# Patient Record
Sex: MALE | Race: WHITE | NOT HISPANIC OR LATINO | Employment: OTHER | URBAN - METROPOLITAN AREA
[De-identification: names, ages, dates, MRNs, and addresses within clinical notes are randomized per-mention and may not be internally consistent; named-entity substitution may affect disease eponyms.]

---

## 2017-01-11 ENCOUNTER — TRANSFERRED RECORDS (OUTPATIENT)
Dept: HEALTH INFORMATION MANAGEMENT | Facility: CLINIC | Age: 57
End: 2017-01-11

## 2017-01-27 ENCOUNTER — MYC MEDICAL ADVICE (OUTPATIENT)
Dept: OTHER | Facility: CLINIC | Age: 57
End: 2017-01-27

## 2017-01-27 NOTE — TELEPHONE ENCOUNTER
Please just have the patient see me next week (not on Monday) to address his concerns. This type of thing can not be optimally handled over the internet.

## 2017-03-07 ENCOUNTER — HOSPITAL ENCOUNTER (OUTPATIENT)
Facility: CLINIC | Age: 57
Setting detail: OBSERVATION
Discharge: HOME | End: 2017-03-09
Attending: EMERGENCY MEDICINE | Admitting: INTERNAL MEDICINE
Payer: COMMERCIAL

## 2017-03-07 ENCOUNTER — APPOINTMENT (OUTPATIENT)
Dept: CT IMAGING | Facility: CLINIC | Age: 57
End: 2017-03-07
Attending: EMERGENCY MEDICINE
Payer: COMMERCIAL

## 2017-03-07 DIAGNOSIS — R07.89 CHEST WALL PAIN: ICD-10-CM

## 2017-03-07 DIAGNOSIS — K59.03 DRUG-INDUCED CONSTIPATION: Primary | ICD-10-CM
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
## 2017-03-07 DIAGNOSIS — J93.9 PNEUMOTHORAX ON RIGHT: ICD-10-CM

## 2017-03-07 LAB
ALBUMIN SERPL-MCNC: 3.9 G/DL (ref 3.4–5)
ALP SERPL-CCNC: 87 U/L (ref 40–150)
ALT SERPL W P-5'-P-CCNC: 29 U/L (ref 0–70)
ANION GAP SERPL CALCULATED.3IONS-SCNC: 6 MMOL/L (ref 3–14)
AST SERPL W P-5'-P-CCNC: 27 U/L (ref 0–45)
BASOPHILS # BLD AUTO: 0.1 10E9/L (ref 0–0.2)
BASOPHILS NFR BLD AUTO: 0.7 %
BILIRUB SERPL-MCNC: 0.2 MG/DL (ref 0.2–1.3)
BUN SERPL-MCNC: 13 MG/DL (ref 7–30)
CALCIUM SERPL-MCNC: 8.9 MG/DL (ref 8.5–10.1)
CHLORIDE SERPL-SCNC: 103 MMOL/L (ref 94–109)
CO2 SERPL-SCNC: 32 MMOL/L (ref 20–32)
CREAT SERPL-MCNC: 0.9 MG/DL (ref 0.66–1.25)
DIFFERENTIAL METHOD BLD: NORMAL
EOSINOPHIL # BLD AUTO: 0.2 10E9/L (ref 0–0.7)
EOSINOPHIL NFR BLD AUTO: 2.7 %
ERYTHROCYTE [DISTWIDTH] IN BLOOD BY AUTOMATED COUNT: 13.3 % (ref 10–15)
GFR SERPL CREATININE-BSD FRML MDRD: 87 ML/MIN/1.7M2
GLUCOSE SERPL-MCNC: 106 MG/DL (ref 70–99)
HCT VFR BLD AUTO: 43.6 % (ref 40–53)
HGB BLD-MCNC: 14.6 G/DL (ref 13.3–17.7)
IMM GRANULOCYTES # BLD: 0 10E9/L (ref 0–0.4)
IMM GRANULOCYTES NFR BLD: 0.1 %
LIPASE SERPL-CCNC: 124 U/L (ref 73–393)
LYMPHOCYTES # BLD AUTO: 2.3 10E9/L (ref 0.8–5.3)
LYMPHOCYTES NFR BLD AUTO: 33.7 %
MCH RBC QN AUTO: 30.7 PG (ref 26.5–33)
MCHC RBC AUTO-ENTMCNC: 33.5 G/DL (ref 31.5–36.5)
MCV RBC AUTO: 92 FL (ref 78–100)
MONOCYTES # BLD AUTO: 0.6 10E9/L (ref 0–1.3)
MONOCYTES NFR BLD AUTO: 8.8 %
NEUTROPHILS # BLD AUTO: 3.6 10E9/L (ref 1.6–8.3)
NEUTROPHILS NFR BLD AUTO: 54 %
NRBC # BLD AUTO: 0 10*3/UL
NRBC BLD AUTO-RTO: 0 /100
PLATELET # BLD AUTO: 154 10E9/L (ref 150–450)
POTASSIUM SERPL-SCNC: 3.5 MMOL/L (ref 3.4–5.3)
PROT SERPL-MCNC: 7.9 G/DL (ref 6.8–8.8)
RBC # BLD AUTO: 4.76 10E12/L (ref 4.4–5.9)
SODIUM SERPL-SCNC: 141 MMOL/L (ref 133–144)
WBC # BLD AUTO: 6.7 10E9/L (ref 4–11)

## 2017-03-07 PROCEDURE — 99218 ZZC INITIAL OBSERVATION CARE,LEVL I: CPT | Performed by: INTERNAL MEDICINE

## 2017-03-07 PROCEDURE — 99285 EMERGENCY DEPT VISIT HI MDM: CPT | Mod: 25

## 2017-03-07 PROCEDURE — G0378 HOSPITAL OBSERVATION PER HR: HCPCS

## 2017-03-07 PROCEDURE — 85025 COMPLETE CBC W/AUTO DIFF WBC: CPT | Performed by: EMERGENCY MEDICINE

## 2017-03-07 PROCEDURE — 25000128 H RX IP 250 OP 636: Performed by: EMERGENCY MEDICINE

## 2017-03-07 PROCEDURE — 80053 COMPREHEN METABOLIC PANEL: CPT | Performed by: EMERGENCY MEDICINE

## 2017-03-07 PROCEDURE — 96375 TX/PRO/DX INJ NEW DRUG ADDON: CPT

## 2017-03-07 PROCEDURE — 25500064 ZZH RX 255 OP 636: Performed by: EMERGENCY MEDICINE

## 2017-03-07 PROCEDURE — 83690 ASSAY OF LIPASE: CPT | Performed by: EMERGENCY MEDICINE

## 2017-03-07 PROCEDURE — 96361 HYDRATE IV INFUSION ADD-ON: CPT

## 2017-03-07 PROCEDURE — 96374 THER/PROPH/DIAG INJ IV PUSH: CPT | Mod: 59

## 2017-03-07 PROCEDURE — 25000132 ZZH RX MED GY IP 250 OP 250 PS 637: Performed by: INTERNAL MEDICINE

## 2017-03-07 PROCEDURE — 71260 CT THORAX DX C+: CPT

## 2017-03-07 PROCEDURE — 99207 ZZC CDG-EXAM COMPONENT: MEETS DETAILED - DOWN CODED: CPT | Performed by: INTERNAL MEDICINE

## 2017-03-07 PROCEDURE — 74177 CT ABD & PELVIS W/CONTRAST: CPT

## 2017-03-07 PROCEDURE — 25000125 ZZHC RX 250: Performed by: EMERGENCY MEDICINE

## 2017-03-07 RX ORDER — AMOXICILLIN 250 MG
1-2 CAPSULE ORAL 2 TIMES DAILY
Status: DISCONTINUED | OUTPATIENT
Start: 2017-03-07 | End: 2017-03-09 | Stop reason: HOSPADM

## 2017-03-07 RX ORDER — IOPAMIDOL 755 MG/ML
104 INJECTION, SOLUTION INTRAVASCULAR ONCE
Status: DISCONTINUED | OUTPATIENT
Start: 2017-03-07 | End: 2017-03-07

## 2017-03-07 RX ORDER — AMOXICILLIN 250 MG
1-2 CAPSULE ORAL 2 TIMES DAILY PRN
Status: DISCONTINUED | OUTPATIENT
Start: 2017-03-07 | End: 2017-03-09 | Stop reason: HOSPADM

## 2017-03-07 RX ORDER — PROCHLORPERAZINE MALEATE 5 MG
5-10 TABLET ORAL EVERY 6 HOURS PRN
Status: DISCONTINUED | OUTPATIENT
Start: 2017-03-07 | End: 2017-03-09 | Stop reason: HOSPADM

## 2017-03-07 RX ORDER — NALOXONE HYDROCHLORIDE 0.4 MG/ML
.1-.4 INJECTION, SOLUTION INTRAMUSCULAR; INTRAVENOUS; SUBCUTANEOUS
Status: DISCONTINUED | OUTPATIENT
Start: 2017-03-07 | End: 2017-03-09 | Stop reason: HOSPADM

## 2017-03-07 RX ORDER — HYDROMORPHONE HYDROCHLORIDE 1 MG/ML
.3-.5 INJECTION, SOLUTION INTRAMUSCULAR; INTRAVENOUS; SUBCUTANEOUS
Status: DISCONTINUED | OUTPATIENT
Start: 2017-03-07 | End: 2017-03-09 | Stop reason: HOSPADM

## 2017-03-07 RX ORDER — ONDANSETRON 4 MG/1
4 TABLET, ORALLY DISINTEGRATING ORAL EVERY 6 HOURS PRN
Status: DISCONTINUED | OUTPATIENT
Start: 2017-03-07 | End: 2017-03-09 | Stop reason: HOSPADM

## 2017-03-07 RX ORDER — BISACODYL 10 MG
10 SUPPOSITORY, RECTAL RECTAL DAILY PRN
Status: DISCONTINUED | OUTPATIENT
Start: 2017-03-07 | End: 2017-03-09 | Stop reason: HOSPADM

## 2017-03-07 RX ORDER — ONDANSETRON 2 MG/ML
4 INJECTION INTRAMUSCULAR; INTRAVENOUS EVERY 6 HOURS PRN
Status: DISCONTINUED | OUTPATIENT
Start: 2017-03-07 | End: 2017-03-09 | Stop reason: HOSPADM

## 2017-03-07 RX ORDER — PRAVASTATIN SODIUM 40 MG
20 TABLET ORAL DAILY
Status: DISCONTINUED | OUTPATIENT
Start: 2017-03-08 | End: 2017-03-09 | Stop reason: HOSPADM

## 2017-03-07 RX ORDER — POLYETHYLENE GLYCOL 3350 17 G/17G
17 POWDER, FOR SOLUTION ORAL DAILY PRN
Status: DISCONTINUED | OUTPATIENT
Start: 2017-03-07 | End: 2017-03-09 | Stop reason: HOSPADM

## 2017-03-07 RX ORDER — METOCLOPRAMIDE HYDROCHLORIDE 5 MG/ML
10 INJECTION INTRAMUSCULAR; INTRAVENOUS EVERY 6 HOURS PRN
Status: DISCONTINUED | OUTPATIENT
Start: 2017-03-07 | End: 2017-03-09 | Stop reason: HOSPADM

## 2017-03-07 RX ORDER — ACETAMINOPHEN 325 MG/1
650 TABLET ORAL EVERY 4 HOURS PRN
Status: DISCONTINUED | OUTPATIENT
Start: 2017-03-07 | End: 2017-03-09 | Stop reason: HOSPADM

## 2017-03-07 RX ORDER — OXYCODONE HYDROCHLORIDE 5 MG/1
5-10 TABLET ORAL
Status: DISCONTINUED | OUTPATIENT
Start: 2017-03-07 | End: 2017-03-09 | Stop reason: HOSPADM

## 2017-03-07 RX ORDER — PROCHLORPERAZINE 25 MG
25 SUPPOSITORY, RECTAL RECTAL EVERY 12 HOURS PRN
Status: DISCONTINUED | OUTPATIENT
Start: 2017-03-07 | End: 2017-03-09 | Stop reason: HOSPADM

## 2017-03-07 RX ORDER — HYDROCHLOROTHIAZIDE 25 MG/1
25 TABLET ORAL EVERY MORNING
Status: DISCONTINUED | OUTPATIENT
Start: 2017-03-08 | End: 2017-03-09 | Stop reason: HOSPADM

## 2017-03-07 RX ORDER — MORPHINE SULFATE 4 MG/ML
4 INJECTION, SOLUTION INTRAMUSCULAR; INTRAVENOUS ONCE
Status: COMPLETED | OUTPATIENT
Start: 2017-03-07 | End: 2017-03-07

## 2017-03-07 RX ORDER — LEVOTHYROXINE SODIUM 125 UG/1
125 TABLET ORAL DAILY
Status: DISCONTINUED | OUTPATIENT
Start: 2017-03-08 | End: 2017-03-09 | Stop reason: HOSPADM

## 2017-03-07 RX ORDER — SODIUM CHLORIDE 9 MG/ML
1000 INJECTION, SOLUTION INTRAVENOUS CONTINUOUS
Status: DISCONTINUED | OUTPATIENT
Start: 2017-03-07 | End: 2017-03-07

## 2017-03-07 RX ORDER — IOPAMIDOL 755 MG/ML
104 INJECTION, SOLUTION INTRAVASCULAR ONCE
Status: COMPLETED | OUTPATIENT
Start: 2017-03-07 | End: 2017-03-07

## 2017-03-07 RX ORDER — METOCLOPRAMIDE 10 MG/1
10 TABLET ORAL EVERY 6 HOURS PRN
Status: DISCONTINUED | OUTPATIENT
Start: 2017-03-07 | End: 2017-03-09 | Stop reason: HOSPADM

## 2017-03-07 RX ADMIN — SODIUM CHLORIDE 1000 ML: 9 INJECTION, SOLUTION INTRAVENOUS at 15:42

## 2017-03-07 RX ADMIN — SENNOSIDES AND DOCUSATE SODIUM 1 TABLET: 8.6; 5 TABLET ORAL at 21:05

## 2017-03-07 RX ADMIN — OXYCODONE HYDROCHLORIDE 10 MG: 5 TABLET ORAL at 21:05

## 2017-03-07 RX ADMIN — HYDROMORPHONE HYDROCHLORIDE 1 MG: 1 INJECTION, SOLUTION INTRAMUSCULAR; INTRAVENOUS; SUBCUTANEOUS at 15:38

## 2017-03-07 RX ADMIN — SODIUM CHLORIDE 72 ML: 9 INJECTION, SOLUTION INTRAVENOUS at 16:00

## 2017-03-07 RX ADMIN — MORPHINE SULFATE 4 MG: 4 INJECTION, SOLUTION INTRAMUSCULAR; INTRAVENOUS at 15:06

## 2017-03-07 RX ADMIN — IOPAMIDOL 104 ML: 755 INJECTION, SOLUTION INTRAVENOUS at 16:00

## 2017-03-07 ASSESSMENT — ENCOUNTER SYMPTOMS
NAUSEA: 0
NECK PAIN: 0
ANAL BLEEDING: 1
SHORTNESS OF BREATH: 0
BACK PAIN: 1
WEAKNESS: 0
HEADACHES: 0
VOMITING: 0
NUMBNESS: 0

## 2017-03-07 NOTE — PHARMACY-ADMISSION MEDICATION HISTORY
Admission Medication History    Admission medication history interview status for the 3/7/2017 admission is complete. See EPIC admission navigator for prior to admission medications     Medication history source reliability:Good    Actions taken by pharmacist (provider contacted, etc):None     Additional medication history information not noted on PTA med list :None    Medication reconciliation/reorder completed by provider prior to medication history? No    Time spent in this activity: 10 minutes    Prior to Admission medications    Medication Sig Last Dose Taking? Auth Provider   hydrochlorothiazide (HYDRODIURIL) 25 MG tablet Take 1 tablet (25 mg) by mouth every morning 3/7/2017 at AM Yes Addison Davis MD   pravastatin (PRAVACHOL) 20 MG tablet Take 1 tablet (20 mg) by mouth daily 3/7/2017 at AM Yes Addison Davis MD   levothyroxine (SYNTHROID, LEVOTHROID) 125 MCG tablet Take 1 tablet (125 mcg) by mouth daily 3/7/2017 at AM Yes Addison Davis MD   Cholecalciferol (VITAMIN D) 2000 UNITS tablet Take 1 tablet by mouth daily Past Week at Unknown time Yes Addison Davis MD   cyanocobalamin 1000 MCG SUBL Place 1,000 mcg under the tongue daily Past Week at Unknown time Yes Reported, Patient   Multiple Vitamin (MULTI-VITAMIN) per tablet Take 1 tablet by mouth 2 times daily  3/7/2017 at AM Yes Reported, Patient       Camron Singh, PharmD

## 2017-03-07 NOTE — ED PROVIDER NOTES
History     Chief Complaint:  Fall    HPI   Ramiro Jeffers is a 56 year old male who presents to the emergency department today with fall. Today at 1400 the patient was in a his garage carrying an object and as he was on the third step while climbing down a flight of hardwood stairs he lost his balance and fell down the remaining stairs, which he estimates to be 8-10 steps, striking a wall at the bottom of the steps before coming to a halt on the landing. He can not recall exactly how he fell down the stairs. He denies striking his head or loss of consciousness. States that it took him 15 minutes to get up off the ground before being able to get to his feet and call his brother. States that his most sever pain is over the right anterior chest, but he also has pain in the low back. Also describes a sensations of spasms in the abdomen. Denies neck pain. States that the anterior chest pain increases with breath, but denies shortness of breath. Denies headache, visual disturbances, focal numbness or weakness. Denies nausea or vomiting.     Allergies:  No Known Drug Allergies      Medications:    Hydrodiuril   Pravastatin   Synthroid      Past Medical History:    Hypothyroidism   DVT  Cataract   Obesity   PE   Venous insufficiency   Vitamin D deficiency     Past Surgical History:    Laparoscopy, repair incisional or ventral hernia   Laparoscopic gastric bypass  Colonoscopy   Phacoemulsification clear cornea with standard intraocular lens implant   Cholecystectomy   Laparoscopic lysis adhesions     Family History:    Osteoporosis   Cancer - Maternal Grandmother - Father     Social History:  The patient was accompanied to the ED by brother.  Smoking Status: Never smoker   Smokeless Tobacco: Never used  Alcohol Use: No     Marital Status:  Single      Review of Systems   Respiratory: Negative for shortness of breath.    Gastrointestinal: Positive for anal bleeding. Negative for nausea and vomiting.   Musculoskeletal:  "Positive for back pain. Negative for neck pain.        Anterior chest wall pain: Positive    Neurological: Negative for weakness, numbness and headaches.   All other systems reviewed and are negative.    Physical Exam   First Vitals:  BP: 146/69  Pulse: 66  Temp: 97.7  F (36.5  C)  Resp: 20  Height: 175.3 cm (5' 9\")  Weight: 94.3 kg (208 lb)  SpO2: 95 %    Physical Exam   General: Middle age man appearing older than stated age sitting on bed holding right side and moaning.     Eye:  Pupils are equal, round, and reactive.  Extraocular movements intact.    ENT:  No rhinorrhea.  Moist mucus membranes.  Normal tongue and tonsil.    Cardiac:  Regular rate and rhythm.  No murmurs, gallops, or rubs.    Pulmonary:  Clear to auscultation bilaterally.  No wheezes, rales, or rhonchi. Difficulty to full assess due to shallow breaths.     Abdomen:  Positive bowel sounds.  Abdomen is soft and non-distended, without focal tenderness.    Musculoskeletal:  Normal movement of all extremities without evidence for deficit. Diffuse pain throughout the entire right anterior chest wall including the 10 th rib. No crepitous.     Skin:  Warm and dry without rashes.    Neurologic:  Non-focal exam without asymmetric weakness or numbness.  GCS 15    Psychiatric:  Normal affect with appropriate interaction with examiner.     Emergency Department Course     Imaging:  Radiology findings were communicated with the patient who voiced understanding of the findings.    CT Chest/Abdomen/Pelvis w Contrast:   IMPRESSION:  1. Mild right pneumothorax.  2. 2.5 cm left thyroid nodule.  3. Several right lung nodules. Follow-up recommended, as above.  4. 1.4 cm indeterminate sclerotic lesion of the medial left ilium.  This has slowly grown compared with June 2010. Continued periodic  surveillance recommended.  Preliminary result per radiology      Laboratory:  Laboratory findings were communicated with the patient who voiced understanding of the " findings.    CBC:WBC 6.7, HGB 14.6,    CMP: Glucose 106 (H) o/w WNL Creatinine 0.90   Lipase: 124     Interventions:  1506 Morphine 4 mg IV   1538 Dilaudid 1 mg IV   1542 NS 1000 mL IV     Emergency Department Course:  Nursing notes and vitals reviewed.  I performed an exam of the patient as documented above.   The patient was sent for a CT Chest/Abdomen/Pelvis w Contrast while in the emergency department, results above.      1748: I spoke with Dr. Avendaño of the hospitalist service regarding patient's presentation, findings, and plan of care.     1851: Discussed the patient with Dr. Santamaria, of the trauma surgery service. They agree with the plan and will evaluate the patient in the hospital.     I discussed the treatment plan with the patient. They expressed understanding of this plan and consented to admission. I discussed the patient with Dr. Avendaño, who will admit the patient to a monitored bed for further evaluation and treatment.     Impression & Plan      Medical Decision Making:  Ramiro Jeffers is a 56 year old male who presents to the emergency department today after a significant fall down some wooden stairs, primarily striking his right chest wall. He was having significant pain on my initial assessment with difficulty on deep inspiration secondary to pain. Due to the nature of the injury advance imaging was obtained, which is fortunately negative for rib fracture or intraabdominal injury. However, it does show a small pneumothorax. Considering that this is a small injury with otherwise normal oxygenation and blood pressure I do not feel that he would require a chest tube at this time. However, this certainty needs to be watched closely has his pain needs to be controled. Therefore, I spoke with Dr. Avendaño of the hospitalist who will admit the patient to observation for repeat imaging in the morning to make sure that this pneumothorax is not inappropriately expanding. I also spoke with Dr. Santamaria of the trauma  service who will consult and review the imaging to determine if further interventions need to be undertaken.     Diagnosis:    ICD-10-CM    1. Pneumothorax on right J93.9    2. Chest wall pain R07.89    3. Fall down stairs, initial encounter W10.8XXA      Scribe Disclosure:  I, Jerald Anne, am serving as a scribe at 3:11 PM on 3/7/2017 to document services personally performed by Trierweiler, Chad A, MD, based on my observations and the provider's statements to me.   3/7/2017    EMERGENCY DEPARTMENT       Trierweiler, Chad A, MD  03/07/17 1249

## 2017-03-07 NOTE — IP AVS SNAPSHOT
[facility]    [address] [place] [address]    Phone:  [phone]                                       After Visit Summary   3/7/2017    Ramiro Jeffers    MRN: [id number]           After Visit Summary Signature Page     I have received my discharge instructions, and my questions have been answered. I have discussed any challenges I see with this plan with the nurse or doctor.    ..........................................................................................................................................  Patient/Patient Representative Signature      ..........................................................................................................................................  Patient Representative Print Name and Relationship to Patient    ..................................................               ................................................  Date                                            Time    ..........................................................................................................................................  Reviewed by Signature/Title    ...................................................              ..............................................  Date                                                            Time

## 2017-03-07 NOTE — LETTER
[facility]  [address] [address]  [phone]      PATIENT DATA    Employee Name: Ramiro Jeffers      : 1960         Ramiro Jeffers was under my care from 3/7/2017-3/9/2017 at [facility]. He was UNABLE to fly at that time.     Please call with questions or concerns.         Amira Bauer PA-C

## 2017-03-07 NOTE — LETTER
[facility]  [address] [address]  [phone]      PATIENT DATA    Patient Name: Ramiro Jeffers      : 1960       Ramiro Jeffers was under my care from 3/7/2017-3/9/2017 at [facility]. He was UNABLE to fly at that time.     Please call with questions or concerns.           Amira Bauer PA-C

## 2017-03-07 NOTE — ED NOTES
[facility]  ED Nurse Handoff Report    ED Chief complaint: Fall (Fell down a flight of stairs - having lots of right torso pain, hurts to breathe.  No LOC)      ED Diagnosis:   Final diagnoses:   Pneumothorax on right   Chest wall pain   Fall down stairs, initial encounter       Code Status: Full Code    Allergies:   Allergies   Allergen Reactions     No Known Allergies        Activity level:  Stand with Assist     Needed?: No    Isolation: No  Infection: Not Applicable    Bariatric?: No      Vital Signs:   Vitals:    03/07/17 1545 03/07/17 1615 03/07/17 1645 03/07/17 1700   BP: 126/77  135/75 130/70   Pulse: 65  67 64   Resp: 19      Temp:       TempSrc:       SpO2: 92% 95%  99%   Weight:       Height:           Cardiac Rhythm: ,        Pain level: 0-10 Pain Scale: 8    Is this patient confused?: No    Patient Report: Initial Complaint: Fell down several stairs landing on his back.  Difficult to take a deep breath and has R sided arm, back and rib pain  Focused Assessment:  Difficulty breathing R rib pain  Tests Performed: labs CT  Abnormal Results:CT, Pneumothorax  Treatments provided: morphine and dilaudid, IVF    Family Comments: Brother and Niece here    OBS brochure/video discussed/provided to patient: Yes    ED Medications:   Medications   sodium chloride (PF) 0.9% PF flush 3 mL (not administered)   sodium chloride (PF) 0.9% PF flush 3 mL (not administered)   0.9% sodium chloride BOLUS (0 mLs Intravenous Stopped 3/7/17 1714)     Followed by   0.9% sodium chloride infusion (not administered)   morphine (PF) injection 4 mg (4 mg Intramuscular Given 3/7/17 1506)   HYDROmorphone (DILAUDID) injection 1 mg (1 mg Intravenous Given 3/7/17 1538)   iopamidol (ISOVUE-370) solution 104 mL (104 mLs Intravenous Given 3/7/17 1600)   sodium chloride 0.9 % for CT scan flush dose 72 mL (72 mLs Intravenous Given 3/7/17 1600)       Drips infusing?:  No      ED NURSE PHONE NUMBER: [phone]

## 2017-03-07 NOTE — IP AVS SNAPSHOT
MRN:[id number]                      After Visit Summary   3/7/2017    Ramiro WILKERSON Chose    MRN: [id number]           Thank you!     Thank you for choosing [facility] for your care. Our goal is always to provide you with excellent care. Hearing back from our patients is one way we can continue to improve our services. Please take a few minutes to complete the written survey that you may receive in the mail after you visit with us. Thank you!        Patient Information     Date Of Birth          1960        About your hospital stay     You were admitted on:  March 7, 2017 [facility]    You were discharged on:  March 9, 2017        Reason for your hospital stay       You were here for evaluation after a fall. Your chest xray showed that the fall caused a small air leak between your lung and chest wall called a pneumothorax. Repeat imaging showed that this had decreased in size.                  Who to Call     For medical emergencies, please call 911.  For non-urgent questions about your medical care, please call your primary care provider or clinic, [phone]          Attending Provider     Provider Specialty    Ramiro Kovacs MD Emergency Medicine    [facility] gard[facility] Emergency Medicine    [facility]       Primary Care Provider Office Phone # Fax #    Rufinosarah Tobi Davis [phone]       [facility] [address]  [place] [address]         When to contact your care team       Seek medical evaluation if your pain or shortness of breath worsens.                  After Care Instructions     Activity       Your activity upon discharge: activity as tolerated. Be cautious to avoid additional trauma.            Diet       Follow this diet upon discharge: Orders Placed This Encounter      Regular Diet Adult            Discharge Instructions       Continue Tylenol and Oxycodone for pain  "control, you should not drive on this medication and should be cautious as it can be sedating.                  Follow-up Appointments     Follow-up and recommended labs and tests        Follow up with primary care provider, Addison Davis, within 7 days. This appointment will be scheduled for you. Discuss ongoing symptom management as well as thyroid and lung nodules.                  Pending Results     No orders found from 3/5/2017 to 3/8/2017.            Statement of Approval     Ordered          03/09/17 1309  I have reviewed and agree with all the recommendations and orders detailed in this document.  EFFECTIVE NOW     Approved and electronically signed by:  Amira Bauer PA-C             Admission Information     Date & Time Provider Department Dept. Phone    3/7/2017 Shahram Avendaño MD [facility] [phone]      Your Vitals Were     Blood Pressure Pulse Temperature Respirations Height Weight    121/63 64 98.2  F (36.8  C) 16 1.753 m (5' 9\") 94.3 kg (208 lb)    Pulse Oximetry BMI (Body Mass Index)                95% 30.72 kg/m2          [email or web address] Information     [employer] gives you secure access to your electronic health record. If you see a primary care provider, you can also send messages to your care team and make appointments. If you have questions, please call your primary care clinic.  If you do not have a primary care provider, please call [phone] and they will assist you.        Care EveryWhere ID     This is your Care EveryWhere ID. This could be used by other organizations to access your [place] medical records  [id number]           Review of your medicines      START taking        Dose / Directions    acetaminophen 325 MG tablet   Commonly known as:  TYLENOL   Used for:  Chest wall pain        Dose:  650 mg   Take 2 tablets (650 mg) by mouth every 4 hours as needed for mild pain   Quantity:  100 tablet   Refills:  0       oxyCODONE 5 MG IR tablet   Commonly " known as:  ROXICODONE   Used for:  Chest wall pain        Dose:  5-10 mg   Take 1-2 tablets (5-10 mg) by mouth every 4 hours as needed for moderate to severe pain   Quantity:  20 tablet   Refills:  0       senna-docusate 8.6-50 MG per tablet   Commonly known as:  SENOKOT-S;PERICOLACE   Used for:  Drug-induced constipation        Dose:  1-2 tablet   Take 1-2 tablets by mouth 2 times daily as needed (constipation )   Quantity:  100 tablet   Refills:  0         CONTINUE these medicines which have NOT CHANGED        Dose / Directions    cyanocobalamin 1000 MCG Subl sublingual tablet        Dose:  1000 mcg   Place 1,000 mcg under the tongue daily   Refills:  0       hydrochlorothiazide 25 MG tablet   Commonly known as:  HYDRODIURIL   Used for:  Essential hypertension with goal blood pressure less than 130/80        Dose:  25 mg   Take 1 tablet (25 mg) by mouth every morning   Quantity:  90 tablet   Refills:  3       levothyroxine 125 MCG tablet   Commonly known as:  SYNTHROID/LEVOTHROID   Used for:  Hypothyroidism, unspecified type        Dose:  125 mcg   Take 1 tablet (125 mcg) by mouth daily   Quantity:  90 tablet   Refills:  3       Multi-vitamin Tabs tablet   Generic drug:  multivitamin, therapeutic with minerals        Dose:  1 tablet   Take 1 tablet by mouth 2 times daily   Refills:  0       pravastatin 20 MG tablet   Commonly known as:  PRAVACHOL   Used for:  Hyperlipidemia LDL goal <100        Dose:  20 mg   Take 1 tablet (20 mg) by mouth daily   Quantity:  90 tablet   Refills:  3       vitamin D 2000 UNITS tablet   Used for:  Vitamin D deficiency        Dose:  1 tablet   Take 1 tablet by mouth daily   Quantity:  100 tablet   Refills:  12            Where to get your medicines      Some of these will need a paper prescription and others can be bought over the counter. Ask your nurse if you have questions.     Bring a paper prescription for each of these medications     oxyCODONE 5 MG IR tablet       You don't  need a prescription for these medications     acetaminophen 325 MG tablet    senna-docusate 8.6-50 MG per tablet                Protect others around you: Learn how to safely use, store and throw away your medicines at www.disposemymeds.org.             Medication List: This is a list of all your medications and when to take them. Check marks below indicate your daily home schedule. Keep this list as a reference.      Medications           Morning Afternoon Evening Bedtime As Needed    acetaminophen 325 MG tablet   Commonly known as:  TYLENOL   Take 2 tablets (650 mg) by mouth every 4 hours as needed for mild pain   Last time this was given:  650 mg on 3/8/2017  7:23 AM                            Every 4 hours for pain       cyanocobalamin 1000 MCG Subl sublingual tablet   Place 1,000 mcg under the tongue daily                                hydrochlorothiazide 25 MG tablet   Commonly known as:  HYDRODIURIL   Take 1 tablet (25 mg) by mouth every morning   Last time this was given:  25 mg on 3/9/2017  7:42 AM                                   levothyroxine 125 MCG tablet   Commonly known as:  SYNTHROID/LEVOTHROID   Take 1 tablet (125 mcg) by mouth daily   Last time this was given:  125 mcg on 3/9/2017  7:42 AM                                   Multi-vitamin Tabs tablet   Take 1 tablet by mouth 2 times daily   Generic drug:  multivitamin, therapeutic with minerals                                oxyCODONE 5 MG IR tablet   Commonly known as:  ROXICODONE   Take 1-2 tablets (5-10 mg) by mouth every 4 hours as needed for moderate to severe pain   Last time this was given:  10 mg on 3/9/2017  1:18 PM                            Every 4 hours needed for pain       pravastatin 20 MG tablet   Commonly known as:  PRAVACHOL   Take 1 tablet (20 mg) by mouth daily   Last time this was given:  20 mg on 3/9/2017  7:42 AM                                   senna-docusate 8.6-50 MG per tablet   Commonly known as:  SENOKOT-S;PERICOLACE    Take 1-2 tablets by mouth 2 times daily as needed (constipation )   Last time this was given:  2 tablets on 3/9/2017  7:45 AM                                      vitamin D 2000 UNITS tablet   Take 1 tablet by mouth daily

## 2017-03-08 ENCOUNTER — APPOINTMENT (OUTPATIENT)
Dept: GENERAL RADIOLOGY | Facility: CLINIC | Age: 57
End: 2017-03-08
Attending: PHYSICIAN ASSISTANT
Payer: COMMERCIAL

## 2017-03-08 PROCEDURE — G0378 HOSPITAL OBSERVATION PER HR: HCPCS

## 2017-03-08 PROCEDURE — 25000132 ZZH RX MED GY IP 250 OP 250 PS 637: Performed by: INTERNAL MEDICINE

## 2017-03-08 PROCEDURE — 99221 1ST HOSP IP/OBS SF/LOW 40: CPT | Performed by: SURGERY

## 2017-03-08 PROCEDURE — 71020 XR CHEST 2 VW: CPT

## 2017-03-08 PROCEDURE — 99226 ZZC SUBSEQUENT OBSERVATION CARE,LEVEL III: CPT | Performed by: PHYSICIAN ASSISTANT

## 2017-03-08 RX ADMIN — OXYCODONE HYDROCHLORIDE 10 MG: 5 TABLET ORAL at 02:22

## 2017-03-08 RX ADMIN — OXYCODONE HYDROCHLORIDE 10 MG: 5 TABLET ORAL at 07:22

## 2017-03-08 RX ADMIN — LEVOTHYROXINE SODIUM 125 MCG: 125 TABLET ORAL at 07:26

## 2017-03-08 RX ADMIN — HYDROCHLOROTHIAZIDE 25 MG: 25 TABLET ORAL at 08:17

## 2017-03-08 RX ADMIN — OXYCODONE HYDROCHLORIDE 5 MG: 5 TABLET ORAL at 13:50

## 2017-03-08 RX ADMIN — Medication 1 MG: at 02:22

## 2017-03-08 RX ADMIN — ACETAMINOPHEN 650 MG: 325 TABLET, FILM COATED ORAL at 07:23

## 2017-03-08 RX ADMIN — OXYCODONE HYDROCHLORIDE 10 MG: 5 TABLET ORAL at 17:07

## 2017-03-08 RX ADMIN — SENNOSIDES AND DOCUSATE SODIUM 1 TABLET: 8.6; 5 TABLET ORAL at 08:20

## 2017-03-08 RX ADMIN — SENNOSIDES AND DOCUSATE SODIUM 2 TABLET: 8.6; 5 TABLET ORAL at 20:49

## 2017-03-08 RX ADMIN — OXYCODONE HYDROCHLORIDE 10 MG: 5 TABLET ORAL at 20:48

## 2017-03-08 ASSESSMENT — PAIN DESCRIPTION - DESCRIPTORS
DESCRIPTORS: ACHING
DESCRIPTORS: ACHING

## 2017-03-08 NOTE — PROGRESS NOTES
Full note dictated. Fall with right chest pain. Plan to stay today. CXR tomorrow morning. If OK, home, if PTX larger, will keep here. Work on ambulation today.

## 2017-03-08 NOTE — PROGRESS NOTES
Observation goals PRIOR TO DISCHARGE      -diagnostic tests and consults completed and resulted -Partially met  -vital signs normal or at patient baseline-Met   -adequate pain control on oral analgesics -Not met-  Stable to improved pneumothorax- Not met  Nurse to notify provider when observation goals have been met and patient is ready for discharge.

## 2017-03-08 NOTE — PLAN OF CARE
Problem: Goal Outcome Summary  Goal: Goal Outcome Summary  Outcome: Improving  Pt doing well. A/o. VSS. Pain controlled on oral oxy. Up SBA with cane. Ambulated in the hallway. Tolerated well. Some SOB with exertion. Oxygen sats at 89-90% with ambulation. Climbed right back to 93 % and above on rest. Tolerated reg diet. Voiding. Seen by trauma. Repeat chest Xray in am. Dc home if stable tomorrow.

## 2017-03-08 NOTE — H&P
[facility]  History and Physical   Hospitalist Service  Shahram Avendaño MD    Ramiro Jeffers MRN# [id number]   YOB: 1960 Age: 56 year old      Date of Admission:  3/7/2017    Assessment & Plan   Ramiro Jeffers is a 56 year old male with history of DVT/PE, HTN, s/p gastric bypass, hyperlipidemia, chronic venous insufficiency, hypothyroidism, who presents with a fall down the stairs and right sided rib pain.     Small right sided pneumothorax secondary to fall down stairs  Currently requiring 2L supplemental O2.  No fracture on CT, but likely has contusion of right chest wall.  - Trauma surgery consult  - Wean O2 as able  - Repeat CXR in AM; if any worsening, will defer to trauma surgery if chest tube should be considered  - Pain control with Tylenol, oxycodone and IV Dilaudid    Hypertension  - Continue PTA HCTZ    Right lung nodules  Incidental finding on chest CT showing several small right lung nodules, largest measuring 9mm.  - Radiologist recommends outpatient f/u with serial CT f/u at 3 months for 2 years vs PET    Hypothyroidism  2.5 cm left thyroid nodule  - Continue Synthroid  - F/u with PCP regarding thyroid nodule    Prophylaxis: Pneumatic Compression Devices  Code Status: Full  Disposition: Observation status, possible discharge to home tomorrow if pain control and PTX stable to improved    Primary Care Physician   Addison Davis    Chief Complaint   Fall  History is obtained from the patient.    History of Present Illness   Ramiro Jeffers is a 56 year old male with history of DVT/PE, HTN, hyperlipidemia, chronic venous insufficiency, hypothyroidism, who presents with a fall down the stairs and right sided rib pain.  The patient was in a garage when he tripped while carrying a small table, falling down 8-9 steps and landing hard on his right side.  He denies loss of consciousness or hitting his head.  The small table also landed on him.  He was unable to get up until 15  minutes later, but continued to have significant right sided chest wall pain and some shortness of breath.  He also reported some spasms involving his abdominal muscles.    Patient denies drinking alcohol.  Does not smoke.  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    In the emergency department, CT chest/abd/pelvis was positive for a small right pneumothorax but no fractures.  Trauma surgery is being contacted.    Past Medical History    I have reviewed this patient's medical history and updated it with pertinent information if needed.   Past Medical History   Diagnosis Date     ACQUIRED HYPOTHYROIDISM       Blood clot in the legs      Calculus of gallbladder without mention of cholecystitis or obstruction      Cataract      Obesity, unspecified      significant weight loss w/diet alone, on 10-15-10, BMI was 56.4     pulmonary emboli 6-2010     Unprovoked large bilateral pulmonary emboli without source identified on lower extremity dopplers, pt had a transient moderate lupus inhibitor upon initial presentation in June , 2010 which was gone in September, 2010;  all other thrombophilic workup is normal     Pulmonary embolus, bilateral      Shortness of breath      on exertion     Venous insufficiency      Viral pneumonia, unspecified      Vitamin D deficiency      Past Surgical History   I have reviewed this patient's surgical history and updated it with pertinent information if needed.  Past Surgical History   Procedure Laterality Date     Laparoscopy, surgical; repair incisional or ventral hernia       repair of ventral strangulated surgery     C nonspecific procedure  10-     Laparoscopic gastric bypass,     C nonspecific procedure  10-     1.  Attempted laparoscopic exploration with conversion to: .  Abdominal exploration. 3.  Reduction of incarcerated incisional hernia. 4.  Repair of incisional hernia.5.  Gastrostomy tube placement (#22 [place]uvian) into defunctionalized stomach.6.   Enterotomy with bowel decompression and primary repair. 7.  Abdominal lavage.      Colonoscopy  11/17/2011     Procedure:COLONOSCOPY; colonoscopy; Surgeon:ELENA OROURKE; Location: GI     Phacoemulsification clear cornea with standard intraocular lens implant  12/19/2011     Procedure:PHACOEMULSIFICATION CLEAR CORNEA WITH STANDARD INTRAOCULAR LENS IMPLANT; RIGHT PHACOEMULSIFICATION CLEAR CORNEA WITH STANDARD INTRAOCULAR LENS IMPLANT ; Surgeon:ALLISON ANTONIO; Location: [facility]     Laparoscopic cholecystectomy  9/28/2012     Procedure: LAPAROSCOPIC CHOLECYSTECTOMY;  LAPAROSCOPIC CHOLECYSTECTOMY, LYSIS OF ADHESIONS;  Surgeon: Dutch Matta MD;  Location:  OR     Laparoscopic lysis adhesions  9/28/2012     Procedure: LAPAROSCOPIC LYSIS ADHESIONS;;  Surgeon: Dutch Matta MD;  Location:  OR     Prior to Admission Medications   Prior to Admission Medications   Prescriptions Last Dose Informant Patient Reported? Taking?   Cholecalciferol (VITAMIN D) 2000 UNITS tablet Past Week at Unknown time Self No Yes   Sig: Take 1 tablet by mouth daily   Multiple Vitamin (MULTI-VITAMIN) per tablet 3/7/2017 at AM Self Yes Yes   Sig: Take 1 tablet by mouth 2 times daily    cyanocobalamin 1000 MCG SUBL Past Week at Unknown time Self Yes Yes   Sig: Place 1,000 mcg under the tongue daily   hydrochlorothiazide (HYDRODIURIL) 25 MG tablet 3/7/2017 at AM Self No Yes   Sig: Take 1 tablet (25 mg) by mouth every morning   levothyroxine (SYNTHROID, LEVOTHROID) 125 MCG tablet 3/7/2017 at AM Self No Yes   Sig: Take 1 tablet (125 mcg) by mouth daily   pravastatin (PRAVACHOL) 20 MG tablet 3/7/2017 at AM Self No Yes   Sig: Take 1 tablet (20 mg) by mouth daily      Facility-Administered Medications: None     Allergies   Allergies   Allergen Reactions     No Known Allergies      Social History   I have reviewed this patient's social history and updated it with pertinent information if needed.   Ramiro  reports that he has never smoked. He has  "never used smokeless tobacco. He reports that he does not drink alcohol or use illicit drugs.    Family History   I have reviewed this patient's family history and updated it with pertinent information if needed.    Problem (# of Occurrences) Relation (Name,Age of Onset)    CANCER (2) Maternal Grandmother: ? lung cancer, Father: melanoma    OSTEOPOROSIS (1) Mother    Thyroid Disease (1) Mother: no thyroid        Review of Systems   The 10 point Review of Systems is negative other than noted in the HPI or here.    Physical Exam   /70  Pulse 64  Temp 97.7  F (36.5  C) (Oral)  Resp 19  Ht 5' 9\" (1.753 m)  Wt 208 lb (94.3 kg)  SpO2 99%  BMI 30.72 kg/m2  Constitutional: Awake, NAD  HEENT: NC, AT, EOMI  Cardiovascular: S1, S2, regular rhythm  Respiratory: CTAB, no crackles  Gastrointestinal: Soft, NT, ND, no rebound  Neurologic: No focal deficits, oriented  Psychiatric: Appropriate affect  Lymphatic: No edema  MSK: Pain over right chest wall    Data   All pertinent laboratory and imaging results from this encounter were personally reviewed.    Recent Labs  Lab 03/07/17  1530   WBC 6.7   HGB 14.6   MCV 92         POTASSIUM 3.5   CHLORIDE 103   CO2 32   BUN 13   CR 0.90   ANIONGAP 6   GUERITA 8.9   *   ALBUMIN 3.9   PROTTOTAL 7.9   BILITOTAL 0.2   ALKPHOS 87   ALT 29   AST 27   LIPASE 124       Recent Results (from the past 24 hour(s))   CT Chest/Abdomen/Pelvis w Contrast    Narrative    CT CHEST/ABDOMEN/PELVIS WITH CONTRAST   3/7/2017 4:04 PM     HISTORY: Right-sided chest wall/abdominal pain, trauma.    TECHNIQUE: 104 mL Isovue-370. Radiation dose for this scan was reduced  using automated exposure control, adjustment of the mA and/or kV  according to patient size, or iterative reconstruction technique.    COMPARISON: CT abdomen dated 10/13/2014    FINDINGS:   Chest: Coronary artery calcifications. There is a 2.5 cm partially  calcified left thyroid nodule. Small amount of asymmetric right  pleural " thickening or fluid without significant overall effusion. Mild  right pneumothorax. No definite right rib fracture is appreciated.  There are small right lung nodules on images 32, 37, 38, 43, 23, 41.  The largest of these measures 9 mm (image 43). Depending on the  clinical scenario, serial CT followup (first at 3 months, total 2  years), versus PET scan should be performed.    Abdomen, pelvis: Changes of gastric surgery and cholecystectomy.  Lumbosacral spondylolytic spondylolisthesis. At the medial aspect of  the left ilium, there is a 1.4 cm lesion which has demonstrated slow  growth compared with June 2010. This is of unknown significance and is  too small to confidently characterize. Nothing else acute with respect  to the remaining upper abdominal organs.      Impression    IMPRESSION:  1. Mild right pneumothorax.  2. 2.5 cm left thyroid nodule.  3. Several right lung nodules. Follow-up recommended, as above.  4. 1.4 cm indeterminate sclerotic lesion of the medial left ilium.  This has slowly grown compared with June 2010. Continued periodic  surveillance recommended.    CLAUDIA BLOUNT MD

## 2017-03-08 NOTE — PROGRESS NOTES
[facility]    Hospitalist Progress Note      Assessment & Plan   Ramiro Jeffers is a 56 year old male  With a history of gastric bypass surgery, DVT/PE, HLD, chronic venous insufficiency and hypothyroidism and who was admitted on 3/7/2017 for evaluation after a fall with resulting pneumothorax.     Small right sided pneumothorax secondary to fall down stairs   No fracture on CT, but likely has contusion of right chest wall. Xray was obtained this am which shows small apical pneumothorax on the right. Patient continues to have right sided chest discomfort but has been weaned off supplemental oxygen.   - Trauma surgery consulted, discussed patient with Dr. Santamaria this morning. Patient will remain in the hospital tonight to repeat CXR in the am. If pneumothorax is stable he can likely discharge, if size increasing he will require chest tube.   - Pain control with Tylenol, oxycodone and IV Dilaudid     Hypertension. Blood pressure is well controlled.   - Continue PTA HCTZ     Right lung nodules  Incidental finding on chest CT showing several small right lung nodules, largest measuring 9mm.  - Radiologist recommends outpatient f/u with serial CT f/u at 3 months for 2 years vs PET     Hypothyroidism  2.5 cm left thyroid nodule  - Continue Synthroid  - F/u with PCP regarding thyroid nodule    DVT Prophylaxis: Pneumatic Compression Devices, ambulate every shift   Code Status: Full Code    Disposition: Expected discharge pending results of CXR tomorrow     This patient was seen and examined with Dr. Del Valle who agrees with the above plan.    Amira Bauer PA-C    Interval History   Patient continues to have right chest wall pain today, worse with inspiration. Pain is controlled with oral medication. He does have some mild shortness of breath. Ambulated with assistance of cane.     -Data reviewed today: I reviewed all new labs and imaging results over the last 24 hours. I personally reviewed the chest x-ray  image(s) showing small right pneumothorax.    Physical Exam   Temp: 99  F (37.2  C) Temp src: Oral BP: 124/75 Pulse: 64 Heart Rate: 52 Resp: 16 SpO2: 94 % O2 Device: None (Room air) Oxygen Delivery: 2 LPM  Vitals:    03/07/17 1428 03/07/17 2006   Weight: 94.3 kg (208 lb) 94.3 kg (208 lb)     Vital Signs with Ranges  Temp:  [96.7  F (35.9  C)-99  F (37.2  C)] 99  F (37.2  C)  Pulse:  [64-68] 64  Heart Rate:  [52-68] 52  Resp:  [16-22] 16  BP: (105-146)/(61-98) 124/75  SpO2:  [88 %-99 %] 94 %  I/O last 3 completed shifts:  In: -   Out: 400 [Urine:400]    Constitutional: Alert and oriented, sitting up in bed. Appears comfortable and is appropriately conversant.   ENT: normal cephalic, moist mucous membranes  Eyes:  Pupils are equal and reactive to light, EOMI  Respiratory: Lungs clear to auscultation bilaterally, no crackles or wheezing, no increased work of breathing  Cardiovascular: Regular rate and rhythm, no murmur is appreciated.   Extremities: chronic stasis changes lower extremities. Varicose veins   GI: Normal active bowel sounds, abdomen soft, non-tender  Skin/Integumen: warm, dry. No rash or ecchymosis. White plaques to elbows.   Neurologic: cranial nerves 2-12 grossly intact. No focal deficits.       Medications        sodium chloride (PF)  3 mL Intracatheter Q8H     hydrochlorothiazide  25 mg Oral QAM     levothyroxine  125 mcg Oral Daily     pravastatin  20 mg Oral Daily     senna-docusate  1-2 tablet Oral BID       Data     Recent Labs  Lab 03/07/17  1530   WBC 6.7   HGB 14.6   MCV 92         POTASSIUM 3.5   CHLORIDE 103   CO2 32   BUN 13   CR 0.90   ANIONGAP 6   GUERITA 8.9   *   ALBUMIN 3.9   PROTTOTAL 7.9   BILITOTAL 0.2   ALKPHOS 87   ALT 29   AST 27   LIPASE 124       Recent Results (from the past 24 hour(s))   CT Chest/Abdomen/Pelvis w Contrast    Narrative    CT CHEST/ABDOMEN/PELVIS WITH CONTRAST   3/7/2017 4:04 PM     HISTORY: Right-sided chest wall/abdominal pain,  trauma.    TECHNIQUE: 104 mL Isovue-370. Radiation dose for this scan was reduced  using automated exposure control, adjustment of the mA and/or kV  according to patient size, or iterative reconstruction technique.    COMPARISON: CT abdomen dated 10/13/2014    FINDINGS:   Chest: Coronary artery calcifications. There is a 2.5 cm partially  calcified left thyroid nodule. Small amount of asymmetric right  pleural thickening or fluid without significant overall effusion. Mild  right pneumothorax. No definite right rib fracture is appreciated.  There are small right lung nodules on images 32, 37, 38, 43, 23, 41.  The largest of these measures 9 mm (image 43). Depending on the  clinical scenario, serial CT followup (first at 3 months, total 2  years), versus PET scan should be performed.    Abdomen, pelvis: Changes of gastric surgery and cholecystectomy.  Lumbosacral spondylolytic spondylolisthesis. At the medial aspect of  the left ilium, there is a 1.4 cm lesion which has demonstrated slow  growth compared with June 2010. This is of unknown significance and is  too small to confidently characterize. Nothing else acute with respect  to the remaining upper abdominal organs.      Impression    IMPRESSION:  1. Mild right pneumothorax.  2. 2.5 cm left thyroid nodule.  3. Several right lung nodules. Follow-up recommended, as above.  4. 1.4 cm indeterminate sclerotic lesion of the medial left ilium.  This has slowly grown compared with June 2010. Continued periodic  surveillance recommended.    CLAUDIA BLOUNT MD   XR Chest 2 Views    Narrative    CHEST TWO VIEWS 3/8/2017 7:35 AM     HISTORY: Pneumothorax.    COMPARISON: Chest CT from 3/7/2017.    FINDINGS: Small apical pneumothorax on the right noted. No left  pneumothorax. There are no acute infiltrates. The cardiac silhouette  is not enlarged. Pulmonary vasculature is unremarkable.       Impression    IMPRESSION: Small right apical pneumothorax.    JOSE M BRENNER MD

## 2017-03-08 NOTE — PLAN OF CARE
Problem: Discharge Planning  Goal: Discharge Planning (Adult, OB, Behavioral, Peds)  Outcome: No Change  Pt A+O. VSS on RA. Lungs diminished. C/o pain with deep breathing and activity. Right arm/rib cage pain managed with oxycodone and tylenol. Up to bathroom with A1. Offered cane but pt declined. Right hand/wrist hurting so pt declined walker as well. Appears shaky on feet. Repeated X ray this morning for small pneumothorax. Nursing will continue to monitor.

## 2017-03-08 NOTE — PROGRESS NOTES
Observation goals PRIOR TO DISCHARGE        -diagnostic tests and consults completed and resulted -Partially met. Repeat X-ray this morning   -vital signs normal or at patient baseline-Met   -adequate pain control on oral analgesics -partially met. Managed with oxycodone and tylenol.   Stable to improved pneumothorax- Not met. X-ray this morning   Nurse to notify provider when observation goals have been met and patient is ready for discharge.

## 2017-03-08 NOTE — PROGRESS NOTES
Observation goals PRIOR TO DISCHARGE        -diagnostic tests and consults completed and resulted -Partially met  -vital signs normal or at patient baseline-Met   -adequate pain control on oral analgesics -Not met   Stable to improved pneumothorax- Not met  Nurse to notify provider when observation goals have been met and patient is ready for discharge.

## 2017-03-08 NOTE — PROGRESS NOTES
Observation goals PRIOR TO DISCHARGE        -diagnostic tests and consults completed and resulted -Partially met.  -vital signs normal or at patient baseline-Met   -adequate pain control on oral analgesics -partially met. Managed with oxycodone and tylenol.   Stable to improved pneumothorax- Xray result not changed from yesterday  Nurse to notify provider when observation goals have been met and patient is ready for discharge.

## 2017-03-08 NOTE — CONSULTS
REQUESTING PHYSICIAN:  None stated.      DIAGNOSIS:  Fall with right chest pain.      HISTORY OF PRESENT ILLNESS:  Ramiro Jeffers is a 56-year-old gentleman who fell down 8-10 steps that were on unpadded hitting a wall at the bottom of the steps.  He felt stuck at the bottom and does not believe he lost consciousness or hit his head.  He said because he was stuck, it took him 10-15 minutes to get up and call his brother.  His pain is mainly on the right side in the anterior and lateral chest.  He also has some low back pain, although it is much better than last night.  He denies any weakness or dizziness.  No real abdominal pain.  No leg,  arm, or head pain.  He says he has had no changes in vision or nausea or vomiting.  He does have some pain with deep inspiration today, but it is better than last night.      PAST MEDICAL HISTORY:  Morbid obesity treated with laparoscopic gastric bypass by myself.  He has also had a cholecystectomy, incisional hernia repair.  He has had cataract surgery.  Medical history includes obesity, blood clots, hypothyroidism, venous insufficiency, and cataracts.      FAMILY HISTORY:  Positive for cancers in father and grandmother.  There is a history of osteoporosis also.      SOCIAL HISTORY:  The patient is single.  He does not use tobacco or alcohol regularly.  He lives near here.  He was planning to go on a trip to visit his mother who lives out of town.      MEDICATIONS:  Pravastatin, Synthroid, HydroDIURIL, vitamins.      ALLERGIES:  No known drug allergies.      REVIEW OF SYSTEMS:  Done in 10-point completion.   RESPIRATORY:  Negative for dyspnea.   GASTROINTESTINAL:  Positive for right-sided abdominal discomfort, but negative for vomiting or diarrhea.   MUSCULOSKELETAL:  Positive for chest and back pain.   NEUROLOGIC:  No weakness, dizziness, visual changes or headaches.    ENDOCRINE:  No recent changes in his endocrine.    INTEGUMENTARY:  No cuts or bruises that he knows of.    GENITOURINARY:  He has been able to urinate.    Other systems are reviewed and are negative.      PHYSICAL EXAMINATION:   GENERAL:  The patient is alert and oriented.  Hearing and speech seem good.  Mental status seems appropriate.     VITAL SIGNS:  Afebrile with a pulse between 50 and 70.  Blood pressure is normal.   HEENT:  Head is normocephalic and atraumatic.  Nose is normal.  There are no clinical skull fractures.  There are no clinical facial bone fractures.  No abrasions or lacerations on his head.   NECK:  Nontender and supple.  The cervical spine is nontender.  There is no cervical adenopathy or thyromegaly.  There is no stridor.  There is no tracheal deviation.  Clavicles are nontender and stable.    CHEST:  Sternum is nontender to pressure.  Right-sided ribs are tender laterally.  Left-sided ribs are nontender and breathing is normal and nonlabored.  Thoracic spine is nontender.  Scapulae are nontender.   ABDOMEN:  Soft and nontender.  There is redundant skin.  There is no localized guarding, rebound or masses.  Pelvic bone is stable.   EXTREMITIES:  Redundant skin venous stasis changes on his ankles but he does have good motion and no evidence of long bone or patellar fracture.  He has good strength in all fours.  He is able to stand on his own.     NEUROLOGIC:  He seems appropriate and alert.  Seems to have good sensation in all fours.      LABORATORY DATA:  Remarkable for normal metabolic battery and CBC.  Chest CT is reviewed.  Images reviewed last night and are reviewed again today.  He does have a small pneumothorax on the right side.  There is no clear pleural effusion and no definite lung injury or rib fractures on the CT.  Solid organs appear good on the abdominal views, but no evidence of free air or free fluid.  Chest x-ray this morning also shows a small pneumothorax that is apical.  These films were reviewed and I discussed these with the radiologist and it is hard to evaluate the change in  size from a CT to a plain chest x-ray view, although it is not definitely larger.      IMPRESSION:  Fall with pulmonary injury, small pneumothorax.  Does not need a chest tube at this size.  He lives alone and is not mobilizing very well independently, so our plan is to keep him 1more night and if the chest x-ray Thursday morning shows no progression of pneumothorax, permit him to be dismissed to home.  The Trauma Service will follow with you.         ELENA KAY MD             D: 2017 13:07   T: 2017 13:25   MT: MAGDIEL      Name:     TASIA ANDREA   MRN:      [id number]        Account:       [id number]   :      1960           Consult Date:  2017      Document: [id number]

## 2017-03-08 NOTE — PROGRESS NOTES
Observation goals PRIOR TO DISCHARGE        -diagnostic tests and consults completed and resulted -Partially met. Xray done this am. Result pending  -vital signs normal or at patient baseline-Met   -adequate pain control on oral analgesics -partially met. Managed with oxycodone and tylenol.   Stable to improved pneumothorax- Xray result pending.   Nurse to notify provider when observation goals have been met and patient is ready for discharge.

## 2017-03-08 NOTE — PLAN OF CARE
Problem: Goal Outcome Summary  Goal: Goal Outcome Summary  Outcome: No Change  Care Plan Summary Note: Pt A&Ox4 , VSS on 2L/NC. C/o on Right  Upper and lower back pain managed by PO oxycodone. States pain worse with deep breathing and activity. Denies N&V, headache, SOB, dyspnea. Regular diet, IV saline locked, SBA with a walker. CXR in AM. Continue to monitor.

## 2017-03-08 NOTE — PROGRESS NOTES
CT and labs reviewed. Patient known to us from prior weight loss surgery. CT shows small pneumothorax. Would not place chest tube at this time. Recheck CXR in AM. Will follow with you.

## 2017-03-09 ENCOUNTER — APPOINTMENT (OUTPATIENT)
Dept: GENERAL RADIOLOGY | Facility: CLINIC | Age: 57
End: 2017-03-09
Attending: SURGERY
Payer: COMMERCIAL

## 2017-03-09 VITALS
RESPIRATION RATE: 16 BRPM | WEIGHT: 208 LBS | TEMPERATURE: 98.2 F | BODY MASS INDEX: 30.81 KG/M2 | HEART RATE: 64 BPM | HEIGHT: 69 IN | OXYGEN SATURATION: 95 % | SYSTOLIC BLOOD PRESSURE: 121 MMHG | DIASTOLIC BLOOD PRESSURE: 63 MMHG

## 2017-03-09 PROCEDURE — 99217 ZZC OBSERVATION CARE DISCHARGE: CPT | Performed by: PHYSICIAN ASSISTANT

## 2017-03-09 PROCEDURE — 71020 XR CHEST 2 VW: CPT

## 2017-03-09 PROCEDURE — G0378 HOSPITAL OBSERVATION PER HR: HCPCS

## 2017-03-09 PROCEDURE — 25000132 ZZH RX MED GY IP 250 OP 250 PS 637: Performed by: INTERNAL MEDICINE

## 2017-03-09 RX ORDER — AMOXICILLIN 250 MG
1-2 CAPSULE ORAL 2 TIMES DAILY PRN
Qty: 100 TABLET | Refills: 0 | COMMUNITY
Start: 2017-03-09 | End: 2018-11-13

## 2017-03-09 RX ORDER — ACETAMINOPHEN 325 MG/1
650 TABLET ORAL EVERY 4 HOURS PRN
Qty: 100 TABLET | Refills: 0 | COMMUNITY
Start: 2017-03-09 | End: 2018-11-13

## 2017-03-09 RX ORDER — OXYCODONE HYDROCHLORIDE 5 MG/1
5-10 TABLET ORAL EVERY 4 HOURS PRN
Qty: 20 TABLET | Refills: 0 | Status: ON HOLD | OUTPATIENT
Start: 2017-03-09 | End: 2018-11-15

## 2017-03-09 RX ADMIN — PRAVASTATIN SODIUM 20 MG: 40 TABLET ORAL at 07:42

## 2017-03-09 RX ADMIN — LEVOTHYROXINE SODIUM 125 MCG: 125 TABLET ORAL at 07:42

## 2017-03-09 RX ADMIN — OXYCODONE HYDROCHLORIDE 10 MG: 5 TABLET ORAL at 13:18

## 2017-03-09 RX ADMIN — SENNOSIDES AND DOCUSATE SODIUM 2 TABLET: 8.6; 5 TABLET ORAL at 07:45

## 2017-03-09 RX ADMIN — HYDROCHLOROTHIAZIDE 25 MG: 25 TABLET ORAL at 07:42

## 2017-03-09 RX ADMIN — OXYCODONE HYDROCHLORIDE 10 MG: 5 TABLET ORAL at 07:42

## 2017-03-09 NOTE — PLAN OF CARE
Problem: Goal Outcome Summary  Goal: Goal Outcome Summary  Outcome: Adequate for Discharge Date Met:  03/09/17  Pt doing well. A/o. VSS. Pain controlled. Up adlib. Tolerating diet. Xray done. Result reviewed. Ok to dc home by hospitalist and trauma. Discharge paper work given by jerel ZALDIVAR, hard copy script and work letter given to patient. Attempted to make follow up appointment with dr Davis. Left ms with his . Pt will follow up with dusty if no call received by end of this week. Dc home with brother.

## 2017-03-09 NOTE — PROGRESS NOTES
Trauma Team Note    CXR Reviewed.  Pneumothorax decreasing (down ~6mm since yesterday) in size.  Chest tube not needed.  Pt on room air and Sats maintained above 94% all of today.  Pain is adequately controlled on PO narcotics.    OK to discharge from Trauma standpoint. Home with oxycodone and stool softeners.   Should Follow up with Dr. Davis, his PCP, within 1 week.  Does not need to see Dr Santamaria.  Appreciate Hospitalist team's help with this patient!

## 2017-03-09 NOTE — PLAN OF CARE
Pt discharging to home, brother is waiting at exit 6. Pt states pain is improved, states he understands discharge instructions and when to contact for f/u emergency care. Pt will wait for call from clinic to set follow up appointment. Pt took belongings with him.

## 2017-03-09 NOTE — PROGRESS NOTES
Observation goals PRIOR TO DISCHARGE        -diagnostic tests and consults completed and resulted  Met  -vital signs normal or at patient baseline-Met   -adequate pain control on oral analgesics -Met  Stable to improved pneumothorax- Met    Awaiting Trauma consult; discharge pending.

## 2017-03-09 NOTE — PROGRESS NOTES
Observation goals PRIOR TO DISCHARGE        -diagnostic tests and consults completed and resulted -Partially met.  -vital signs normal or at patient baseline-Met   -adequate pain control on oral analgesics -partially met. Managed with oxycodone.  Stable to improved pneumothorax- not met.  Xray result not changed from yesterday  Nurse to notify provider when observation goals have been met and patient is ready for discharge.

## 2017-03-09 NOTE — PLAN OF CARE
Problem: Goal Outcome Summary  Goal: Goal Outcome Summary  Outcome: Improving  VSS and WNL for this pt. Pt transfers independently, LS diminished and is A&Ox4. Pt reports SOB with activity which quickly resolves with rest. Pt reports pain and took oxy with improvement.Plan to discharge in AM if chest xray shows improvement. Continue POC, will pass on and continue to monitor.

## 2017-03-09 NOTE — PROGRESS NOTES
Observation goals PRIOR TO DISCHARGE        -diagnostic tests and consults completed and resulted -Partially met. Xray result pending.   -vital signs normal or at patient baseline-Met   -adequate pain control on oral analgesics -partially met. Managed with oxycodone.  Stable to improved pneumothorax- Xray result pending.     Nurse to notify provider when observation goals have been met and patient is ready for discharge.

## 2017-03-09 NOTE — PLAN OF CARE
Problem: Goal Outcome Summary  Goal: Goal Outcome Summary  Outcome: No Change  VSS and WNL for this pt. Pt transfers independently, LS diminished and is A&Ox4. Pt has edema and erythema to R side from fall and complained of pain. Was given oxy with reports of improvement. Plan to discharge today if pneumothorax improves. Continue POC, will pass on and continue to monitor.

## 2017-03-16 ENCOUNTER — TELEPHONE (OUTPATIENT)
Dept: OTHER | Facility: CLINIC | Age: 57
End: 2017-03-16

## 2017-03-16 ENCOUNTER — OFFICE VISIT (OUTPATIENT)
Dept: OTHER | Facility: CLINIC | Age: 57
End: 2017-03-16
Attending: INTERNAL MEDICINE
Payer: COMMERCIAL

## 2017-03-16 VITALS
OXYGEN SATURATION: 97 % | WEIGHT: 204 LBS | BODY MASS INDEX: 30.13 KG/M2 | DIASTOLIC BLOOD PRESSURE: 67 MMHG | HEART RATE: 74 BPM | SYSTOLIC BLOOD PRESSURE: 112 MMHG

## 2017-03-16 DIAGNOSIS — E04.1 THYROID NODULE: ICD-10-CM

## 2017-03-16 DIAGNOSIS — R91.8 PULMONARY NODULES: Primary | ICD-10-CM

## 2017-03-16 PROCEDURE — 99211 OFF/OP EST MAY X REQ PHY/QHP: CPT

## 2017-03-16 PROCEDURE — 99214 OFFICE O/P EST MOD 30 MIN: CPT | Mod: ZP | Performed by: INTERNAL MEDICINE

## 2017-03-16 NOTE — PROGRESS NOTES
Ramiro Jeffers is a 56 year old male who is presenting at the current time to discuss his diagnosi(es) of multiple incidentally discovered nodules after working up a recent mechanical fall.      Review Of Systems  Skin: negative  Eyes: negative  Ears/Nose/Throat: negative  Respiratory: No shortness of breath, dyspnea on exertion, cough, or hemoptysis  Cardiovascular: negative  Gastrointestinal: negative  Genitourinary: negative  Musculoskeletal: positive for pleuritic chest pain when coughing  Neurologic: negative  Psychiatric: negative  Hematologic/Lymphatic/Immunologic: negative  Endocrine: negative    PAST MEDICAL HISTORY:                  Past Medical History   Diagnosis Date     ACQUIRED HYPOTHYROIDISM       Blood clot in the legs      Calculus of gallbladder without mention of cholecystitis or obstruction      Cataract      Obesity, unspecified      significant weight loss w/diet alone, on 10-15-10, BMI was 56.4     pulmonary emboli 6-2010     Unprovoked large bilateral pulmonary emboli without source identified on lower extremity dopplers, pt had a transient moderate lupus inhibitor upon initial presentation in June , 2010 which was gone in September, 2010;  all other thrombophilic workup is normal     Pulmonary embolus, bilateral      Shortness of breath      on exertion     Venous insufficiency      Viral pneumonia, unspecified      Vitamin D deficiency        PAST SURGICAL HISTORY:                  Past Surgical History   Procedure Laterality Date     Laparoscopy, surgical; repair incisional or ventral hernia       repair of ventral strangulated surgery     C nonspecific procedure  10-     Laparoscopic gastric bypass,     C nonspecific procedure  10-     1.  Attempted laparoscopic exploration with conversion to: .  Abdominal exploration. 3.  Reduction of incarcerated incisional hernia. 4.  Repair of incisional hernia.5.  Gastrostomy tube placement (#22 Greek) into defunctionalized stomach.6.   Enterotomy with bowel decompression and primary repair. 7.  Abdominal lavage.      Colonoscopy  11/17/2011     Procedure:COLONOSCOPY; colonoscopy; Surgeon:ELENA OROURKE; Location: GI     Phacoemulsification clear cornea with standard intraocular lens implant  12/19/2011     Procedure:PHACOEMULSIFICATION CLEAR CORNEA WITH STANDARD INTRAOCULAR LENS IMPLANT; RIGHT PHACOEMULSIFICATION CLEAR CORNEA WITH STANDARD INTRAOCULAR LENS IMPLANT ; Surgeon:ALLISON ANTONIO; Location: [facility]     Laparoscopic cholecystectomy  9/28/2012     Procedure: LAPAROSCOPIC CHOLECYSTECTOMY;  LAPAROSCOPIC CHOLECYSTECTOMY, LYSIS OF ADHESIONS;  Surgeon: Dutch Matta MD;  Location:  OR     Laparoscopic lysis adhesions  9/28/2012     Procedure: LAPAROSCOPIC LYSIS ADHESIONS;;  Surgeon: Dutch Matta MD;  Location:  OR       CURRENT MEDICATIONS:                  Current Outpatient Prescriptions   Medication Sig Dispense Refill     oxyCODONE (ROXICODONE) 5 MG IR tablet Take 1-2 tablets (5-10 mg) by mouth every 4 hours as needed for moderate to severe pain 20 tablet 0     acetaminophen (TYLENOL) 325 MG tablet Take 2 tablets (650 mg) by mouth every 4 hours as needed for mild pain 100 tablet 0     senna-docusate (SENOKOT-S;PERICOLACE) 8.6-50 MG per tablet Take 1-2 tablets by mouth 2 times daily as needed (constipation ) 100 tablet 0     hydrochlorothiazide (HYDRODIURIL) 25 MG tablet Take 1 tablet (25 mg) by mouth every morning 90 tablet 3     pravastatin (PRAVACHOL) 20 MG tablet Take 1 tablet (20 mg) by mouth daily 90 tablet 3     levothyroxine (SYNTHROID, LEVOTHROID) 125 MCG tablet Take 1 tablet (125 mcg) by mouth daily 90 tablet 3     Cholecalciferol (VITAMIN D) 2000 UNITS tablet Take 1 tablet by mouth daily 100 tablet 12     cyanocobalamin 1000 MCG SUBL Place 1,000 mcg under the tongue daily       Multiple Vitamin (MULTI-VITAMIN) per tablet Take 1 tablet by mouth 2 times daily          ALLERGIES:                  Allergies   Allergen  Reactions     No Known Allergies        SOCIAL HISTORY:                  Social History     Social History     Marital status: Single     Spouse name: N/A     Number of children: N/A     Years of education: N/A     Occupational History      Self     Social History Main Topics     Smoking status: Never Smoker     Smokeless tobacco: Never Used     Alcohol use No     Drug use: No     Sexual activity: Not on file     Other Topics Concern     Not on file     Social History Narrative       FAMILY HISTORY:                   Family History   Problem Relation Age of Onset     OSTEOPOROSIS Mother      Thyroid Disease Mother      no thyroid     CANCER Maternal Grandmother      ? lung cancer     CANCER Father      melanoma            Physical exam was not performed as it was not indicated.    A/P:    (R91.8) Pulmonary nodules  (primary encounter diagnosis)  Comment: He has pulmonary, thyroid, and ilium nodules. The ilium nodule is growing, and the thyroid nodule is large at 25 mm  Plan: PET Oncology Whole Body        Rather than getting a simple CT scan in three months, I would prefer to move to PET scan now given multiple nodules    (E04.1) Thyroid nodule  Plan: US Head Neck Soft Tissue    Greater than one half the twenty five minutes total spent on the pt's visit were spent providing education and counselling to the patient regarding the above matters.

## 2017-03-16 NOTE — MR AVS SNAPSHOT
After Visit Summary   3/16/2017    Ramiro Jeffers    MRN: [id number]           Patient Information     Date Of Birth          1960        Visit Information        Provider Department      3/16/2017 9:30 AM Addison Davis MD [facility]      Today's Diagnoses     Pulmonary nodules    -  1    Thyroid nodule           Follow-ups after your visit        Future tests that were ordered for you today     Open Future Orders        Priority Expected Expires Ordered    PET Oncology Whole Body Routine 3/23/2017 3/16/2018 3/16/2017    US Head Neck Soft Tissue Routine 6/14/2017 3/16/2018 3/16/2017            Who to contact     If you have questions or need follow up information about today's clinic visit or your schedule please contact [facility] directly at [phone].  Normal or non-critical lab and imaging results will be communicated to you by Post-ihart, letter or phone within 4 business days after the clinic has received the results. If you do not hear from us within 7 days, please contact the clinic through Post-ihart or phone. If you have a critical or abnormal lab result, we will notify you by phone as soon as possible.  Submit refill requests through [employer] or call your pharmacy and they will forward the refill request to us. Please allow 3 business days for your refill to be completed.          Additional Information About Your Visit        MyChart Information     [employer] gives you secure access to your electronic health record. If you see a primary care provider, you can also send messages to your care team and make appointments. If you have questions, please call your primary care clinic.  If you do not have a primary care provider, please call [phone] and they will assist you.        Care EveryWhere ID     This is your Care EveryWhere ID. This could be used by other organizations to access your  [facility] medical records  [id number]        Your Vitals Were     Pulse Pulse Oximetry BMI (Body Mass Index)             74 97% 30.13 kg/m2          Blood Pressure from Last 3 Encounters:   03/16/17 112/67   03/09/17 121/63   12/30/16 124/74    Weight from Last 3 Encounters:   03/16/17 204 lb (92.5 kg)   03/07/17 208 lb (94.3 kg)   12/30/16 207 lb (93.9 kg)               Primary Care Provider Office Phone # Fax #    Addison Davis [phone]       [facility] [address]  [place] [address]        Thank you!     Thank you for choosing [facility] [place]  for your care. Our goal is always to provide you with excellent care. Hearing back from our patients is one way we can continue to improve our services. Please take a few minutes to complete the written survey that you may receive in the mail after your visit with us. Thank you!             Your Updated Medication List - Protect others around you: Learn how to safely use, store and throw away your medicines at www.disposemymeds.org.          This list is accurate as of: 3/16/17 10:13 AM.  Always use your most recent med list.                   Brand Name Dispense Instructions for use    acetaminophen 325 MG tablet    TYLENOL    100 tablet    Take 2 tablets (650 mg) by mouth every 4 hours as needed for mild pain       cyanocobalamin 1000 MCG Subl sublingual tablet      Place 1,000 mcg under the tongue daily       hydrochlorothiazide 25 MG tablet    HYDRODIURIL    90 tablet    Take 1 tablet (25 mg) by mouth every morning       levothyroxine 125 MCG tablet    SYNTHROID/LEVOTHROID    90 tablet    Take 1 tablet (125 mcg) by mouth daily       Multi-vitamin Tabs tablet   Generic drug:  multivitamin, therapeutic with minerals      Take 1 tablet by mouth 2 times daily       oxyCODONE 5 MG IR tablet    ROXICODONE    20 tablet    Take 1-2 tablets (5-10 mg) by mouth every 4 hours as needed for moderate to severe pain        pravastatin 20 MG tablet    PRAVACHOL    90 tablet    Take 1 tablet (20 mg) by mouth daily       senna-docusate 8.6-50 MG per tablet    SENOKOT-S;PERICOLACE    100 tablet    Take 1-2 tablets by mouth 2 times daily as needed (constipation )       vitamin D 2000 UNITS tablet     100 tablet    Take 1 tablet by mouth daily

## 2017-03-16 NOTE — NURSING NOTE
"Chief Complaint   Patient presents with     RECHECK     hospital follow up       Initial /67 (BP Location: Left arm, Patient Position: Chair, Cuff Size: Adult Large)  Pulse 74  Wt 204 lb (92.5 kg)  SpO2 97%  BMI 30.13 kg/m2 Estimated body mass index is 30.13 kg/(m^2) as calculated from the following:    Height as of 3/7/17: 5' 9\" (1.753 m).    Weight as of this encounter: 204 lb (92.5 kg).  Medication Reconciliation: complete     Face to face nursing time: 8 minutes    Ana Gonzalez MA     "

## 2017-03-16 NOTE — TELEPHONE ENCOUNTER
Called  to start PA for PET SCAN  They will contact RN at  with response  Abdelrahman Barnes, RN, BSN

## 2017-03-20 ENCOUNTER — HOSPITAL ENCOUNTER (OUTPATIENT)
Dept: PET IMAGING | Facility: CLINIC | Age: 57
Discharge: HOME | End: 2017-03-20
Attending: INTERNAL MEDICINE | Admitting: INTERNAL MEDICINE
Payer: COMMERCIAL

## 2017-03-20 DIAGNOSIS — R91.8 PULMONARY NODULES: ICD-10-CM

## 2017-03-20 LAB — GLUCOSE BLDC GLUCOMTR-MCNC: 94 MG/DL (ref 70–99)

## 2017-03-20 PROCEDURE — 34300033 ZZH RX 343: Performed by: INTERNAL MEDICINE

## 2017-03-20 PROCEDURE — 82962 GLUCOSE BLOOD TEST: CPT

## 2017-03-20 PROCEDURE — A9552 F18 FDG: HCPCS | Performed by: INTERNAL MEDICINE

## 2017-03-20 PROCEDURE — 78816 PET IMAGE W/CT FULL BODY: CPT | Mod: PI

## 2017-03-20 RX ADMIN — FLUDEOXYGLUCOSE F-18 12.33 MCI.: 500 INJECTION, SOLUTION INTRAVENOUS at 12:07

## 2017-03-21 ENCOUNTER — OFFICE VISIT (OUTPATIENT)
Dept: OTHER | Facility: CLINIC | Age: 57
End: 2017-03-21
Attending: INTERNAL MEDICINE
Payer: COMMERCIAL

## 2017-03-21 VITALS
HEART RATE: 81 BPM | SYSTOLIC BLOOD PRESSURE: 138 MMHG | DIASTOLIC BLOOD PRESSURE: 78 MMHG | BODY MASS INDEX: 29.27 KG/M2 | OXYGEN SATURATION: 97 % | WEIGHT: 198.2 LBS

## 2017-03-21 DIAGNOSIS — R91.8 PULMONARY NODULES: ICD-10-CM

## 2017-03-21 DIAGNOSIS — R59.1 LYMPHADENOPATHY OF HEAD AND NECK: ICD-10-CM

## 2017-03-21 DIAGNOSIS — E04.1 THYROID NODULE: ICD-10-CM

## 2017-03-21 DIAGNOSIS — R42 DIZZINESS: Primary | ICD-10-CM

## 2017-03-21 PROCEDURE — 99211 OFF/OP EST MAY X REQ PHY/QHP: CPT

## 2017-03-21 PROCEDURE — 99214 OFFICE O/P EST MOD 30 MIN: CPT | Mod: ZP | Performed by: INTERNAL MEDICINE

## 2017-03-21 NOTE — NURSING NOTE
"Chief Complaint   Patient presents with     RECHECK     follow up; lab results       Initial /79 (BP Location: Right arm, Patient Position: Chair, Cuff Size: Adult Large)  Pulse 81  Wt 198 lb 3.2 oz (89.9 kg)  SpO2 97%  BMI 29.27 kg/m2 Estimated body mass index is 29.27 kg/(m^2) as calculated from the following:    Height as of 3/7/17: 5' 9\" (1.753 m).    Weight as of this encounter: 198 lb 3.2 oz (89.9 kg).  Medication Reconciliation: complete     Face to Face nursing time 7 minutes     Mackenzie Wooten CMA      "

## 2017-03-21 NOTE — MR AVS SNAPSHOT
After Visit Summary   3/21/2017    Ramiro Jeffers    MRN: [id number]           Patient Information     Date Of Birth          1960        Visit Information        Provider Department      3/21/2017 1:00 PM Addison Davis MD [facility] [place] [facility] [place]      Today's Diagnoses     Dizziness    -  1    Lymphadenopathy of head and neck        Pulmonary nodules        Thyroid nodule           Follow-ups after your visit        Your next 10 appointments already scheduled     Mar 23, 2017  7:40 AM CDT   US THYROID with US5   [facility] ([facility])    [address] [address]   [phone]           Please bring a list of your medicines (including vitamins, minerals and over-the-counter drugs). Also, tell your doctor about any allergies you may have. Wear comfortable clothes and leave your valuables at home.  You do not need to do anything special to prepare for your exam.  Please call the Imaging Department at your exam site with any questions.            Mar 23, 2017 10:30 AM CDT   US BIOPSY FINE NEEDLE ASPIRATION LYMPH NODE with US4   [facility] ([facility])    [address] [address]   [phone]           Tell us in advance if there s any chance you may be pregnant.  Bring a list of your medicines to the exam. Include vitamins, minerals and over-the-counter drugs.  If you take blood thinners, you may need to stop taking them a few days before treatment. Talk to your doctor before stopping these medicines. You will need a blood test the morning of your exam.   Stop taking Coumadin (warfarin) 3 days before your exam. Restart the day after your exam.   If you take aspirin, you may need to stop taking it 3 days before your scan.   If you take Plavix, Ticlid, Pletal or Persantine, you may need to stop taking them 5 days before  your scan. Please talk to your doctor before stopping these medicines.  If you will receive sedation for this test (medicine to help you relax):   See your family doctor for an exam within 30 days of treatment.   Plan for an adult to drive you home and stay with you for at least 6 hours.   Follow the eating and drinking guidelines checked below:   No eating or drinking for 4 hours before your test. You may take medicine with small sips of water.   If you have diabetes:If you take insulin, call your diabetes care team. Do not take diabetes pills on the morning of your test. If you take metformin (Avandamet, Glucophage, Glucovance, Metaglip) and received contrast, wait 48 hours before re-starting this medicine.  Please call the Imaging Department at your exam site with any questions.            Mar 24, 2017  9:00 AM CDT   CT LYMPH NODE BIOPSY with SHCT1, SH BODY RAD   [facility] CT ([facility])    [address] [place] [address] [phone]           Plan for an adult to drive you home and stay with you until morning.  Tell your doctor in advance:   If you have any allergies.   If you are breastfeeding or there s any chance you are pregnant.  Please bring any scans or X-rays taken at other hospitals, if they may be helpful. Also bring a list of your medicines, including vitamins, minerals and over-the-counter drugs. It is safest to leave valuables at home.  If you take blood thinners, you may need to stop taking them a few days before treatment. Talk to your doctor before stopping these medicines. You will need a blood test the morning of your exam.   Stop taking Coumadin (warfarin) 5 days before treatment. Restart the day after treatment.   If you take aspirin, you may need to stop taking it 3 days before treatment.   If you take Plavix, Ticlid, Pletal or Persantine, you may need to stop taking them 5 days before your scan.  If you have diabetes:   If you take insulin, call  your diabetes care team. Ask if you should adjust your insulin before this test.   If your kidney function is normal, continue taking your metformin (Avandamet, Glucophage, Glucovance, Metaglip) on the day of your exam.   If your kidney function is abnormal, wait 48 hours before restarting this medicine.  If you have any questions, please call the imaging department where you will have your exam.  The day before your exam: Drink extra fluids at least six 8-ounce glasses (unless your doctor tells you to restrict fluids). The day of your exam: No eating or drinking for 4 hours before your test. You may take medicine with small sips of water.              Future tests that were ordered for you today     Open Future Orders        Priority Expected Expires Ordered    US Biopsy FNA Lymph Node Routine  3/21/2018 3/21/2017    US Thyroid Routine 3/22/2017 4/21/2017 3/21/2017    CT Lymph Node Biopsy Routine 3/22/2017 4/21/2017 3/21/2017    CT Needle Biopsy Unlisted Routine 3/23/2017 3/21/2018 3/21/2017    Holter Monitor 24 hour - Adult Routine 3/30/2017 5/5/2017 3/21/2017            Who to contact     If you have questions or need follow up information about today's clinic visit or your schedule please contact [facility] directly at [phone].  Normal or non-critical lab and imaging results will be communicated to you by [employer] Labshart, letter or phone within 4 business days after the clinic has received the results. If you do not hear from us within 7 days, please contact the clinic through Sypher Labshart or phone. If you have a critical or abnormal lab result, we will notify you by phone as soon as possible.  Submit refill requests through [employer] or call your pharmacy and they will forward the refill request to us. Please allow 3 business days for your refill to be completed.          Additional Information About Your Visit        BTI Systems Information     [employer] gives you secure access to your electronic  health record. If you see a primary care provider, you can also send messages to your care team and make appointments. If you have questions, please call your primary care clinic.  If you do not have a primary care provider, please call [phone] and they will assist you.        Care EveryWhere ID     This is your Care EveryWhere ID. This could be used by other organizations to access your [facility] medical records  [id number]        Your Vitals Were     Pulse Pulse Oximetry BMI (Body Mass Index)             81 97% 29.27 kg/m2          Blood Pressure from Last 3 Encounters:   03/21/17 138/78   03/16/17 112/67   03/09/17 121/63    Weight from Last 3 Encounters:   03/21/17 198 lb 3.2 oz (89.9 kg)   03/16/17 204 lb (92.5 kg)   03/07/17 208 lb (94.3 kg)              We Performed the Following     Follow-Up with Vascular Medicine        Primary Care Provider Office Phone # Fax #    Addison Davis [phone]       [facility] [address]  [facility] [address]        Thank you!     Thank you for choosing [facility] [place]  for your care. Our goal is always to provide you with excellent care. Hearing back from our patients is one way we can continue to improve our services. Please take a few minutes to complete the written survey that you may receive in the mail after your visit with us. Thank you!             Your Updated Medication List - Protect others around you: Learn how to safely use, store and throw away your medicines at www.disposemymeds.org.          This list is accurate as of: 3/21/17  2:33 PM.  Always use your most recent med list.                   Brand Name Dispense Instructions for use    acetaminophen 325 MG tablet    TYLENOL    100 tablet    Take 2 tablets (650 mg) by mouth every 4 hours as needed for mild pain       cyanocobalamin 1000 MCG Subl sublingual tablet      Place 1,000 mcg under the tongue daily       hydrochlorothiazide 25 MG  tablet    HYDRODIURIL    90 tablet    Take 1 tablet (25 mg) by mouth every morning       levothyroxine 125 MCG tablet    SYNTHROID/LEVOTHROID    90 tablet    Take 1 tablet (125 mcg) by mouth daily       Multi-vitamin Tabs tablet   Generic drug:  multivitamin, therapeutic with minerals      Take 1 tablet by mouth 2 times daily       oxyCODONE 5 MG IR tablet    ROXICODONE    20 tablet    Take 1-2 tablets (5-10 mg) by mouth every 4 hours as needed for moderate to severe pain       pravastatin 20 MG tablet    PRAVACHOL    90 tablet    Take 1 tablet (20 mg) by mouth daily       senna-docusate 8.6-50 MG per tablet    SENOKOT-S;PERICOLACE    100 tablet    Take 1-2 tablets by mouth 2 times daily as needed (constipation )       vitamin D 2000 UNITS tablet     100 tablet    Take 1 tablet by mouth daily

## 2017-03-21 NOTE — PROGRESS NOTES
SUBJECTIVE:    CC: Ramiro Jeffers is a 56 year old male who presents for:       Dizziness  Lymphadenopathy of head and neck  Pulmonary nodules  Thyroid nodule          HISTORIES:    PROBLEM LIST:                   Patient Active Problem List   Diagnosis     Acquired hypothyroidism     pulmonary emboli     Venous insufficiency     Vitamin D deficiency     Bariatric surgery status     Postsurgical nonabsorption     Bilateral knee pain     Anemia     Pneumothorax       PAST MEDICAL HISTORY:                  Past Medical History   Diagnosis Date     ACQUIRED HYPOTHYROIDISM       Blood clot in the legs      Calculus of gallbladder without mention of cholecystitis or obstruction      Cataract      Obesity, unspecified      significant weight loss w/diet alone, on 10-15-10, BMI was 56.4     pulmonary emboli 6-2010     Unprovoked large bilateral pulmonary emboli without source identified on lower extremity dopplers, pt had a transient moderate lupus inhibitor upon initial presentation in June , 2010 which was gone in September, 2010;  all other thrombophilic workup is normal     Pulmonary embolus, bilateral      Shortness of breath      on exertion     Venous insufficiency      Viral pneumonia, unspecified      Vitamin D deficiency        PAST SURGICAL HISTORY:                  Past Surgical History   Procedure Laterality Date     Laparoscopy, surgical; repair incisional or ventral hernia       repair of ventral strangulated surgery     C nonspecific procedure  10-     Laparoscopic gastric bypass,     C nonspecific procedure  10-     1.  Attempted laparoscopic exploration with conversion to: .  Abdominal exploration. 3.  Reduction of incarcerated incisional hernia. 4.  Repair of incisional hernia.5.  Gastrostomy tube placement (#22 Welsh) into defunctionalized stomach.6.  Enterotomy with bowel decompression and primary repair. 7.  Abdominal lavage.      Colonoscopy  11/17/2011     Procedure:COLONOSCOPY;  colonoscopy; Surgeon:ELENA OROURKE; Location: GI     Phacoemulsification clear cornea with standard intraocular lens implant  12/19/2011     Procedure:PHACOEMULSIFICATION CLEAR CORNEA WITH STANDARD INTRAOCULAR LENS IMPLANT; RIGHT PHACOEMULSIFICATION CLEAR CORNEA WITH STANDARD INTRAOCULAR LENS IMPLANT ; Surgeon:ALLISON ANTONIO; Location: [facility]     Laparoscopic cholecystectomy  9/28/2012     Procedure: LAPAROSCOPIC CHOLECYSTECTOMY;  LAPAROSCOPIC CHOLECYSTECTOMY, LYSIS OF ADHESIONS;  Surgeon: Dutch Matta MD;  Location:  OR     Laparoscopic lysis adhesions  9/28/2012     Procedure: LAPAROSCOPIC LYSIS ADHESIONS;;  Surgeon: Dutch Matta MD;  Location:  OR       CURRENT MEDICATIONS:                  Current Outpatient Prescriptions   Medication Sig Dispense Refill     oxyCODONE (ROXICODONE) 5 MG IR tablet Take 1-2 tablets (5-10 mg) by mouth every 4 hours as needed for moderate to severe pain 20 tablet 0     acetaminophen (TYLENOL) 325 MG tablet Take 2 tablets (650 mg) by mouth every 4 hours as needed for mild pain 100 tablet 0     senna-docusate (SENOKOT-S;PERICOLACE) 8.6-50 MG per tablet Take 1-2 tablets by mouth 2 times daily as needed (constipation ) 100 tablet 0     hydrochlorothiazide (HYDRODIURIL) 25 MG tablet Take 1 tablet (25 mg) by mouth every morning 90 tablet 3     pravastatin (PRAVACHOL) 20 MG tablet Take 1 tablet (20 mg) by mouth daily 90 tablet 3     levothyroxine (SYNTHROID, LEVOTHROID) 125 MCG tablet Take 1 tablet (125 mcg) by mouth daily 90 tablet 3     Cholecalciferol (VITAMIN D) 2000 UNITS tablet Take 1 tablet by mouth daily 100 tablet 12     cyanocobalamin 1000 MCG SUBL Place 1,000 mcg under the tongue daily       Multiple Vitamin (MULTI-VITAMIN) per tablet Take 1 tablet by mouth 2 times daily          ALLERGIES:                  Allergies   Allergen Reactions     No Known Allergies        SOCIAL HISTORY:                  Social History     Social History     Marital status: Single      Spouse name: N/A     Number of children: N/A     Years of education: N/A     Occupational History      Self     Social History Main Topics     Smoking status: Never Smoker     Smokeless tobacco: Never Used     Alcohol use No     Drug use: No     Sexual activity: Not on file     Other Topics Concern     Not on file     Social History Narrative       FAMILY HISTORY:                   Family History   Problem Relation Age of Onset     OSTEOPOROSIS Mother      Thyroid Disease Mother      no thyroid     CANCER Maternal Grandmother      ? lung cancer     CANCER Father      melanoma                             REVIEW OF SYSTEMS:  CONSTITUTIONAL:fatigue, dizziness  EYES: no subjective changes in visual acuity, no photophobia  ENT/MOUTH: no complaints of rhinorrhea, nasal congestion, sore throat, hearing changes  RESP:no SOB  CV: no c/o exertional chest pressure or ALAS  GI: No abdominal pain, constipation, change in bowel movements, nausea, pyrosis, BRBPR  :no polyuria or polydipsia, no dysuria, no gross hematuria  MUSCULOSKELATAL:no arthalgias or myalgias  INTEGUMENTARY/SKIN: no pruritis, rashes, or moles with recent change in size, shape, or pigmentation  BREAST: no lumps, masses, or discharges  NEURO: no gross sensory or motor symptoms, no dizziness, no confusion  ENDOCRINE: no polyuria or polydipsia, no heat or cold intolerance  HEME/ALLERGY/IMMUNE: no fevers, chills, night sweats, or unwanted weight loss  PSYCHIATRIC: no depression, anxiety, or internal stimuli    EXAM:    /78 (BP Location: Right arm, Patient Position: Chair, Cuff Size: Adult Large)  Pulse 81  Wt 198 lb 3.2 oz (89.9 kg)  SpO2 97%  BMI 29.27 kg/m2  BMI: Body mass index is 29.27 kg/(m^2).  GENERAL APPEARANCE: healthy, alert and no distress   EXAM:  EYES: clear conjunctiva, no cataracts, no obvious fundoscopic abnormalities  HENT: oropharynx, nares, and TMs are WNL  NECK: no JVD, thyromegaly or lymphadenopathy, no cervical  bruits  RESP: clear to auscultation without rales, wheezes, or rhonchi  CV: RRR, no murmurs, gallops, or rubs  LYMPH: no cervical , axillary, or inguinal lymphadenopathy appreciated  GI: NABS, ND/NT, no masses or organomegally appreciated  MS: no obvoius clinicallly relevant arthropathy, no evidence vasculitis  SKIN: no nevi clinically suspicious for malignancy are noted  NEURO: CN II-XII intact, no localizing sensory or motor abnoramlities noted, DTRs symmetrical bilaterally  PSYCH: Mental status exam reveals the pt to have normal mood and affect. There is no disorder of thought form or content. There is no response to internal stimuli. There is no suicidal or homicidal ideation.           (R42) Dizziness  (primary encounter diagnosis)  Comment: he has dizziness every day and states his HR is in the 30s when he has the dizziness  Plan: Holter Monitor 24 hour - Adult, CT Needle         Biopsy Unlisted, US Thyroid, CT Lymph Node         Biopsy            (R59.1) Lymphadenopathy of head and neck  Comment: this si suspicious for malignancy. I will proceed to Bx.  Plan: Holter Monitor 24 hour - Adult, CT Needle         Biopsy Unlisted, US Thyroid, CT Lymph Node         Biopsy           (R91.8) Pulmonary nodules  Comment: non lit up on PET scan  Plan: Holter Monitor 24 hour - Adult, CT Needle         Biopsy Unlisted, US Thyroid, CT Lymph Node         Biopsy           (E04.1) Thyroid nodule  Comment: check thyroid U/S  Plan: Holter Monitor 24 hour - Adult, CT Needle         Biopsy Unlisted, US Thyroid, CT Lymph Node         Biopsy                                   I have discussed with patient the risks, benefits, medications, treatment options and modalities.   I have instructed the patient to call or schedule a follow-up appointment if any problems or failure to improve.

## 2017-03-22 RX ORDER — LIDOCAINE 40 MG/G
CREAM TOPICAL
Status: CANCELLED | OUTPATIENT
Start: 2017-03-22

## 2017-03-23 ENCOUNTER — HOSPITAL ENCOUNTER (OUTPATIENT)
Dept: ULTRASOUND IMAGING | Facility: CLINIC | Age: 57
End: 2017-03-23
Attending: INTERNAL MEDICINE | Admitting: INTERNAL MEDICINE
Payer: COMMERCIAL

## 2017-03-23 ENCOUNTER — TELEPHONE (OUTPATIENT)
Dept: OTHER | Facility: CLINIC | Age: 57
End: 2017-03-23

## 2017-03-23 ENCOUNTER — HOSPITAL ENCOUNTER (OUTPATIENT)
Facility: CLINIC | Age: 57
Discharge: HOME | End: 2017-03-23
Attending: INTERNAL MEDICINE | Admitting: INTERNAL MEDICINE
Payer: COMMERCIAL

## 2017-03-23 VITALS — SYSTOLIC BLOOD PRESSURE: 121 MMHG | HEART RATE: 47 BPM | DIASTOLIC BLOOD PRESSURE: 55 MMHG | RESPIRATION RATE: 16 BRPM

## 2017-03-23 DIAGNOSIS — E04.1 THYROID NODULE: ICD-10-CM

## 2017-03-23 DIAGNOSIS — R42 DIZZINESS: ICD-10-CM

## 2017-03-23 DIAGNOSIS — R59.1 LYMPHADENOPATHY OF HEAD AND NECK: ICD-10-CM

## 2017-03-23 DIAGNOSIS — R91.8 PULMONARY NODULES: ICD-10-CM

## 2017-03-23 PROCEDURE — 76942 ECHO GUIDE FOR BIOPSY: CPT

## 2017-03-23 PROCEDURE — 88185 FLOWCYTOMETRY/TC ADD-ON: CPT | Performed by: INTERNAL MEDICINE

## 2017-03-23 PROCEDURE — 76536 US EXAM OF HEAD AND NECK: CPT

## 2017-03-23 PROCEDURE — 40001004 ZZHCL STATISTIC FLOW INT 9-15 ABY TC 88188: Performed by: INTERNAL MEDICINE

## 2017-03-23 PROCEDURE — 25000125 ZZHC RX 250: Performed by: RADIOLOGY

## 2017-03-23 PROCEDURE — 40000863 ZZH STATISTIC RADIOLOGY XRAY, US, CT, MAR, NM

## 2017-03-23 PROCEDURE — 88305 TISSUE EXAM BY PATHOLOGIST: CPT | Mod: 26 | Performed by: RADIOLOGY

## 2017-03-23 PROCEDURE — 00000159 ZZHCL STATISTIC H-SEND OUTS PREP: Performed by: RADIOLOGY

## 2017-03-23 PROCEDURE — 88184 FLOWCYTOMETRY/ TC 1 MARKER: CPT | Performed by: INTERNAL MEDICINE

## 2017-03-23 PROCEDURE — 88305 TISSUE EXAM BY PATHOLOGIST: CPT | Performed by: RADIOLOGY

## 2017-03-23 RX ORDER — LIDOCAINE HYDROCHLORIDE 10 MG/ML
6 INJECTION, SOLUTION EPIDURAL; INFILTRATION; INTRACAUDAL; PERINEURAL ONCE
Status: COMPLETED | OUTPATIENT
Start: 2017-03-23 | End: 2017-03-23

## 2017-03-23 RX ADMIN — LIDOCAINE HYDROCHLORIDE 60 MG: 10 INJECTION, SOLUTION EPIDURAL; INFILTRATION; INTRACAUDAL; PERINEURAL at 11:40

## 2017-03-23 NOTE — IP AVS SNAPSHOT
MRN:[id number]                      After Visit Summary   3/23/2017    Ramiro Jeffers    MRN: [id number]           Visit Information        Department      3/23/2017  9:44 AM [facility]          Review of your medicines      UNREVIEWED medicines. Ask your doctor about these medicines        Dose / Directions    acetaminophen 325 MG tablet   Commonly known as:  TYLENOL   Used for:  Chest wall pain        Dose:  650 mg   Take 2 tablets (650 mg) by mouth every 4 hours as needed for mild pain   Quantity:  100 tablet   Refills:  0       cyanocobalamin 1000 MCG Subl sublingual tablet        Dose:  1000 mcg   Place 1,000 mcg under the tongue daily   Refills:  0       hydrochlorothiazide 25 MG tablet   Commonly known as:  HYDRODIURIL   Used for:  Essential hypertension with goal blood pressure less than 130/80        Dose:  25 mg   Take 1 tablet (25 mg) by mouth every morning   Quantity:  90 tablet   Refills:  3       levothyroxine 125 MCG tablet   Commonly known as:  SYNTHROID/LEVOTHROID   Used for:  Hypothyroidism, unspecified type        Dose:  125 mcg   Take 1 tablet (125 mcg) by mouth daily   Quantity:  90 tablet   Refills:  3       Multi-vitamin Tabs tablet   Generic drug:  multivitamin, therapeutic with minerals        Dose:  1 tablet   Take 1 tablet by mouth 2 times daily   Refills:  0       oxyCODONE 5 MG IR tablet   Commonly known as:  ROXICODONE   Used for:  Chest wall pain        Dose:  5-10 mg   Take 1-2 tablets (5-10 mg) by mouth every 4 hours as needed for moderate to severe pain   Quantity:  20 tablet   Refills:  0       pravastatin 20 MG tablet   Commonly known as:  PRAVACHOL   Used for:  Hyperlipidemia LDL goal <100        Dose:  20 mg   Take 1 tablet (20 mg) by mouth daily   Quantity:  90 tablet   Refills:  3       senna-docusate 8.6-50 MG per tablet   Commonly known as:  SENOKOT-S;PERICOLACE   Used for:  Drug-induced constipation        Dose:  1-2 tablet   Take 1-2  tablets by mouth 2 times daily as needed (constipation )   Quantity:  100 tablet   Refills:  0       vitamin D 2000 UNITS tablet   Used for:  Vitamin D deficiency        Dose:  1 tablet   Take 1 tablet by mouth daily   Quantity:  100 tablet   Refills:  12                Protect others around you: Learn how to safely use, store and throw away your medicines at www.disposemymeds.org.         Follow-ups after your visit         Care Instructions        Further instructions from your care team       FNA Thyroid Biopsy Discharge Instructions     After you go home:      You may resume your normal diet    Care of Puncture Site:      You may have mild bruising, soreness & swelling at the puncture site. This will go away in a few days    For swelling & bruising, you may use an ice pack on the site.     Activity:      You may go back to your normal activity    Avoid strenuous activity for 24 hours    Medicines:      You may resume all medications    For minor pain, you may take Acetaminophen (Tylenol) or Ibuprofen (Advil)            Call the provider who ordered this test if:      Increased redness or swelling at the site    Fluid or blood oozing from the site    Severe pain at the site    Chills or a fever greater than 101 F (38 C).    Any questions or concerns    Call  911 or go to the Emergency Room if:      Trouble breathing    Your neck swells    Bleeding that you cannot control    Severe difficulty swallowing    If you have questions call:      [facility] Radiology Dept @ [phone]    The provider who performed your procedure was _________Dr Carreon________.     Additional Information About Your Visit        [employer] Information     [employer] gives you secure access to your electronic health record. If you see a primary care provider, you can also send messages to your care team and make appointments. If you have questions, please call your primary care clinic.  If you do not have a primary care provider, please  call [phone] and they will assist you.        Care EveryWhere ID     This is your Care EveryWhere ID. This could be used by other organizations to access your [facility] medical records  [id number]         Primary Care Provider Office Phone # Fax #    Addison Tobi Davis [phone]      Thank you!     Thank you for choosing [facility] for your care. Our goal is always to provide you with excellent care. Hearing back from our patients is one way we can continue to improve our services. Please take a few minutes to complete the written survey that you may receive in the mail after you visit with us. Thank you!             Medication List: This is a list of all your medications and when to take them. Check marks below indicate your daily home schedule. Keep this list as a reference.      Medications           Morning Afternoon Evening Bedtime As Needed    acetaminophen 325 MG tablet   Commonly known as:  TYLENOL   Take 2 tablets (650 mg) by mouth every 4 hours as needed for mild pain                                cyanocobalamin 1000 MCG Subl sublingual tablet   Place 1,000 mcg under the tongue daily                                hydrochlorothiazide 25 MG tablet   Commonly known as:  HYDRODIURIL   Take 1 tablet (25 mg) by mouth every morning                                levothyroxine 125 MCG tablet   Commonly known as:  SYNTHROID/LEVOTHROID   Take 1 tablet (125 mcg) by mouth daily                                Multi-vitamin Tabs tablet   Take 1 tablet by mouth 2 times daily   Generic drug:  multivitamin, therapeutic with minerals                                oxyCODONE 5 MG IR tablet   Commonly known as:  ROXICODONE   Take 1-2 tablets (5-10 mg) by mouth every 4 hours as needed for moderate to severe pain                                pravastatin 20 MG tablet   Commonly known as:  PRAVACHOL   Take 1 tablet (20 mg) by mouth daily                                senna-docusate 8.6-50 MG per  tablet   Commonly known as:  SENOKOT-S;PERICOLACE   Take 1-2 tablets by mouth 2 times daily as needed (constipation )                                vitamin D 2000 UNITS tablet   Take 1 tablet by mouth daily

## 2017-03-23 NOTE — TELEPHONE ENCOUNTER
Patient called and states that he wants to cancel Holter monitor.  He thinks that the antibiotic that he was taking was causing the palpitations. (He googled).  Appointment canceled per pt request as he states that he has not had the sxs since stopping the  Antibiotic  Abdelrahman Barnes, RN, BSN

## 2017-03-23 NOTE — IP AVS SNAPSHOT
[address] [place] [address]    Phone:  [phone]                                       After Visit Summary   3/23/2017    Ramiro Jeffers    MRN: [id number]           After Visit Summary Signature Page     I have received my discharge instructions, and my questions have been answered. I have discussed any challenges I see with this plan with the nurse or doctor.    ..........................................................................................................................................  Patient/Patient Representative Signature      ..........................................................................................................................................  Patient Representative Print Name and Relationship to Patient    ..................................................               ................................................  Date                                            Time    ..........................................................................................................................................  Reviewed by Signature/Title    ...................................................              ..............................................  Date                                                            Time

## 2017-03-23 NOTE — DISCHARGE INSTRUCTIONS
FNA Thyroid Biopsy Discharge Instructions     After you go home:      You may resume your normal diet    Care of Puncture Site:      You may have mild bruising, soreness & swelling at the puncture site. This will go away in a few days    For swelling & bruising, you may use an ice pack on the site.     Activity:      You may go back to your normal activity    Avoid strenuous activity for 24 hours    Medicines:      You may resume all medications    For minor pain, you may take Acetaminophen (Tylenol) or Ibuprofen (Advil)            Call the provider who ordered this test if:      Increased redness or swelling at the site    Fluid or blood oozing from the site    Severe pain at the site    Chills or a fever greater than 101 F (38 C).    Any questions or concerns    Call  911 or go to the Emergency Room if:      Trouble breathing    Your neck swells    Bleeding that you cannot control    Severe difficulty swallowing    If you have questions call:      [facility] Radiology Dept @ [phone]    The provider who performed your procedure was _________Dr Carreon________.

## 2017-03-23 NOTE — PROGRESS NOTES
No dressing on neck, no hematoma or bleeding.  Care Suites Discharge Summary    Discharge Criteria:   Discharge Criteria met per MD orders: Yes.   Vital signs stable.     Pt demonstrates ability to ambulate safely: Yes.  (See discharge questionnaire for additional information)    Discharge instructions & education:   Discharge instructions reviewed with patient . Patient verbalizes  understanding.    Medications:   Patient will be discharging on new medications- No. Patient verbalizes reason for use, start date, and side effects NA.    Items returned to patient:   Home and hospital acquired medications returned to patient NA   Listed belongings gathered and returned to patient: Yes    Patient discharged to home per self  .    Svetlana Medley

## 2017-03-24 LAB — COPATH REPORT: NORMAL

## 2017-03-27 LAB — COPATH REPORT: NORMAL

## 2017-03-28 ENCOUNTER — TELEPHONE (OUTPATIENT)
Dept: OTHER | Facility: CLINIC | Age: 57
End: 2017-03-28

## 2017-03-28 NOTE — TELEPHONE ENCOUNTER
Patient called and wanted to know results of biopsy.  Routed to covering PCP to advise    Abdelrahman Barnes RN, BSN

## 2017-10-12 DIAGNOSIS — Z00.00 ROUTINE GENERAL MEDICAL EXAMINATION AT A HEALTH CARE FACILITY: ICD-10-CM

## 2017-10-12 DIAGNOSIS — I10 ESSENTIAL HYPERTENSION WITH GOAL BLOOD PRESSURE LESS THAN 130/80: ICD-10-CM

## 2017-10-12 DIAGNOSIS — E78.5 HYPERLIPIDEMIA LDL GOAL <100: ICD-10-CM

## 2017-10-12 DIAGNOSIS — Z98.84 BARIATRIC SURGERY STATUS: ICD-10-CM

## 2017-10-12 DIAGNOSIS — K91.2 POSTSURGICAL NONABSORPTION: ICD-10-CM

## 2017-10-12 DIAGNOSIS — E55.9 VITAMIN D DEFICIENCY: ICD-10-CM

## 2017-10-12 LAB
ALBUMIN SERPL-MCNC: 3.6 G/DL (ref 3.4–5)
ALP SERPL-CCNC: 76 U/L (ref 40–150)
ALT SERPL W P-5'-P-CCNC: 22 U/L (ref 0–70)
ANION GAP SERPL CALCULATED.3IONS-SCNC: 9 MMOL/L (ref 3–14)
AST SERPL W P-5'-P-CCNC: 22 U/L (ref 0–45)
BASOPHILS # BLD AUTO: 0 10E9/L (ref 0–0.2)
BASOPHILS NFR BLD AUTO: 0.7 %
BILIRUB DIRECT SERPL-MCNC: <0.1 MG/DL (ref 0–0.2)
BILIRUB SERPL-MCNC: 0.5 MG/DL (ref 0.2–1.3)
BUN SERPL-MCNC: 16 MG/DL (ref 7–30)
CALCIUM SERPL-MCNC: 9.2 MG/DL (ref 8.5–10.1)
CHLORIDE SERPL-SCNC: 103 MMOL/L (ref 94–109)
CHOLEST SERPL-MCNC: 158 MG/DL
CO2 SERPL-SCNC: 28 MMOL/L (ref 20–32)
CREAT SERPL-MCNC: 0.75 MG/DL (ref 0.66–1.25)
DEPRECATED CALCIDIOL+CALCIFEROL SERPL-MC: 34 UG/L (ref 20–75)
DIFFERENTIAL METHOD BLD: ABNORMAL
EOSINOPHIL # BLD AUTO: 0.2 10E9/L (ref 0–0.7)
EOSINOPHIL NFR BLD AUTO: 3.5 %
ERYTHROCYTE [DISTWIDTH] IN BLOOD BY AUTOMATED COUNT: 14.3 % (ref 10–15)
FOLATE SERPL-MCNC: 26.3 NG/ML
GFR SERPL CREATININE-BSD FRML MDRD: >90 ML/MIN/1.7M2
GLUCOSE SERPL-MCNC: 88 MG/DL (ref 70–99)
HBA1C MFR BLD: 5.7 % (ref 4.3–6)
HCT VFR BLD AUTO: 41.5 % (ref 40–53)
HDLC SERPL-MCNC: 61 MG/DL
HGB BLD-MCNC: 13.8 G/DL (ref 13.3–17.7)
LDLC SERPL CALC-MCNC: 82 MG/DL
LYMPHOCYTES # BLD AUTO: 1.6 10E9/L (ref 0.8–5.3)
LYMPHOCYTES NFR BLD AUTO: 35.8 %
MCH RBC QN AUTO: 30.5 PG (ref 26.5–33)
MCHC RBC AUTO-ENTMCNC: 33.3 G/DL (ref 31.5–36.5)
MCV RBC AUTO: 92 FL (ref 78–100)
MONOCYTES # BLD AUTO: 0.4 10E9/L (ref 0–1.3)
MONOCYTES NFR BLD AUTO: 8.1 %
NEUTROPHILS # BLD AUTO: 2.4 10E9/L (ref 1.6–8.3)
NEUTROPHILS NFR BLD AUTO: 51.9 %
NONHDLC SERPL-MCNC: 97 MG/DL
PLATELET # BLD AUTO: 148 10E9/L (ref 150–450)
POTASSIUM SERPL-SCNC: 3.9 MMOL/L (ref 3.4–5.3)
PROT SERPL-MCNC: 7.3 G/DL (ref 6.8–8.8)
PSA SERPL-ACNC: 0.04 UG/L (ref 0–4)
RBC # BLD AUTO: 4.52 10E12/L (ref 4.4–5.9)
SODIUM SERPL-SCNC: 140 MMOL/L (ref 133–144)
T3FREE SERPL-MCNC: 2.4 PG/ML (ref 2.3–4.2)
T4 FREE SERPL-MCNC: 1.21 NG/DL (ref 0.76–1.46)
TRIGL SERPL-MCNC: 77 MG/DL
TSH SERPL DL<=0.005 MIU/L-ACNC: 3.49 MU/L (ref 0.4–4)
VIT B12 SERPL-MCNC: 412 PG/ML (ref 193–986)
WBC # BLD AUTO: 4.6 10E9/L (ref 4–11)

## 2017-10-12 PROCEDURE — 84439 ASSAY OF FREE THYROXINE: CPT | Performed by: INTERNAL MEDICINE

## 2017-10-12 PROCEDURE — 80076 HEPATIC FUNCTION PANEL: CPT | Performed by: INTERNAL MEDICINE

## 2017-10-12 PROCEDURE — 36415 COLL VENOUS BLD VENIPUNCTURE: CPT | Performed by: INTERNAL MEDICINE

## 2017-10-12 PROCEDURE — 84481 FREE ASSAY (FT-3): CPT | Performed by: INTERNAL MEDICINE

## 2017-10-12 PROCEDURE — 82306 VITAMIN D 25 HYDROXY: CPT | Performed by: INTERNAL MEDICINE

## 2017-10-12 PROCEDURE — 84443 ASSAY THYROID STIM HORMONE: CPT | Performed by: INTERNAL MEDICINE

## 2017-10-12 PROCEDURE — 80061 LIPID PANEL: CPT | Performed by: INTERNAL MEDICINE

## 2017-10-12 PROCEDURE — G0103 PSA SCREENING: HCPCS | Performed by: INTERNAL MEDICINE

## 2017-10-12 PROCEDURE — 85025 COMPLETE CBC W/AUTO DIFF WBC: CPT | Performed by: INTERNAL MEDICINE

## 2017-10-12 PROCEDURE — 82607 VITAMIN B-12: CPT | Performed by: INTERNAL MEDICINE

## 2017-10-12 PROCEDURE — 80048 BASIC METABOLIC PNL TOTAL CA: CPT | Performed by: INTERNAL MEDICINE

## 2017-10-12 PROCEDURE — 82746 ASSAY OF FOLIC ACID SERUM: CPT | Performed by: INTERNAL MEDICINE

## 2017-10-12 PROCEDURE — 83036 HEMOGLOBIN GLYCOSYLATED A1C: CPT | Performed by: INTERNAL MEDICINE

## 2017-10-19 ENCOUNTER — OFFICE VISIT (OUTPATIENT)
Dept: OTHER | Facility: CLINIC | Age: 57
End: 2017-10-19
Attending: INTERNAL MEDICINE
Payer: COMMERCIAL

## 2017-10-19 VITALS
HEART RATE: 60 BPM | SYSTOLIC BLOOD PRESSURE: 108 MMHG | OXYGEN SATURATION: 97 % | WEIGHT: 206.8 LBS | BODY MASS INDEX: 30.54 KG/M2 | DIASTOLIC BLOOD PRESSURE: 60 MMHG

## 2017-10-19 DIAGNOSIS — E03.9 HYPOTHYROIDISM, UNSPECIFIED TYPE: ICD-10-CM

## 2017-10-19 DIAGNOSIS — I10 ESSENTIAL HYPERTENSION WITH GOAL BLOOD PRESSURE LESS THAN 130/80: ICD-10-CM

## 2017-10-19 DIAGNOSIS — E78.5 HYPERLIPIDEMIA LDL GOAL <100: ICD-10-CM

## 2017-10-19 DIAGNOSIS — Z00.00 ROUTINE GENERAL MEDICAL EXAMINATION AT A HEALTH CARE FACILITY: Primary | ICD-10-CM

## 2017-10-19 PROCEDURE — 99211 OFF/OP EST MAY X REQ PHY/QHP: CPT

## 2017-10-19 PROCEDURE — 99396 PREV VISIT EST AGE 40-64: CPT | Mod: ZP | Performed by: INTERNAL MEDICINE

## 2017-10-19 RX ORDER — PRAVASTATIN SODIUM 20 MG
20 TABLET ORAL DAILY
Qty: 90 TABLET | Refills: 3 | Status: SHIPPED | OUTPATIENT
Start: 2017-10-19 | End: 2018-10-26

## 2017-10-19 RX ORDER — HYDROCHLOROTHIAZIDE 25 MG/1
25 TABLET ORAL EVERY MORNING
Qty: 90 TABLET | Refills: 3 | Status: SHIPPED | OUTPATIENT
Start: 2017-10-19 | End: 2018-10-26

## 2017-10-19 RX ORDER — LEVOTHYROXINE SODIUM 125 UG/1
125 TABLET ORAL DAILY
Qty: 90 TABLET | Refills: 3 | Status: SHIPPED | OUTPATIENT
Start: 2017-10-19 | End: 2018-11-13

## 2017-10-19 NOTE — NURSING NOTE
"Chief Complaint   Patient presents with     RECHECK     1 year F/U. Labs on 10/12       Initial /60 (BP Location: Right arm, Patient Position: Chair, Cuff Size: Adult Regular)  Pulse 60  Wt 206 lb 12.8 oz (93.8 kg)  SpO2 97%  BMI 30.54 kg/m2 Estimated body mass index is 30.54 kg/(m^2) as calculated from the following:    Height as of 3/7/17: 5' 9\" (1.753 m).    Weight as of this encounter: 206 lb 12.8 oz (93.8 kg).  Medication Reconciliation: complete    Face to Face time 5 minutes  Jennifer Man CMA      "

## 2017-10-19 NOTE — PROGRESS NOTES
SUBJECTIVE:    CC: Ramiro Jeffers is a 57 year old male who presents for:       Routine general medical examination at a health care facility  Essential hypertension with goal blood pressure less than 130/80  Hyperlipidemia LDL goal <100  Hypothyroidism, unspecified type          HISTORIES:    PROBLEM LIST:                   Patient Active Problem List   Diagnosis     Acquired hypothyroidism     pulmonary emboli     Venous insufficiency     Vitamin D deficiency     Bariatric surgery status     Postsurgical nonabsorption     Bilateral knee pain     Anemia     Pneumothorax       PAST MEDICAL HISTORY:                  Past Medical History:   Diagnosis Date     ACQUIRED HYPOTHYROIDISM       Blood clot in the legs      Calculus of gallbladder without mention of cholecystitis or obstruction      Cataract      Obesity, unspecified     significant weight loss w/diet alone, on 10-15-10, BMI was 56.4     pulmonary emboli 6-2010    Unprovoked large bilateral pulmonary emboli without source identified on lower extremity dopplers, pt had a transient moderate lupus inhibitor upon initial presentation in June , 2010 which was gone in September, 2010;  all other thrombophilic workup is normal     Pulmonary embolus, bilateral      Shortness of breath     on exertion     Venous insufficiency      Viral pneumonia, unspecified      Vitamin D deficiency        PAST SURGICAL HISTORY:                  Past Surgical History:   Procedure Laterality Date     C NONSPECIFIC PROCEDURE  10-    Laparoscopic gastric bypass,     C NONSPECIFIC PROCEDURE  10-    1.  Attempted laparoscopic exploration with conversion to: .  Abdominal exploration. 3.  Reduction of incarcerated incisional hernia. 4.  Repair of incisional hernia.5.  Gastrostomy tube placement (#22 Somali) into defunctionalized stomach.6.  Enterotomy with bowel decompression and primary repair. 7.  Abdominal lavage.      COLONOSCOPY  11/17/2011    Procedure:COLONOSCOPY;  colonoscopy; Surgeon:ELENA OROURKE; Location: GI     LAPAROSCOPIC CHOLECYSTECTOMY  9/28/2012    Procedure: LAPAROSCOPIC CHOLECYSTECTOMY;  LAPAROSCOPIC CHOLECYSTECTOMY, LYSIS OF ADHESIONS;  Surgeon: Dutch Matta MD;  Location:  OR     LAPAROSCOPIC LYSIS ADHESIONS  9/28/2012    Procedure: LAPAROSCOPIC LYSIS ADHESIONS;;  Surgeon: Dutch Matta MD;  Location:  OR     LAPAROSCOPY, SURGICAL; REPAIR INCISIONAL OR VENTRAL HERNIA      repair of ventral strangulated surgery     PHACOEMULSIFICATION CLEAR CORNEA WITH STANDARD INTRAOCULAR LENS IMPLANT  12/19/2011    Procedure:PHACOEMULSIFICATION CLEAR CORNEA WITH STANDARD INTRAOCULAR LENS IMPLANT; RIGHT PHACOEMULSIFICATION CLEAR CORNEA WITH STANDARD INTRAOCULAR LENS IMPLANT ; Surgeon:ALLISON ANTONIO; Location[facility]       CURRENT MEDICATIONS:                  Current Outpatient Prescriptions   Medication Sig Dispense Refill     hydrochlorothiazide (HYDRODIURIL) 25 MG tablet Take 1 tablet (25 mg) by mouth every morning 90 tablet 3     pravastatin (PRAVACHOL) 20 MG tablet Take 1 tablet (20 mg) by mouth daily 90 tablet 3     levothyroxine (SYNTHROID/LEVOTHROID) 125 MCG tablet Take 1 tablet (125 mcg) by mouth daily 90 tablet 3     oxyCODONE (ROXICODONE) 5 MG IR tablet Take 1-2 tablets (5-10 mg) by mouth every 4 hours as needed for moderate to severe pain 20 tablet 0     acetaminophen (TYLENOL) 325 MG tablet Take 2 tablets (650 mg) by mouth every 4 hours as needed for mild pain 100 tablet 0     senna-docusate (SENOKOT-S;PERICOLACE) 8.6-50 MG per tablet Take 1-2 tablets by mouth 2 times daily as needed (constipation ) 100 tablet 0     Cholecalciferol (VITAMIN D) 2000 UNITS tablet Take 1 tablet by mouth daily 100 tablet 12     cyanocobalamin 1000 MCG SUBL Place 1,000 mcg under the tongue daily       Multiple Vitamin (MULTI-VITAMIN) per tablet Take 1 tablet by mouth 2 times daily        [DISCONTINUED] hydrochlorothiazide (HYDRODIURIL) 25 MG tablet Take 1 tablet (25 mg) by  mouth every morning 90 tablet 3     [DISCONTINUED] pravastatin (PRAVACHOL) 20 MG tablet Take 1 tablet (20 mg) by mouth daily 90 tablet 3     [DISCONTINUED] levothyroxine (SYNTHROID, LEVOTHROID) 125 MCG tablet Take 1 tablet (125 mcg) by mouth daily 90 tablet 3       ALLERGIES:                  Allergies   Allergen Reactions     No Known Allergies        SOCIAL HISTORY:                  Social History     Social History     Marital status: Single     Spouse name: N/A     Number of children: N/A     Years of education: N/A     Occupational History      Self     Social History Main Topics     Smoking status: Never Smoker     Smokeless tobacco: Never Used     Alcohol use No     Drug use: No     Sexual activity: Not on file     Other Topics Concern     Not on file     Social History Narrative       FAMILY HISTORY:                   Family History   Problem Relation Age of Onset     OSTEOPOROSIS Mother      Thyroid Disease Mother      no thyroid     CANCER Maternal Grandmother      ? lung cancer     CANCER Father      melanoma                             REVIEW OF SYSTEMS:  CONSTITUTIONAL:POSITIVE  For generalized fatigue without B sxs  EYES: no subjective changes in visual acuity, no photophobia  ENT/MOUTH: no complaints of rhinorrhea, nasal congestion, sore throat, hearing changes  RESP:no SOB  CV: no c/o exertional chest pressure or ALAS  GI: No abdominal pain, constipation, change in bowel movements, nausea, pyrosis, BRBPR  :no polyuria or polydipsia, no dysuria, no gross hematuria  MUSCULOSKELATAL:no arthalgias or myalgias  INTEGUMENTARY/SKIN: no pruritis, rashes, or moles with recent change in size, shape, or pigmentation  BREAST: no lumps, masses, or discharges  NEURO: no gross sensory or motor symptoms, no dizziness, no confusion  ENDOCRINE: no polyuria or polydipsia, no heat or cold intolerance  HEME/ALLERGY/IMMUNE: no fevers, chills, night sweats, or unwanted weight loss  PSYCHIATRIC: no  depression, anxiety, or internal stimuli    EXAM:    /60 (BP Location: Right arm, Patient Position: Chair, Cuff Size: Adult Regular)  Pulse 60  Wt 206 lb 12.8 oz (93.8 kg)  SpO2 97%  BMI 30.54 kg/m2  BMI: Body mass index is 30.54 kg/(m^2).  GENERAL APPEARANCE: healthy, alert and no distress   EXAM:  EYES: clear conjunctiva, no cataracts, no obvious fundoscopic abnormalities  HENT: oropharynx, nares, and TMs are WNL  NECK: no JVD, thyromegaly or lymphadenopathy, no cervical bruits  RESP: clear to auscultation without rales, wheezes, or rhonchi  CV: RRR, no murmurs, gallops, or rubs  LYMPH: no cervical , axillary, or inguinal lymphadenopathy appreciated  GI: NABS, ND/NT, no masses or organomegally appreciated  : normal external genitalia and anus, no lumps, masses, or tenderness noted on palpation of the normally sized prostate  MS: no obvoius clinicallly relevant arthropathy, no evidence vasculitis  SKIN: no nevi clinically suspicious for malignancy are noted  NEURO: CN II-XII intact, no localizing sensory or motor abnoramlities noted, DTRs symmetrical bilaterally  PSYCH: Mental status exam reveals the pt to have normal mood and affect. There is no disorder of thought form or content. There is no response to internal stimuli. There is no suicidal or homicidal ideation.         (Z00.00) Routine general medical examination at a health care facility  (primary encounter diagnosis)  Comment: colonosocpy due 2021  Plan: Follow-Up with Vascular Medicine, CBC with         platelets differential, T3 Free, T4 free, TSH,         Basic metabolic panel, Hepatic panel, Lipid         Profile, Hemoglobin A1c, Prostate spec antigen         screen           (I10) Essential hypertension with goal blood pressure less than 130/80  Comment: at goal  Plan: hydrochlorothiazide (HYDRODIURIL) 25 MG tablet,        Follow-Up with Vascular Medicine            (E78.5) Hyperlipidemia LDL goal <100  Comment: at goal  Plan: pravastatin  (PRAVACHOL) 20 MG tablet, Follow-Up        with Vascular Medicine, T3 Free, T4 free, TSH,         Lipid Profile            (E03.9) Hypothyroidism, unspecified type  Comment: TFTs WNL  Plan: levothyroxine (SYNTHROID/LEVOTHROID) 125 MCG         tablet, Follow-Up with Vascular Medicine, T3         Free, T4 free, TSH            Greater than one half the 15 minutes not spent on preventive care during this visit was spent providing aducation and counselling regarding item(s) # 2 onward as delineated above.                            I have discussed with patient the risks, benefits, medications, treatment options and modalities.   I have instructed the patient to call or schedule a follow-up appointment if any problems or failure to improve.

## 2017-10-19 NOTE — MR AVS SNAPSHOT
After Visit Summary   10/19/2017    Ramiro Jeffers    MRN: [id number]           Patient Information     Date Of Birth          1960        Visit Information        Provider Department      10/19/2017 8:30 AM Addison Davis MD [facility] [place]      Today's Diagnoses     Routine general medical examination at a health care facility    -  1    Essential hypertension with goal blood pressure less than 130/80        Hyperlipidemia LDL goal <100        Hypothyroidism, unspecified type           Follow-ups after your visit        Additional Services     Follow-Up with Vascular Medicine                 Future tests that were ordered for you today     Open Future Orders        Priority Expected Expires Ordered    Follow-Up with Vascular Medicine Routine 10/19/2018 10/19/2018 10/19/2017    CBC with platelets differential Routine 10/19/2018 10/19/2018 10/19/2017    T3 Free Routine 10/19/2018 10/19/2018 10/19/2017    T4 free Routine 10/19/2018 10/19/2018 10/19/2017    TSH Routine 10/19/2018 10/19/2018 10/19/2017    Basic metabolic panel Routine 10/19/2018 10/19/2018 10/19/2017    Hepatic panel Routine 10/19/2018 10/19/2018 10/19/2017    Lipid Profile Routine 10/19/2018 10/19/2018 10/19/2017    Hemoglobin A1c Routine 10/19/2018 10/19/2018 10/19/2017    Prostate spec antigen screen Routine 10/19/2018 10/19/2018 10/19/2017            Who to contact     If you have questions or need follow up information about today's clinic visit or your schedule please contact [facility] directly at [phone].  Normal or non-critical lab and imaging results will be communicated to you by MyChart, letter or phone within 4 business days after the clinic has received the results. If you do not hear from us within 7 days, please contact the clinic through MyChart or phone. If you have a critical or abnormal lab result, we will notify  you by phone as soon as possible.  Submit refill requests through [employer] or call your pharmacy and they will forward the refill request to us. Please allow 3 business days for your refill to be completed.          Additional Information About Your Visit        [email or web address] Information     [employer] gives you secure access to your electronic health record. If you see a primary care provider, you can also send messages to your care team and make appointments. If you have questions, please call your primary care clinic.  If you do not have a primary care provider, please call [phone] and they will assist you.        Care EveryWhere ID     This is your Care EveryWhere ID. This could be used by other organizations to access your [facility] medical records  [id number]        Your Vitals Were     Pulse Pulse Oximetry BMI (Body Mass Index)             60 97% 30.54 kg/m2          Blood Pressure from Last 3 Encounters:   10/19/17 108/60   03/23/17 121/55   03/21/17 138/78    Weight from Last 3 Encounters:   10/19/17 206 lb 12.8 oz (93.8 kg)   03/21/17 198 lb 3.2 oz (89.9 kg)   03/16/17 204 lb (92.5 kg)                 Where to get your medicines      [facility] [place], [place] - [address]  [address] [place] [address] [facility] [address]    Hours:  24-hours Phone:  [phone]     hydrochlorothiazide 25 MG tablet    levothyroxine 125 MCG tablet    pravastatin 20 MG tablet          Primary Care Provider Office Phone # Fax #    Addison Davis [phone]       [place] [facility] [address] [facility] [address]  [facility] [address]        Equal Access to Services     [facility] AH: Hadii kinza Cheng, waaxda luqadaha, qaybta kaalmada ed, israel kumari. So [facility] [phone].    ATENCIÓN: Si habla español, tiene a reynoso disposición servicios gratuitos de asistencia lingüística. Llame al [phone].    We  comply with applicable federal civil rights laws and [place] laws. We do not discriminate on the basis of race, color, national origin, age, disability, sex, sexual orientation, or gender identity.            Thank you!     Thank you for choosing [facility] [place]  for your care. Our goal is always to provide you with excellent care. Hearing back from our patients is one way we can continue to improve our services. Please take a few minutes to complete the written survey that you may receive in the mail after your visit with us. Thank you!             Your Updated Medication List - Protect others around you: Learn how to safely use, store and throw away your medicines at www.disposemymeds.org.          This list is accurate as of: 10/19/17  9:35 AM.  Always use your most recent med list.                   Brand Name Dispense Instructions for use Diagnosis    acetaminophen 325 MG tablet    TYLENOL    100 tablet    Take 2 tablets (650 mg) by mouth every 4 hours as needed for mild pain    Chest wall pain       cyanocobalamin 1000 MCG Subl sublingual tablet      Place 1,000 mcg under the tongue daily        hydrochlorothiazide 25 MG tablet    HYDRODIURIL    90 tablet    Take 1 tablet (25 mg) by mouth every morning    Essential hypertension with goal blood pressure less than 130/80       levothyroxine 125 MCG tablet    SYNTHROID/LEVOTHROID    90 tablet    Take 1 tablet (125 mcg) by mouth daily    Hypothyroidism, unspecified type       Multi-vitamin Tabs tablet   Generic drug:  multivitamin, therapeutic with minerals      Take 1 tablet by mouth 2 times daily        oxyCODONE 5 MG IR tablet    ROXICODONE    20 tablet    Take 1-2 tablets (5-10 mg) by mouth every 4 hours as needed for moderate to severe pain    Chest wall pain       pravastatin 20 MG tablet    PRAVACHOL    90 tablet    Take 1 tablet (20 mg) by mouth daily    Hyperlipidemia LDL goal <100       senna-docusate 8.6-50 MG per tablet     SENOKOT-S;PERICOLACE    100 tablet    Take 1-2 tablets by mouth 2 times daily as needed (constipation )    Drug-induced constipation       vitamin D 2000 UNITS tablet     100 tablet    Take 1 tablet by mouth daily    Vitamin D deficiency

## 2017-12-25 DIAGNOSIS — I10 ESSENTIAL HYPERTENSION WITH GOAL BLOOD PRESSURE LESS THAN 130/80: ICD-10-CM

## 2017-12-26 RX ORDER — HYDROCHLOROTHIAZIDE 25 MG/1
TABLET ORAL
Qty: 90 TABLET | Refills: 0 | Status: SHIPPED | OUTPATIENT
Start: 2017-12-26 | End: 2018-04-11

## 2017-12-26 NOTE — TELEPHONE ENCOUNTER
"Refill request received for Hydrochlorothiazide  Last Fill Quantity: 90; # refills: 3  Last Written Prescription Date: 10-19-1  Last Office Visit 10-19-17 with  who noted   \"I10) Essential hypertension with goal blood pressure less than 130/80  Comment: at goal  Plan: hydrochlorothiazide (HYDRODIURIL) 25 MG tablet,        Follow-Up with Vascular Medicine\"    Will Refill per RN protocol.            Last Basic Metabolic Panel:  Lab Results   Component Value Date     10/12/2017      Lab Results   Component Value Date    POTASSIUM 3.9 10/12/2017     Lab Results   Component Value Date    CHLORIDE 103 10/12/2017     Lab Results   Component Value Date    GUERITA 9.2 10/12/2017     Lab Results   Component Value Date    CO2 28 10/12/2017     Lab Results   Component Value Date    BUN 16 10/12/2017     Lab Results   Component Value Date    CR 0.75 10/12/2017     Lab Results   Component Value Date    GLC 88 10/12/2017       Emma Tolentino, RN, BSN  "

## 2017-12-27 ENCOUNTER — TELEPHONE (OUTPATIENT)
Dept: OTHER | Facility: CLINIC | Age: 57
End: 2017-12-27

## 2017-12-27 NOTE — TELEPHONE ENCOUNTER
In March patient had a pneumothorax.  He had to cancel a flight at that time.  He is wondering if PCP would write a note stating that the patient could not fly during that month due to illness/ surgery.  He can then use a ticket for now without additional fees.  He would like the letter sent to his e mail address  Please see drafted letter under letters tab.  Abdelrahman Barnes RN, BSN

## 2017-12-27 NOTE — LETTER
[facility] [address]  [place] [address]  Phone: [phone]  Fax: [phone]      December 27, 2017    Ramiro Jeffers  [address] [place] [id number]    TO WHOM IT MAY CONCERN,    Ramiro Jeffers was seen in March 2017.  He was not medically appropriate to travel by plane due to injury that resulted in a surgical procedure.  This resulted him in having to cancel his flight/ travel plans    Sincerely,          Addison Davis MD    [facility]

## 2018-04-11 DIAGNOSIS — I10 ESSENTIAL HYPERTENSION WITH GOAL BLOOD PRESSURE LESS THAN 130/80: ICD-10-CM

## 2018-04-11 RX ORDER — HYDROCHLOROTHIAZIDE 25 MG/1
TABLET ORAL
Qty: 90 TABLET | Refills: 0 | Status: SHIPPED | OUTPATIENT
Start: 2018-04-11 | End: 2018-10-26

## 2018-04-11 NOTE — TELEPHONE ENCOUNTER
Patient has refills available from 10/19/17 Rx w Q90 and 3 refills  Sent to [facility] as patient needs refill  Abdelrahman Barnes, RN, BSN

## 2018-10-17 ENCOUNTER — TELEPHONE (OUTPATIENT)
Dept: OTHER | Facility: CLINIC | Age: 58
End: 2018-10-17

## 2018-10-17 DIAGNOSIS — R30.0 DYSURIA: Primary | ICD-10-CM

## 2018-10-17 NOTE — TELEPHONE ENCOUNTER
Patient states that over the past several months that he has difficulty starting and has poor flow when urinating.  He has PSA ordered for lab draw on Friday, does PCP want any other labs?  Please advise  Abdelrahman Barnes RN, BSN

## 2018-10-19 DIAGNOSIS — Z00.00 ROUTINE GENERAL MEDICAL EXAMINATION AT A HEALTH CARE FACILITY: ICD-10-CM

## 2018-10-19 DIAGNOSIS — R30.0 DYSURIA: ICD-10-CM

## 2018-10-19 DIAGNOSIS — E78.5 HYPERLIPIDEMIA LDL GOAL <100: ICD-10-CM

## 2018-10-19 DIAGNOSIS — E03.9 HYPOTHYROIDISM, UNSPECIFIED TYPE: ICD-10-CM

## 2018-10-19 LAB
ALBUMIN SERPL-MCNC: 3.5 G/DL (ref 3.4–5)
ALBUMIN UR-MCNC: NEGATIVE MG/DL
ALP SERPL-CCNC: 76 U/L (ref 40–150)
ALT SERPL W P-5'-P-CCNC: 21 U/L (ref 0–70)
ANION GAP SERPL CALCULATED.3IONS-SCNC: 7 MMOL/L (ref 3–14)
APPEARANCE UR: CLEAR
AST SERPL W P-5'-P-CCNC: 21 U/L (ref 0–45)
BASOPHILS # BLD AUTO: 0 10E9/L (ref 0–0.2)
BASOPHILS NFR BLD AUTO: 0.4 %
BILIRUB DIRECT SERPL-MCNC: 0.1 MG/DL (ref 0–0.2)
BILIRUB SERPL-MCNC: 0.4 MG/DL (ref 0.2–1.3)
BILIRUB UR QL STRIP: NEGATIVE
BUN SERPL-MCNC: 21 MG/DL (ref 7–30)
CALCIUM SERPL-MCNC: 9.1 MG/DL (ref 8.5–10.1)
CHLORIDE SERPL-SCNC: 102 MMOL/L (ref 94–109)
CHOLEST SERPL-MCNC: 163 MG/DL
CO2 SERPL-SCNC: 31 MMOL/L (ref 20–32)
COLOR UR AUTO: YELLOW
CREAT SERPL-MCNC: 0.83 MG/DL (ref 0.66–1.25)
DIFFERENTIAL METHOD BLD: ABNORMAL
EOSINOPHIL # BLD AUTO: 0.2 10E9/L (ref 0–0.7)
EOSINOPHIL NFR BLD AUTO: 3.8 %
ERYTHROCYTE [DISTWIDTH] IN BLOOD BY AUTOMATED COUNT: 14 % (ref 10–15)
GFR SERPL CREATININE-BSD FRML MDRD: >90 ML/MIN/1.7M2
GLUCOSE SERPL-MCNC: 86 MG/DL (ref 70–99)
GLUCOSE UR STRIP-MCNC: NEGATIVE MG/DL
HBA1C MFR BLD: 5.4 % (ref 0–5.6)
HCT VFR BLD AUTO: 42.6 % (ref 40–53)
HDLC SERPL-MCNC: 52 MG/DL
HGB BLD-MCNC: 13.6 G/DL (ref 13.3–17.7)
HGB UR QL STRIP: NEGATIVE
KETONES UR STRIP-MCNC: NEGATIVE MG/DL
LDLC SERPL CALC-MCNC: 98 MG/DL
LEUKOCYTE ESTERASE UR QL STRIP: NEGATIVE
LYMPHOCYTES # BLD AUTO: 1.6 10E9/L (ref 0.8–5.3)
LYMPHOCYTES NFR BLD AUTO: 32.8 %
MCH RBC QN AUTO: 29 PG (ref 26.5–33)
MCHC RBC AUTO-ENTMCNC: 31.9 G/DL (ref 31.5–36.5)
MCV RBC AUTO: 91 FL (ref 78–100)
MONOCYTES # BLD AUTO: 0.4 10E9/L (ref 0–1.3)
MONOCYTES NFR BLD AUTO: 8.8 %
NEUTROPHILS # BLD AUTO: 2.6 10E9/L (ref 1.6–8.3)
NEUTROPHILS NFR BLD AUTO: 54.2 %
NITRATE UR QL: NEGATIVE
NONHDLC SERPL-MCNC: 111 MG/DL
PH UR STRIP: 6 PH (ref 5–7)
PLATELET # BLD AUTO: 146 10E9/L (ref 150–450)
POTASSIUM SERPL-SCNC: 3.7 MMOL/L (ref 3.4–5.3)
PROT SERPL-MCNC: 7.3 G/DL (ref 6.8–8.8)
PSA SERPL-ACNC: 0.02 UG/L (ref 0–4)
RBC # BLD AUTO: 4.69 10E12/L (ref 4.4–5.9)
SODIUM SERPL-SCNC: 140 MMOL/L (ref 133–144)
SOURCE: NORMAL
SP GR UR STRIP: 1.01 (ref 1–1.03)
T3FREE SERPL-MCNC: 1.9 PG/ML (ref 2.3–4.2)
T4 FREE SERPL-MCNC: 1.01 NG/DL (ref 0.76–1.46)
TRIGL SERPL-MCNC: 66 MG/DL
TSH SERPL DL<=0.005 MIU/L-ACNC: 4.2 MU/L (ref 0.4–4)
UROBILINOGEN UR STRIP-ACNC: 0.2 EU/DL (ref 0.2–1)
WBC # BLD AUTO: 4.8 10E9/L (ref 4–11)

## 2018-10-19 PROCEDURE — 36415 COLL VENOUS BLD VENIPUNCTURE: CPT | Performed by: INTERNAL MEDICINE

## 2018-10-19 PROCEDURE — 84481 FREE ASSAY (FT-3): CPT | Performed by: INTERNAL MEDICINE

## 2018-10-19 PROCEDURE — 80048 BASIC METABOLIC PNL TOTAL CA: CPT | Performed by: INTERNAL MEDICINE

## 2018-10-19 PROCEDURE — G0103 PSA SCREENING: HCPCS | Performed by: INTERNAL MEDICINE

## 2018-10-19 PROCEDURE — 83036 HEMOGLOBIN GLYCOSYLATED A1C: CPT | Performed by: INTERNAL MEDICINE

## 2018-10-19 PROCEDURE — 85025 COMPLETE CBC W/AUTO DIFF WBC: CPT | Performed by: INTERNAL MEDICINE

## 2018-10-19 PROCEDURE — 80061 LIPID PANEL: CPT | Performed by: INTERNAL MEDICINE

## 2018-10-19 PROCEDURE — 84439 ASSAY OF FREE THYROXINE: CPT | Performed by: INTERNAL MEDICINE

## 2018-10-19 PROCEDURE — 80076 HEPATIC FUNCTION PANEL: CPT | Performed by: INTERNAL MEDICINE

## 2018-10-19 PROCEDURE — 84443 ASSAY THYROID STIM HORMONE: CPT | Performed by: INTERNAL MEDICINE

## 2018-10-19 PROCEDURE — 81003 URINALYSIS AUTO W/O SCOPE: CPT | Performed by: INTERNAL MEDICINE

## 2018-10-26 ENCOUNTER — HOSPITAL ENCOUNTER (OUTPATIENT)
Dept: CT IMAGING | Facility: CLINIC | Age: 58
Discharge: HOME | End: 2018-10-26
Attending: INTERNAL MEDICINE | Admitting: INTERNAL MEDICINE
Payer: COMMERCIAL

## 2018-10-26 ENCOUNTER — OFFICE VISIT (OUTPATIENT)
Dept: OTHER | Facility: CLINIC | Age: 58
End: 2018-10-26
Attending: INTERNAL MEDICINE
Payer: COMMERCIAL

## 2018-10-26 VITALS
DIASTOLIC BLOOD PRESSURE: 69 MMHG | OXYGEN SATURATION: 98 % | WEIGHT: 200.8 LBS | HEIGHT: 68 IN | BODY MASS INDEX: 30.43 KG/M2 | HEART RATE: 55 BPM | SYSTOLIC BLOOD PRESSURE: 109 MMHG

## 2018-10-26 DIAGNOSIS — E03.9 HYPOTHYROIDISM, UNSPECIFIED TYPE: ICD-10-CM

## 2018-10-26 DIAGNOSIS — E78.5 HYPERLIPIDEMIA LDL GOAL <100: ICD-10-CM

## 2018-10-26 DIAGNOSIS — Z00.00 ROUTINE GENERAL MEDICAL EXAMINATION AT A HEALTH CARE FACILITY: Primary | ICD-10-CM

## 2018-10-26 DIAGNOSIS — I10 ESSENTIAL HYPERTENSION WITH GOAL BLOOD PRESSURE LESS THAN 130/80: ICD-10-CM

## 2018-10-26 DIAGNOSIS — R10.31 RLQ ABDOMINAL PAIN: ICD-10-CM

## 2018-10-26 DIAGNOSIS — R07.89 CHEST PRESSURE: ICD-10-CM

## 2018-10-26 PROCEDURE — 25000128 H RX IP 250 OP 636: Performed by: INTERNAL MEDICINE

## 2018-10-26 PROCEDURE — 99213 OFFICE O/P EST LOW 20 MIN: CPT | Mod: 25 | Performed by: INTERNAL MEDICINE

## 2018-10-26 PROCEDURE — G0463 HOSPITAL OUTPT CLINIC VISIT: HCPCS

## 2018-10-26 PROCEDURE — 74177 CT ABD & PELVIS W/CONTRAST: CPT

## 2018-10-26 PROCEDURE — 99396 PREV VISIT EST AGE 40-64: CPT | Mod: ZP | Performed by: INTERNAL MEDICINE

## 2018-10-26 PROCEDURE — 93010 ELECTROCARDIOGRAM REPORT: CPT | Mod: ZP | Performed by: INTERNAL MEDICINE

## 2018-10-26 RX ORDER — PRAVASTATIN SODIUM 20 MG
20 TABLET ORAL DAILY
Qty: 90 TABLET | Refills: 3 | Status: ON HOLD | OUTPATIENT
Start: 2018-10-26 | End: 2018-11-15

## 2018-10-26 RX ORDER — HYDROCHLOROTHIAZIDE 25 MG/1
25 TABLET ORAL EVERY MORNING
Qty: 90 TABLET | Refills: 3 | Status: SHIPPED | OUTPATIENT
Start: 2018-10-26 | End: 2019-10-04

## 2018-10-26 RX ORDER — LEVOTHYROXINE SODIUM 137 UG/1
137 TABLET ORAL DAILY
Qty: 90 TABLET | Refills: 3 | Status: SHIPPED | OUTPATIENT
Start: 2018-10-26 | End: 2019-06-27 | Stop reason: DRUGHIGH

## 2018-10-26 RX ORDER — IOPAMIDOL 755 MG/ML
100 INJECTION, SOLUTION INTRAVASCULAR ONCE
Status: COMPLETED | OUTPATIENT
Start: 2018-10-26 | End: 2018-10-26

## 2018-10-26 RX ADMIN — IOPAMIDOL 98 ML: 755 INJECTION, SOLUTION INTRAVENOUS at 11:51

## 2018-10-26 NOTE — MR AVS SNAPSHOT
After Visit Summary   10/26/2018    Ramiro Jeffers    MRN: [id number]           Patient Information     Date Of Birth          1960        Visit Information        Provider Department      10/26/2018 9:10 AM Addison Davis MD [facility]  [facility]      Today's Diagnoses     Routine general medical examination at a health care facility    -  1    Essential hypertension with goal blood pressure less than 130/80        Hyperlipidemia LDL goal <100        Hypothyroidism, unspecified type        Exertional chest pressure        RLQ abdominal pain           Follow-ups after your visit        Additional Services     CARDIOLOGY PROCEDURE ADULT REFERRAL       Your provider has referred you to:   [facility]: [facility] - [place] [phone]   [email or web address]    Cardiology procedures to be completed: Lexiscan nuc med stress test, reason for procedure exertional chest pain    Please be aware that coverage of these services is subject to the terms and limitations of your health insurance plan.  Call member services at your health plan with any benefit or coverage questions.     Please bring the following to your appointment:  >>   Any x-rays, CTs or MRIs which have been performed.  Contact the facility where they were done to arrange for  prior to your scheduled appointment.   >>   List of current medications  >>   This referral request   >>   Any documents/labs given to you for this referral    [facility]   For scheduling questions call [phone]    [facility]  For scheduling questions call [phone]                  Your next 10 appointments already scheduled     Oct 30, 2018  8:00 AM CDT   NM  CV MPI WITH ISABELA 1 DAY with SCINM1   [facility] (Cardiovascular Imaging at [facility])    [address]  [facility] [facility]  [address]  [facility] [address] [phone]           How do I prepare for my exam? (Food and drink instructions) Day 1 & Day 2: Stop all caffeine 12 hours before the test. This includes coffee, tea, soda pop, chocolate and certain medicines (such as Anacin, Excedrin and NoDoz). Also avoid decaf coffee and tea, as these contain small amounts of caffeine. Stop eating 4 hours before the test. You may drink water.  How do I prepare for my exam? (Other instructions) You may need to stop some medicines before the test. Follow your doctor s orders. Day 1 & Day 2: *If you take a beta blocker: Do not take your beta-blocker on the day before your test, unless specifically told to by your doctor. And do not take it on the day of your test. Bring it with you to take after the test.  *If you take Aggrenox or dipyridamole (Persantine, Permole), stop taking it 48 hours before your test. *If you take Viagra, Cialis or Levitra, stop taking it 48 hours before your test. *If you take theophylline or aminophylline, stop taking it 12 hours before your test.  For patients with diabetes: *If you take insulin, call your diabetes care team. Ask if you should take a 1/2 dose the morning of your test. *If you take diabetes medicine by mouth, don t take it on the morning of your test. Bring it with you to take after the test. (If you have questions, call your diabetes care team.)  *Do not take nitrates on the day of your test. Do not wear your Nitro-Patch. *No alcohol, smoking or other tobacco for 12 hours before the test.  What should I wear: Please wear a loose two-piece outfit. If you will have an exercise test, bring rubber-soled walking shoes.  How long does the exam take: *This test can take 1-2 days.* ONE day exam: Allow 3-4 hours for test. IF TWO day exam: Allow  minutes PER day for test.  What should I bring: Please bring a list of your medicines (including vitamins, minerals and over-the-counter drugs).  Leave your valuables at home.  Do I need a :  No  is needed.  What do I need to tell my doctor? When you arrive, please tell us if you: * Have diabetes * Are breastfeeding * May be pregnant * Have a pacemaker of ICD (implantable defibrillator).  What should I do after the exam: No restrictions, You may resume normal activities.  What is this test: Your doctor has ordered a nuclear stress test to check how well blood is flowing through your heart. You will either exercise or take a medicine that mimics exercise; we will watch your heart.  Who should I call with questions: If you have any questions, please call the Imaging Department where you will have your exam. Directions, parking instructions, and other information is available on our website, [email or web address].            Oct 30, 2018 12:10 PM CDT   Return Visit with Addison Davis MD   [facility] ([facility])    [address]  [facility] [id number] [phone]              Who to contact     If you have questions or need follow up information about today's clinic visit or your schedule please contact [facility] [place] directly at [phone].  Normal or non-critical lab and imaging results will be communicated to you by Gentishart, letter or phone within 4 business days after the clinic has received the results. If you do not hear from us within 7 days, please contact the clinic through Gentishart or phone. If you have a critical or abnormal lab result, we will notify you by phone as soon as possible.  Submit refill requests through Cotendo or call your pharmacy and they will forward the refill request to us. Please allow 3 business days for your refill to be completed.          Additional Information About Your Visit        Cotendo Information     Cotendo gives you secure access to your electronic health record. If you see a primary care  "provider, you can also send messages to your care team and make appointments. If you have questions, please call your primary care clinic.  If you do not have a primary care provider, please call [phone] and they will assist you.        Care EveryWhere ID     This is your Care EveryWhere ID. This could be used by other organizations to access your [place] medical records  [id number]        Your Vitals Were     Pulse Height Pulse Oximetry BMI (Body Mass Index)          55 1.727 m (5' 8\") 98% 30.53 kg/m2         Blood Pressure from Last 3 Encounters:   10/26/18 109/69   10/19/17 108/60   03/23/17 121/55    Weight from Last 3 Encounters:   10/26/18 91.1 kg (200 lb 12.8 oz)   10/19/17 93.8 kg (206 lb 12.8 oz)   03/21/17 89.9 kg (198 lb 3.2 oz)              We Performed the Following     CARDIOLOGY PROCEDURE ADULT REFERRAL     EKG 12-lead complete w/read - Clinics     EKG 12-lead complete w/read - Clinics     Follow-Up with Vascular Medicine     OFFICE/OUTPT VISIT,EST,LEVL III          Today's Medication Changes          These changes are accurate as of 10/26/18 11:59 PM.  If you have any questions, ask your nurse or doctor.               These medicines have changed or have updated prescriptions.        Dose/Directions    hydrochlorothiazide 25 MG tablet   Commonly known as:  HYDRODIURIL   This may have changed:  See the new instructions.   Used for:  Essential hypertension with goal blood pressure less than 130/80   Changed by:  Addison Davis MD        Dose:  25 mg   Take 1 tablet (25 mg) by mouth every morning   Quantity:  90 tablet   Refills:  3       * levothyroxine 125 MCG tablet   Commonly known as:  SYNTHROID/LEVOTHROID   This may have changed:  Another medication with the same name was added. Make sure you understand how and when to take each.   Used for:  Hypothyroidism, unspecified type   Changed by:  Addison Davis MD        Dose:  125 mcg   Take 1 tablet (125 mcg) by mouth " daily   Quantity:  90 tablet   Refills:  3       * levothyroxine 137 MCG tablet   Commonly known as:  SYNTHROID/LEVOTHROID   This may have changed:  You were already taking a medication with the same name, and this prescription was added. Make sure you understand how and when to take each.   Used for:  Hypothyroidism, unspecified type   Changed by:  Addison Davis MD        Dose:  137 mcg   Take 1 tablet (137 mcg) by mouth daily   Quantity:  90 tablet   Refills:  3       * Notice:  This list has 2 medication(s) that are the same as other medications prescribed for you. Read the directions carefully, and ask your doctor or other care provider to review them with you.         Where to get your medicines      These medications were sent to [facility] [address] [place], [place] - [address]  [address] [facility] [address]    Hours:  24-hours Phone:  [phone]     hydrochlorothiazide 25 MG tablet    levothyroxine 137 MCG tablet    pravastatin 20 MG tablet                Primary Care Provider Office Phone # Fax #    Addison Davis [phone] [address]  [facility] [address]        Equal Access to Services     LARY REYES AH: Hadii kinza arias hadasho Soomaali, waaxda luqadaha, qaybta kaalmada adeegyada, israel kumari. So [facility] [phone].    ATENCIÓN: Si habla español, tiene a reynoso disposición servicios gratuitos de asistencia lingüística. Llame al [phone].    We comply with applicable federal civil rights laws and [place] laws. We do not discriminate on the basis of race, color, national origin, age, disability, sex, sexual orientation, or gender identity.            Thank you!     Thank you for choosing [facility] [place]  for your care. Our goal is always to provide you with excellent care. Hearing back from our patients is one way we can continue to improve our services. Please  take a few minutes to complete the written survey that you may receive in the mail after your visit with us. Thank you!             Your Updated Medication List - Protect others around you: Learn how to safely use, store and throw away your medicines at www.disposemymeds.org.          This list is accurate as of 10/26/18 11:59 PM.  Always use your most recent med list.                   Brand Name Dispense Instructions for use Diagnosis    acetaminophen 325 MG tablet    TYLENOL    100 tablet    Take 2 tablets (650 mg) by mouth every 4 hours as needed for mild pain    Chest wall pain       cyanocobalamin 1000 MCG Subl sublingual tablet      Place 1,000 mcg under the tongue daily        hydrochlorothiazide 25 MG tablet    HYDRODIURIL    90 tablet    Take 1 tablet (25 mg) by mouth every morning    Essential hypertension with goal blood pressure less than 130/80       * levothyroxine 125 MCG tablet    SYNTHROID/LEVOTHROID    90 tablet    Take 1 tablet (125 mcg) by mouth daily    Hypothyroidism, unspecified type       * levothyroxine 137 MCG tablet    SYNTHROID/LEVOTHROID    90 tablet    Take 1 tablet (137 mcg) by mouth daily    Hypothyroidism, unspecified type       Multi-vitamin Tabs tablet   Generic drug:  multivitamin, therapeutic with minerals      Take 1 tablet by mouth 2 times daily        oxyCODONE IR 5 MG tablet    ROXICODONE    20 tablet    Take 1-2 tablets (5-10 mg) by mouth every 4 hours as needed for moderate to severe pain    Chest wall pain       pravastatin 20 MG tablet    PRAVACHOL    90 tablet    Take 1 tablet (20 mg) by mouth daily    Hyperlipidemia LDL goal <100       senna-docusate 8.6-50 MG per tablet    SENOKOT-S;PERICOLACE    100 tablet    Take 1-2 tablets by mouth 2 times daily as needed (constipation )    Drug-induced constipation       vitamin D 2000 units tablet     100 tablet    Take 1 tablet by mouth daily    Vitamin D deficiency       * Notice:  This list has 2 medication(s) that are the  same as other medications prescribed for you. Read the directions carefully, and ask your doctor or other care provider to review them with you.

## 2018-10-26 NOTE — NURSING NOTE
"Ramiro Jeffers is a 58 year old male who presents for:  Chief Complaint   Patient presents with     RECHECK     Follow up on labs        Vitals:    Vitals:    10/26/18 0850   BP: 109/69   BP Location: Right arm   Patient Position: Sitting   Cuff Size: Adult Regular   Pulse: 55   SpO2: 98%   Weight: 200 lb 12.8 oz (91.1 kg)   Height: 5' 8\" (1.727 m)       BMI:  Estimated body mass index is 30.53 kg/(m^2) as calculated from the following:    Height as of this encounter: 5' 8\" (1.727 m).    Weight as of this encounter: 200 lb 12.8 oz (91.1 kg).    Pain Score:  Data Unavailable        Kelsea Chance      "

## 2018-10-29 ENCOUNTER — TELEPHONE (OUTPATIENT)
Dept: OTHER | Facility: CLINIC | Age: 58
End: 2018-10-29

## 2018-10-29 NOTE — TELEPHONE ENCOUNTER
Prior Authorization Not Needed per Insurance    Medication: pravastatin  Insurance Company: [employer] - Phone [phone] Fax [phone]  Expected CoPay:      Pharmacy Filling the Rx: [email or web address] [address] [place] [address] [address]  Pharmacy Notified:  yes  Patient Notified:  YES    PA not needed.  Pharmacy was able to get a paid claim.  Request was sent in error.

## 2018-10-29 NOTE — TELEPHONE ENCOUNTER
Prior Authorization Retail Medication Request    Medication/Dose: pravastatin  ICD code (if different than what is on RX):    Previously Tried and Failed:  Pravastatin 10 mg  Rationale:  Meets Hyperlipidemia LDL goal <100 on medication    Insurance Name:  [employer] [place]   Insurance ID:  [id number]       Pharmacy Information (if different than what is on RX)  Name:    Phone:

## 2018-10-29 NOTE — PROGRESS NOTES
SUBJECTIVE:    CC: Ramiro Jeffers is a 58 year old male who presents for:       Routine general medical examination at a health care facility  Essential hypertension with goal blood pressure less than 130/80  Hyperlipidemia LDL goal <100  Hypothyroidism, unspecified type  Chest pressure  RLQ abdominal pain          HISTORIES:    PROBLEM LIST:                   Patient Active Problem List   Diagnosis     Acquired hypothyroidism     pulmonary emboli     Venous insufficiency     Vitamin D deficiency     Bariatric surgery status     Postsurgical nonabsorption     Bilateral knee pain     Anemia     Pneumothorax       PAST MEDICAL HISTORY:                  Past Medical History:   Diagnosis Date     ACQUIRED HYPOTHYROIDISM       Blood clot in the legs      Calculus of gallbladder without mention of cholecystitis or obstruction      Cataract      Obesity, unspecified     significant weight loss w/diet alone, on 10-15-10, BMI was 56.4     pulmonary emboli 6-2010    Unprovoked large bilateral pulmonary emboli without source identified on lower extremity dopplers, pt had a transient moderate lupus inhibitor upon initial presentation in June , 2010 which was gone in September, 2010;  all other thrombophilic workup is normal     Pulmonary embolus, bilateral      Shortness of breath     on exertion     Venous insufficiency      Viral pneumonia, unspecified      Vitamin D deficiency        PAST SURGICAL HISTORY:                  Past Surgical History:   Procedure Laterality Date     C NONSPECIFIC PROCEDURE  10-    Laparoscopic gastric bypass,     C NONSPECIFIC PROCEDURE  10-    1.  Attempted laparoscopic exploration with conversion to: .  Abdominal exploration. 3.  Reduction of incarcerated incisional hernia. 4.  Repair of incisional hernia.5.  Gastrostomy tube placement (#22 Luxembourgish) into defunctionalized stomach.6.  Enterotomy with bowel decompression and primary repair. 7.  Abdominal lavage.      COLONOSCOPY   11/17/2011    Procedure:COLONOSCOPY; colonoscopy; Surgeon:ELENA OROURKE; Location: GI     LAPAROSCOPIC CHOLECYSTECTOMY  9/28/2012    Procedure: LAPAROSCOPIC CHOLECYSTECTOMY;  LAPAROSCOPIC CHOLECYSTECTOMY, LYSIS OF ADHESIONS;  Surgeon: Dutch Matta MD;  Location:  OR     LAPAROSCOPIC LYSIS ADHESIONS  9/28/2012    Procedure: LAPAROSCOPIC LYSIS ADHESIONS;;  Surgeon: Dutch Matta MD;  Location:  OR     LAPAROSCOPY, SURGICAL; REPAIR INCISIONAL OR VENTRAL HERNIA      repair of ventral strangulated surgery     PHACOEMULSIFICATION CLEAR CORNEA WITH STANDARD INTRAOCULAR LENS IMPLANT  12/19/2011    Procedure:PHACOEMULSIFICATION CLEAR CORNEA WITH STANDARD INTRAOCULAR LENS IMPLANT; RIGHT PHACOEMULSIFICATION CLEAR CORNEA WITH STANDARD INTRAOCULAR LENS IMPLANT ; Surgeon:ALLISON ANTONIO; Location[facility]       CURRENT MEDICATIONS:                  Current Outpatient Prescriptions   Medication Sig Dispense Refill     acetaminophen (TYLENOL) 325 MG tablet Take 2 tablets (650 mg) by mouth every 4 hours as needed for mild pain 100 tablet 0     Cholecalciferol (VITAMIN D) 2000 UNITS tablet Take 1 tablet by mouth daily 100 tablet 12     cyanocobalamin 1000 MCG SUBL Place 1,000 mcg under the tongue daily       hydrochlorothiazide (HYDRODIURIL) 25 MG tablet Take 1 tablet (25 mg) by mouth every morning 90 tablet 3     levothyroxine (SYNTHROID/LEVOTHROID) 125 MCG tablet Take 1 tablet (125 mcg) by mouth daily 90 tablet 3     levothyroxine (SYNTHROID/LEVOTHROID) 137 MCG tablet Take 1 tablet (137 mcg) by mouth daily 90 tablet 3     Multiple Vitamin (MULTI-VITAMIN) per tablet Take 1 tablet by mouth 2 times daily        oxyCODONE (ROXICODONE) 5 MG IR tablet Take 1-2 tablets (5-10 mg) by mouth every 4 hours as needed for moderate to severe pain 20 tablet 0     pravastatin (PRAVACHOL) 20 MG tablet Take 1 tablet (20 mg) by mouth daily 90 tablet 3     senna-docusate (SENOKOT-S;PERICOLACE) 8.6-50 MG per tablet Take 1-2 tablets by mouth  "2 times daily as needed (constipation ) 100 tablet 0       ALLERGIES:                  Allergies   Allergen Reactions     No Known Allergies        SOCIAL HISTORY:                  Social History     Social History     Marital status: Single     Spouse name: N/A     Number of children: N/A     Years of education: N/A     Occupational History      Self     Social History Main Topics     Smoking status: Never Smoker     Smokeless tobacco: Never Used     Alcohol use No     Drug use: No     Sexual activity: Not on file     Other Topics Concern     Not on file     Social History Narrative       FAMILY HISTORY:                   Family History   Problem Relation Age of Onset     Osteoporosis Mother      Thyroid Disease Mother      no thyroid     Cancer Maternal Grandmother      ? lung cancer     Cancer Father      melanoma                             REVIEW OF SYSTEMS:  CONSTITUTIONAL:no malaise, fatigue, or other general symptoms  EYES: no subjective changes in visual acuity, no photophobia  ENT/MOUTH: no complaints of rhinorrhea, nasal congestion, sore throat, hearing changes  RESP:no SOB  CV: POSITIVE for left sided exertional CP while mowing grass  GI: RLQ abdominal pain  :no polyuria or polydipsia, no dysuria, no gross hematuria  MUSCULOSKELATAL:no arthalgias or myalgias  INTEGUMENTARY/SKIN: no pruritis, rashes, or moles with recent change in size, shape, or pigmentation  BREAST: no lumps, masses, or discharges  NEURO: no gross sensory or motor symptoms, no dizziness, no confusion  ENDOCRINE: no polyuria or polydipsia, no heat or cold intolerance  HEME/ALLERGY/IMMUNE: no fevers, chills, night sweats, or unwanted weight loss  PSYCHIATRIC: no depression, anxiety, or internal stimuli    EXAM:    /69 (BP Location: Right arm, Patient Position: Sitting, Cuff Size: Adult Regular)  Pulse 55  Ht 1.727 m (5' 8\")  Wt 91.1 kg (200 lb 12.8 oz)  SpO2 98%  BMI 30.53 kg/m2  BMI: Body mass index is 30.53 " kg/(m^2).  GENERAL APPEARANCE: healthy, alert and no distress   EXAM:  EYES: clear conjunctiva, no cataracts, no obvious fundoscopic abnormalities  HENT: oropharynx, nares, and TMs are WNL  NECK: no JVD, thyromegaly or lymphadenopathy, no cervical bruits  RESP: clear to auscultation without rales, wheezes, or rhonchi  CV: RRR, no murmurs, gallops, or rubs  LYMPH: no cervical , axillary, or inguinal lymphadenopathy appreciated  GI: POSITIVE for RLQ painonpalpation  : normal external genitalia and anus, no lumps, masses, or tenderness noted on palpation of the normally sized prostate  MS: no obvoius clinicallly relevant arthropathy, no evidence vasculitis  SKIN: no nevi clinically suspicious for malignancy are noted  NEURO: CN II-XII intact, no localizing sensory or motor abnoramlities noted, DTRs symmetrical bilaterally  PSYCH: Mental status exam reveals the pt to have normal mood and affect. There is no disorder of thought form or content. There is no response to internal stimuli. There is no suicidal or homicidal ideation.           (Z00.00) Routine general medical examination at a health care facility  (primary encounter diagnosis)  Comment: RTC one year for annual physical  Plan:    (I10) Essential hypertension with goal blood pressure less than 130/80  Comment: at goal  Plan: OFFICE/OUTPT VISIT,EST,LEVL III,         hydrochlorothiazide (HYDRODIURIL) 25 MG tablet            (E78.5) Hyperlipidemia LDL goal <100  Comment: at goal  Plan: OFFICE/OUTPT VISIT,EST,LEVL III, pravastatin         (PRAVACHOL) 20 MG tablet            (E03.9) Hypothyroidism, unspecified type  Comment: TFTs WNL  Plan: OFFICE/OUTPT VISIT,EST,LEVL III, levothyroxine         (SYNTHROID/LEVOTHROID) 137 MCG tablet, TSH, T4         free, T3 Free           (R07.89) Exertional chest pressure  Comment: EKG without acute changes, no pain presently, get Lexiscan , RTC next week, go to ER if recurrent exertional CP  Plan: EKG 12-lead complete w/read -  Clinics,         OFFICE/OUTPT VISIT,EST,LEVL III, EKG 12-lead         complete w/read - Clinics, CARDIOLOGY PROCEDURE        ADULT REFERRAL, NM Lexiscan stress test (nuc         card)           (R10.31) RLQ abdominal pain  Comment: r/o   appendicitis  Plan: CT Abdomen Pelvis w Contrast            Greater than one half the 15 minutes not spent on preventive care during this visit was spent providing aducation and counselling regarding item(s) # 2 onward as delineated above.                            I have discussed with patient the risks, benefits, medications, treatment options and modalities.   I have instructed the patient to call or schedule a follow-up appointment if any problems or failure to improve.

## 2018-10-30 ENCOUNTER — HOSPITAL ENCOUNTER (OUTPATIENT)
Dept: CARDIOLOGY | Facility: CLINIC | Age: 58
Discharge: HOME | End: 2018-10-30
Attending: INTERNAL MEDICINE | Admitting: INTERNAL MEDICINE
Payer: COMMERCIAL

## 2018-10-30 ENCOUNTER — DOCUMENTATION ONLY (OUTPATIENT)
Dept: ADMINISTRATIVE | Facility: CLINIC | Age: 58
End: 2018-10-30

## 2018-10-30 VITALS — HEART RATE: 50 BPM

## 2018-10-30 DIAGNOSIS — R07.89 ATYPICAL CHEST PAIN: Primary | ICD-10-CM

## 2018-10-30 DIAGNOSIS — R07.89 CHEST PRESSURE: ICD-10-CM

## 2018-10-30 DIAGNOSIS — R07.89 ATYPICAL CHEST PAIN: ICD-10-CM

## 2018-10-30 PROCEDURE — A9502 TC99M TETROFOSMIN: HCPCS | Performed by: INTERNAL MEDICINE

## 2018-10-30 PROCEDURE — 34300033 ZZH RX 343: Performed by: INTERNAL MEDICINE

## 2018-10-30 PROCEDURE — 75571 CT HRT W/O DYE W/CA TEST: CPT | Mod: 26 | Performed by: INTERNAL MEDICINE

## 2018-10-30 PROCEDURE — 75571 CT HRT W/O DYE W/CA TEST: CPT

## 2018-10-30 RX ORDER — AMINOPHYLLINE 25 MG/ML
50-100 INJECTION, SOLUTION INTRAVENOUS
Status: DISCONTINUED | OUTPATIENT
Start: 2018-10-30 | End: 2018-10-31 | Stop reason: HOSPADM

## 2018-10-30 RX ORDER — METOPROLOL TARTRATE 1 MG/ML
5-15 INJECTION, SOLUTION INTRAVENOUS
Status: DISCONTINUED | OUTPATIENT
Start: 2018-10-30 | End: 2018-10-31 | Stop reason: HOSPADM

## 2018-10-30 RX ORDER — ONDANSETRON 2 MG/ML
4 INJECTION INTRAMUSCULAR; INTRAVENOUS
Status: DISCONTINUED | OUTPATIENT
Start: 2018-10-30 | End: 2018-10-31 | Stop reason: HOSPADM

## 2018-10-30 RX ORDER — REGADENOSON 0.08 MG/ML
0.4 INJECTION, SOLUTION INTRAVENOUS ONCE
Status: DISCONTINUED | OUTPATIENT
Start: 2018-10-30 | End: 2018-10-31 | Stop reason: HOSPADM

## 2018-10-30 RX ORDER — ALBUTEROL SULFATE 90 UG/1
2 AEROSOL, METERED RESPIRATORY (INHALATION) EVERY 5 MIN PRN
Status: DISCONTINUED | OUTPATIENT
Start: 2018-10-30 | End: 2018-10-31 | Stop reason: HOSPADM

## 2018-10-30 RX ORDER — DIPHENHYDRAMINE HYDROCHLORIDE 50 MG/ML
25-50 INJECTION INTRAMUSCULAR; INTRAVENOUS
Status: DISCONTINUED | OUTPATIENT
Start: 2018-10-30 | End: 2018-10-31 | Stop reason: HOSPADM

## 2018-10-30 RX ORDER — NITROGLYCERIN 0.4 MG/1
0.4 TABLET SUBLINGUAL
Status: DISCONTINUED | OUTPATIENT
Start: 2018-10-30 | End: 2018-10-31 | Stop reason: HOSPADM

## 2018-10-30 RX ORDER — METHYLPREDNISOLONE SODIUM SUCCINATE 125 MG/2ML
125 INJECTION, POWDER, LYOPHILIZED, FOR SOLUTION INTRAMUSCULAR; INTRAVENOUS
Status: DISCONTINUED | OUTPATIENT
Start: 2018-10-30 | End: 2018-10-31 | Stop reason: HOSPADM

## 2018-10-30 RX ORDER — METOPROLOL TARTRATE 50 MG
50-100 TABLET ORAL
Status: DISCONTINUED | OUTPATIENT
Start: 2018-10-30 | End: 2018-10-31 | Stop reason: HOSPADM

## 2018-10-30 RX ORDER — NITROGLYCERIN 0.4 MG/1
TABLET SUBLINGUAL
Qty: 25 TABLET | Refills: 1 | Status: SHIPPED | OUTPATIENT
Start: 2018-10-30 | End: 2018-11-26

## 2018-10-30 RX ORDER — DIPHENHYDRAMINE HCL 25 MG
25 CAPSULE ORAL
Status: DISCONTINUED | OUTPATIENT
Start: 2018-10-30 | End: 2018-10-31 | Stop reason: HOSPADM

## 2018-10-30 RX ORDER — ACYCLOVIR 200 MG/1
0-1 CAPSULE ORAL
Status: DISCONTINUED | OUTPATIENT
Start: 2018-10-30 | End: 2018-10-31 | Stop reason: HOSPADM

## 2018-10-30 RX ADMIN — Medication 4 MCI.: at 08:23

## 2018-10-30 NOTE — PROGRESS NOTES
Cardiology Note    Patient presented today for a Lexiscan nuclear stress test.  Resting images had significant inferior attenuation due to GI uptake, likely from previous gastric bypass.  Despite drinking soda and reimaging, there was still significant inferior attenuation.  Test was canceled as it would have been uninterpretable.    Patient was set up for coronary CTA the same day.  Due to frequent PACs, only the calcium score portion could be done.  Calcium score was 700 with significant calcification of the LAD.    Results were communicated to Dr. Davis's office.  Patient will be seen tomorrow by Dr. Davis.  Referral to cardiology and angiogram may be needed.  As far as other noninvasive testing options, a dobutamine stress echo or cardiac MRI could be considered (unable to do a treadmill).  If patient has symptoms more suggestive of angina, moving forward with angiogram may be a better option.    Saúl Culver MD  Cardiology - [facility]  Pager: [phone]  Text Page  October 30, 2018

## 2018-11-05 PROBLEM — R07.89 CHEST PRESSURE: Status: ACTIVE | Noted: 2018-11-05

## 2018-11-06 ENCOUNTER — OFFICE VISIT (OUTPATIENT)
Dept: CARDIOLOGY | Facility: CLINIC | Age: 58
End: 2018-11-06
Payer: COMMERCIAL

## 2018-11-06 VITALS
HEIGHT: 68 IN | DIASTOLIC BLOOD PRESSURE: 72 MMHG | WEIGHT: 198.6 LBS | BODY MASS INDEX: 30.1 KG/M2 | HEART RATE: 64 BPM | SYSTOLIC BLOOD PRESSURE: 115 MMHG

## 2018-11-06 DIAGNOSIS — I25.118 CORONARY ARTERY DISEASE OF NATIVE ARTERY OF NATIVE HEART WITH STABLE ANGINA PECTORIS (H): Primary | ICD-10-CM

## 2018-11-06 PROCEDURE — 99205 OFFICE O/P NEW HI 60 MIN: CPT | Performed by: INTERNAL MEDICINE

## 2018-11-06 RX ORDER — METOPROLOL TARTRATE 25 MG/1
12.5 TABLET, FILM COATED ORAL 2 TIMES DAILY
Qty: 60 TABLET | Refills: 3 | Status: SHIPPED | OUTPATIENT
Start: 2018-11-06 | End: 2018-11-21 | Stop reason: ALTCHOICE

## 2018-11-06 NOTE — LETTER
11/6/2018    Addison Davis MD  [address]  [facility] [address]    RE: Ramiro Jeffers       Dear Colleague,    I had the pleasure of seeing Ramiro Jeffers in the [facility].    CARDIOLOGY CLINIC CONSULTATION    REASON FOR CONSULT: Angina      PRIMARY CARE PHYSICIAN:  Addison Davis    HISTORY OF PRESENT ILLNESS:  This is a pleasant 58-year-old male patient who denies any prior cardiac history.  The patient has a history of DVT and a pulmonary embolus in the past and was on anticoagulation.  He is off anticoagulation now.  He has a history of mild hypertension and mild hyperlipidemia on medical management.  Over the last few months particularly in the last 1-3 months the patient has been complaining of exertional chest heaviness and discomfort in the center of his chest.  Symptoms resolve when he is at rest.  Last Sunday symptoms lasted for about an hour when he was mowing the lawn.  After they subsided he was fine throughout the entire day.  His PCP ordered a nuclear stress test appropriately however there was significant gut uptake and attenuation because of which the pictures were not reliable.  He was referred for a coronary CT angiography however he had PVCs which made that unreliable as well and eventually the test was converted into a CT coronary calcium score.  This showed significant calcification in the LAD with a total score of 563.  He has been referred to cardiology to discuss this.  Denies heart failure symptoms or palpitations or syncope or presyncope.  Functionally otherwise pretty active.  He says the first time he noticed this chest pain was about a year ago but symptoms have been particularly predominant in the last 3 months.  He never has any symptoms at rest. No smoking, no DM. No bleeding problems.    PAST MEDICAL HISTORY:  Past Medical History:   Diagnosis Date     ACQUIRED HYPOTHYROIDISM       Blood clot in the legs      Calculus of  gallbladder without mention of cholecystitis or obstruction      Cataract      Chest pressure 11/5/2018     Hyperlipidemia      Hypertension      Obesity, unspecified     significant weight loss w/diet alone, on 10-15-10, BMI was 56.4     pulmonary emboli 6-2010    Unprovoked large bilateral pulmonary emboli without source identified on lower extremity dopplers, pt had a transient moderate lupus inhibitor upon initial presentation in June , 2010 which was gone in September, 2010;  all other thrombophilic workup is normal     Pulmonary embolus, bilateral      Shortness of breath     on exertion     Venous insufficiency      Viral pneumonia, unspecified      Vitamin D deficiency        MEDICATIONS:  Current Outpatient Prescriptions   Medication     acetaminophen (TYLENOL) 325 MG tablet     Cholecalciferol (VITAMIN D) 2000 UNITS tablet     cyanocobalamin 1000 MCG SUBL     hydrochlorothiazide (HYDRODIURIL) 25 MG tablet     levothyroxine (SYNTHROID/LEVOTHROID) 137 MCG tablet     metoprolol tartrate (LOPRESSOR) 25 MG tablet     Multiple Vitamin (MULTI-VITAMIN) per tablet     nitroGLYcerin (NITROSTAT) 0.4 MG sublingual tablet     pravastatin (PRAVACHOL) 20 MG tablet     levothyroxine (SYNTHROID/LEVOTHROID) 125 MCG tablet     oxyCODONE (ROXICODONE) 5 MG IR tablet     senna-docusate (SENOKOT-S;PERICOLACE) 8.6-50 MG per tablet     No current facility-administered medications for this visit.        ALLERGIES:  Allergies   Allergen Reactions     No Known Allergies        SOCIAL HISTORY:  I have reviewed this patient's social history and updated it with pertinent information if needed. Ramiro Jeffers  reports that he has never smoked. He has never used smokeless tobacco. He reports that he does not drink alcohol or use illicit drugs.    FAMILY HISTORY:  I have reviewed this patient's family history and updated it with pertinent information if needed.   Family History   Problem Relation Age of Onset     Osteoporosis Mother       Thyroid Disease Mother      no thyroid     Cancer Maternal Grandmother      ? lung cancer     Cancer Father      melanoma     Coronary Artery Disease Brother      Coronary Artery Disease Brother        REVIEW OF SYSTEMS:  Constitutional:  No weight loss, fever, chills, weakness or fatigue.  HEENT:  Eyes:  No visual loss, blurred vision, double vision or yellow sclerae. No hearing loss, sneezing, congestion, runny nose or sore throat.  Skin:  No rash or itching.  Cardiovascular: per HPI  Respiratory: per HPI  GI:  No anorexia, nausea, vomiting or diarrhea. No abdominal pain or blood.  :  No dysurea, hematuria  Neurologic:  No headache, dizziness, syncope, paralysis, ataxia, numbness or tingling in the extremities. No change in bowel or bladder control.  Musculoskeletal:  No muscle, back pain, joint pain or stiffness.  Hematologic:  No anemia, bleeding or bruising.  Lymphatics:  No enlarged nodes. No history of splenectomy.  Psychiatric:  No history of depression or anxiety.  Endocrine:  No reports of sweating, cold or heat intolerance. No polyuria or polydipsia.  Allergies:  No history of asthma, hives, eczema or rhinitis.    PHYSICAL EXAM:      BP: 115/72 Pulse: 64            Vital Signs with Ranges  Pulse:  [64] 64  BP: (115)/(72) 115/72  198 lbs 9.6 oz    Constitutional: awake, alert, no distress  Eyes: PERRL, sclera nonicteric  ENT: trachea midline  Respiratory: CTAB  Cardiovascular: Normal heart sounds without MRG  GI: nondistended, nontender, bowel sounds present, hernia  Lymph/Hematologic: no lymphadenopathy  Skin: dry, no rash  Musculoskeletal: good muscle tone, strength 5/5 in upper and lower extremities  Neurologic: no focal deficits  Neuropsychiatric: appropriate affact    DATA:  Labs: Pertinent cardiac labs reviewed    Echocardiogram 2016  The left ventricle is borderline dilated. Left ventricular systolic function is mildly reduced. The visual ejection fraction is estimated at 50-55%. There is mild  global hypokinesia of the left ventricle. No regional wall motion abnormalities noted. The transmitral spectral Doppler flow pattern is suggestive of pseudonormalization. There is mild biatrial enlargement. The rhythm was normal sinus. The patient exhibited frequent PVCs. Compared to the prior study dated 10/25/2010, there are changes as noted. The mild global left ventricular dysfunction is new and may be partially due to the frequent PVCs. Clinical correlation suggested.    ASSESSMENT:  This is a 58-year-old male patient who is been complaining of crescendo angina over the last 1-3 months.  He does not have any resting symptoms.  ECG does not show any active signs of acute ischemia.  Nuclear stress test was unreliable as mentioned above.  CT angiography could not be gated well because of PVCs.  Subsequently a CT coronary calcium score showed a total score of 563 with significant calcium in the LAD.    RECOMMENDATIONS:  1.  Crescendo angina: No unstable findings at this point.  No resting chest discomfort.  Discussed extensively about the stress test findings and CT findings.  Given his symptoms and LAD calcifications noted on the CT we will proceed with coronary angiography to define coronary anatomy with intent to revascularize. Risks and benefits of the procedure were discussed with the patient in detail that includes the risk of stroke, heart attack, death, emergent stenting or bypass, contrast induced allergic reaction, renal dysfunction (including risks of temporary or permanent dialysis), and pulmonary, sedation, and vascular complications (including bleeding and transfusion) were discussed. Patient understands the overall risks of the procedure and wishes to proceed.  He is agreeable to taking dual antiplatelet therapy and the risks and benefits associated with that.  Start aspirin 81 mg daily.  Continue statin.  Will start metoprolol 12.5 mg twice daily.  I have instructed him to take nitroglycerin when he  gets chest discomfort.  If pain does not subside and or changes in intensity or quality he needs to come into the emergency department and/or call 911.  We will repeat an echocardiogram to evaluate valves and LV function. If no obstructive CAD found, he can stop Metoprolol, but still needs to continue statin, and aspirin should be considered too. If he does end up getting PCI, I would recommend pushing his statin to high intensity.     2.  PVCs: These were noted even in 2016 on his echocardiogram.  I will get a Holter monitor to quantify this burden.  May need referral to cardiac EP.     Return to see me in clinic in 1 month with results of echo and Holter, and after his cardiac catheterization.    Tereza Key MD, [facility]  Cardiology - [facility]  November 6, 2018          Thank you for allowing me to participate in the care of your patient.      Sincerely,     Tereza Key MD     [facility]    cc:   Addison Davis MD  [address]  [place] [address]

## 2018-11-06 NOTE — PROGRESS NOTES
CARDIOLOGY CLINIC CONSULTATION    REASON FOR CONSULT: Angina      PRIMARY CARE PHYSICIAN:  Addison Davis    HISTORY OF PRESENT ILLNESS:  This is a pleasant 58-year-old male patient who denies any prior cardiac history.  The patient has a history of DVT and a pulmonary embolus in the past and was on anticoagulation.  He is off anticoagulation now.  He has a history of mild hypertension and mild hyperlipidemia on medical management.  Over the last few months particularly in the last 1-3 months the patient has been complaining of exertional chest heaviness and discomfort in the center of his chest.  Symptoms resolve when he is at rest.  Last Sunday symptoms lasted for about an hour when he was mowing the lawn.  After they subsided he was fine throughout the entire day.  His PCP ordered a nuclear stress test appropriately however there was significant gut uptake and attenuation because of which the pictures were not reliable.  He was referred for a coronary CT angiography however he had PVCs which made that unreliable as well and eventually the test was converted into a CT coronary calcium score.  This showed significant calcification in the LAD with a total score of 563.  He has been referred to cardiology to discuss this.  Denies heart failure symptoms or palpitations or syncope or presyncope.  Functionally otherwise pretty active.  He says the first time he noticed this chest pain was about a year ago but symptoms have been particularly predominant in the last 3 months.  He never has any symptoms at rest. No smoking, no DM. No bleeding problems.    PAST MEDICAL HISTORY:  Past Medical History:   Diagnosis Date     ACQUIRED HYPOTHYROIDISM       Blood clot in the legs      Calculus of gallbladder without mention of cholecystitis or obstruction      Cataract      Chest pressure 11/5/2018     Hyperlipidemia      Hypertension      Obesity, unspecified     significant weight loss w/diet alone, on 10-15-10, BMI  was 56.4     pulmonary emboli 6-2010    Unprovoked large bilateral pulmonary emboli without source identified on lower extremity dopplers, pt had a transient moderate lupus inhibitor upon initial presentation in June , 2010 which was gone in September, 2010;  all other thrombophilic workup is normal     Pulmonary embolus, bilateral      Shortness of breath     on exertion     Venous insufficiency      Viral pneumonia, unspecified      Vitamin D deficiency        MEDICATIONS:  Current Outpatient Prescriptions   Medication     acetaminophen (TYLENOL) 325 MG tablet     Cholecalciferol (VITAMIN D) 2000 UNITS tablet     cyanocobalamin 1000 MCG SUBL     hydrochlorothiazide (HYDRODIURIL) 25 MG tablet     levothyroxine (SYNTHROID/LEVOTHROID) 137 MCG tablet     metoprolol tartrate (LOPRESSOR) 25 MG tablet     Multiple Vitamin (MULTI-VITAMIN) per tablet     nitroGLYcerin (NITROSTAT) 0.4 MG sublingual tablet     pravastatin (PRAVACHOL) 20 MG tablet     levothyroxine (SYNTHROID/LEVOTHROID) 125 MCG tablet     oxyCODONE (ROXICODONE) 5 MG IR tablet     senna-docusate (SENOKOT-S;PERICOLACE) 8.6-50 MG per tablet     No current facility-administered medications for this visit.        ALLERGIES:  Allergies   Allergen Reactions     No Known Allergies        SOCIAL HISTORY:  I have reviewed this patient's social history and updated it with pertinent information if needed. Ramiro WILKERSON Zeyad  reports that he has never smoked. He has never used smokeless tobacco. He reports that he does not drink alcohol or use illicit drugs.    FAMILY HISTORY:  I have reviewed this patient's family history and updated it with pertinent information if needed.   Family History   Problem Relation Age of Onset     Osteoporosis Mother      Thyroid Disease Mother      no thyroid     Cancer Maternal Grandmother      ? lung cancer     Cancer Father      melanoma     Coronary Artery Disease Brother      Coronary Artery Disease Brother        REVIEW OF  SYSTEMS:  Constitutional:  No weight loss, fever, chills, weakness or fatigue.  HEENT:  Eyes:  No visual loss, blurred vision, double vision or yellow sclerae. No hearing loss, sneezing, congestion, runny nose or sore throat.  Skin:  No rash or itching.  Cardiovascular: per HPI  Respiratory: per HPI  GI:  No anorexia, nausea, vomiting or diarrhea. No abdominal pain or blood.  :  No dysurea, hematuria  Neurologic:  No headache, dizziness, syncope, paralysis, ataxia, numbness or tingling in the extremities. No change in bowel or bladder control.  Musculoskeletal:  No muscle, back pain, joint pain or stiffness.  Hematologic:  No anemia, bleeding or bruising.  Lymphatics:  No enlarged nodes. No history of splenectomy.  Psychiatric:  No history of depression or anxiety.  Endocrine:  No reports of sweating, cold or heat intolerance. No polyuria or polydipsia.  Allergies:  No history of asthma, hives, eczema or rhinitis.    PHYSICAL EXAM:      BP: 115/72 Pulse: 64            Vital Signs with Ranges  Pulse:  [64] 64  BP: (115)/(72) 115/72  198 lbs 9.6 oz    Constitutional: awake, alert, no distress  Eyes: PERRL, sclera nonicteric  ENT: trachea midline  Respiratory: CTAB  Cardiovascular: Normal heart sounds without MRG  GI: nondistended, nontender, bowel sounds present, hernia  Lymph/Hematologic: no lymphadenopathy  Skin: dry, no rash  Musculoskeletal: good muscle tone, strength 5/5 in upper and lower extremities  Neurologic: no focal deficits  Neuropsychiatric: appropriate affact    DATA:  Labs: Pertinent cardiac labs reviewed    Echocardiogram 2016  The left ventricle is borderline dilated. Left ventricular systolic function is mildly reduced. The visual ejection fraction is estimated at 50-55%. There is mild global hypokinesia of the left ventricle. No regional wall motion abnormalities noted. The transmitral spectral Doppler flow pattern is suggestive of pseudonormalization. There is mild biatrial enlargement. The  rhythm was normal sinus. The patient exhibited frequent PVCs. Compared to the prior study dated 10/25/2010, there are changes as noted. The mild global left ventricular dysfunction is new and may be partially due to the frequent PVCs. Clinical correlation suggested.    ASSESSMENT:  This is a 58-year-old male patient who is been complaining of crescendo angina over the last 1-3 months.  He does not have any resting symptoms.  ECG does not show any active signs of acute ischemia.  Nuclear stress test was unreliable as mentioned above.  CT angiography could not be gated well because of PVCs.  Subsequently a CT coronary calcium score showed a total score of 563 with significant calcium in the LAD.    RECOMMENDATIONS:  1.  Crescendo angina: No unstable findings at this point.  No resting chest discomfort.  Discussed extensively about the stress test findings and CT findings.  Given his symptoms and LAD calcifications noted on the CT we will proceed with coronary angiography to define coronary anatomy with intent to revascularize. Risks and benefits of the procedure were discussed with the patient in detail that includes the risk of stroke, heart attack, death, emergent stenting or bypass, contrast induced allergic reaction, renal dysfunction (including risks of temporary or permanent dialysis), and pulmonary, sedation, and vascular complications (including bleeding and transfusion) were discussed. Patient understands the overall risks of the procedure and wishes to proceed.  He is agreeable to taking dual antiplatelet therapy and the risks and benefits associated with that.  Start aspirin 81 mg daily.  Continue statin.  Will start metoprolol 12.5 mg twice daily.  I have instructed him to take nitroglycerin when he gets chest discomfort.  If pain does not subside and or changes in intensity or quality he needs to come into the emergency department and/or call 911.  We will repeat an echocardiogram to evaluate valves and  LV function. If no obstructive CAD found, he can stop Metoprolol, but still needs to continue statin, and aspirin should be considered too. If he does end up getting PCI, I would recommend pushing his statin to high intensity.     2.  PVCs: These were noted even in 2016 on his echocardiogram.  I will get a Holter monitor to quantify this burden.  May need referral to cardiac EP.     Return to see me in clinic in 1 month with results of echo and Holter, and after his cardiac catheterization.    Teerza Key MD, [facility]  Cardiology - [facility]  November 6, 2018

## 2018-11-06 NOTE — MR AVS SNAPSHOT
After Visit Summary   11/6/2018    Ramiro Jeffers    MRN: [id number]           Patient Information     Date Of Birth          1960        Visit Information        Provider Department      11/6/2018 9:45 AM Tereza Key MD [facility]        Today's Diagnoses     Coronary artery disease of native artery of native heart with stable angina pectoris (H)    -  1       Follow-ups after your visit        Additional Services     Follow-Up with Cardiologist                 Your next 10 appointments already scheduled     Nov 07, 2018  8:30 AM CST   Holter Monitor with [facility] Radiology - P Heart Imaging ([facility])    [address]  [facility] [address] [phone]            Nov 08, 2018  7:45 AM CST   Ech Complete with [id number]   [facility] CV Echocardiography (Cardiovascular Imaging at [facility])    [address]  [facility] [address] [phone]           1.  Please bring or wear a comfortable two-piece outfit. 2.  You may eat, drink and take your normal medicines. 3.  For any questions that cannot be answered, please contact the ordering physician 4.  Please do not wear perfumes or scented lotions on the day of your exam.            Nov 13, 2018  9:40 AM CST   Return Visit with Addison Davis MD   [facility] Vascular Center (Vascular Health Center at [facility])    [address]  [facility] [address]   [phone]              Future tests that were ordered for you today     Open Future Orders        Priority Expected Expires Ordered    Holter Monitor 48 hour - Adult Routine 11/6/2018 11/13/2018 11/6/2018    Echocardiogram Routine 11/6/2018 11/19/2018 11/6/2018    Follow-Up with Cardiologist Routine 12/6/2018 12/29/2018 11/6/2018    Cardiac Cath: Coronary Angiography Adult Order Routine 11/6/2018 11/22/2018  "11/6/2018            Who to contact     If you have questions or need follow up information about today's clinic visit or your schedule please contact [facility] directly at [phone].  Normal or non-critical lab and imaging results will be communicated to you by MyChart, letter or phone within 4 business days after the clinic has received the results. If you do not hear from us within 7 days, please contact the clinic through Ricohart or phone. If you have a critical or abnormal lab result, we will notify you by phone as soon as possible.  Submit refill requests through Quosis or call your pharmacy and they will forward the refill request to us. Please allow 3 business days for your refill to be completed.          Additional Information About Your Visit        RicoharRedux Information     Quosis gives you secure access to your electronic health record. If you see a primary care provider, you can also send messages to your care team and make appointments. If you have questions, please call your primary care clinic.  If you do not have a primary care provider, please call [phone] and they will assist you.        Care EveryWhere ID     This is your Care EveryWhere ID. This could be used by other organizations to access your [facility] medical records  [id number]        Your Vitals Were     Pulse Height BMI (Body Mass Index)             64 1.727 m (5' 8\") 30.2 kg/m2          Blood Pressure from Last 3 Encounters:   11/06/18 115/72   10/26/18 109/69   10/19/17 108/60    Weight from Last 3 Encounters:   11/06/18 90.1 kg (198 lb 9.6 oz)   10/26/18 91.1 kg (200 lb 12.8 oz)   10/19/17 93.8 kg (206 lb 12.8 oz)                 Today's Medication Changes          These changes are accurate as of 11/6/18 10:30 AM.  If you have any questions, ask your nurse or doctor.               Start taking these medicines.        Dose/Directions    metoprolol tartrate 25 MG tablet   Commonly known " as:  LOPRESSOR   Used for:  Coronary artery disease of native artery of native heart with stable angina pectoris (H)   Started by:  Tereza Key MD        Dose:  12.5 mg   Take 0.5 tablets (12.5 mg) by mouth 2 times daily   Quantity:  60 tablet   Refills:  3            Where to get your medicines      These medications [facility] sent to [facility] [address] [place] [address] - [address] AT [address]  [address] [address]    Hours:  24-hours Phone:  [phone]     metoprolol tartrate 25 MG tablet                Primary Care Provider Office Phone # Fax #    Addison Davis [phone] [address]  [place] [address]        Equal Access to Services     [facility]: Hadii kinza arias hadasho Sopaul, waaxda luqadaha, qaybta kaalmada adetressayada, israel mcmahan . So [facility] [phone].    ATENCIÓN: Si habla español, tiene a reynoso disposición servicios gratuitos de asistencia lingüística. [facility] [phone].    We comply with applicable federal civil rights laws and [place] laws. We do not discriminate on the basis of race, color, national origin, age, disability, sex, sexual orientation, or gender identity.            Thank you!     Thank you for choosing [facility]  for your care. Our goal is always to provide you with excellent care. Hearing back from our patients is one way we can continue to improve our services. Please take a few minutes to complete the written survey that you may receive in the mail after your visit with us. Thank you!             Your Updated Medication List - Protect others around you: Learn how to safely use, store and throw away your medicines at www.disposemymeds.org.          This list is accurate as of 11/6/18 10:30 AM.  Always use your most recent med list.                   Brand Name Dispense Instructions for use Diagnosis    acetaminophen 325 MG  tablet    TYLENOL    100 tablet    Take 2 tablets (650 mg) by mouth every 4 hours as needed for mild pain    Chest wall pain       cyanocobalamin 1000 MCG Subl sublingual tablet      Place 1,000 mcg under the tongue daily        hydrochlorothiazide 25 MG tablet    HYDRODIURIL    90 tablet    Take 1 tablet (25 mg) by mouth every morning    Essential hypertension with goal blood pressure less than 130/80       * levothyroxine 125 MCG tablet    SYNTHROID/LEVOTHROID    90 tablet    Take 1 tablet (125 mcg) by mouth daily    Hypothyroidism, unspecified type       * levothyroxine 137 MCG tablet    SYNTHROID/LEVOTHROID    90 tablet    Take 1 tablet (137 mcg) by mouth daily    Hypothyroidism, unspecified type       metoprolol tartrate 25 MG tablet    LOPRESSOR    60 tablet    Take 0.5 tablets (12.5 mg) by mouth 2 times daily    Coronary artery disease of native artery of native heart with stable angina pectoris (H)       Multi-vitamin Tabs tablet   Generic drug:  multivitamin, therapeutic with minerals      Take 1 tablet by mouth 2 times daily        nitroGLYcerin 0.4 MG sublingual tablet    NITROSTAT    25 tablet    For chest pain place 1 tablet under the tongue every 5 minutes for 3 doses. If symptoms persist 5 minutes after 1st dose call 911.    Atypical chest pain       oxyCODONE IR 5 MG tablet    ROXICODONE    20 tablet    Take 1-2 tablets (5-10 mg) by mouth every 4 hours as needed for moderate to severe pain    Chest wall pain       pravastatin 20 MG tablet    PRAVACHOL    90 tablet    Take 1 tablet (20 mg) by mouth daily    Hyperlipidemia LDL goal <100       senna-docusate 8.6-50 MG per tablet    SENOKOT-S;PERICOLACE    100 tablet    Take 1-2 tablets by mouth 2 times daily as needed (constipation )    Drug-induced constipation       vitamin D 2000 units tablet     100 tablet    Take 1 tablet by mouth daily    Vitamin D deficiency       * Notice:  This list has 2 medication(s) that are the same as other medications  prescribed for you. Read the directions carefully, and ask your doctor or other care provider to review them with you.

## 2018-11-06 NOTE — LETTER
11/6/2018    Addison Davis MD  [address]  [facility] [address]    RE: Ramiro Jeffers       Dear Colleague,    I had the pleasure of seeing Ramiro Jeffers in the [facility].    CARDIOLOGY CLINIC CONSULTATION    REASON FOR CONSULT: Angina      PRIMARY CARE PHYSICIAN:  Addison Davis    HISTORY OF PRESENT ILLNESS:  This is a pleasant 58-year-old male patient who denies any prior cardiac history.  The patient has a history of DVT and a pulmonary embolus in the past and was on anticoagulation.  He is off anticoagulation now.  He has a history of mild hypertension and mild hyperlipidemia on medical management.  Over the last few months particularly in the last 1-3 months the patient has been complaining of exertional chest heaviness and discomfort in the center of his chest.  Symptoms resolve when he is at rest.  Last Sunday symptoms lasted for about an hour when he was mowing the lawn.  After they subsided he was fine throughout the entire day.  His PCP ordered a nuclear stress test appropriately however there was significant gut uptake and attenuation because of which the pictures were not reliable.  He was referred for a coronary CT angiography however he had PVCs which made that unreliable as well and eventually the test was converted into a CT coronary calcium score.  This showed significant calcification in the LAD with a total score of 563.  He has been referred to cardiology to discuss this.  Denies heart failure symptoms or palpitations or syncope or presyncope.  Functionally otherwise pretty active.  He says the first time he noticed this chest pain was about a year ago but symptoms have been particularly predominant in the last 3 months.  He never has any symptoms at rest. No smoking, no DM. No bleeding problems.    PAST MEDICAL HISTORY:  Past Medical History:   Diagnosis Date     ACQUIRED HYPOTHYROIDISM       Blood clot in the legs      Calculus of  gallbladder without mention of cholecystitis or obstruction      Cataract      Chest pressure 11/5/2018     Hyperlipidemia      Hypertension      Obesity, unspecified     significant weight loss w/diet alone, on 10-15-10, BMI was 56.4     pulmonary emboli 6-2010    Unprovoked large bilateral pulmonary emboli without source identified on lower extremity dopplers, pt had a transient moderate lupus inhibitor upon initial presentation in June , 2010 which was gone in September, 2010;  all other thrombophilic workup is normal     Pulmonary embolus, bilateral      Shortness of breath     on exertion     Venous insufficiency      Viral pneumonia, unspecified      Vitamin D deficiency        MEDICATIONS:  Current Outpatient Prescriptions   Medication     acetaminophen (TYLENOL) 325 MG tablet     Cholecalciferol (VITAMIN D) 2000 UNITS tablet     cyanocobalamin 1000 MCG SUBL     hydrochlorothiazide (HYDRODIURIL) 25 MG tablet     levothyroxine (SYNTHROID/LEVOTHROID) 137 MCG tablet     metoprolol tartrate (LOPRESSOR) 25 MG tablet     Multiple Vitamin (MULTI-VITAMIN) per tablet     nitroGLYcerin (NITROSTAT) 0.4 MG sublingual tablet     pravastatin (PRAVACHOL) 20 MG tablet     levothyroxine (SYNTHROID/LEVOTHROID) 125 MCG tablet     oxyCODONE (ROXICODONE) 5 MG IR tablet     senna-docusate (SENOKOT-S;PERICOLACE) 8.6-50 MG per tablet     No current facility-administered medications for this visit.        ALLERGIES:  Allergies   Allergen Reactions     No Known Allergies        SOCIAL HISTORY:  I have reviewed this patient's social history and updated it with pertinent information if needed. Ramiro Jeffers  reports that he has never smoked. He has never used smokeless tobacco. He reports that he does not drink alcohol or use illicit drugs.    FAMILY HISTORY:  I have reviewed this patient's family history and updated it with pertinent information if needed.   Family History   Problem Relation Age of Onset     Osteoporosis Mother       Thyroid Disease Mother      no thyroid     Cancer Maternal Grandmother      ? lung cancer     Cancer Father      melanoma     Coronary Artery Disease Brother      Coronary Artery Disease Brother        REVIEW OF SYSTEMS:  Constitutional:  No weight loss, fever, chills, weakness or fatigue.  HEENT:  Eyes:  No visual loss, blurred vision, double vision or yellow sclerae. No hearing loss, sneezing, congestion, runny nose or sore throat.  Skin:  No rash or itching.  Cardiovascular: per HPI  Respiratory: per HPI  GI:  No anorexia, nausea, vomiting or diarrhea. No abdominal pain or blood.  :  No dysurea, hematuria  Neurologic:  No headache, dizziness, syncope, paralysis, ataxia, numbness or tingling in the extremities. No change in bowel or bladder control.  Musculoskeletal:  No muscle, back pain, joint pain or stiffness.  Hematologic:  No anemia, bleeding or bruising.  Lymphatics:  No enlarged nodes. No history of splenectomy.  Psychiatric:  No history of depression or anxiety.  Endocrine:  No reports of sweating, cold or heat intolerance. No polyuria or polydipsia.  Allergies:  No history of asthma, hives, eczema or rhinitis.    PHYSICAL EXAM:      BP: 115/72 Pulse: 64            Vital Signs with Ranges  Pulse:  [64] 64  BP: (115)/(72) 115/72  198 lbs 9.6 oz    Constitutional: awake, alert, no distress  Eyes: PERRL, sclera nonicteric  ENT: trachea midline  Respiratory: CTAB  Cardiovascular: Normal heart sounds without MRG  GI: nondistended, nontender, bowel sounds present, hernia  Lymph/Hematologic: no lymphadenopathy  Skin: dry, no rash  Musculoskeletal: good muscle tone, strength 5/5 in upper and lower extremities  Neurologic: no focal deficits  Neuropsychiatric: appropriate affact    DATA:  Labs: Pertinent cardiac labs reviewed    Echocardiogram 2016  The left ventricle is borderline dilated. Left ventricular systolic function is mildly reduced. The visual ejection fraction is estimated at 50-55%. There is mild  global hypokinesia of the left ventricle. No regional wall motion abnormalities noted. The transmitral spectral Doppler flow pattern is suggestive of pseudonormalization. There is mild biatrial enlargement. The rhythm was normal sinus. The patient exhibited frequent PVCs. Compared to the prior study dated 10/25/2010, there are changes as noted. The mild global left ventricular dysfunction is new and may be partially due to the frequent PVCs. Clinical correlation suggested.    ASSESSMENT:  This is a 58-year-old male patient who is been complaining of crescendo angina over the last 1-3 months.  He does not have any resting symptoms.  ECG does not show any active signs of acute ischemia.  Nuclear stress test was unreliable as mentioned above.  CT angiography could not be gated well because of PVCs.  Subsequently a CT coronary calcium score showed a total score of 563 with significant calcium in the LAD.    RECOMMENDATIONS:  1.  Crescendo angina: No unstable findings at this point.  No resting chest discomfort.  Discussed extensively about the stress test findings and CT findings.  Given his symptoms and LAD calcifications noted on the CT we will proceed with coronary angiography to define coronary anatomy with intent to revascularize. Risks and benefits of the procedure were discussed with the patient in detail that includes the risk of stroke, heart attack, death, emergent stenting or bypass, contrast induced allergic reaction, renal dysfunction (including risks of temporary or permanent dialysis), and pulmonary, sedation, and vascular complications (including bleeding and transfusion) were discussed. Patient understands the overall risks of the procedure and wishes to proceed.  He is agreeable to taking dual antiplatelet therapy and the risks and benefits associated with that.  Start aspirin 81 mg daily.  Continue statin.  Will start metoprolol 12.5 mg twice daily.  I have instructed him to take nitroglycerin when he  gets chest discomfort.  If pain does not subside and or changes in intensity or quality he needs to come into the emergency department and/or call 911.  We will repeat an echocardiogram to evaluate valves and LV function. If no obstructive CAD found, he can stop Metoprolol, but still needs to continue statin, and aspirin should be considered too. If he does end up getting PCI, I would recommend pushing his statin to high intensity.     2.  PVCs: These were noted even in 2016 on his echocardiogram.  I will get a Holter monitor to quantify this burden.  May need referral to cardiac EP.     Return to see me in clinic in 1 month with results of echo and Holter, and after his cardiac catheterization.    Tereza Key MD, [facility]  Cardiology - [facility] Heart  November 6, 2018    Thank you for allowing me to participate in the care of your patient.    Sincerely,     Tereza Key MD     [facility]

## 2018-11-08 ENCOUNTER — CARE COORDINATION (OUTPATIENT)
Dept: CARDIOLOGY | Facility: CLINIC | Age: 58
End: 2018-11-08

## 2018-11-08 ENCOUNTER — HOSPITAL ENCOUNTER (OUTPATIENT)
Dept: CARDIOLOGY | Facility: CLINIC | Age: 58
Discharge: HOME | End: 2018-11-08
Attending: INTERNAL MEDICINE | Admitting: INTERNAL MEDICINE
Payer: COMMERCIAL

## 2018-11-08 DIAGNOSIS — I25.118 CORONARY ARTERY DISEASE OF NATIVE ARTERY OF NATIVE HEART WITH STABLE ANGINA PECTORIS (H): ICD-10-CM

## 2018-11-08 PROCEDURE — 93306 TTE W/DOPPLER COMPLETE: CPT

## 2018-11-08 PROCEDURE — 93306 TTE W/DOPPLER COMPLETE: CPT | Mod: 26 | Performed by: INTERNAL MEDICINE

## 2018-11-08 NOTE — PROGRESS NOTES
Echo results from 11/8/18 noted. Pt had OV with  on 11/6 for crescendo angina and calcification found on CT calcium scan. Pt set up for coronary angiogram on 11/15. Echo ordered to assess pumping function and valves. Pt having holter placed on 11/12 to further assess PVC burden. Will message  with echo results and call pt with any further recommendations. Enrique ZALDIVAR November 8, 2018, 11:24 AM        Echo summary report from 11/8/18:  Interpretation Summary     Mildly decreased left ventricular systolic function. The visual ejection  fraction is estimated at 40-45%. Traced at 45%.  There is mild-moderate global hypokinesia of the left ventricle.  The right ventricle is normal in structure, function and size.  There is no pericardial effusion.  Dilation of the inferior vena cava is present with normal respiratory  variation in diameter.  Compared to prior study, LV fxn appears unchanged despite reported difference  in LVEF, owing to frequent PVCs.

## 2018-11-12 ENCOUNTER — HOSPITAL ENCOUNTER (OUTPATIENT)
Dept: CARDIOLOGY | Facility: CLINIC | Age: 58
Discharge: HOME | End: 2018-11-12
Attending: INTERNAL MEDICINE | Admitting: INTERNAL MEDICINE
Payer: COMMERCIAL

## 2018-11-12 DIAGNOSIS — I25.118 CORONARY ARTERY DISEASE OF NATIVE ARTERY OF NATIVE HEART WITH STABLE ANGINA PECTORIS (H): ICD-10-CM

## 2018-11-12 PROCEDURE — 93226 XTRNL ECG REC<48 HR SCAN A/R: CPT

## 2018-11-13 ENCOUNTER — OFFICE VISIT (OUTPATIENT)
Dept: OTHER | Facility: CLINIC | Age: 58
End: 2018-11-13
Attending: INTERNAL MEDICINE
Payer: COMMERCIAL

## 2018-11-13 VITALS
BODY MASS INDEX: 30.2 KG/M2 | SYSTOLIC BLOOD PRESSURE: 107 MMHG | HEART RATE: 56 BPM | DIASTOLIC BLOOD PRESSURE: 67 MMHG | WEIGHT: 198.6 LBS | OXYGEN SATURATION: 98 %

## 2018-11-13 DIAGNOSIS — E03.9 HYPOTHYROIDISM, UNSPECIFIED TYPE: ICD-10-CM

## 2018-11-13 DIAGNOSIS — R07.89 CHEST WALL PAIN: Primary | ICD-10-CM

## 2018-11-13 DIAGNOSIS — E78.5 HYPERLIPIDEMIA LDL GOAL <70: ICD-10-CM

## 2018-11-13 DIAGNOSIS — K59.03 DRUG-INDUCED CONSTIPATION: ICD-10-CM

## 2018-11-13 DIAGNOSIS — E55.9 VITAMIN D DEFICIENCY: ICD-10-CM

## 2018-11-13 PROCEDURE — 99214 OFFICE O/P EST MOD 30 MIN: CPT | Mod: ZP | Performed by: INTERNAL MEDICINE

## 2018-11-13 PROCEDURE — G0463 HOSPITAL OUTPT CLINIC VISIT: HCPCS

## 2018-11-13 RX ORDER — PRAVASTATIN SODIUM 20 MG
20 TABLET ORAL DAILY
Qty: 90 TABLET | Refills: 3 | Status: CANCELLED | OUTPATIENT
Start: 2018-11-13

## 2018-11-13 RX ORDER — LEVOTHYROXINE SODIUM 125 UG/1
125 TABLET ORAL DAILY
Qty: 90 TABLET | Refills: 3 | Status: ON HOLD | OUTPATIENT
Start: 2018-11-13 | End: 2018-11-15

## 2018-11-13 RX ORDER — CHOLECALCIFEROL (VITAMIN D3) 50 MCG
1 TABLET ORAL DAILY
Qty: 100 TABLET | Refills: 12 | Status: ON HOLD | OUTPATIENT
Start: 2018-11-13 | End: 2023-07-17

## 2018-11-13 RX ORDER — AMOXICILLIN 250 MG
1-2 CAPSULE ORAL 2 TIMES DAILY PRN
Qty: 100 TABLET | Refills: 0 | Status: SHIPPED | OUTPATIENT
Start: 2018-11-13 | End: 2019-02-11

## 2018-11-13 RX ORDER — ATORVASTATIN CALCIUM 40 MG/1
40 TABLET, FILM COATED ORAL DAILY
Qty: 90 TABLET | Refills: 3 | Status: SHIPPED | OUTPATIENT
Start: 2018-11-13 | End: 2019-11-25

## 2018-11-13 RX ORDER — ACETAMINOPHEN 325 MG/1
650 TABLET ORAL EVERY 4 HOURS PRN
Qty: 100 TABLET | Refills: 0 | Status: SHIPPED | OUTPATIENT
Start: 2018-11-13 | End: 2020-11-16

## 2018-11-13 NOTE — NURSING NOTE
"Ramiro Jeffers is a 58 year old male who presents for:  Chief Complaint   Patient presents with     RECHECK     f/u cardiology appt / stress tests-        Vitals:    Vitals:    11/13/18 0932   BP: 107/67   BP Location: Right arm   Patient Position: Chair   Cuff Size: Adult Large   Pulse: 56   SpO2: 98%   Weight: 198 lb 9.6 oz (90.1 kg)       BMI:  Estimated body mass index is 30.2 kg/(m^2) as calculated from the following:    Height as of 11/6/18: 5' 8\" (1.727 m).    Weight as of this encounter: 198 lb 9.6 oz (90.1 kg).    Pain Score:  Data Unavailable        Eri Bonilla MA      "

## 2018-11-13 NOTE — PROGRESS NOTES
Ramiro Jeffers is a 58 year old male who is presenting at the current time to discuss his diagnosi(es) of:       Chest wall pain  Hyperlipidemia LDL goal <70  Hypothyroidism, unspecified type  Vitamin D deficiency  Drug-induced constipation .      Review Of Systems  Skin: negative  Eyes: negative  Ears/Nose/Throat: negative  Respiratory: No shortness of breath, dyspnea on exertion, cough, or hemoptysis  Cardiovascular: positive for exertional CP  Gastrointestinal: negative  Genitourinary: negative  Musculoskeletal: negative  Neurologic: negative  Psychiatric: negative  Hematologic/Lymphatic/Immunologic: negative  Endocrine: negative      PAST MEDICAL HISTORY:                  Past Medical History:   Diagnosis Date     ACQUIRED HYPOTHYROIDISM       Blood clot in the legs      Calculus of gallbladder without mention of cholecystitis or obstruction      Cataract      Chest pressure 11/5/2018     Hyperlipidemia      Hypertension      Obesity, unspecified     significant weight loss w/diet alone, on 10-15-10, BMI was 56.4     pulmonary emboli 6-2010    Unprovoked large bilateral pulmonary emboli without source identified on lower extremity dopplers, pt had a transient moderate lupus inhibitor upon initial presentation in June , 2010 which was gone in September, 2010;  all other thrombophilic workup is normal     Pulmonary embolus, bilateral      Shortness of breath     on exertion     Venous insufficiency      Viral pneumonia, unspecified      Vitamin D deficiency        PAST SURGICAL HISTORY:                  Past Surgical History:   Procedure Laterality Date     C NONSPECIFIC PROCEDURE  10-    Laparoscopic gastric bypass,     C NONSPECIFIC PROCEDURE  10-    1.  Attempted laparoscopic exploration with conversion to: .  Abdominal exploration. 3.  Reduction of incarcerated incisional hernia. 4.  Repair of incisional hernia.5.  Gastrostomy tube placement (#22 Romanian) into defunctionalized stomach.6.  Enterotomy  with bowel decompression and primary repair. 7.  Abdominal lavage.      COLONOSCOPY  11/17/2011    Procedure:COLONOSCOPY; colonoscopy; Surgeon:ELENA OROURKE; Location: GI     LAPAROSCOPIC CHOLECYSTECTOMY  9/28/2012    Procedure: LAPAROSCOPIC CHOLECYSTECTOMY;  LAPAROSCOPIC CHOLECYSTECTOMY, LYSIS OF ADHESIONS;  Surgeon: Dutch Matta MD;  Location:  OR     LAPAROSCOPIC LYSIS ADHESIONS  9/28/2012    Procedure: LAPAROSCOPIC LYSIS ADHESIONS;;  Surgeon: Dutch Matta MD;  Location:  OR     LAPAROSCOPY, SURGICAL; REPAIR INCISIONAL OR VENTRAL HERNIA      repair of ventral strangulated surgery     PHACOEMULSIFICATION CLEAR CORNEA WITH STANDARD INTRAOCULAR LENS IMPLANT  12/19/2011    Procedure:PHACOEMULSIFICATION CLEAR CORNEA WITH STANDARD INTRAOCULAR LENS IMPLANT; RIGHT PHACOEMULSIFICATION CLEAR CORNEA WITH STANDARD INTRAOCULAR LENS IMPLANT ; Surgeon:ALLISON ANTONIO; Location[facility]       CURRENT MEDICATIONS:                  Current Outpatient Prescriptions   Medication Sig Dispense Refill     acetaminophen (TYLENOL) 325 MG tablet Take 2 tablets (650 mg) by mouth every 4 hours as needed for mild pain 100 tablet 0     Cholecalciferol (VITAMIN D) 2000 UNITS tablet Take 1 tablet by mouth daily 100 tablet 12     cyanocobalamin 1000 MCG SUBL Place 1,000 mcg under the tongue daily       hydrochlorothiazide (HYDRODIURIL) 25 MG tablet Take 1 tablet (25 mg) by mouth every morning 90 tablet 3     levothyroxine (SYNTHROID/LEVOTHROID) 125 MCG tablet Take 1 tablet (125 mcg) by mouth daily 90 tablet 3     levothyroxine (SYNTHROID/LEVOTHROID) 137 MCG tablet Take 1 tablet (137 mcg) by mouth daily 90 tablet 3     metoprolol tartrate (LOPRESSOR) 25 MG tablet Take 0.5 tablets (12.5 mg) by mouth 2 times daily 60 tablet 3     Multiple Vitamin (MULTI-VITAMIN) per tablet Take 1 tablet by mouth 2 times daily        nitroGLYcerin (NITROSTAT) 0.4 MG sublingual tablet For chest pain place 1 tablet under the tongue every 5 minutes for 3  doses. If symptoms persist 5 minutes after 1st dose call 911. 25 tablet 1     oxyCODONE (ROXICODONE) 5 MG IR tablet Take 1-2 tablets (5-10 mg) by mouth every 4 hours as needed for moderate to severe pain 20 tablet 0     pravastatin (PRAVACHOL) 20 MG tablet Take 1 tablet (20 mg) by mouth daily 90 tablet 3     senna-docusate (SENOKOT-S;PERICOLACE) 8.6-50 MG per tablet Take 1-2 tablets by mouth 2 times daily as needed (constipation ) 100 tablet 0       ALLERGIES:                  Allergies   Allergen Reactions     No Known Allergies        SOCIAL HISTORY:                  Social History     Social History     Marital status: Single     Spouse name: N/A     Number of children: N/A     Years of education: N/A     Occupational History      Self     Social History Main Topics     Smoking status: Never Smoker     Smokeless tobacco: Never Used     Alcohol use No     Drug use: No     Sexual activity: Not on file     Other Topics Concern     Not on file     Social History Narrative       FAMILY HISTORY:                   Family History   Problem Relation Age of Onset     Osteoporosis Mother      Thyroid Disease Mother      no thyroid     Cancer Maternal Grandmother      ? lung cancer     Cancer Father      melanoma     Coronary Artery Disease Brother      Coronary Artery Disease Brother          Physical exam Reveals:    O/P: WNL  HEENT: WNL  NECK: No JVD, thyromegaly, or lymphadenopathy  HEART: RRR, no murmurs, gallops, or rubs  LUNGS: CTA bilaterally without rales, wheezes, or rhonchi  GI: NABS, nondistended, nontender, soft  EXT:without cyanosis, clubbing, or edema  NEURO: nonfocal  : no flank tenderness      A/P:    (R07.89) Crescendo angina  (primary encounter diagnosis)  Comment: having continuing angina, but not taking SL NTG. Told to do so, and go to ER if not miguel angel  Plan: acetaminophen (TYLENOL) 325 MG tablet, LipoFit         by NMR, CRP cardiac risk, Lipoprotein A        RTC after cath, and f/u appt  with cards    (E78.5) Hyperlipidemia LDL goal <70  Comment: needs high intensity statin given CAD, not at goal, stop prava , start lipitor  Plan: atorvastatin (LIPITOR) 40 MG tablet, LipoFit by        NMR, CRP cardiac risk, Lipoprotein A            (E03.9) Hypothyroidism, unspecified type  Comment: TFTs WNL  Plan: levothyroxine (SYNTHROID/LEVOTHROID) 125 MCG         tablet           (E55.9) Vitamin D deficiency  Comment: needs the below  Plan: Cholecalciferol (VITAMIN D) 2000 units tablet            (K59.03) Drug-induced constipation  Comment: needs the below  Plan: senna-docusate (SENOKOT-S;PERICOLACE) 8.6-50 MG        per tablet

## 2018-11-14 RX ORDER — POTASSIUM CHLORIDE 1500 MG/1
20 TABLET, EXTENDED RELEASE ORAL
Status: CANCELLED | OUTPATIENT
Start: 2018-11-14

## 2018-11-14 RX ORDER — SODIUM CHLORIDE 9 MG/ML
INJECTION, SOLUTION INTRAVENOUS CONTINUOUS
Status: CANCELLED | OUTPATIENT
Start: 2018-11-14

## 2018-11-14 RX ORDER — LIDOCAINE 40 MG/G
CREAM TOPICAL
Status: CANCELLED | OUTPATIENT
Start: 2018-11-14

## 2018-11-14 NOTE — PROGRESS NOTES
Pre cath orders entered for left heart cath on 11/15. Pt called and I reviewed prep instructions. Pt instructed not to eat or drink anything after midnight. He will take his AM medications. Pt just started ASA 81 mg daily, so he will instead take  mg the night before and morning of cath. Pt will hold his hydrochlorothiazide. Pt has no known allergy to dye. He has someone to drive him and stay with him 24 hours post cath. I reviewed echo results with pt. Pt returned holter today so results will be reviewed with him at post cath f/u next week. Enrique ZALDIVAR November 14, 2018, 5:14 PM

## 2018-11-15 ENCOUNTER — HOSPITAL ENCOUNTER (OUTPATIENT)
Facility: CLINIC | Age: 58
Discharge: HOME | End: 2018-11-15
Attending: INTERNAL MEDICINE | Admitting: INTERNAL MEDICINE
Payer: COMMERCIAL

## 2018-11-15 ENCOUNTER — APPOINTMENT (OUTPATIENT)
Dept: CARDIOLOGY | Facility: CLINIC | Age: 58
End: 2018-11-15
Attending: INTERNAL MEDICINE
Payer: COMMERCIAL

## 2018-11-15 VITALS
HEIGHT: 68 IN | WEIGHT: 198.6 LBS | TEMPERATURE: 98 F | SYSTOLIC BLOOD PRESSURE: 110 MMHG | HEART RATE: 52 BPM | BODY MASS INDEX: 30.1 KG/M2 | RESPIRATION RATE: 16 BRPM | OXYGEN SATURATION: 100 % | DIASTOLIC BLOOD PRESSURE: 75 MMHG

## 2018-11-15 DIAGNOSIS — Z98.61 POSTSURGICAL PERCUTANEOUS TRANSLUMINAL CORONARY ANGIOPLASTY STATUS: ICD-10-CM

## 2018-11-15 DIAGNOSIS — I25.110 CORONARY ARTERY DISEASE INVOLVING NATIVE CORONARY ARTERY OF NATIVE HEART WITH UNSTABLE ANGINA PECTORIS (H): ICD-10-CM

## 2018-11-15 DIAGNOSIS — I25.118 CORONARY ARTERY DISEASE OF NATIVE ARTERY OF NATIVE HEART WITH STABLE ANGINA PECTORIS (H): ICD-10-CM

## 2018-11-15 LAB
ANION GAP SERPL CALCULATED.3IONS-SCNC: 5 MMOL/L (ref 3–14)
APTT PPP: 27 SEC (ref 22–37)
BUN SERPL-MCNC: 27 MG/DL (ref 7–30)
CALCIUM SERPL-MCNC: 8.9 MG/DL (ref 8.5–10.1)
CHLORIDE SERPL-SCNC: 103 MMOL/L (ref 94–109)
CO2 SERPL-SCNC: 33 MMOL/L (ref 20–32)
CREAT SERPL-MCNC: 0.8 MG/DL (ref 0.66–1.25)
ERYTHROCYTE [DISTWIDTH] IN BLOOD BY AUTOMATED COUNT: 13.5 % (ref 10–15)
GFR SERPL CREATININE-BSD FRML MDRD: >90 ML/MIN/1.7M2
GLUCOSE SERPL-MCNC: 92 MG/DL (ref 70–99)
HCT VFR BLD AUTO: 40.6 % (ref 40–53)
HGB BLD-MCNC: 13.6 G/DL (ref 13.3–17.7)
INR PPP: 1.06 (ref 0.86–1.14)
MCH RBC QN AUTO: 30.1 PG (ref 26.5–33)
MCHC RBC AUTO-ENTMCNC: 33.5 G/DL (ref 31.5–36.5)
MCV RBC AUTO: 90 FL (ref 78–100)
PLATELET # BLD AUTO: 129 10E9/L (ref 150–450)
POTASSIUM SERPL-SCNC: 3.5 MMOL/L (ref 3.4–5.3)
RBC # BLD AUTO: 4.52 10E12/L (ref 4.4–5.9)
SODIUM SERPL-SCNC: 141 MMOL/L (ref 133–144)
WBC # BLD AUTO: 4.6 10E9/L (ref 4–11)

## 2018-11-15 PROCEDURE — 85027 COMPLETE CBC AUTOMATED: CPT | Performed by: INTERNAL MEDICINE

## 2018-11-15 PROCEDURE — C1887 CATHETER, GUIDING: HCPCS

## 2018-11-15 PROCEDURE — C1725 CATH, TRANSLUMIN NON-LASER: HCPCS

## 2018-11-15 PROCEDURE — 80048 BASIC METABOLIC PNL TOTAL CA: CPT | Performed by: INTERNAL MEDICINE

## 2018-11-15 PROCEDURE — 99153 MOD SED SAME PHYS/QHP EA: CPT

## 2018-11-15 PROCEDURE — 25000132 ZZH RX MED GY IP 250 OP 250 PS 637: Performed by: INTERNAL MEDICINE

## 2018-11-15 PROCEDURE — 27210759 ZZH DEVICE INFLATION CR6

## 2018-11-15 PROCEDURE — 85610 PROTHROMBIN TIME: CPT | Performed by: INTERNAL MEDICINE

## 2018-11-15 PROCEDURE — 40000235 ZZH STATISTIC TELEMETRY

## 2018-11-15 PROCEDURE — 27210914 ZZH SHEATH CR8

## 2018-11-15 PROCEDURE — 93454 CORONARY ARTERY ANGIO S&I: CPT | Mod: 26 | Performed by: INTERNAL MEDICINE

## 2018-11-15 PROCEDURE — 27210856 ZZH ACCESS HEART CATH CR2

## 2018-11-15 PROCEDURE — 93010 ELECTROCARDIOGRAM REPORT: CPT | Performed by: INTERNAL MEDICINE

## 2018-11-15 PROCEDURE — C9600 PERC DRUG-EL COR STENT SING: HCPCS | Mod: LD

## 2018-11-15 PROCEDURE — 40000852 ZZH STATISTIC HEART CATH LAB OR EP LAB

## 2018-11-15 PROCEDURE — C1769 GUIDE WIRE: HCPCS

## 2018-11-15 PROCEDURE — 27210892 ZZH CATH CR4

## 2018-11-15 PROCEDURE — 27210787 ZZH MANIFOLD CR2

## 2018-11-15 PROCEDURE — C1874 STENT, COATED/COV W/DEL SYS: HCPCS

## 2018-11-15 PROCEDURE — 40000065 ZZH STATISTIC EKG NON-CHARGEABLE

## 2018-11-15 PROCEDURE — 99152 MOD SED SAME PHYS/QHP 5/>YRS: CPT

## 2018-11-15 PROCEDURE — 92928 PRQ TCAT PLMT NTRAC ST 1 LES: CPT | Mod: LD | Performed by: INTERNAL MEDICINE

## 2018-11-15 PROCEDURE — 93454 CORONARY ARTERY ANGIO S&I: CPT

## 2018-11-15 PROCEDURE — 27210946 ZZH KIT HC TOTES DISP CR8

## 2018-11-15 PROCEDURE — 25000125 ZZHC RX 250: Performed by: INTERNAL MEDICINE

## 2018-11-15 PROCEDURE — 93005 ELECTROCARDIOGRAM TRACING: CPT

## 2018-11-15 PROCEDURE — 85730 THROMBOPLASTIN TIME PARTIAL: CPT | Performed by: INTERNAL MEDICINE

## 2018-11-15 PROCEDURE — 27210795 ZZH PAD DEFIB QUICK CR4

## 2018-11-15 PROCEDURE — 99152 MOD SED SAME PHYS/QHP 5/>YRS: CPT | Performed by: INTERNAL MEDICINE

## 2018-11-15 PROCEDURE — 27211089 ZZH KIT ACIST INJECTOR CR3

## 2018-11-15 PROCEDURE — 25000128 H RX IP 250 OP 636: Performed by: INTERNAL MEDICINE

## 2018-11-15 RX ORDER — PROPOFOL 10 MG/ML
5-75 INJECTION, EMULSION INTRAVENOUS CONTINUOUS
Status: DISCONTINUED | OUTPATIENT
Start: 2018-11-15 | End: 2018-11-15 | Stop reason: HOSPADM

## 2018-11-15 RX ORDER — NALOXONE HYDROCHLORIDE 0.4 MG/ML
.1-.4 INJECTION, SOLUTION INTRAMUSCULAR; INTRAVENOUS; SUBCUTANEOUS
Status: DISCONTINUED | OUTPATIENT
Start: 2018-11-15 | End: 2018-11-15 | Stop reason: HOSPADM

## 2018-11-15 RX ORDER — ACETAMINOPHEN 325 MG/1
325-650 TABLET ORAL EVERY 4 HOURS PRN
Status: DISCONTINUED | OUTPATIENT
Start: 2018-11-15 | End: 2018-11-15 | Stop reason: HOSPADM

## 2018-11-15 RX ORDER — LIDOCAINE 40 MG/G
CREAM TOPICAL
Status: DISCONTINUED | OUTPATIENT
Start: 2018-11-15 | End: 2018-11-15 | Stop reason: HOSPADM

## 2018-11-15 RX ORDER — NITROGLYCERIN 5 MG/ML
100-500 VIAL (ML) INTRAVENOUS
Status: COMPLETED | OUTPATIENT
Start: 2018-11-15 | End: 2018-11-15

## 2018-11-15 RX ORDER — NITROGLYCERIN 0.4 MG/1
0.4 TABLET SUBLINGUAL EVERY 5 MIN PRN
Status: DISCONTINUED | OUTPATIENT
Start: 2018-11-15 | End: 2018-11-15 | Stop reason: HOSPADM

## 2018-11-15 RX ORDER — SODIUM CHLORIDE 9 MG/ML
INJECTION, SOLUTION INTRAVENOUS CONTINUOUS
Status: DISCONTINUED | OUTPATIENT
Start: 2018-11-15 | End: 2018-11-15 | Stop reason: HOSPADM

## 2018-11-15 RX ORDER — CLOPIDOGREL 300 MG/1
300-600 TABLET, FILM COATED ORAL
Status: COMPLETED | OUTPATIENT
Start: 2018-11-15 | End: 2018-11-15

## 2018-11-15 RX ORDER — DIPHENHYDRAMINE HYDROCHLORIDE 50 MG/ML
25-50 INJECTION INTRAMUSCULAR; INTRAVENOUS
Status: DISCONTINUED | OUTPATIENT
Start: 2018-11-15 | End: 2018-11-15 | Stop reason: HOSPADM

## 2018-11-15 RX ORDER — ENALAPRILAT 1.25 MG/ML
1.25-2.5 INJECTION INTRAVENOUS
Status: DISCONTINUED | OUTPATIENT
Start: 2018-11-15 | End: 2018-11-15 | Stop reason: HOSPADM

## 2018-11-15 RX ORDER — CLOPIDOGREL BISULFATE 75 MG/1
75 TABLET ORAL ONCE
Status: DISCONTINUED | OUTPATIENT
Start: 2018-11-16 | End: 2018-11-15 | Stop reason: HOSPADM

## 2018-11-15 RX ORDER — METHYLPREDNISOLONE SODIUM SUCCINATE 125 MG/2ML
125 INJECTION, POWDER, LYOPHILIZED, FOR SOLUTION INTRAMUSCULAR; INTRAVENOUS
Status: DISCONTINUED | OUTPATIENT
Start: 2018-11-15 | End: 2018-11-15 | Stop reason: HOSPADM

## 2018-11-15 RX ORDER — ASPIRIN 81 MG/1
81 TABLET ORAL DAILY
Status: DISCONTINUED | OUTPATIENT
Start: 2018-11-15 | End: 2018-11-15 | Stop reason: HOSPADM

## 2018-11-15 RX ORDER — NITROGLYCERIN 20 MG/100ML
.07-2 INJECTION INTRAVENOUS CONTINUOUS PRN
Status: DISCONTINUED | OUTPATIENT
Start: 2018-11-15 | End: 2018-11-15 | Stop reason: HOSPADM

## 2018-11-15 RX ORDER — BUPIVACAINE HYDROCHLORIDE 2.5 MG/ML
1-10 INJECTION, SOLUTION EPIDURAL; INFILTRATION; INTRACAUDAL
Status: DISCONTINUED | OUTPATIENT
Start: 2018-11-15 | End: 2018-11-15 | Stop reason: HOSPADM

## 2018-11-15 RX ORDER — NALOXONE HYDROCHLORIDE 0.4 MG/ML
.2-.4 INJECTION, SOLUTION INTRAMUSCULAR; INTRAVENOUS; SUBCUTANEOUS
Status: DISCONTINUED | OUTPATIENT
Start: 2018-11-15 | End: 2018-11-15 | Stop reason: HOSPADM

## 2018-11-15 RX ORDER — ATROPINE SULFATE 0.1 MG/ML
.5-1 INJECTION INTRAVENOUS
Status: DISCONTINUED | OUTPATIENT
Start: 2018-11-15 | End: 2018-11-15 | Stop reason: HOSPADM

## 2018-11-15 RX ORDER — ONDANSETRON 2 MG/ML
4 INJECTION INTRAMUSCULAR; INTRAVENOUS EVERY 4 HOURS PRN
Status: DISCONTINUED | OUTPATIENT
Start: 2018-11-15 | End: 2018-11-15 | Stop reason: HOSPADM

## 2018-11-15 RX ORDER — POTASSIUM CHLORIDE 7.45 MG/ML
10 INJECTION INTRAVENOUS
Status: DISCONTINUED | OUTPATIENT
Start: 2018-11-15 | End: 2018-11-15 | Stop reason: HOSPADM

## 2018-11-15 RX ORDER — LIDOCAINE HYDROCHLORIDE 10 MG/ML
1-10 INJECTION, SOLUTION EPIDURAL; INFILTRATION; INTRACAUDAL; PERINEURAL
Status: COMPLETED | OUTPATIENT
Start: 2018-11-15 | End: 2018-11-15

## 2018-11-15 RX ORDER — LIDOCAINE HYDROCHLORIDE 10 MG/ML
30 INJECTION, SOLUTION EPIDURAL; INFILTRATION; INTRACAUDAL; PERINEURAL
Status: DISCONTINUED | OUTPATIENT
Start: 2018-11-15 | End: 2018-11-15 | Stop reason: HOSPADM

## 2018-11-15 RX ORDER — SODIUM NITROPRUSSIDE 25 MG/ML
100-200 INJECTION INTRAVENOUS
Status: DISCONTINUED | OUTPATIENT
Start: 2018-11-15 | End: 2018-11-15 | Stop reason: HOSPADM

## 2018-11-15 RX ORDER — PROPOFOL 10 MG/ML
10-20 INJECTION, EMULSION INTRAVENOUS EVERY 30 MIN PRN
Status: DISCONTINUED | OUTPATIENT
Start: 2018-11-15 | End: 2018-11-15 | Stop reason: HOSPADM

## 2018-11-15 RX ORDER — METOPROLOL TARTRATE 1 MG/ML
5 INJECTION, SOLUTION INTRAVENOUS EVERY 5 MIN PRN
Status: DISCONTINUED | OUTPATIENT
Start: 2018-11-15 | End: 2018-11-15 | Stop reason: HOSPADM

## 2018-11-15 RX ORDER — FUROSEMIDE 10 MG/ML
20-100 INJECTION INTRAMUSCULAR; INTRAVENOUS
Status: DISCONTINUED | OUTPATIENT
Start: 2018-11-15 | End: 2018-11-15 | Stop reason: HOSPADM

## 2018-11-15 RX ORDER — VERAPAMIL HYDROCHLORIDE 2.5 MG/ML
1-2.5 INJECTION, SOLUTION INTRAVENOUS
Status: COMPLETED | OUTPATIENT
Start: 2018-11-15 | End: 2018-11-15

## 2018-11-15 RX ORDER — NITROGLYCERIN 5 MG/ML
100-200 VIAL (ML) INTRAVENOUS
Status: DISCONTINUED | OUTPATIENT
Start: 2018-11-15 | End: 2018-11-15 | Stop reason: HOSPADM

## 2018-11-15 RX ORDER — NICARDIPINE HYDROCHLORIDE 2.5 MG/ML
100 INJECTION INTRAVENOUS
Status: DISCONTINUED | OUTPATIENT
Start: 2018-11-15 | End: 2018-11-15 | Stop reason: HOSPADM

## 2018-11-15 RX ORDER — PROTAMINE SULFATE 10 MG/ML
25-100 INJECTION, SOLUTION INTRAVENOUS EVERY 5 MIN PRN
Status: DISCONTINUED | OUTPATIENT
Start: 2018-11-15 | End: 2018-11-15 | Stop reason: HOSPADM

## 2018-11-15 RX ORDER — NALOXONE HYDROCHLORIDE 0.4 MG/ML
0.4 INJECTION, SOLUTION INTRAMUSCULAR; INTRAVENOUS; SUBCUTANEOUS EVERY 5 MIN PRN
Status: DISCONTINUED | OUTPATIENT
Start: 2018-11-15 | End: 2018-11-15 | Stop reason: HOSPADM

## 2018-11-15 RX ORDER — POTASSIUM CHLORIDE 1500 MG/1
20 TABLET, EXTENDED RELEASE ORAL
Status: COMPLETED | OUTPATIENT
Start: 2018-11-15 | End: 2018-11-15

## 2018-11-15 RX ORDER — FENTANYL CITRATE 50 UG/ML
25-50 INJECTION, SOLUTION INTRAMUSCULAR; INTRAVENOUS
Status: DISCONTINUED | OUTPATIENT
Start: 2018-11-15 | End: 2018-11-15 | Stop reason: HOSPADM

## 2018-11-15 RX ORDER — MORPHINE SULFATE 2 MG/ML
1-2 INJECTION, SOLUTION INTRAMUSCULAR; INTRAVENOUS EVERY 5 MIN PRN
Status: DISCONTINUED | OUTPATIENT
Start: 2018-11-15 | End: 2018-11-15 | Stop reason: HOSPADM

## 2018-11-15 RX ORDER — ASPIRIN 325 MG
325 TABLET ORAL
Status: DISCONTINUED | OUTPATIENT
Start: 2018-11-15 | End: 2018-11-15 | Stop reason: HOSPADM

## 2018-11-15 RX ORDER — FLUMAZENIL 0.1 MG/ML
0.2 INJECTION, SOLUTION INTRAVENOUS
Status: DISCONTINUED | OUTPATIENT
Start: 2018-11-15 | End: 2018-11-15 | Stop reason: HOSPADM

## 2018-11-15 RX ORDER — DOBUTAMINE HYDROCHLORIDE 200 MG/100ML
2-20 INJECTION INTRAVENOUS CONTINUOUS PRN
Status: DISCONTINUED | OUTPATIENT
Start: 2018-11-15 | End: 2018-11-15 | Stop reason: HOSPADM

## 2018-11-15 RX ORDER — ASPIRIN 81 MG/1
81-324 TABLET, CHEWABLE ORAL
Status: DISCONTINUED | OUTPATIENT
Start: 2018-11-15 | End: 2018-11-15 | Stop reason: HOSPADM

## 2018-11-15 RX ORDER — PHENYLEPHRINE HCL IN 0.9% NACL 1 MG/10 ML
20-100 SYRINGE (ML) INTRAVENOUS
Status: DISCONTINUED | OUTPATIENT
Start: 2018-11-15 | End: 2018-11-15 | Stop reason: HOSPADM

## 2018-11-15 RX ORDER — EPINEPHRINE 1 MG/ML
0.3 INJECTION, SOLUTION, CONCENTRATE INTRAVENOUS
Status: DISCONTINUED | OUTPATIENT
Start: 2018-11-15 | End: 2018-11-15 | Stop reason: HOSPADM

## 2018-11-15 RX ORDER — PROTAMINE SULFATE 10 MG/ML
1-5 INJECTION, SOLUTION INTRAVENOUS
Status: DISCONTINUED | OUTPATIENT
Start: 2018-11-15 | End: 2018-11-15 | Stop reason: HOSPADM

## 2018-11-15 RX ORDER — PRASUGREL 10 MG/1
10-60 TABLET, FILM COATED ORAL
Status: DISCONTINUED | OUTPATIENT
Start: 2018-11-15 | End: 2018-11-15 | Stop reason: HOSPADM

## 2018-11-15 RX ORDER — HEPARIN SODIUM 1000 [USP'U]/ML
1000-10000 INJECTION, SOLUTION INTRAVENOUS; SUBCUTANEOUS EVERY 5 MIN PRN
Status: DISCONTINUED | OUTPATIENT
Start: 2018-11-15 | End: 2018-11-15 | Stop reason: HOSPADM

## 2018-11-15 RX ORDER — ADENOSINE 3 MG/ML
12-12000 INJECTION, SOLUTION INTRAVENOUS
Status: DISCONTINUED | OUTPATIENT
Start: 2018-11-15 | End: 2018-11-15 | Stop reason: HOSPADM

## 2018-11-15 RX ORDER — HYDRALAZINE HYDROCHLORIDE 20 MG/ML
10-20 INJECTION INTRAMUSCULAR; INTRAVENOUS
Status: DISCONTINUED | OUTPATIENT
Start: 2018-11-15 | End: 2018-11-15 | Stop reason: HOSPADM

## 2018-11-15 RX ORDER — CLOPIDOGREL BISULFATE 75 MG/1
75 TABLET ORAL DAILY
Qty: 90 TABLET | Refills: 3 | Status: SHIPPED | OUTPATIENT
Start: 2018-11-16 | End: 2018-11-21

## 2018-11-15 RX ORDER — CLOPIDOGREL BISULFATE 75 MG/1
75 TABLET ORAL
Status: DISCONTINUED | OUTPATIENT
Start: 2018-11-15 | End: 2018-11-15 | Stop reason: HOSPADM

## 2018-11-15 RX ORDER — HYDROCODONE BITARTRATE AND ACETAMINOPHEN 5; 325 MG/1; MG/1
1-2 TABLET ORAL EVERY 4 HOURS PRN
Status: DISCONTINUED | OUTPATIENT
Start: 2018-11-15 | End: 2018-11-15 | Stop reason: HOSPADM

## 2018-11-15 RX ORDER — DEXTROSE MONOHYDRATE 25 G/50ML
12.5-5 INJECTION, SOLUTION INTRAVENOUS EVERY 30 MIN PRN
Status: DISCONTINUED | OUTPATIENT
Start: 2018-11-15 | End: 2018-11-15 | Stop reason: HOSPADM

## 2018-11-15 RX ORDER — DOPAMINE HYDROCHLORIDE 160 MG/100ML
2-20 INJECTION, SOLUTION INTRAVENOUS CONTINUOUS PRN
Status: DISCONTINUED | OUTPATIENT
Start: 2018-11-15 | End: 2018-11-15 | Stop reason: HOSPADM

## 2018-11-15 RX ORDER — POTASSIUM CHLORIDE 29.8 MG/ML
20 INJECTION INTRAVENOUS
Status: DISCONTINUED | OUTPATIENT
Start: 2018-11-15 | End: 2018-11-15 | Stop reason: HOSPADM

## 2018-11-15 RX ORDER — LORAZEPAM 2 MG/ML
.5-2 INJECTION INTRAMUSCULAR EVERY 4 HOURS PRN
Status: DISCONTINUED | OUTPATIENT
Start: 2018-11-15 | End: 2018-11-15 | Stop reason: HOSPADM

## 2018-11-15 RX ORDER — ATROPINE SULFATE 0.1 MG/ML
0.5 INJECTION INTRAVENOUS EVERY 5 MIN PRN
Status: DISCONTINUED | OUTPATIENT
Start: 2018-11-15 | End: 2018-11-15 | Stop reason: HOSPADM

## 2018-11-15 RX ORDER — NIFEDIPINE 10 MG/1
10 CAPSULE ORAL
Status: DISCONTINUED | OUTPATIENT
Start: 2018-11-15 | End: 2018-11-15 | Stop reason: HOSPADM

## 2018-11-15 RX ORDER — IOPAMIDOL 755 MG/ML
130 INJECTION, SOLUTION INTRAVASCULAR ONCE
Status: COMPLETED | OUTPATIENT
Start: 2018-11-15 | End: 2018-11-15

## 2018-11-15 RX ADMIN — POTASSIUM CHLORIDE 20 MEQ: 1500 TABLET, EXTENDED RELEASE ORAL at 07:53

## 2018-11-15 RX ADMIN — CLOPIDOGREL BISULFATE 600 MG: 300 TABLET, FILM COATED ORAL at 08:54

## 2018-11-15 RX ADMIN — MIDAZOLAM 2 MG: 1 INJECTION INTRAMUSCULAR; INTRAVENOUS at 09:16

## 2018-11-15 RX ADMIN — VERAPAMIL HYDROCHLORIDE 2.5 MG: 2.5 INJECTION, SOLUTION INTRAVENOUS at 08:48

## 2018-11-15 RX ADMIN — FENTANYL CITRATE 100 MCG: 50 INJECTION, SOLUTION INTRAMUSCULAR; INTRAVENOUS at 09:16

## 2018-11-15 RX ADMIN — NITROGLYCERIN 400 MCG: 5 INJECTION, SOLUTION INTRAVENOUS at 08:48

## 2018-11-15 RX ADMIN — NITROGLYCERIN 800 MCG: 5 INJECTION, SOLUTION INTRAVENOUS at 09:13

## 2018-11-15 RX ADMIN — IOPAMIDOL 130 ML: 755 INJECTION, SOLUTION INTRAVASCULAR at 09:30

## 2018-11-15 RX ADMIN — HEPARIN SODIUM 5000 UNITS: 1000 INJECTION, SOLUTION INTRAVENOUS; SUBCUTANEOUS at 08:48

## 2018-11-15 RX ADMIN — SODIUM CHLORIDE: 9 INJECTION, SOLUTION INTRAVENOUS at 07:32

## 2018-11-15 RX ADMIN — ACETAMINOPHEN 650 MG: 325 TABLET, FILM COATED ORAL at 11:49

## 2018-11-15 RX ADMIN — LIDOCAINE HYDROCHLORIDE 1 ML: 10 INJECTION, SOLUTION EPIDURAL; INFILTRATION; INTRACAUDAL; PERINEURAL at 08:46

## 2018-11-15 RX ADMIN — HEPARIN SODIUM 5000 UNITS: 1000 INJECTION, SOLUTION INTRAVENOUS; SUBCUTANEOUS at 08:54

## 2018-11-15 NOTE — DISCHARGE INSTRUCTIONS
Cardiac Angioplasty/Stent Discharge Instructions - Radial    After you go home:      Have an adult stay with you until tomorrow.    Drink extra fluids for 2 days.    You may resume your normal diet.    No smoking       For 24 hours - due to the sedation you received:    Relax and take it easy.    Do NOT make any important or legal decisions.    Do NOT drive or operate machines at home or at work.    Do NOT drink alcohol.    Care of Wrist Puncture Site:      For the first 24 hrs - check the puncture site every 1-2 hours while awake.    It is normal to have soreness at the puncture site and mild tingling in your hand for up to 3 days.    Remove the bandaid after 24 hours. If there is minor oozing, apply another bandaid and remove it after 12 hours.    You may shower tomorrow.  Do NOT take a bath, or use a hot tub or pool for at least 3 days. Do NOT scrub the site. Do not use lotion or powder near the puncture site.           Activity:          For 2 days:     do not use your hand or arm to support your weight (such as rising from a chair)     do not bend your wrist (such as lifting a garage door).    do not lift more than 5 pounds or exercise your arm (such as tennis, golf or bowling).    Do NOT do any heavy activity such as exercise, lifting, or straining.     Bleeding:      If you start bleeding from the site in your wrist, sit down and press firmly on/above the site for 10 minutes.     Once bleeding stops, keep arm still for 2 hours.     Call [facility] as soon as you can.       Call 911 right away if you have heavy bleeding or bleeding that does not stop.      Medicines:      do not stop taking Plavix  until you talk to your cardiologist.         Take your medications,  unless your provider tells you not to.      If you have stopped any medicines, check with your provider about when to restart them.    Follow Up Appointments:      Follow up with [facility] Nurse Practitioner at [facility] of patient  preference in 7-10 days.    Cardiac Rehab will contact you for follow up care.    Call the clinic if:      You have a large or growing hard lump around the site.    The site is red, swollen, hot or tender.    Blood or fluid is draining from the site.    You have chills or a fever greater than 101 F (38 C).    Your arm feels numb, cool or changes color.    You have hives, a rash or unusual itching.    Any questions or concerns.    Other Instructions:     carry your stent card with you at all times.      [facility] [place]:    [phone] [facility] (7 days a week)

## 2018-11-15 NOTE — CONSULTS
I have examined the patient, reviewed the history, medications and pre procedural tests. Progressive angina with CAD documented with CTA. I have explained to the patient the risks of death, MI, stroke, hematoma, possible urgent bypass surgery for failed PCI, use of stents, thienopyridine agents, possible peripheral vascular complications, arrhythmia, the use of FFR in clinical decision-making and alternative of medical therapy alone in regards to left heart catheterization, left ventriculography, coronary angiography, and possible percutaneous coronary intervention. The patient voiced understanding and wishes to proceed. The patient has a good right radial pulse, normal ulnar pulse and a normal Vikas's sign.

## 2018-11-15 NOTE — PROGRESS NOTES
AVS reviewed and given to pt, along with new filled Plavix and stent booklet with temporary ID card. Right radial site remains soft, clean, and dry with no signs of bleeding. Denies any c/o. Discharged to home per w/c with family driving, in stable condition.

## 2018-11-15 NOTE — PROGRESS NOTES
Pt up and ambulated to bathroom in the mott with stand by assist and tolerated activity well. Pt tolerating po food and fluids.

## 2018-11-15 NOTE — PROGRESS NOTES
Pt returns to [facility] via bed.  Right radial TR band in place --Denies pain, nausea or vomiting. Tolerating po fluids and family at bedside.

## 2018-11-15 NOTE — IP AVS SNAPSHOT
[address] [place] [address]    Phone:  [phone]                                       After Visit Summary   11/15/2018    Ramiro Jeffers    MRN: [id number]           After Visit Summary Signature Page     I have received my discharge instructions, and my questions have been answered. I have discussed any challenges I see with this plan with the nurse or doctor.    ..........................................................................................................................................  Patient/Patient Representative Signature      ..........................................................................................................................................  Patient Representative Print Name and Relationship to Patient    ..................................................               ................................................  Date                                   Time    ..........................................................................................................................................  Reviewed by Signature/Title    ...................................................              ..............................................  Date                                               Time          22EPIC Rev 08/18

## 2018-11-15 NOTE — IP AVS SNAPSHOT
MRN:[id number]                      After Visit Summary   11/15/2018    Ramiro Jeffers    MRN: [id number]           Visit Information        Department      11/15/2018  6:27 AM [facility]          Review of your medicines      UNREVIEWED medicines. Ask your doctor about these medicines        Dose / Directions    acetaminophen 325 MG tablet   Commonly known as:  TYLENOL   Used for:  Chest wall pain        Dose:  650 mg   Take 2 tablets (650 mg) by mouth every 4 hours as needed for mild pain   Quantity:  100 tablet   Refills:  0       ASPIRIN PO        Dose:  325 mg   Take 325 mg by mouth once   Refills:  0       atorvastatin 40 MG tablet   Commonly known as:  LIPITOR   Used for:  Hyperlipidemia LDL goal <70        Dose:  40 mg   Take 1 tablet (40 mg) by mouth daily   Quantity:  90 tablet   Refills:  3       cyanocobalamin 1000 MCG Subl sublingual tablet        Dose:  1000 mcg   Place 1,000 mcg under the tongue daily   Refills:  0       hydrochlorothiazide 25 MG tablet   Commonly known as:  HYDRODIURIL   Used for:  Essential hypertension with goal blood pressure less than 130/80        Dose:  25 mg   Take 1 tablet (25 mg) by mouth every morning   Quantity:  90 tablet   Refills:  3       levothyroxine 137 MCG tablet   Commonly known as:  SYNTHROID/LEVOTHROID   Used for:  Hypothyroidism, unspecified type        Dose:  137 mcg   Take 1 tablet (137 mcg) by mouth daily   Quantity:  90 tablet   Refills:  3       metoprolol tartrate 25 MG tablet   Commonly known as:  LOPRESSOR   Used for:  Coronary artery disease of native artery of native heart with stable angina pectoris (H)        Dose:  12.5 mg   Take 0.5 tablets (12.5 mg) by mouth 2 times daily   Quantity:  60 tablet   Refills:  3       Multi-vitamin Tabs tablet   Generic drug:  multivitamin, therapeutic with minerals        Dose:  1 tablet   Take 1 tablet by mouth 2 times daily   Refills:  0       nitroGLYcerin 0.4 MG sublingual  tablet   Commonly known as:  NITROSTAT   Used for:  Atypical chest pain        For chest pain place 1 tablet under the tongue every 5 minutes for 3 doses. If symptoms persist 5 minutes after 1st dose call 911.   Quantity:  25 tablet   Refills:  1       senna-docusate 8.6-50 MG per tablet   Commonly known as:  SENOKOT-S;PERICOLACE   Used for:  Drug-induced constipation        Dose:  1-2 tablet   Take 1-2 tablets by mouth 2 times daily as needed (constipation )   Quantity:  100 tablet   Refills:  0       vitamin D 2000 units tablet   Used for:  Vitamin D deficiency        Dose:  1 tablet   Take 1 tablet by mouth daily   Quantity:  100 tablet   Refills:  12         START taking        Dose / Directions    aspirin 81 MG tablet   Used for:  Coronary artery disease involving native coronary artery of native heart with unstable angina pectoris (H)        Dose:  81 mg   Start taking on:  11/16/2018   Take 1 tablet (81 mg) by mouth daily Start tomorrow morning.   Refills:  0       clopidogrel 75 MG tablet   Commonly known as:  PLAVIX   Used for:  Coronary artery disease involving native coronary artery of native heart with unstable angina pectoris (H)        Dose:  75 mg   Start taking on:  11/16/2018   Take 1 tablet (75 mg) by mouth daily   Quantity:  90 tablet   Refills:  3            Where to get your medicines      Some of these will need a paper prescription and others can be bought over the counter. Ask your nurse if you have questions.     Bring a paper prescription for each of these medications     clopidogrel 75 MG tablet       You don't need a prescription for these medications     aspirin 81 MG tablet               Prescriptions were sent or printed at these locations (2 Prescriptions)                   [facility] [address] [place] [address] [place], [place] - [address]   [address] [place] [address]    Telephone:  [phone]   Fax:  [phone]   Hours:                  Not Printed or  Sent (1 of 2)         aspirin 81 MG tablet                 Printed at Department/Unit printer (1 of 2)         clopidogrel (PLAVIX) 75 MG tablet                 Protect others around you: Learn how to safely use, store and throw away your medicines at www.disposemymeds.org.         Follow-ups after your visit        Additional Services     CARDIAC REHAB REFERRAL       Please be aware that coverage of these services is subject to the terms and limitations of your health insurance plan.  Call member services at your health plan with any benefit or coverage questions.     Order is sent electronically to central rehab scheduling. Call [phone] if you haven't been contacted regarding these appointments within 2 business days of discharge.                  Your next 10 appointments already scheduled     Nov 21, 2018 10:10 AM CST   Return Visit with CHARO Oliver CNP   [facility] ([facility])    [address] [place] [address] [address]  [facility] [address]   [phone] OPT 2            Dec 06, 2018  7:45 AM CST   Return Visit with Tereza Key MD   [facility] ([facility])    [address] [place] [address] [address]  [facility] [address] [phone] OPT 2            Dec 07, 2018  8:40 AM CST   Return Visit with Addison Davis MD   [facility] ([facility])    [address] [place] [address]. . [address]  [facility] [address]   [phone]            Feb 04, 2019  7:30 AM CST   LAB with [facility]   [facility] ([facility])    [address] [id number] [phone]           Please do not eat 10-12 hours before your appointment if you are coming in fasting for labs on lipids, cholesterol, or glucose (sugar). This does not apply to pregnant women. Water, hot tea and black coffee (with nothing added)  are okay. Do not drink other fluids, diet soda or chew gum.            Feb 19, 2019  9:10 AM CST   Return Visit with Addison Davis MD   [facility] ([facility])    [address]  [place] [address] [phone]               Care Instructions        After Care Instructions     Discharge Instructions - Follow up with [facility] Cardiologist       Follow -up with the [facility] Clinic of patient preference in 2-4 weeks            Discharge Instructions - Follow up with [facility] Nurse Practitioner        Follow up with [facility] Nurse Practitioner at [facility] Clinic of patient preference in 7-10 days.            Discharge Instructions - IF on Metformin (Glucophage or Glucovance) or Metformin containing medications       IF on Metformin (Glucophage or Glucovance) or Metformin containing medications , schedule a Basic Metabolic Panel at [facility] or Primary Clinic in 48 - 72 hours post procedure and PRIOR TO resuming the Metformin or Metformin containing medications.  Hold Metformin (Glucophage or Glucovance) or Metformin containing medications until after the Basic Metabolic Panel on the 2nd or 3rd day following the procedure.  May resume after blood draw is complete.                  Further instructions from your care team       Cardiac Angioplasty/Stent Discharge Instructions - Radial    After you go home:      Have an adult stay with you until tomorrow.    Drink extra fluids for 2 days.    You may resume your normal diet.    No smoking       For 24 hours - due to the sedation you received:    Relax and take it easy.    Do NOT make any important or legal decisions.    Do NOT drive or operate machines at home or at work.    Do NOT drink alcohol.    Care of Wrist Puncture Site:      For the first 24 hrs - check the puncture site every 1-2 hours while awake.    It is normal to have soreness at the puncture site and mild  tingling in your hand for up to 3 days.    Remove the bandaid after 24 hours. If there is minor oozing, apply another bandaid and remove it after 12 hours.    You may shower tomorrow.  Do NOT take a bath, or use a hot tub or pool for at least 3 days. Do NOT scrub the site. Do not use lotion or powder near the puncture site.           Activity:          For 2 days:     do not use your hand or arm to support your weight (such as rising from a chair)     do not bend your wrist (such as lifting a garage door).    do not lift more than 5 pounds or exercise your arm (such as tennis, golf or bowling).    Do NOT do any heavy activity such as exercise, lifting, or straining.     Bleeding:      If you start bleeding from the site in your wrist, sit down and press firmly on/above the site for 10 minutes.     Once bleeding stops, keep arm still for 2 hours.     Call [facility] as soon as you can.       Call 911 right away if you have heavy bleeding or bleeding that does not stop.      Medicines:      do not stop taking Plavix  until you talk to your cardiologist.         Take your medications,  unless your provider tells you not to.      If you have stopped any medicines, check with your provider about when to restart them.    Follow Up Appointments:      Follow up with [facility] Nurse Practitioner at [facility] Clinic of patient preference in 7-10 days.    Cardiac Rehab will contact you for follow up care.    Call the clinic if:      You have a large or growing hard lump around the site.    The site is red, swollen, hot or tender.    Blood or fluid is draining from the site.    You have chills or a fever greater than 101 F (38 C).    Your arm feels numb, cool or changes color.    You have hives, a rash or unusual itching.    Any questions or concerns.    Other Instructions:     carry your stent card with you at all times.      [facility] [place]:    [phone] [facility] (7 days a week)            "Additional Information About Your Visit        MyChart Information     [email or web address] gives you secure access to your electronic health record. If you see a primary care provider, you can also send messages to your care team and make appointments. If you have questions, please call your primary care clinic.  If you do not have a primary care provider, please call [phone] and they will assist you.        Care EveryWhere ID     This is your Care EveryWhere ID. This could be used by other organizations to access your [facility] medical records  [id number]        Your Vitals Were     Blood Pressure Pulse Temperature Respirations Height Weight    111/65 52 98  F (36.7  C) (Oral) 20 1.727 m (5' 8\") 90.1 kg (198 lb 9.6 oz)    Pulse Oximetry BMI (Body Mass Index)                99% 30.2 kg/m2           Primary Care Provider Office Phone # Fax #    Rufinosarah Tobi Davis [phone]      Equal Access to Services     LARY REYES : Hadii kinza ku hadasho Soomaali, waaxda luqadaha, qaybta kaalmada adeegyada, waxay mariano mcmahan . So [facility] [phone].    ATENCIÓN: Si habla espvianney, tiene a reynoso disposición servicios gratuitos de asistencia lingüística. Llame al [phone].    We comply with applicable federal civil rights laws and [place] laws. We do not discriminate on the basis of race, color, national origin, age, disability, sex, sexual orientation, or gender identity.            Thank you!     Thank you for choosing [facility] for your care. Our goal is always to provide you with excellent care. Hearing back from our patients is one way we can continue to improve our services. Please take a few minutes to complete the written survey that you may receive in the mail after you visit with us. Thank you!             Medication List: This is a list of all your medications and when to take them. Check marks below indicate your daily home schedule. Keep this list as a reference.    "   Medications           Morning Afternoon Evening Bedtime As Needed    acetaminophen 325 MG tablet   Commonly known as:  TYLENOL   Take 2 tablets (650 mg) by mouth every 4 hours as needed for mild pain                                aspirin 81 MG tablet   Take 1 tablet (81 mg) by mouth daily Start tomorrow morning.   Start taking on:  11/16/2018                                ASPIRIN PO   Take 325 mg by mouth once                                atorvastatin 40 MG tablet   Commonly known as:  LIPITOR   Take 1 tablet (40 mg) by mouth daily                                clopidogrel 75 MG tablet   Commonly known as:  PLAVIX   Take 1 tablet (75 mg) by mouth daily   Start taking on:  11/16/2018   Last time this was given:  600 mg on 11/15/2018  8:54 AM                                cyanocobalamin 1000 MCG Subl sublingual tablet   Place 1,000 mcg under the tongue daily                                hydrochlorothiazide 25 MG tablet   Commonly known as:  HYDRODIURIL   Take 1 tablet (25 mg) by mouth every morning                                levothyroxine 137 MCG tablet   Commonly known as:  SYNTHROID/LEVOTHROID   Take 1 tablet (137 mcg) by mouth daily                                metoprolol tartrate 25 MG tablet   Commonly known as:  LOPRESSOR   Take 0.5 tablets (12.5 mg) by mouth 2 times daily                                Multi-vitamin Tabs tablet   Take 1 tablet by mouth 2 times daily   Generic drug:  multivitamin, therapeutic with minerals                                nitroGLYcerin 0.4 MG sublingual tablet   Commonly known as:  NITROSTAT   For chest pain place 1 tablet under the tongue every 5 minutes for 3 doses. If symptoms persist 5 minutes after 1st dose call 911.                                senna-docusate 8.6-50 MG per tablet   Commonly known as:  SENOKOT-S;PERICOLACE   Take 1-2 tablets by mouth 2 times daily as needed (constipation )                                vitamin D 2000 units tablet   Take 1  tablet by mouth daily

## 2018-11-16 ENCOUNTER — TELEPHONE (OUTPATIENT)
Dept: CARDIOLOGY | Facility: CLINIC | Age: 58
End: 2018-11-16

## 2018-11-16 LAB
INTERPRETATION ECG - MUSE: NORMAL
INTERPRETATION MONITOR -MUSE: NORMAL

## 2018-11-16 NOTE — TELEPHONE ENCOUNTER
Spoke with patient post discharge from care suites after coronary angiography.     Intervention: proximal LAD    Access Site: Right radial    Instructions:   Patient may remove the Band-Aid after 24 hours, but if there is minor oozing apply another and may remove second Band-Aid after 12 hours. Also, no heavy exercise for 2 days, do not take a bath, or use a hot tub or pool for at least 3 days but may shower.     Instructed patient to call 911 right away if the bleeding is heavy or does not stop. Call the clinic if there is a large or growing lump around the site, the site is red, swollen, hot, or tender, have fever >101 degrees F or chills, your arm or leg feels numb or cool, or hives, a rash, or unusual itching, shortness of breath, or chest discomfort.    Patient states he is having intermittent palpitations. Holter monitor results are not available at this time. He agrees to participate in cardiac rehab.     Heart Follow Up: CHARO Oliver, CNP at 10:10 am on 11/21    After hours contact number [phone] option 2.     CHARO Almaraz, CNP  11/16/2018

## 2018-11-17 LAB — INTERPRETATION ECG - MUSE: NORMAL

## 2018-11-21 ENCOUNTER — OFFICE VISIT (OUTPATIENT)
Dept: CARDIOLOGY | Facility: CLINIC | Age: 58
End: 2018-11-21
Payer: COMMERCIAL

## 2018-11-21 VITALS
HEIGHT: 68 IN | HEART RATE: 59 BPM | DIASTOLIC BLOOD PRESSURE: 70 MMHG | SYSTOLIC BLOOD PRESSURE: 109 MMHG | BODY MASS INDEX: 31.49 KG/M2 | WEIGHT: 207.8 LBS

## 2018-11-21 DIAGNOSIS — I25.110 CORONARY ARTERY DISEASE INVOLVING NATIVE CORONARY ARTERY OF NATIVE HEART WITH UNSTABLE ANGINA PECTORIS (H): Primary | ICD-10-CM

## 2018-11-21 DIAGNOSIS — R00.2 PALPITATIONS: ICD-10-CM

## 2018-11-21 DIAGNOSIS — E78.2 MIXED HYPERLIPIDEMIA: ICD-10-CM

## 2018-11-21 DIAGNOSIS — I10 ESSENTIAL HYPERTENSION: ICD-10-CM

## 2018-11-21 DIAGNOSIS — I25.5 ISCHEMIC CARDIOMYOPATHY: ICD-10-CM

## 2018-11-21 PROCEDURE — 99214 OFFICE O/P EST MOD 30 MIN: CPT | Performed by: NURSE PRACTITIONER

## 2018-11-21 RX ORDER — CLOPIDOGREL BISULFATE 75 MG/1
75 TABLET ORAL DAILY
Qty: 30 TABLET | Refills: 11 | Status: SHIPPED | OUTPATIENT
Start: 2018-11-21 | End: 2019-12-02

## 2018-11-21 RX ORDER — METOPROLOL SUCCINATE 50 MG/1
50 TABLET, EXTENDED RELEASE ORAL DAILY
Qty: 30 TABLET | Refills: 6 | Status: SHIPPED | OUTPATIENT
Start: 2018-11-21 | End: 2018-12-07

## 2018-11-21 NOTE — PROGRESS NOTES
Cardiology Clinic Progress Note  Ramiro Jeffers MRN# [id number]   YOB: 1960 Age: 58 year old     Reason For Visit: Follow up post angiogram   Primary Cardiologist:   Dr. Key          History of Presenting Illness:    Ramiro Jeffers is a pleasant 58 year old patient who carries a PMH significant for DVT and PE, hypertension, gastric bypass surgery in 2010 and hyperlipidemia. He was last seen on 11/6/18 as a new consult for cresendo angina progressing over the last 1-3 months. He recently underwent a nuclear stress test that showed unreliable images due to gut uptake and attenuation followed by a CTA that was unreliable due to frequent PVC's. A CE coronary calcium score showed a significant calcification in the LAD with a total score of 563. Due to his reported symptoms and calcium score finding, a coronary angiogram was recommended. On 11/15/18, he underwent coronary angiogram and stenting of the proximal LAD with a 3.5 x 16 mm DORY. No other significant residual disease. Echocardiogram showed a mildly reduced LV function of 40-45% with mild-moderate global hypokinesia. No significant valvular disease. He comes to the office today for follow up.     He is feeling well on a cardiac standpoint, denies chest pain, shortness of breath, PND, orthopnea, presyncope, or syncope. His primary complaint is palpitations. Recent 48 hr Holter monitor showed a 4% PVC burden. He admits to a mild improvement in symptoms after starting on metoprolol.    Blood pressure today is well controlled at 109/70, HR 59. He monitor blood pressure at home with comparable readings.   Last BMP showed a sodium of 141, potassium 3.5, BUN 27, Creatinine 0.8, and GFR >90  BMI is 31. He monitors his weights daily and concerned over his ~ 9lb weight gain. Upon exam he is Euvolemic.     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 He is scheduled to begin cardiac rehab next week.   He follows a strict heart healthy, low  sodium, no sugar diet.   Last lipid panel showed a total cholesterol of 163, HDL 52, LDL 98, and triglycerides 66. He was recently switched from pravastatin to Lipitor.     He  remains compliant with all medications. Denies any evidence of bleeding on DAPT.                    Assessment and Plan:     1. Coronary artery disease - s/p 3.5 x 16 mm DORY to proximal LAD. No residual disease. Continue on Plavix x 12 month uninterrupted and lifelong aspirin, BB, hydrochlorothiazide, and statin. In review of his medications, he is unsure of the names of medications and clopidogrel does not sound familiar. He will call and clarify once he gets home.  Cardiac rehab, beginning next week. Encourage exercise and weight loss.    2. Ischemic cardiomyopathy - Echo cardiogram demonstrated a Ef of 40-45% with mold- moderate global hypokinesis. Continue on diuretics, and upitrated dose of metoprolol.   3. Palpitations - frequent. 48 hr holter showed a 4% PVC burden. Will switch metoprolol to Toprol and increase dose to 50mg daily. Monitor for bradycardia.   4. Hypertension - well controlled  5. Hyperlipidemia - LDL not at goal, recently switched to lipitor from pravastatin. Due for follow up lipids in 6 weeks, labs were added to his already scheduled lab visit in February.                 Thank you for allowing me to participate in this delightful patient's care. He is scheduled to follow up in 2 weeks with Dr. Key.        CHARO Oliver, CNP          Review of Systems:   Review of Systems:  Skin:  Positive for rash;bruising   Eyes:  Positive for glasses;cataracts  ENT:  Negative    Respiratory:  Positive for shortness of breath;sleep apnea  Cardiovascular:    Positive for;chest pain;palpitations;lightheadedness;dizziness;edema  Gastroenterology: Negative    Genitourinary:  Positive for urinary frequency;hesitancy  Musculoskeletal:  Positive for joint stiffness;back pain;joint pain  Neurologic:  Positive for headaches;numbness  or tingling of hands;numbness or tingling of feet  Psychiatric:  Positive for sleep disturbances  Heme/Lymph/Imm:  Negative    Endocrine:  Positive for cold intolerance;thyroid disorder;hot flashes              Physical Exam:     GEN:  In general, this is a well nourished male  in no acute distress   HEENT:  Pupils equal, round. Sclerae nonicteric. Clear oropharynx. Mucous membranes moist.  NECK: Supple, no masses appreciated. Trachea midline. No JVD  C/V:  Regular rate and rhythm, no murmur, rub or gallop.  RESP: Respirations are unlabored. No use of accessory muscles. Clear to auscultation bilaterally without wheezing, rales, or rhonchi.  GI: Abdomen soft, nontender, nondistended. No HSM appreciated.   EXTREM: Trace ankle LE edema. No cyanosis or clubbing.  NEURO: Alert and oriented, cooperative. Gait stable No obvious focal deficits.   PSYCH: Normal affect.  SKIN: Warm and dry. No rashes or petechiae appreciated.          Past Medical History:     Past Medical History:   Diagnosis Date     ACQUIRED HYPOTHYROIDISM       Blood clot in the legs      Calculus of gallbladder without mention of cholecystitis or obstruction      Cataract      Chest pressure 11/5/2018     Hyperlipidemia      Hypertension      Obesity, unspecified     significant weight loss w/diet alone, on 10-15-10, BMI was 56.4     pulmonary emboli 6-2010    Unprovoked large bilateral pulmonary emboli without source identified on lower extremity dopplers, pt had a transient moderate lupus inhibitor upon initial presentation in June , 2010 which was gone in September, 2010;  all other thrombophilic workup is normal     Pulmonary embolus, bilateral      Shortness of breath     on exertion     Venous insufficiency      Viral pneumonia, unspecified      Vitamin D deficiency               Past Surgical History:     Past Surgical History:   Procedure Laterality Date     C NONSPECIFIC PROCEDURE  10-    Laparoscopic gastric bypass,     C NONSPECIFIC  PROCEDURE  10-    1.  Attempted laparoscopic exploration with conversion to: .  Abdominal exploration. 3.  Reduction of incarcerated incisional hernia. 4.  Repair of incisional hernia.5.  Gastrostomy tube placement (#22 Qat[place]) into defunctionalized stomach.6.  Enterotomy with bowel decompression and primary repair. 7.  Abdominal lavage.      COLONOSCOPY  11/17/2011    Procedure:COLONOSCOPY; colonoscopy; Surgeon:ELENA OROURKE; Location: GI     LAPAROSCOPIC CHOLECYSTECTOMY  9/28/2012    Procedure: LAPAROSCOPIC CHOLECYSTECTOMY;  LAPAROSCOPIC CHOLECYSTECTOMY, LYSIS OF ADHESIONS;  Surgeon: Dutch Matta MD;  Location:  OR     LAPAROSCOPIC LYSIS ADHESIONS  9/28/2012    Procedure: LAPAROSCOPIC LYSIS ADHESIONS;;  Surgeon: Dutch Matta MD;  Location:  OR     LAPAROSCOPY, SURGICAL; REPAIR INCISIONAL OR VENTRAL HERNIA      repair of ventral strangulated surgery     PHACOEMULSIFICATION CLEAR CORNEA WITH STANDARD INTRAOCULAR LENS IMPLANT  12/19/2011    Procedure:PHACOEMULSIFICATION CLEAR CORNEA WITH STANDARD INTRAOCULAR LENS IMPLANT; RIGHT PHACOEMULSIFICATION CLEAR CORNEA WITH STANDARD INTRAOCULAR LENS IMPLANT ; Surgeon:ALLISON ANTONIO; Location:[facility]              Allergies:   No known allergies       Data:   All laboratory data reviewed:    LAST CHOLESTEROL:  Lab Results   Component Value Date    CHOL 163 10/19/2018     Lab Results   Component Value Date    HDL 52 10/19/2018     Lab Results   Component Value Date    LDL 98 10/19/2018     Lab Results   Component Value Date    TRIG 66 10/19/2018     Lab Results   Component Value Date    CHOLHDLRATIO 3.0 10/13/2014       LAST BMP:  Lab Results   Component Value Date     11/15/2018      Lab Results   Component Value Date    POTASSIUM 3.5 11/15/2018     Lab Results   Component Value Date    CHLORIDE 103 11/15/2018     Lab Results   Component Value Date    GUERITA 8.9 11/15/2018     Lab Results   Component Value Date    CO2 33 11/15/2018     Lab Results    Component Value Date    BUN 27 11/15/2018     Lab Results   Component Value Date    CR 0.80 11/15/2018     Lab Results   Component Value Date    GLC 92 11/15/2018       LAST CBC:  Lab Results   Component Value Date    WBC 4.6 11/15/2018     Lab Results   Component Value Date    RBC 4.52 11/15/2018     Lab Results   Component Value Date    HGB 13.6 11/15/2018     Lab Results   Component Value Date    HCT 40.6 11/15/2018     Lab Results   Component Value Date    MCV 90 11/15/2018     Lab Results   Component Value Date    MCH 30.1 11/15/2018     Lab Results   Component Value Date    MCHC 33.5 11/15/2018     Lab Results   Component Value Date    RDW 13.5 11/15/2018     Lab Results   Component Value Date     11/15/2018

## 2018-11-21 NOTE — MR AVS SNAPSHOT
After Visit Summary   11/21/2018    Ramiro Jeffers    MRN: [id number]           Patient Information     Date Of Birth          1960        Visit Information        Provider Department      11/21/2018 10:10 AM Ritu Altamirano APRN CNP [facility]        Today's Diagnoses     Essential hypertension    -  1    Mixed hyperlipidemia        Coronary artery disease involving native coronary artery of native heart with unstable angina pectoris (H)        Ischemic cardiomyopathy          Care Instructions    Thanks for coming into [facility] Heart clinic today.    Doing well on a cardiac standpoint   Complaints of persistent palpitations  48 hr holter showed 4% PVC burden  Will switch metoprolol to long acting Toprol and increase to 50mg daily  Monitor daily weights, call if > 3lbs overnight or a 5lb increase in a week.   Blood pressure and HR stable, monitor daily  Follow up cholesterol panel in February  Reviewed angiogram results  Due to begin cardiac rehab next week.         Please call my nurse at [phone] with any questions or concerns.    Scheduling phone number: [phone]  Reminder: Please bring in all current medications, over the counter supplements and vitamin bottles to your next appointment.                        Follow-ups after your visit        Your next 10 appointments already scheduled     Nov 27, 2018  9:30 AM CST   Cardiac Evaluation with  CARDIAC REHAB 4   [facility] ([facility])    [address], [address]  [facility] [address]   [phone]            Dec 06, 2018  7:45 AM CST   Return Visit with Tereza Key MD   [facility] ([facility])    [address]  [facility] [address]   [phone] OPT 2            Dec 07, 2018  8:40 AM CST   Return Visit with Addison Davis MD   [facility]  [place] ([facility])    [address]  [place] [address]   [phone]            Feb 04, 2019  7:30 AM CST   LAB with [facility]   [facility] ([facility])    [address] [address]   [phone]           Please do not eat 10-12 hours before your appointment if you are coming in fasting for labs on lipids, cholesterol, or glucose (sugar). This does not apply to pregnant women. Water, hot tea and black coffee (with nothing added) are okay. Do not drink other fluids, diet soda or chew gum.            Feb 19, 2019  9:10 AM CST   Return Visit with Addison Davis MD   [facility] ([facility])    [address]  [place] [address]   [phone]              Future tests that were ordered for you today     Open Future Orders        Priority Expected Expires Ordered    Lipid Profile Routine 2/4/2019 11/21/2019 11/21/2018            Who to contact     If you have questions or need follow up information about today's clinic visit or your schedule please contact [facility] directly at [phone].  Normal or non-critical lab and imaging results will be communicated to you by [employer], letter or phone within 4 business days after the clinic has received the results. If you do not hear from us within 7 days, please contact the clinic through [employer] or phone. If you have a critical or abnormal lab result, we will notify you by phone as soon as possible.  Submit refill requests through [employer] or call your pharmacy and they will forward the refill request to us. Please allow 3 business days for your refill to be completed.          Additional Information About Your Visit        [employer] Information     [employer] gives you secure access to your electronic health  "record. If you see a primary care provider, you can also send messages to your care team and make appointments. If you have questions, please call your primary care clinic.  If you do not have a primary care provider, please call [phone] and they will assist you.        Care EveryWhere ID     This is your Care EveryWhere ID. This could be used by other organizations to access your [place] medical records  [id number]        Your Vitals Were     Pulse Height BMI (Body Mass Index)             59 1.727 m (5' 8\") 31.6 kg/m2          Blood Pressure from Last 3 Encounters:   11/21/18 109/70   11/15/18 110/75   11/13/18 107/67    Weight from Last 3 Encounters:   11/21/18 94.3 kg (207 lb 12.8 oz)   11/15/18 90.1 kg (198 lb 9.6 oz)   11/13/18 90.1 kg (198 lb 9.6 oz)                 Today's Medication Changes          These changes are accurate as of 11/21/18 10:45 AM.  If you have any questions, ask your nurse or doctor.               Start taking these medicines.        Dose/Directions    metoprolol succinate 50 MG 24 hr tablet   Commonly known as:  TOPROL-XL   Used for:  Coronary artery disease involving native coronary artery of native heart with unstable angina pectoris (H), Ischemic cardiomyopathy   Started by:  Ritu Altamirano APRN CNP        Dose:  50 mg   Take 1 tablet (50 mg) by mouth daily   Quantity:  30 tablet   Refills:  6         Stop taking these medicines if you haven't already. Please contact your care team if you have questions.     metoprolol tartrate 25 MG tablet   Commonly known as:  LOPRESSOR   Stopped by:  Ritu Altamirano APRN CNP                Where to get your medicines      These medications were sent to [id number]  [place], [place] - [address] AT [address] [place] [address] & [facility]  [address] [place] [address]    Hours:  24-hours Phone:  [phone]     clopidogrel 75 MG tablet    metoprolol succinate 50 MG 24 hr tablet                Primary Care Provider Office Phone # " Fax #    Addison Davis [phone] [address] [place] [address]        Equal Access to Services     LARY REYES : Hadii aad ku hadeileengato Prosper, waaxda luqadaha, qaybta kaalmada ed, israel barriso alexandriatressa moss martir kumari. So [facility] [phone].    ATENCIÓN: Si habla español, tiene a reynoso disposición servicios gratuitos de asistencia lingüística. Llame al [phone].    We comply with applicable federal civil rights laws and [place] laws. We do not discriminate on the basis of race, color, national origin, age, disability, sex, sexual orientation, or gender identity.            Thank you!     Thank you for choosing [facility]  for your care. Our goal is always to provide you with excellent care. Hearing back from our patients is one way we can continue to improve our services. Please take a few minutes to complete the written survey that you may receive in the mail after your visit with us. Thank you!             Your Updated Medication List - Protect others around you: Learn how to safely use, store and throw away your medicines at www.disposemymeds.org.          This list is accurate as of 11/21/18 10:45 AM.  Always use your most recent med list.                   Brand Name Dispense Instructions for use Diagnosis    acetaminophen 325 MG tablet    TYLENOL    100 tablet    Take 2 tablets (650 mg) by mouth every 4 hours as needed for mild pain    Chest wall pain       aspirin 81 MG tablet      Take 1 tablet (81 mg) by mouth daily Start tomorrow morning.    Coronary artery disease involving native coronary artery of native heart with unstable angina pectoris (H)       atorvastatin 40 MG tablet    LIPITOR    90 tablet    Take 1 tablet (40 mg) by mouth daily    Hyperlipidemia LDL goal <70       clopidogrel 75 MG tablet    PLAVIX    30 tablet    Take 1 tablet (75 mg) by mouth daily    Coronary artery disease involving native  coronary artery of native heart with unstable angina pectoris (H)       cyanocobalamin 1000 MCG Subl sublingual tablet      Place 1,000 mcg under the tongue daily        hydrochlorothiazide 25 MG tablet    HYDRODIURIL    90 tablet    Take 1 tablet (25 mg) by mouth every morning    Essential hypertension with goal blood pressure less than 130/80       levothyroxine 137 MCG tablet    SYNTHROID/LEVOTHROID    90 tablet    Take 1 tablet (137 mcg) by mouth daily    Hypothyroidism, unspecified type       metoprolol succinate 50 MG 24 hr tablet    TOPROL-XL    30 tablet    Take 1 tablet (50 mg) by mouth daily    Coronary artery disease involving native coronary artery of native heart with unstable angina pectoris (H), Ischemic cardiomyopathy       Multi-vitamin Tabs tablet   Generic drug:  multivitamin, therapeutic with minerals      Take 1 tablet by mouth 2 times daily        nitroGLYcerin 0.4 MG sublingual tablet    NITROSTAT    25 tablet    For chest pain place 1 tablet under the tongue every 5 minutes for 3 doses. If symptoms persist 5 minutes after 1st dose call 911.    Atypical chest pain       senna-docusate 8.6-50 MG per tablet    SENOKOT-S;PERICOLACE    100 tablet    Take 1-2 tablets by mouth 2 times daily as needed (constipation )    Drug-induced constipation       vitamin D3 2000 units tablet    CHOLECALCIFEROL    100 tablet    Take 1 tablet by mouth daily    Vitamin D deficiency

## 2018-11-21 NOTE — PATIENT INSTRUCTIONS
Thanks for coming into [facility] Heart clinic today.    Doing well on a cardiac standpoint   Complaints of persistent palpitations  48 hr holter showed 4% PVC burden  Will switch metoprolol to long acting Toprol and increase to 50mg daily  Monitor daily weights, call if > 3lbs overnight or a 5lb increase in a week.   Blood pressure and HR stable, monitor daily  Follow up cholesterol panel in February  Reviewed angiogram results  Due to begin cardiac rehab next week.   Continue on current medications, will confirm when he gets home he is taking Plavix.        Please call my nurse at [phone] with any questions or concerns.    Scheduling phone number: [phone]  Reminder: Please bring in all current medications, over the counter supplements and vitamin bottles to your next appointment.

## 2018-11-21 NOTE — LETTER
11/21/2018    Addison Davis MD  [address]  [facility] [address]    RE: Ramiro Jeffers       Dear Colleague,    I had the pleasure of seeing Ramiro Jeffers in the [facility].    Cardiology Clinic Progress Note  Ramiro Jeffers MRN# [id number]   YOB: 1960 Age: 58 year old     Reason For Visit: Follow up post angiogram   Primary Cardiologist:   Dr. Key          History of Presenting Illness:    Ramiro Jeffers is a pleasant 58 year old patient who carries a PMH significant for DVT and PE, hypertension, gastric bypass surgery in 2010 and hyperlipidemia. He was last seen on 11/6/18 as a new consult for cresendo angina progressing over the last 1-3 months. He recently underwent a nuclear stress test that showed unreliable images due to gut uptake and attenuation followed by a CTA that was unreliable due to frequent PVC's. A CE coronary calcium score showed a significant calcification in the LAD with a total score of 563. Due to his reported symptoms and calcium score finding, a coronary angiogram was recommended. On 11/15/18, he underwent coronary angiogram and stenting of the proximal LAD with a 3.5 x 16 mm DORY. No other significant residual disease. Echocardiogram showed a mildly reduced LV function of 40-45% with mild-moderate global hypokinesia. No significant valvular disease. He comes to the office today for follow up.     He is feeling well on a cardiac standpoint, denies chest pain, shortness of breath, PND, orthopnea, presyncope, or syncope. His primary complaint is palpitations. Recent 48 hr Holter monitor showed a 4% PVC burden. He admits to a mild improvement in symptoms after starting on metoprolol.    Blood pressure today is well controlled at 109/70, HR 59. He monitor blood pressure at home with comparable readings.   Last BMP showed a sodium of 141, potassium 3.5, BUN 27, Creatinine 0.8, and GFR >90  BMI is 31. He monitors his weights daily and  concerned over his ~ 9lb weight gain. Upon exam he is Euvolemic.     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 He is scheduled to begin cardiac rehab next week.   He follows a strict heart healthy, low sodium, no sugar diet.   Last lipid panel showed a total cholesterol of 163, HDL 52, LDL 98, and triglycerides 66. He was recently switched from pravastatin to Lipitor.     He  remains compliant with all medications. Denies any evidence of bleeding on DAPT.                    Assessment and Plan:     1. Coronary artery disease - s/p 3.5 x 16 mm DORY to proximal LAD. No residual disease. Continue on Plavix x 12 month uninterrupted and lifelong aspirin, BB, hydrochlorothiazide, and statin. In review of his medications, he is unsure of the names of medications and clopidogrel does not sound familiar. He will call and clarify once he gets home.  Cardiac rehab, beginning next week. Encourage exercise and weight loss.    2. Ischemic cardiomyopathy - Echo cardiogram demonstrated a Ef of 40-45% with mold- moderate global hypokinesis. Continue on diuretics, and upitrated dose of metoprolol.   3. Palpitations - frequent. 48 hr holter showed a 4% PVC burden. Will switch metoprolol to Toprol and increase dose to 50mg daily. Monitor for bradycardia.   4. Hypertension - well controlled  5. Hyperlipidemia - LDL not at goal, recently switched to lipitor from pravastatin. Due for follow up lipids in 6 weeks, labs were added to his already scheduled lab visit in February.                 Thank you for allowing me to participate in this delightful patient's care. He is scheduled to follow up in 2 weeks with Dr. Key.        Ritu Altamirano, CHARO, CNP          Review of Systems:   Review of Systems:  Skin:  Positive for rash;bruising   Eyes:  Positive for glasses;cataracts  ENT:  Negative    Respiratory:  Positive for shortness of breath;sleep apnea  Cardiovascular:    Positive for;chest  pain;palpitations;lightheadedness;dizziness;edema  Gastroenterology: Negative    Genitourinary:  Positive for urinary frequency;hesitancy  Musculoskeletal:  Positive for joint stiffness;back pain;joint pain  Neurologic:  Positive for headaches;numbness or tingling of hands;numbness or tingling of feet  Psychiatric:  Positive for sleep disturbances  Heme/Lymph/Imm:  Negative    Endocrine:  Positive for cold intolerance;thyroid disorder;hot flashes              Physical Exam:     GEN:  In general, this is a well nourished male  in no acute distress   HEENT:  Pupils equal, round. Sclerae nonicteric. Clear oropharynx. Mucous membranes moist.  NECK: Supple, no masses appreciated. Trachea midline. No JVD  C/V:  Regular rate and rhythm, no murmur, rub or gallop.  RESP: Respirations are unlabored. No use of accessory muscles. Clear to auscultation bilaterally without wheezing, rales, or rhonchi.  GI: Abdomen soft, nontender, nondistended. No HSM appreciated.   EXTREM: Trace ankle LE edema. No cyanosis or clubbing.  NEURO: Alert and oriented, cooperative. Gait stable No obvious focal deficits.   PSYCH: Normal affect.  SKIN: Warm and dry. No rashes or petechiae appreciated.          Past Medical History:     Past Medical History:   Diagnosis Date     ACQUIRED HYPOTHYROIDISM       Blood clot in the legs      Calculus of gallbladder without mention of cholecystitis or obstruction      Cataract      Chest pressure 11/5/2018     Hyperlipidemia      Hypertension      Obesity, unspecified     significant weight loss w/diet alone, on 10-15-10, BMI was 56.4     pulmonary emboli 6-2010    Unprovoked large bilateral pulmonary emboli without source identified on lower extremity dopplers, pt had a transient moderate lupus inhibitor upon initial presentation in June , 2010 which was gone in September, 2010;  all other thrombophilic workup is normal     Pulmonary embolus, bilateral      Shortness of breath     on exertion     Venous  insufficiency      Viral pneumonia, unspecified      Vitamin D deficiency               Past Surgical History:     Past Surgical History:   Procedure Laterality Date     C NONSPECIFIC PROCEDURE  10-    Laparoscopic gastric bypass,     C NONSPECIFIC PROCEDURE  10-    1.  Attempted laparoscopic exploration with conversion to: .  Abdominal exploration. 3.  Reduction of incarcerated incisional hernia. 4.  Repair of incisional hernia.5.  Gastrostomy tube placement (#22 [id number]) into defunctionalized stomach.6.  Enterotomy with bowel decompression and primary repair. 7.  Abdominal lavage.      COLONOSCOPY  11/17/2011    Procedure:COLONOSCOPY; colonoscopy; Surgeon:ELENA OROURKE; Location: GI     LAPAROSCOPIC CHOLECYSTECTOMY  9/28/2012    Procedure: LAPAROSCOPIC CHOLECYSTECTOMY;  LAPAROSCOPIC CHOLECYSTECTOMY, LYSIS OF ADHESIONS;  Surgeon: Dutch Matta MD;  Location:  OR     LAPAROSCOPIC LYSIS ADHESIONS  9/28/2012    Procedure: LAPAROSCOPIC LYSIS ADHESIONS;;  Surgeon: Dutch Matta MD;  Location:  OR     LAPAROSCOPY, SURGICAL; REPAIR INCISIONAL OR VENTRAL HERNIA      repair of ventral strangulated surgery     PHACOEMULSIFICATION CLEAR CORNEA WITH STANDARD INTRAOCULAR LENS IMPLANT  12/19/2011    Procedure:PHACOEMULSIFICATION CLEAR CORNEA WITH STANDARD INTRAOCULAR LENS IMPLANT; RIGHT PHACOEMULSIFICATION CLEAR CORNEA WITH STANDARD INTRAOCULAR LENS IMPLANT ; Surgeon:ALLISON ANTONIO; Location:[facility]              Allergies:   No known allergies       Data:   All laboratory data reviewed:    LAST CHOLESTEROL:  Lab Results   Component Value Date    CHOL 163 10/19/2018     Lab Results   Component Value Date    HDL 52 10/19/2018     Lab Results   Component Value Date    LDL 98 10/19/2018     Lab Results   Component Value Date    TRIG 66 10/19/2018     Lab Results   Component Value Date    CHOLHDLRATIO 3.0 10/13/2014     LAST BMP:  Lab Results   Component Value Date     11/15/2018      Lab Results    Component Value Date    POTASSIUM 3.5 11/15/2018     Lab Results   Component Value Date    CHLORIDE 103 11/15/2018     Lab Results   Component Value Date    GUERITA 8.9 11/15/2018     Lab Results   Component Value Date    CO2 33 11/15/2018     Lab Results   Component Value Date    BUN 27 11/15/2018     Lab Results   Component Value Date    CR 0.80 11/15/2018     Lab Results   Component Value Date    GLC 92 11/15/2018     LAST CBC:  Lab Results   Component Value Date    WBC 4.6 11/15/2018     Lab Results   Component Value Date    RBC 4.52 11/15/2018     Lab Results   Component Value Date    HGB 13.6 11/15/2018     Lab Results   Component Value Date    HCT 40.6 11/15/2018     Lab Results   Component Value Date    MCV 90 11/15/2018     Lab Results   Component Value Date    MCH 30.1 11/15/2018     Lab Results   Component Value Date    MCHC 33.5 11/15/2018     Lab Results   Component Value Date    RDW 13.5 11/15/2018     Lab Results   Component Value Date     11/15/2018     Thank you for allowing me to participate in the care of your patient.    Sincerely,     CHARO Oliver [facility]

## 2018-11-26 DIAGNOSIS — R07.89 ATYPICAL CHEST PAIN: ICD-10-CM

## 2018-11-26 RX ORDER — NITROGLYCERIN 0.4 MG/1
TABLET SUBLINGUAL
Qty: 25 TABLET | Refills: 1 | Status: SHIPPED | OUTPATIENT
Start: 2018-11-26 | End: 2019-02-11

## 2018-11-27 ENCOUNTER — HOSPITAL ENCOUNTER (OUTPATIENT)
Dept: CARDIAC REHAB | Facility: CLINIC | Age: 58
End: 2018-11-27
Attending: INTERNAL MEDICINE
Payer: COMMERCIAL

## 2018-11-27 DIAGNOSIS — I25.110 CORONARY ARTERY DISEASE INVOLVING NATIVE CORONARY ARTERY OF NATIVE HEART WITH UNSTABLE ANGINA PECTORIS (H): ICD-10-CM

## 2018-11-27 PROCEDURE — 93798 PHYS/QHP OP CAR RHAB W/ECG: CPT | Performed by: OCCUPATIONAL THERAPIST

## 2018-11-27 PROCEDURE — 93797 PHYS/QHP OP CAR RHAB WO ECG: CPT | Performed by: OCCUPATIONAL THERAPIST

## 2018-11-27 PROCEDURE — 40000116 ZZH STATISTIC OP CR VISIT: Performed by: OCCUPATIONAL THERAPIST

## 2018-11-27 PROCEDURE — 40000575 ZZH STATISTIC OP CARDIAC VISIT #2: Performed by: OCCUPATIONAL THERAPIST

## 2018-11-28 ENCOUNTER — HOSPITAL ENCOUNTER (OUTPATIENT)
Dept: CARDIAC REHAB | Facility: CLINIC | Age: 58
End: 2018-11-28
Attending: INTERNAL MEDICINE
Payer: COMMERCIAL

## 2018-11-28 PROCEDURE — 93798 PHYS/QHP OP CAR RHAB W/ECG: CPT | Performed by: REHABILITATION PRACTITIONER

## 2018-11-28 PROCEDURE — 40000116 ZZH STATISTIC OP CR VISIT: Performed by: REHABILITATION PRACTITIONER

## 2018-11-30 ENCOUNTER — HOSPITAL ENCOUNTER (OUTPATIENT)
Dept: CARDIAC REHAB | Facility: CLINIC | Age: 58
End: 2018-11-30
Attending: INTERNAL MEDICINE
Payer: COMMERCIAL

## 2018-11-30 PROCEDURE — 93798 PHYS/QHP OP CAR RHAB W/ECG: CPT

## 2018-11-30 PROCEDURE — 40000116 ZZH STATISTIC OP CR VISIT

## 2018-12-03 ENCOUNTER — HOSPITAL ENCOUNTER (OUTPATIENT)
Dept: CARDIAC REHAB | Facility: CLINIC | Age: 58
End: 2018-12-03
Attending: INTERNAL MEDICINE
Payer: COMMERCIAL

## 2018-12-03 PROCEDURE — 40000116 ZZH STATISTIC OP CR VISIT

## 2018-12-03 PROCEDURE — 93798 PHYS/QHP OP CAR RHAB W/ECG: CPT

## 2018-12-04 ENCOUNTER — HOSPITAL ENCOUNTER (OUTPATIENT)
Dept: CARDIAC REHAB | Facility: CLINIC | Age: 58
End: 2018-12-04
Attending: INTERNAL MEDICINE
Payer: COMMERCIAL

## 2018-12-04 PROCEDURE — 93798 PHYS/QHP OP CAR RHAB W/ECG: CPT

## 2018-12-04 PROCEDURE — 40000116 ZZH STATISTIC OP CR VISIT

## 2018-12-06 ENCOUNTER — HOSPITAL ENCOUNTER (OUTPATIENT)
Dept: CARDIAC REHAB | Facility: CLINIC | Age: 58
End: 2018-12-06
Attending: INTERNAL MEDICINE
Payer: COMMERCIAL

## 2018-12-06 ENCOUNTER — OFFICE VISIT (OUTPATIENT)
Dept: CARDIOLOGY | Facility: CLINIC | Age: 58
End: 2018-12-06
Attending: INTERNAL MEDICINE
Payer: COMMERCIAL

## 2018-12-06 VITALS
HEART RATE: 72 BPM | HEIGHT: 68 IN | BODY MASS INDEX: 30.52 KG/M2 | WEIGHT: 201.4 LBS | DIASTOLIC BLOOD PRESSURE: 54 MMHG | SYSTOLIC BLOOD PRESSURE: 106 MMHG

## 2018-12-06 DIAGNOSIS — I25.118 CORONARY ARTERY DISEASE OF NATIVE ARTERY OF NATIVE HEART WITH STABLE ANGINA PECTORIS (H): Primary | ICD-10-CM

## 2018-12-06 PROCEDURE — 93798 PHYS/QHP OP CAR RHAB W/ECG: CPT | Performed by: CLINICAL EXERCISE PHYSIOLOGIST

## 2018-12-06 PROCEDURE — 99214 OFFICE O/P EST MOD 30 MIN: CPT | Performed by: INTERNAL MEDICINE

## 2018-12-06 PROCEDURE — 40000116 ZZH STATISTIC OP CR VISIT: Performed by: CLINICAL EXERCISE PHYSIOLOGIST

## 2018-12-06 NOTE — MR AVS SNAPSHOT
After Visit Summary   12/6/2018    Ramiro Jeffers    MRN: [id number]           Patient Information     Date Of Birth          1960        Visit Information        Provider Department      12/6/2018 7:45 AM Tereza Key MD [facility]   [place]        Today's Diagnoses     Coronary artery disease of native artery of native heart with stable angina pectoris (H)    -  1       Follow-ups after your visit        Additional Services     Follow-Up with Cardiac Advanced Practice Provider           Follow-Up with Cardiologist                 Your next 10 appointments already scheduled     Dec 07, 2018  8:40 AM CST   Return Visit with Addison Davis MD   [facility] ([facility])    [address]  [place] [address]   [phone]            Dec 10, 2018  6:30 AM CST   Cardiac Treatment with [facility] Card[facility] 1   [facility] ([facility])    [address]  [place] [address]   [phone]            Dec 11, 2018  6:30 AM CST   Cardiac Treatment with Sh Cardiac Rehab 1   [facility] ([facility])    [address]  [place] [address]   [phone]            Dec 13, 2018  6:30 AM CST   Cardiac Treatment with Sh Cardiac Rehab 1   [facility] ([facility])    [address]  [place] [address]   [phone]            Dec 17, 2018  6:30 AM CST   Cardiac Treatment with Sh Cardiac Rehab 1   [facility] ([facility])    [address]  [place] [address]   [phone]            Dec 18, 2018  6:30 AM CST   Cardiac Treatment with Sh Cardiac Rehab 1   [facility] ([facility])    [address]  [facility] [address]    [phone]            Feb 04, 2019  7:30 AM CST   LAB with [facility]   [facility] ([facility])    [address] [id number] [phone]           Please do not eat 10-12 hours before your appointment if you are coming in fasting for labs on lipids, cholesterol, or glucose (sugar). This does not apply to pregnant women. Water, hot tea and black coffee (with nothing added) are okay. Do not drink other fluids, diet soda or chew gum.            Feb 19, 2019  9:10 AM CST   Return Visit with Addison Davis MD   [facility] ([facility])    [address]  [place] [id number] [phone]              Future tests that were ordered for you today     Open Future Orders        Priority Expected Expires Ordered    Follow-Up with Cardiac Advanced Practice Provider Routine 1/5/2019 12/6/2019 12/6/2018    Lipid Profile Routine 6/4/2019 12/6/2019 12/6/2018    ALT Routine 6/4/2019 12/6/2019 12/6/2018    Follow-Up with Cardiologist Routine 6/4/2019 12/6/2019 12/6/2018            Who to contact     If you have questions or need follow up information about today's clinic visit or your schedule please contact [facility] directly at [phone].  Normal or non-critical lab and imaging results will be communicated to you by MyChart, letter or phone within 4 business days after the clinic has received the results. If you do not hear from us within 7 days, please contact the clinic through MyChart or phone. If you have a critical or abnormal lab result, we will notify you by phone as soon as possible.  Submit refill requests through [employer] or call your pharmacy and they will forward the refill request to us. Please allow 3 business days for your refill to be completed.          Additional Information About Your Visit       "  Care EveryWhere ID     This is your Care EveryWhere ID. This could be used by other organizations to access your [place] medical records  [id number]        Your Vitals Were     Pulse Height BMI (Body Mass Index)             72 1.727 m (5' 8\") 30.62 kg/m2          Blood Pressure from Last 3 Encounters:   12/06/18 106/54   11/21/18 109/70   11/15/18 110/75    Weight from Last 3 Encounters:   12/06/18 91.4 kg (201 lb 6.4 oz)   11/21/18 94.3 kg (207 lb 12.8 oz)   11/15/18 90.1 kg (198 lb 9.6 oz)              We Performed the Following     Follow-Up with Cardiologist        Primary Care Provider Office Phone # Fax #    Addison Davis [phone] [address]  [facility] [address]        Equal Access to Services     [facility] HospitalHELEN : Hadii aad ku hadasho Sopaul, waaxda luqadaha, qaybta kaalmada adeegyada, israel mcmahan . So [facility] [phone].    ATENCIÓN: Si habla español, tiene a reynoso disposición servicios gratuitos de asistencia lingüística. Llame al [phone].    We comply with applicable federal civil rights laws and [place] laws. We do not discriminate on the basis of race, color, national origin, age, disability, sex, sexual orientation, or gender identity.            Thank you!     Thank you for choosing [facility]  for your care. Our goal is always to provide you with excellent care. Hearing back from our patients is one way we can continue to improve our services. Please take a few minutes to complete the written survey that you may receive in the mail after your visit with us. Thank you!             Your Updated Medication List - Protect others around you: Learn how to safely use, store and throw away your medicines at www.disposemymeds.org.          This list is accurate as of 12/6/18  8:07 AM.  Always use your most recent med list.                   Brand Name Dispense Instructions for use Diagnosis "    acetaminophen 325 MG tablet    TYLENOL    100 tablet    Take 2 tablets (650 mg) by mouth every 4 hours as needed for mild pain    Chest wall pain       aspirin 81 MG tablet    ASA     Take 1 tablet (81 mg) by mouth daily Start tomorrow morning.    Coronary artery disease involving native coronary artery of native heart with unstable angina pectoris (H)       atorvastatin 40 MG tablet    LIPITOR    90 tablet    Take 1 tablet (40 mg) by mouth daily    Hyperlipidemia LDL goal <70       clopidogrel 75 MG tablet    PLAVIX    30 tablet    Take 1 tablet (75 mg) by mouth daily    Coronary artery disease involving native coronary artery of native heart with unstable angina pectoris (H)       cyanocobalamin 1000 MCG Subl sublingual tablet      Place 1,000 mcg under the tongue daily        hydrochlorothiazide 25 MG tablet    HYDRODIURIL    90 tablet    Take 1 tablet (25 mg) by mouth every morning    Essential hypertension with goal blood pressure less than 130/80       levothyroxine 137 MCG tablet    SYNTHROID/LEVOTHROID    90 tablet    Take 1 tablet (137 mcg) by mouth daily    Hypothyroidism, unspecified type       metoprolol succinate ER 50 MG 24 hr tablet    TOPROL-XL    30 tablet    Take 1 tablet (50 mg) by mouth daily    Coronary artery disease involving native coronary artery of native heart with unstable angina pectoris (H), Ischemic cardiomyopathy       Multi-vitamin tablet   Generic drug:  multivitamin w/minerals      Take 1 tablet by mouth 2 times daily        nitroGLYcerin 0.4 MG sublingual tablet    NITROSTAT    25 tablet    For chest pain place 1 tablet under the tongue every 5 minutes for 3 doses. If symptoms persist 5 minutes after 1st dose call 911.    Atypical chest pain       senna-docusate 8.6-50 MG tablet    SENOKOT-S/PERICOLACE    100 tablet    Take 1-2 tablets by mouth 2 times daily as needed (constipation )    Drug-induced constipation       vitamin D3 2000 units tablet    CHOLECALCIFEROL    100  tablet    Take 1 tablet by mouth daily    Vitamin D deficiency

## 2018-12-06 NOTE — LETTER
12/6/2018    Addison Davis MD  [address]  [facility] [address]    RE: Ramiro Jeffers       Dear Colleague,    I had the pleasure of seeing Ramiro Jeffers in the [facility].    CARDIOLOGY CLINIC CONSULTATION    REASON FOR CONSULT: Proximal LAD PCI    PRIMARY CARE PHYSICIAN:  Addison Davis    HISTORY OF PRESENT ILLNESS:  Ramiro Jeffers is a pleasant 58 year old patient who carries a PMH significant for DVT and PE, hypertension, gastric bypass surgery in 2010 and hyperlipidemia. He was first seen on 11/6/18 as a new consult by me for cresendo angina progressing for 1-3 months. He underwent a nuclear stress test that showed unreliable images due to gut uptake and attenuation followed by a CTA that was unreliable due to frequent PVC's. A CT coronary calcium score showed a significant calcification in the LAD with a total score of 563. Due to his reported symptoms and calcium score finding, a coronary angiogram was recommended. On 11/15/18, he underwent coronary angiogram and stenting of the proximal LAD with a 3.5 x 16 mm DORY. Echocardiogram showed a mildly reduced LV function of 40-45% with mild-moderate global hypokinesia. No significant valvular disease. Enrolled in cardiac rehab. Pravastatin changed to atorvastatin during initial visit.     He comes to the office today for follow up.  Overall he says he is feeling much better after his PCI.  However he did have one episode of chest discomfort during cardiac rehabilitation with subsided with rest.  H clear to auscultation bilaterally with good air entry e also had one episode of chest discomfort while shoveling snow when it was very cold and he was at peak of his heavy exertion.  He took a couple of nitros for that.  Denies shortness of breath palpitations presyncope or syncope.  He is compliant with his medications.    PAST MEDICAL HISTORY:  Past Medical History:   Diagnosis Date     ACQUIRED HYPOTHYROIDISM        Blood clot in the legs      CAD (coronary artery disease)     11/15/18 Cath: PCI with 3.5-16mm DORY to proximal LAD     Calculus of gallbladder without mention of cholecystitis or obstruction      Cataract      Chest pressure 11/5/2018     Hyperlipidemia      Hypertension      Ischemic cardiomyopathy     EF 40-45% on 11/2018 Echo     Obesity, unspecified     significant weight loss w/diet alone, on 10-15-10, BMI was 56.4     pulmonary emboli 6-2010    Unprovoked large bilateral pulmonary emboli without source identified on lower extremity dopplers, pt had a transient moderate lupus inhibitor upon initial presentation in June , 2010 which was gone in September, 2010;  all other thrombophilic workup is normal     Pulmonary embolus, bilateral      PVC's (premature ventricular contractions)     4% burden on 11/2018 Holter     Shortness of breath     on exertion     Venous insufficiency      Viral pneumonia, unspecified      Vitamin D deficiency        MEDICATIONS:  Current Outpatient Prescriptions   Medication     acetaminophen (TYLENOL) 325 MG tablet     aspirin 81 MG tablet     atorvastatin (LIPITOR) 40 MG tablet     Cholecalciferol (VITAMIN D) 2000 units tablet     clopidogrel (PLAVIX) 75 MG tablet     hydrochlorothiazide (HYDRODIURIL) 25 MG tablet     levothyroxine (SYNTHROID/LEVOTHROID) 137 MCG tablet     metoprolol succinate (TOPROL-XL) 50 MG 24 hr tablet     Multiple Vitamin (MULTI-VITAMIN) per tablet     nitroGLYcerin (NITROSTAT) 0.4 MG sublingual tablet     senna-docusate (SENOKOT-S;PERICOLACE) 8.6-50 MG per tablet     cyanocobalamin 1000 MCG SUBL     No current facility-administered medications for this visit.        ALLERGIES:  Allergies   Allergen Reactions     No Known Allergies        SOCIAL HISTORY:  I have reviewed this patient's social history and updated it with pertinent information if needed. Ramiro Jeffers  reports that he has never smoked. He has never used smokeless tobacco. He reports that he does  not drink alcohol or use illicit drugs.    FAMILY HISTORY:  I have reviewed this patient's family history and updated it with pertinent information if needed.   Family History   Problem Relation Age of Onset     Osteoporosis Mother      Thyroid Disease Mother      no thyroid     Cancer Maternal Grandmother      ? lung cancer     Cancer Father      melanoma     Coronary Artery Disease Brother      Coronary Artery Disease Brother        REVIEW OF SYSTEMS:  Skin:  Positive for rash;bruising;lumps or bumps   Eyes:  Positive for glasses;cataracts  ENT:  Negative    Respiratory:  Positive for shortness of breath;sleep apnea (SOB less)  Cardiovascular:    Positive for;heaviness;lightheadedness;edema (Episode of Chest heaviness with shoveling, requiring two ntg., palpitations  greatly improved.)  Gastroenterology: Negative    Genitourinary:  Positive for urinary frequency;hesitancy  Musculoskeletal:  Positive for joint stiffness;back pain;joint pain  Neurologic:  Positive for headaches;numbness or tingling of hands;numbness or tingling of feet  Psychiatric:  Positive for sleep disturbances  Heme/Lymph/Imm:  Negative    Endocrine:  Positive for cold intolerance;thyroid disorder      PHYSICAL EXAM:      BP: 106/54 Pulse: 72            Vital Signs with Ranges  Pulse:  [72] 72  BP: (106)/(54) 106/54  201 lbs 6.4 oz    Constitutional: awake, alert, no distress  ENT: trachea midline  Respiratory: Clear to auscultation bilaterally with good air entry  Cardiovascular: Normal heart sounds without S3 murmurs or gallops.  JVP is normal.  There is no edema.  GI: nondistended, nontender, bowel sounds present  Neuropsychiatric: appropriate affact    DATA:  Labs: Pertinent cardiac labs reviewed    ASSESSMENT:  This is a 58-year-old male patient who presented in November 2018 with crescendo angina.  Coronary angiography revealed proximal LAD disease that was subsequently stented by Dr. Muro.  His ejection fraction is 40-45% without any  clinical evidence of heart failure.  He is here for routine follow-up.    RECOMMENDATIONS:  1. The patient had a couple of episodes of chest discomfort that resolved with nitro, one during cardiac rehabilitation and the other one while shoveling snow when he was at peak exertion.  Otherwise he is functionally pretty active without any any evidence of chest discomfort with routine activity he says.  It is unlikely that his stent is jeopardized at this time and his symptoms could potentially be explained by other obstructive microvascular disease.    2. We discussed about repeating a stress test versus increasing his antianginal therapy.  Patient prefers to increase his beta-blocker therapy at this time.  We will increase metoprolol succinate to 75 mg daily.  If his symptoms do not improve in the next 1 month when he sees an advanced practice provider, then I recommend a stress test.    3. I have advised him that should his symptoms continue to worsen in the interim that he should call our clinic and he will need a repeat ischemic evaluation much sooner.    4. Otherwise I will plan to see him back in 6 months with a lipid panel and echocardiogram to reassess EF.  His LDL goal should be less than 70.  He has recently been transitioned from pravastatin to atorvastatin.    5. He needs to continue dual antiplatelet therapy till November 2019.  6. Given his BP being borderline, and mid-range LV dysfunction and him needing further increase in his anti-anginal meds, we will hold off on ACEi at this time, but can be considered in the future depending on his clinical course.  7. He seems to be doing fine from a palpitations standpoint. No symptoms after BB therapy. Mild PVC burden on Holter. Monitor.    Tereza Key MD, [facility]  Cardiology - [facility]  December 6, 2018          Thank you for allowing me to participate in the care of your patient.    Sincerely,     Treeza Key MD     [facility]  [facility]

## 2018-12-06 NOTE — LETTER
12/6/2018    Addison Davis MD  [address]  [facility] [address]    RE: Ramiro Jeffers       Dear Colleague,    I had the pleasure of seeing Ramiro Jeffers in the [facility].    CARDIOLOGY CLINIC CONSULTATION    REASON FOR CONSULT: Proximal LAD PCI    PRIMARY CARE PHYSICIAN:  Addison Davis    HISTORY OF PRESENT ILLNESS:  Ramiro Jeffers is a pleasant 58 year old patient who carries a PMH significant for DVT and PE, hypertension, gastric bypass surgery in 2010 and hyperlipidemia. He was first seen on 11/6/18 as a new consult by me for cresendo angina progressing for 1-3 months. He underwent a nuclear stress test that showed unreliable images due to gut uptake and attenuation followed by a CTA that was unreliable due to frequent PVC's. A CT coronary calcium score showed a significant calcification in the LAD with a total score of 563. Due to his reported symptoms and calcium score finding, a coronary angiogram was recommended. On 11/15/18, he underwent coronary angiogram and stenting of the proximal LAD with a 3.5 x 16 mm DORY. Echocardiogram showed a mildly reduced LV function of 40-45% with mild-moderate global hypokinesia. No significant valvular disease. Enrolled in cardiac rehab. Pravastatin changed to atorvastatin during initial visit.     He comes to the office today for follow up.  Overall he says he is feeling much better after his PCI.  However he did have one episode of chest discomfort during cardiac rehabilitation with subsided with rest.  H clear to auscultation bilaterally with good air entry e also had one episode of chest discomfort while shoveling snow when it was very cold and he was at peak of his heavy exertion.  He took a couple of nitros for that.  Denies shortness of breath palpitations presyncope or syncope.  He is compliant with his medications.    PAST MEDICAL HISTORY:  Past Medical History:   Diagnosis Date     ACQUIRED HYPOTHYROIDISM        Blood clot in the legs      CAD (coronary artery disease)     11/15/18 Cath: PCI with 3.5-16mm DORY to proximal LAD     Calculus of gallbladder without mention of cholecystitis or obstruction      Cataract      Chest pressure 11/5/2018     Hyperlipidemia      Hypertension      Ischemic cardiomyopathy     EF 40-45% on 11/2018 Echo     Obesity, unspecified     significant weight loss w/diet alone, on 10-15-10, BMI was 56.4     pulmonary emboli 6-2010    Unprovoked large bilateral pulmonary emboli without source identified on lower extremity dopplers, pt had a transient moderate lupus inhibitor upon initial presentation in June , 2010 which was gone in September, 2010;  all other thrombophilic workup is normal     Pulmonary embolus, bilateral      PVC's (premature ventricular contractions)     4% burden on 11/2018 Holter     Shortness of breath     on exertion     Venous insufficiency      Viral pneumonia, unspecified      Vitamin D deficiency        MEDICATIONS:  Current Outpatient Prescriptions   Medication     acetaminophen (TYLENOL) 325 MG tablet     aspirin 81 MG tablet     atorvastatin (LIPITOR) 40 MG tablet     Cholecalciferol (VITAMIN D) 2000 units tablet     clopidogrel (PLAVIX) 75 MG tablet     hydrochlorothiazide (HYDRODIURIL) 25 MG tablet     levothyroxine (SYNTHROID/LEVOTHROID) 137 MCG tablet     metoprolol succinate (TOPROL-XL) 50 MG 24 hr tablet     Multiple Vitamin (MULTI-VITAMIN) per tablet     nitroGLYcerin (NITROSTAT) 0.4 MG sublingual tablet     senna-docusate (SENOKOT-S;PERICOLACE) 8.6-50 MG per tablet     cyanocobalamin 1000 MCG SUBL     No current facility-administered medications for this visit.        ALLERGIES:  Allergies   Allergen Reactions     No Known Allergies        SOCIAL HISTORY:  I have reviewed this patient's social history and updated it with pertinent information if needed. Ramiro Jeffers  reports that he has never smoked. He has never used smokeless tobacco. He reports that he does  not drink alcohol or use illicit drugs.    FAMILY HISTORY:  I have reviewed this patient's family history and updated it with pertinent information if needed.   Family History   Problem Relation Age of Onset     Osteoporosis Mother      Thyroid Disease Mother      no thyroid     Cancer Maternal Grandmother      ? lung cancer     Cancer Father      melanoma     Coronary Artery Disease Brother      Coronary Artery Disease Brother        REVIEW OF SYSTEMS:  Skin:  Positive for rash;bruising;lumps or bumps   Eyes:  Positive for glasses;cataracts  ENT:  Negative    Respiratory:  Positive for shortness of breath;sleep apnea (SOB less)  Cardiovascular:    Positive for;heaviness;lightheadedness;edema (Episode of Chest heaviness with shoveling, requiring two ntg., palpitations  greatly improved.)  Gastroenterology: Negative    Genitourinary:  Positive for urinary frequency;hesitancy  Musculoskeletal:  Positive for joint stiffness;back pain;joint pain  Neurologic:  Positive for headaches;numbness or tingling of hands;numbness or tingling of feet  Psychiatric:  Positive for sleep disturbances  Heme/Lymph/Imm:  Negative    Endocrine:  Positive for cold intolerance;thyroid disorder      PHYSICAL EXAM:      BP: 106/54 Pulse: 72            Vital Signs with Ranges  Pulse:  [72] 72  BP: (106)/(54) 106/54  201 lbs 6.4 oz    Constitutional: awake, alert, no distress  ENT: trachea midline  Respiratory: Clear to auscultation bilaterally with good air entry  Cardiovascular: Normal heart sounds without S3 murmurs or gallops.  JVP is normal.  There is no edema.  GI: nondistended, nontender, bowel sounds present  Neuropsychiatric: appropriate affact    DATA:  Labs: Pertinent cardiac labs reviewed    ASSESSMENT:  This is a 58-year-old male patient who presented in November 2018 with crescendo angina.  Coronary angiography revealed proximal LAD disease that was subsequently stented by Dr. Muro.  His ejection fraction is 40-45% without any  clinical evidence of heart failure.  He is here for routine follow-up.    RECOMMENDATIONS:  1. The patient had a couple of episodes of chest discomfort that resolved with nitro, one during cardiac rehabilitation and the other one while shoveling snow when he was at peak exertion.  Otherwise he is functionally pretty active without any any evidence of chest discomfort with routine activity he says.  It is unlikely that his stent is jeopardized at this time and his symptoms could potentially be explained by other obstructive microvascular disease.    2. We discussed about repeating a stress test versus increasing his antianginal therapy.  Patient prefers to increase his beta-blocker therapy at this time.  We will increase metoprolol succinate to 75 mg daily.  If his symptoms do not improve in the next 1 month when he sees an advanced practice provider, then I recommend a stress test.    3. I have advised him that should his symptoms continue to worsen in the interim that he should call our clinic and he will need a repeat ischemic evaluation much sooner.    4. Otherwise I will plan to see him back in 6 months with a lipid panel and echocardiogram to reassess EF.  His LDL goal should be less than 70.  He has recently been transitioned from pravastatin to atorvastatin.    5. He needs to continue dual antiplatelet therapy till November 2019.  6. Given his BP being borderline, and mid-range LV dysfunction and him needing further increase in his anti-anginal meds, we will hold off on ACEi at this time, but can be considered in the future depending on his clinical course.  7. He seems to be doing fine from a palpitations standpoint. No symptoms after BB therapy. Mild PVC burden on Holter. Monitor.    Tereza Key MD, [facility]  Cardiology - [facility]  December 6, 2018          Thank you for allowing me to participate in the care of your patient.      Sincerely,     Tereza Key MD     [facility]  Care    cc:   Tereza Key MD  [address]  [place] [address]

## 2018-12-06 NOTE — PROGRESS NOTES
CARDIOLOGY CLINIC CONSULTATION    REASON FOR CONSULT: Proximal LAD PCI    PRIMARY CARE PHYSICIAN:  Addison Davis    HISTORY OF PRESENT ILLNESS:  Ramiro Jeffers is a pleasant 58 year old patient who carries a PMH significant for DVT and PE, hypertension, gastric bypass surgery in 2010 and hyperlipidemia. He was first seen on 11/6/18 as a new consult by me for cresendo angina progressing for 1-3 months. He underwent a nuclear stress test that showed unreliable images due to gut uptake and attenuation followed by a CTA that was unreliable due to frequent PVC's. A CT coronary calcium score showed a significant calcification in the LAD with a total score of 563. Due to his reported symptoms and calcium score finding, a coronary angiogram was recommended. On 11/15/18, he underwent coronary angiogram and stenting of the proximal LAD with a 3.5 x 16 mm DORY. Echocardiogram showed a mildly reduced LV function of 40-45% with mild-moderate global hypokinesia. No significant valvular disease. Enrolled in cardiac rehab. Pravastatin changed to atorvastatin during initial visit.     He comes to the office today for follow up.  Overall he says he is feeling much better after his PCI.  However he did have one episode of chest discomfort during cardiac rehabilitation with subsided with rest.  H clear to auscultation bilaterally with good air entry e also had one episode of chest discomfort while shoveling snow when it was very cold and he was at peak of his heavy exertion.  He took a couple of nitros for that.  Denies shortness of breath palpitations presyncope or syncope.  He is compliant with his medications.    PAST MEDICAL HISTORY:  Past Medical History:   Diagnosis Date     ACQUIRED HYPOTHYROIDISM       Blood clot in the legs      CAD (coronary artery disease)     11/15/18 Cath: PCI with 3.5-16mm DORY to proximal LAD     Calculus of gallbladder without mention of cholecystitis or obstruction      Cataract      Chest  pressure 11/5/2018     Hyperlipidemia      Hypertension      Ischemic cardiomyopathy     EF 40-45% on 11/2018 Echo     Obesity, unspecified     significant weight loss w/diet alone, on 10-15-10, BMI was 56.4     pulmonary emboli 6-2010    Unprovoked large bilateral pulmonary emboli without source identified on lower extremity dopplers, pt had a transient moderate lupus inhibitor upon initial presentation in June , 2010 which was gone in September, 2010;  all other thrombophilic workup is normal     Pulmonary embolus, bilateral      PVC's (premature ventricular contractions)     4% burden on 11/2018 Holter     Shortness of breath     on exertion     Venous insufficiency      Viral pneumonia, unspecified      Vitamin D deficiency        MEDICATIONS:  Current Outpatient Prescriptions   Medication     acetaminophen (TYLENOL) 325 MG tablet     aspirin 81 MG tablet     atorvastatin (LIPITOR) 40 MG tablet     Cholecalciferol (VITAMIN D) 2000 units tablet     clopidogrel (PLAVIX) 75 MG tablet     hydrochlorothiazide (HYDRODIURIL) 25 MG tablet     levothyroxine (SYNTHROID/LEVOTHROID) 137 MCG tablet     metoprolol succinate (TOPROL-XL) 50 MG 24 hr tablet     Multiple Vitamin (MULTI-VITAMIN) per tablet     nitroGLYcerin (NITROSTAT) 0.4 MG sublingual tablet     senna-docusate (SENOKOT-S;PERICOLACE) 8.6-50 MG per tablet     cyanocobalamin 1000 MCG SUBL     No current facility-administered medications for this visit.        ALLERGIES:  Allergies   Allergen Reactions     No Known Allergies        SOCIAL HISTORY:  I have reviewed this patient's social history and updated it with pertinent information if needed. Ramiro WILKERSON Zeyad  reports that he has never smoked. He has never used smokeless tobacco. He reports that he does not drink alcohol or use illicit drugs.    FAMILY HISTORY:  I have reviewed this patient's family history and updated it with pertinent information if needed.   Family History   Problem Relation Age of Onset      Osteoporosis Mother      Thyroid Disease Mother      no thyroid     Cancer Maternal Grandmother      ? lung cancer     Cancer Father      melanoma     Coronary Artery Disease Brother      Coronary Artery Disease Brother        REVIEW OF SYSTEMS:  Skin:  Positive for rash;bruising;lumps or bumps   Eyes:  Positive for glasses;cataracts  ENT:  Negative    Respiratory:  Positive for shortness of breath;sleep apnea (SOB less)  Cardiovascular:    Positive for;heaviness;lightheadedness;edema (Episode of Chest heaviness with shoveling, requiring two ntg., palpitations  greatly improved.)  Gastroenterology: Negative    Genitourinary:  Positive for urinary frequency;hesitancy  Musculoskeletal:  Positive for joint stiffness;back pain;joint pain  Neurologic:  Positive for headaches;numbness or tingling of hands;numbness or tingling of feet  Psychiatric:  Positive for sleep disturbances  Heme/Lymph/Imm:  Negative    Endocrine:  Positive for cold intolerance;thyroid disorder      PHYSICAL EXAM:      BP: 106/54 Pulse: 72            Vital Signs with Ranges  Pulse:  [72] 72  BP: (106)/(54) 106/54  201 lbs 6.4 oz    Constitutional: awake, alert, no distress  ENT: trachea midline  Respiratory: Clear to auscultation bilaterally with good air entry  Cardiovascular: Normal heart sounds without S3 murmurs or gallops.  JVP is normal.  There is no edema.  GI: nondistended, nontender, bowel sounds present  Neuropsychiatric: appropriate affact    DATA:  Labs: Pertinent cardiac labs reviewed    ASSESSMENT:  This is a 58-year-old male patient who presented in November 2018 with crescendo angina.  Coronary angiography revealed proximal LAD disease that was subsequently stented by Dr. Muro.  His ejection fraction is 40-45% without any clinical evidence of heart failure.  He is here for routine follow-up.    RECOMMENDATIONS:  1. The patient had a couple of episodes of chest discomfort that resolved with nitro, one during cardiac rehabilitation  and the other one while shoveling snow when he was at peak exertion.  Otherwise he is functionally pretty active without any any evidence of chest discomfort with routine activity he says.  It is unlikely that his stent is jeopardized at this time and his symptoms could potentially be explained by other obstructive microvascular disease.    2. We discussed about repeating a stress test versus increasing his antianginal therapy.  Patient prefers to increase his beta-blocker therapy at this time.  We will increase metoprolol succinate to 75 mg daily.  If his symptoms do not improve in the next 1 month when he sees an advanced practice provider, then I recommend a stress test.    3. I have advised him that should his symptoms continue to worsen in the interim that he should call our clinic and he will need a repeat ischemic evaluation much sooner.    4. Otherwise I will plan to see him back in 6 months with a lipid panel and echocardiogram to reassess EF.  His LDL goal should be less than 70.  He has recently been transitioned from pravastatin to atorvastatin.    5. He needs to continue dual antiplatelet therapy till November 2019.  6. Given his BP being borderline, and mid-range LV dysfunction and him needing further increase in his anti-anginal meds, we will hold off on ACEi at this time, but can be considered in the future depending on his clinical course.  7. He seems to be doing fine from a palpitations standpoint. No symptoms after BB therapy. Mild PVC burden on Holter. Monitor.    Tereza Key MD, [facility]  Cardiology - [facility]  December 6, 2018

## 2018-12-07 ENCOUNTER — OFFICE VISIT (OUTPATIENT)
Dept: OTHER | Facility: CLINIC | Age: 58
End: 2018-12-07
Attending: INTERNAL MEDICINE
Payer: COMMERCIAL

## 2018-12-07 VITALS
BODY MASS INDEX: 30.41 KG/M2 | HEART RATE: 59 BPM | WEIGHT: 200 LBS | OXYGEN SATURATION: 98 % | DIASTOLIC BLOOD PRESSURE: 66 MMHG | SYSTOLIC BLOOD PRESSURE: 104 MMHG

## 2018-12-07 DIAGNOSIS — R10.31 ABDOMINAL PAIN, RLQ: Primary | ICD-10-CM

## 2018-12-07 DIAGNOSIS — I25.5 ISCHEMIC CARDIOMYOPATHY: ICD-10-CM

## 2018-12-07 DIAGNOSIS — I25.110 CORONARY ARTERY DISEASE INVOLVING NATIVE CORONARY ARTERY OF NATIVE HEART WITH UNSTABLE ANGINA PECTORIS (H): ICD-10-CM

## 2018-12-07 PROCEDURE — 99214 OFFICE O/P EST MOD 30 MIN: CPT | Mod: ZP | Performed by: INTERNAL MEDICINE

## 2018-12-07 PROCEDURE — G0463 HOSPITAL OUTPT CLINIC VISIT: HCPCS

## 2018-12-07 RX ORDER — METOPROLOL SUCCINATE 50 MG/1
75 TABLET, EXTENDED RELEASE ORAL DAILY
Qty: 135 TABLET | Refills: 3 | Status: SHIPPED | OUTPATIENT
Start: 2018-12-07 | End: 2019-11-29

## 2018-12-07 NOTE — PROGRESS NOTES
Ramiro Jeffers is a 58 year old male who is presenting at the current time to discuss his diagnosi(es) of :       Abdominal pain, RLQ  Coronary artery disease involving native coronary artery of native heart with unstable angina pectoris (H)  Ischemic cardiomyopathy .    Review Of Systems  Skin: negative  Eyes: negative  Ears/Nose/Throat: negative  Respiratory: No shortness of breath, dyspnea on exertion, cough, or hemoptysis  Cardiovascular: negative  Gastrointestinal: positive for intermittent RLQ pain not effected by eating, urinating , defecating, physical activity, lasts 20 minutes when occurs then subsides. nO testicular radiation or flank pain, no fevers, chills, n,v,d  Genitourinary: negative  Musculoskeletal: negative  Neurologic: negative  Psychiatric: negative  Hematologic/Lymphatic/Immunologic: negative  Endocrine: negative    PAST MEDICAL HISTORY:                  Past Medical History:   Diagnosis Date     ACQUIRED HYPOTHYROIDISM       Blood clot in the legs      CAD (coronary artery disease)     11/15/18 Cath: PCI with 3.5-16mm DORY to proximal LAD     Calculus of gallbladder without mention of cholecystitis or obstruction      Cataract      Chest pressure 11/5/2018     Hyperlipidemia      Hypertension      Ischemic cardiomyopathy     EF 40-45% on 11/2018 Echo     Obesity, unspecified     significant weight loss w/diet alone, on 10-15-10, BMI was 56.4     pulmonary emboli 6-2010    Unprovoked large bilateral pulmonary emboli without source identified on lower extremity dopplers, pt had a transient moderate lupus inhibitor upon initial presentation in June , 2010 which was gone in September, 2010;  all other thrombophilic workup is normal     Pulmonary embolus, bilateral      PVC's (premature ventricular contractions)     4% burden on 11/2018 Holter     Shortness of breath     on exertion     Venous insufficiency      Viral pneumonia, unspecified      Vitamin D deficiency        PAST SURGICAL HISTORY:                   Past Surgical History:   Procedure Laterality Date     C NONSPECIFIC PROCEDURE  10-    Laparoscopic gastric bypass,     C NONSPECIFIC PROCEDURE  10-    1.  Attempted laparoscopic exploration with conversion to: .  Abdominal exploration. 3.  Reduction of incarcerated incisional hernia. 4.  Repair of incisional hernia.5.  Gastrostomy tube placement (#22 French) into defunctionalized stomach.6.  Enterotomy with bowel decompression and primary repair. 7.  Abdominal lavage.      COLONOSCOPY  11/17/2011    Procedure:COLONOSCOPY; colonoscopy; Surgeon:ELENA OROURKE; Location:[facility]     LAPAROSCOPIC CHOLECYSTECTOMY  9/28/2012    Procedure: LAPAROSCOPIC CHOLECYSTECTOMY;  LAPAROSCOPIC CHOLECYSTECTOMY, LYSIS OF ADHESIONS;  Surgeon: Dutch Matta MD;  Location:  OR     LAPAROSCOPIC LYSIS ADHESIONS  9/28/2012    Procedure: LAPAROSCOPIC LYSIS ADHESIONS;;  Surgeon: Dutch Matta MD;  Location:  OR     LAPAROSCOPY, SURGICAL; REPAIR INCISIONAL OR VENTRAL HERNIA      repair of ventral strangulated surgery     PHACOEMULSIFICATION CLEAR CORNEA WITH STANDARD INTRAOCULAR LENS IMPLANT  12/19/2011    Procedure:PHACOEMULSIFICATION CLEAR CORNEA WITH STANDARD INTRAOCULAR LENS IMPLANT; RIGHT PHACOEMULSIFICATION CLEAR CORNEA WITH STANDARD INTRAOCULAR LENS IMPLANT ; Surgeon:ALLISON ANTONIO; Location:[facility]       CURRENT MEDICATIONS:                  Current Outpatient Prescriptions   Medication Sig Dispense Refill     acetaminophen (TYLENOL) 325 MG tablet Take 2 tablets (650 mg) by mouth every 4 hours as needed for mild pain 100 tablet 0     aspirin 81 MG tablet Take 1 tablet (81 mg) by mouth daily Start tomorrow morning.       atorvastatin (LIPITOR) 40 MG tablet Take 1 tablet (40 mg) by mouth daily 90 tablet 3     Cholecalciferol (VITAMIN D) 2000 units tablet Take 1 tablet by mouth daily 100 tablet 12     clopidogrel (PLAVIX) 75 MG tablet Take 1 tablet (75 mg) by mouth daily 30 tablet 11     cyanocobalamin  1000 MCG SUBL Place 1,000 mcg under the tongue daily       hydrochlorothiazide (HYDRODIURIL) 25 MG tablet Take 1 tablet (25 mg) by mouth every morning 90 tablet 3     levothyroxine (SYNTHROID/LEVOTHROID) 137 MCG tablet Take 1 tablet (137 mcg) by mouth daily 90 tablet 3     metoprolol succinate ER (TOPROL-XL) 50 MG 24 hr tablet Take 1.5 tablets (75 mg) by mouth daily 135 tablet 3     Multiple Vitamin (MULTI-VITAMIN) per tablet Take 1 tablet by mouth 2 times daily        nitroGLYcerin (NITROSTAT) 0.4 MG sublingual tablet For chest pain place 1 tablet under the tongue every 5 minutes for 3 doses. If symptoms persist 5 minutes after 1st dose call 911. 25 tablet 1     senna-docusate (SENOKOT-S;PERICOLACE) 8.6-50 MG per tablet Take 1-2 tablets by mouth 2 times daily as needed (constipation ) 100 tablet 0     [DISCONTINUED] metoprolol succinate (TOPROL-XL) 50 MG 24 hr tablet Take 1 tablet (50 mg) by mouth daily 30 tablet 6       ALLERGIES:                  Allergies   Allergen Reactions     No Known Allergies        SOCIAL HISTORY:                  Social History     Social History     Marital status: Single     Spouse name: N/A     Number of children: N/A     Years of education: N/A     Occupational History      Self     Social History Main Topics     Smoking status: Never Smoker     Smokeless tobacco: Never Used     Alcohol use No     Drug use: No     Sexual activity: Not on file     Other Topics Concern     Not on file     Social History Narrative       FAMILY HISTORY:                   Family History   Problem Relation Age of Onset     Osteoporosis Mother      Thyroid Disease Mother      no thyroid     Cancer Maternal Grandmother      ? lung cancer     Cancer Father      melanoma     Coronary Artery Disease Brother      Coronary Artery Disease Brother          Physical exam Reveals:    O/P: WNL  HEENT: WNL  NECK: No JVD, thyromegaly, or lymphadenopathy  HEART: RRR, no murmurs, gallops, or rubs  LUNGS:  CTA bilaterally without rales, wheezes, or rhonchi  GI: NABS, nondistended, nontender, soft  EXT:without cyanosis, clubbing, or edema  NEURO: nonfocal  : no flank tenderness    Component      Latest Ref Rng & Units 10/19/2018   WBC      4.0 - 11.0 10e9/L 4.8   RBC Count      4.4 - 5.9 10e12/L 4.69   Hemoglobin      13.3 - 17.7 g/dL 13.6   Hematocrit      40.0 - 53.0 % 42.6   MCV      78 - 100 fl 91   MCH      26.5 - 33.0 pg 29.0   MCHC      31.5 - 36.5 g/dL 31.9   RDW      10.0 - 15.0 % 14.0   Platelet Count      150 - 450 10e9/L 146 (L)   % Neutrophils      % 54.2   % Lymphocytes      % 32.8   % Monocytes      % 8.8   % Eosinophils      % 3.8   % Basophils      % 0.4   Absolute Neutrophil      1.6 - 8.3 10e9/L 2.6   Absolute Lymphocytes      0.8 - 5.3 10e9/L 1.6   Absolute Monocytes      0.0 - 1.3 10e9/L 0.4   Absolute Eosinophils      0.0 - 0.7 10e9/L 0.2   Absolute Basophils      0.0 - 0.2 10e9/L 0.0   Diff Method       Automated Method   Color Urine       Yellow   Appearance Urine       Clear   Glucose Urine      NEG:Negative mg/dL Negative   Bilirubin Urine      NEG:Negative Negative   Ketones Urine      NEG:Negative mg/dL Negative   Specific Gravity Urine      1.003 - 1.035 1.010   Blood Urine      NEG:Negative Negative   pH Urine      5.0 - 7.0 pH 6.0   Protein Albumin Urine      NEG:Negative mg/dL Negative   Urobilinogen Urine      0.2 - 1.0 EU/dL 0.2   Nitrite Urine      NEG:Negative Negative   Leukocyte Esterase Urine      NEG:Negative Negative   Source       Midstream Urine   Sodium      133 - 144 mmol/L 140   Potassium      3.4 - 5.3 mmol/L 3.7   Chloride      94 - 109 mmol/L 102   Carbon Dioxide      20 - 32 mmol/L 31   Anion Gap      3 - 14 mmol/L 7   Glucose      70 - 99 mg/dL 86   Urea Nitrogen      7 - 30 mg/dL 21   Creatinine      0.66 - 1.25 mg/dL 0.83   GFR Estimate      >60 mL/min/1.7m2 >90   GFR Estimate If Black      >60 mL/min/1.7m2 >90   Calcium      8.5 - 10.1 mg/dL 9.1   Bilirubin  Direct      0.0 - 0.2 mg/dL 0.1   Bilirubin Total      0.2 - 1.3 mg/dL 0.4   Albumin      3.4 - 5.0 g/dL 3.5   Protein Total      6.8 - 8.8 g/dL 7.3   Alkaline Phosphatase      40 - 150 U/L 76   ALT      0 - 70 U/L 21   AST      0 - 45 U/L 21   Cholesterol      <200 mg/dL 163   Triglycerides      <150 mg/dL 66   HDL Cholesterol      >39 mg/dL 52   LDL Cholesterol Calculated      <100 mg/dL 98   Non HDL Cholesterol      <130 mg/dL 111   Free T3      2.3 - 4.2 pg/mL 1.9 (L)   T4 Free      0.76 - 1.46 ng/dL 1.01   TSH      0.40 - 4.00 mU/L 4.20 (H)   Hemoglobin A1C      0 - 5.6 % 5.4   PSA      0 - 4 ug/L 0.02       CT ABDOMEN AND PELVIS WITH CONTRAST 10/26/2018 11:51 AM      HISTORY:  Right lower quadrant abdominal pain.     COMPARISON: None.     TECHNIQUE: Volumetric helical acquisition of CT images from the lung  bases through the symphysis pubis after the administration of 98 mL  Isovue-370 intravenous contrast. Radiation dose for this scan was  reduced using automated exposure control, adjustment of the mA and/or  kV according to patient size, or iterative reconstruction technique.     FINDINGS: The liver, bilateral kidneys and adrenal glands, pancreas,  and spleen demonstrate no worrisome focal lesion. Visualized appendix  unremarkable. Gastric bypass changes. There are mild atherosclerotic  changes of the visualized aorta and its branches. There is no evidence  of aortic dissection or aneurysm. No hydronephrosis. Unremarkable  gallbladder. There is no free fluid in the abdomen or pelvis. No free  air in the abdomen. Bone windows reveal no destructive lesions. L5  spondylolysis with spondylolisthesis. There are no abdominal or pelvic  lymph nodes that are abnormal by size criteria. The visualized lung  bases are unremarkable. There are no dilated loops of small bowel or  colon.         IMPRESSION: No acute process demonstrated within the abdomen and  pelvis.     JOSE M BRENNER MD    A/P:    (R10.31) Abdominal  pain, RLQ, intermittent, with normal CT scan abdomen and WBC   (primary encounter diagnosis)  Comment: Normal findings as above. Sxs persist intermittently, will check a SBFT to image peristaltic progress. Act upon results accordingly. Check labs as below as well.  Plan: CBC with platelets differential, Erythrocyte         sedimentation rate auto, Basic metabolic panel,        Lipase, CRP inflammation, XR Upper GI w Small         Bowel Follow Through            (I25.110) Coronary artery disease involving native coronary artery of native heart with unstable angina pectoris (H)  Comment: On 11/15/18, he underwent coronary angiogram and stenting of the proximal LAD with a 3.5 x 16 mm DORY. Echocardiogram showed a mildly reduced LV function of 40-45% with mild-moderate global hypokinesia. No significant valvular disease. Some anginal episodes since then, but none since seeing cards yesterday.   Plan: metoprolol succinate ER (TOPROL-XL) 50 MG 24 hr        tablet        Go to ER for recurrent CP not relieved with NTG.    (I25.5) Ischemic cardiomyopathy  Comment: increase beta blockade, RTC one week for BP check, consider adding ace inhibition or ARB at that time  Plan: metoprolol succinate ER (TOPROL-XL) 50 MG 24 hr        tablet

## 2018-12-07 NOTE — NURSING NOTE
"Ramiro Jeffers is a 58 year old male who presents for:  Chief Complaint   Patient presents with     RECHECK     f/u medication and ECHO        Vitals:    Vitals:    12/07/18 0844   BP: 104/66   BP Location: Right arm   Patient Position: Chair   Cuff Size: Adult Regular   Pulse: 59   SpO2: 98%   Weight: 200 lb (90.7 kg)       BMI:  Estimated body mass index is 30.41 kg/(m^2) as calculated from the following:    Height as of 12/6/18: 5' 8\" (1.727 m).    Weight as of this encounter: 200 lb (90.7 kg).    Pain Score:  Data Unavailable        Eri Bonilla MA      "

## 2018-12-07 NOTE — MR AVS SNAPSHOT
After Visit Summary   12/7/2018    Ramiro Jeffers    MRN: [id number]           Patient Information     Date Of Birth          1960        Visit Information        Provider Department      12/7/2018 8:40 AM Addison Davis MD [facility]      Today's Diagnoses     Abdominal pain, RLQ, intermittent, with normal CT scan abdomen and WBC     -  1    Coronary artery disease involving native coronary artery of native heart with unstable angina pectoris (H)        Ischemic cardiomyopathy           Follow-ups after your visit        Your next 10 appointments already scheduled     Dec 10, 2018  6:30 AM CST   Cardiac Treatment with  Cardiac Rehab 1   [facility] ([facility])    [address], [address]  [facility] [address] [phone]            Dec 10, 2018  9:30 AM CST   XR UPPER GI W SMALL BOWEL FOLLOW THROUGH with XR5,  BODY RAD   [facility] ([facility])    [address] [facility] [address] [phone]           How do I prepare for my exam? (Food and drink instructions) Do not eat, chew gum, or smoke for 8 hours before your exam. Keep drinking clear liquids until 2 hours before the exam. Clear liquids include water, clear juice, black coffee or clear tea without milk, Gatorade, or clear soda.  How do I prepare for my exam? (Other instructions) If you had a barium enema within two days of the exam, you will need to take 3 bisacodyl (Dulcolax) tablets the day before your exam. Take your usual medicines unless your doctor tells you not to. Take them with small sips of water.  What should I wear: Wear comfortable clothes.  How long does the exam take: The exam usually takes about 30 minutes.  What should I bring: Bring a list of your current medicines to your exam. (Include vitamins, minerals and over-the-counter medicines.)  Leave your valuables at home.  Do I need a :  No  is needed.  What do I need to tell my doctor:  If you think you might be pregnant, tell your doctor.  What should I do after the exam: Drink lots of fluids in the next one to two days. This will help you pass the rest of the barium. Your stool may be white. Take walks if possible. You may take a mild laxative (medicine to loosten your stools), but check with your doctor first. If you don t have a bowel movement within two days, call your doctor.  What is this test: This X-ray exam look at a part of the body that digest food. This exam uses barium (a white liquid) to help the X-rays show up more clearly. Your doctor will tell you which tests you will have. An upper GI looks at your esophagus and stomach. (The esophagus is the tube inside the throat. When you swallow, food travels down this tube and into the stomach.) A small bowel study looks at your small intestine.  Who should I call with questions: If you have any questions, please call the Imaging Department where you will have your exam. Directions, parking instructions, and other information is available on our website, [email or web address].            Dec 11, 2018  6:30 AM CST   Cardiac Treatment with  Cardiac Rehab 1   [facility] ([facility])    [address], [address]  [facility] [address]   [phone]            Dec 13, 2018  6:30 AM CST   Cardiac Treatment with  Cardiac Rehab 1   [facility] ([facility])    [address]  [facility] [address]   [phone]            Dec 17, 2018  6:30 AM CST   Cardiac Treatment with  Cardiac Rehab 1   [facility] ([facility])    [address]  [facility] [address]   [phone]            Dec 18, 2018  6:30 AM CST   Cardiac Treatment with  Cardiac Rehab 1   [facility] ([facility])    [address]  [place] [address]   [phone]            Dec 19, 2018  9:10 AM CST   Return Visit with Addison Davis MD   [facility] ([facility])    [address]  [place] [address]   [phone]            Jan 28, 2019  7:30 AM CST   Return Visit with CHARO Lopez CNP   [facility] ([facility])    [address]  [place] [address]   [phone] OPT 2            Feb 04, 2019  7:30 AM CST   LAB with [facility]   [facility] ([facility])    [address] [id number] [phone]           Please do not eat 10-12 hours before your appointment if you are coming in fasting for labs on lipids, cholesterol, or glucose (sugar). This does not apply to pregnant women. Water, hot tea and black coffee (with nothing added) are okay. Do not drink other fluids, diet soda or chew gum.              Future tests that were ordered for you today     Open Future Orders        Priority Expected Expires Ordered    CBC with platelets differential Routine 12/7/2018 12/7/2018 12/7/2018    Erythrocyte sedimentation rate auto Routine 12/7/2018 12/7/2018 12/7/2018    Basic metabolic panel Routine 12/7/2018 12/7/2018 12/7/2018    Lipase Routine 12/7/2018 12/7/2018 12/7/2018    CRP inflammation Routine 12/7/2018 12/7/2018 12/7/2018    XR Upper GI w Small Bowel Follow Through Routine 12/7/2018 12/31/2018 12/7/2018    Echocardiogram Routine 6/4/2019 12/6/2019 12/6/2018    Follow-Up with Cardiac Advanced Practice Provider Routine 1/5/2019 12/6/2019 12/6/2018    Lipid Profile Routine 6/4/2019 12/6/2019 12/6/2018    ALT Routine 6/4/2019 12/6/2019 12/6/2018    Follow-Up with Cardiologist Routine 6/4/2019 12/6/2019 12/6/2018            Who to contact     If you have  questions or need follow up information about today's clinic visit or your schedule please contact [facility] directly at [phone].  Normal or non-critical lab and imaging results will be communicated to you by [employer], letter or phone within 4 business days after the clinic has received the results. If you do not hear from us within 7 days, please contact the clinic through [employer] or phone. If you have a critical or abnormal lab result, we will notify you by phone as soon as possible.  Submit refill requests through [employer] or call your pharmacy and they will forward the refill request to us. Please allow 3 business days for your refill to be completed.          Additional Information About Your Visit        [employer] Information     [employer] gives you secure access to your electronic health record. If you see a primary care provider, you can also send messages to your care team and make appointments. If you have questions, please call your primary care clinic.  If you do not have a primary care provider, please call [phone] and they will assist you.        Care EveryWhere ID     This is your Care EveryWhere ID. This could be used by other organizations to access your [facility] medical records  [id number]        Your Vitals Were     Pulse Pulse Oximetry BMI (Body Mass Index)             59 98% 30.41 kg/m2          Blood Pressure from Last 3 Encounters:   12/07/18 104/66   12/06/18 106/54   11/21/18 109/70    Weight from Last 3 Encounters:   12/07/18 200 lb (90.7 kg)   12/06/18 201 lb 6.4 oz (91.4 kg)   11/21/18 207 lb 12.8 oz (94.3 kg)                 Today's Medication Changes          These changes are accurate as of 12/7/18 10:05 AM.  If you have any questions, ask your nurse or doctor.               These medicines have changed or have updated prescriptions.        Dose/Directions    metoprolol succinate ER 50 MG 24 hr tablet   Commonly known as:  TOPROL-XL   This may have  changed:  how much to take   Used for:  Coronary artery disease involving native coronary artery of native heart with unstable angina pectoris (H), Ischemic cardiomyopathy   Changed by:  Addison Davis MD        Dose:  75 mg   Take 1.5 tablets (75 mg) by mouth daily   Quantity:  135 tablet   Refills:  3            Where to get your medicines      These medications were sent to [facility] [address] [place] [address] - [address] AT [address] [place] [address] & [facility]  [address] [place] [address]    Hours:  24-hours Phone:  [phone]     metoprolol succinate ER 50 MG 24 hr tablet                Primary Care Provider Office Phone # Fax #    Addison Davis [phone] [address]  [place] [address]        Equal Access to Services     [facility]: Hadii aad ku hadasho Soomaali, waaxda luqadaha, qaybta kaalmada adeegyada, israel quintana hayleanne mcmahan . So [facility] [phone].    ATENCIÓN: Si habla español, tiene a reynoso disposición servicios gratuitos de asistencia lingüística. [facility] [phone].    We comply with applicable federal civil rights laws and [place] laws. We do not discriminate on the basis of race, color, national origin, age, disability, sex, sexual orientation, or gender identity.            Thank you!     Thank you for choosing [facility] [place]  for your care. Our goal is always to provide you with excellent care. Hearing back from our patients is one way we can continue to improve our services. Please take a few minutes to complete the written survey that you may receive in the mail after your visit with us. Thank you!             Your Updated Medication List - Protect others around you: Learn how to safely use, store and throw away your medicines at www.disposemymeds.org.          This list is accurate as of 12/7/18 10:05 AM.  Always use your most recent med list.                   Brand Name Dispense  Instructions for use Diagnosis    acetaminophen 325 MG tablet    TYLENOL    100 tablet    Take 2 tablets (650 mg) by mouth every 4 hours as needed for mild pain    Chest wall pain       aspirin 81 MG tablet    ASA     Take 1 tablet (81 mg) by mouth daily Start tomorrow morning.    Coronary artery disease involving native coronary artery of native heart with unstable angina pectoris (H)       atorvastatin 40 MG tablet    LIPITOR    90 tablet    Take 1 tablet (40 mg) by mouth daily    Hyperlipidemia LDL goal <70       clopidogrel 75 MG tablet    PLAVIX    30 tablet    Take 1 tablet (75 mg) by mouth daily    Coronary artery disease involving native coronary artery of native heart with unstable angina pectoris (H)       cyanocobalamin 1000 MCG Subl sublingual tablet      Place 1,000 mcg under the tongue daily        hydrochlorothiazide 25 MG tablet    HYDRODIURIL    90 tablet    Take 1 tablet (25 mg) by mouth every morning    Essential hypertension with goal blood pressure less than 130/80       levothyroxine 137 MCG tablet    SYNTHROID/LEVOTHROID    90 tablet    Take 1 tablet (137 mcg) by mouth daily    Hypothyroidism, unspecified type       metoprolol succinate ER 50 MG 24 hr tablet    TOPROL-XL    135 tablet    Take 1.5 tablets (75 mg) by mouth daily    Coronary artery disease involving native coronary artery of native heart with unstable angina pectoris (H), Ischemic cardiomyopathy       Multi-vitamin tablet   Generic drug:  multivitamin w/minerals      Take 1 tablet by mouth 2 times daily        nitroGLYcerin 0.4 MG sublingual tablet    NITROSTAT    25 tablet    For chest pain place 1 tablet under the tongue every 5 minutes for 3 doses. If symptoms persist 5 minutes after 1st dose call 911.    Atypical chest pain       senna-docusate 8.6-50 MG tablet    SENOKOT-S/PERICOLACE    100 tablet    Take 1-2 tablets by mouth 2 times daily as needed (constipation )    Drug-induced constipation       vitamin D3 2000 units  tablet    CHOLECALCIFEROL    100 tablet    Take 1 tablet by mouth daily    Vitamin D deficiency

## 2018-12-09 ENCOUNTER — NURSE TRIAGE (OUTPATIENT)
Dept: NURSING | Facility: CLINIC | Age: 58
End: 2018-12-09

## 2018-12-10 ENCOUNTER — HOSPITAL ENCOUNTER (OUTPATIENT)
Dept: GENERAL RADIOLOGY | Facility: CLINIC | Age: 58
Discharge: HOME | End: 2018-12-10
Attending: INTERNAL MEDICINE | Admitting: INTERNAL MEDICINE
Payer: COMMERCIAL

## 2018-12-10 ENCOUNTER — HOSPITAL ENCOUNTER (OUTPATIENT)
Dept: CARDIAC REHAB | Facility: CLINIC | Age: 58
End: 2018-12-10
Attending: INTERNAL MEDICINE
Payer: COMMERCIAL

## 2018-12-10 ENCOUNTER — HOSPITAL ENCOUNTER (OUTPATIENT)
Dept: LAB | Facility: CLINIC | Age: 58
End: 2018-12-10
Attending: INTERNAL MEDICINE
Payer: COMMERCIAL

## 2018-12-10 DIAGNOSIS — R10.31 ABDOMINAL PAIN, RLQ: ICD-10-CM

## 2018-12-10 LAB
ANION GAP SERPL CALCULATED.3IONS-SCNC: 4 MMOL/L (ref 3–14)
BASOPHILS # BLD AUTO: 0 10E9/L (ref 0–0.2)
BASOPHILS NFR BLD AUTO: 0.7 %
BUN SERPL-MCNC: 25 MG/DL (ref 7–30)
CALCIUM SERPL-MCNC: 9.1 MG/DL (ref 8.5–10.1)
CHLORIDE SERPL-SCNC: 105 MMOL/L (ref 94–109)
CO2 SERPL-SCNC: 31 MMOL/L (ref 20–32)
CREAT SERPL-MCNC: 0.89 MG/DL (ref 0.66–1.25)
CRP SERPL-MCNC: <2.9 MG/L (ref 0–8)
DIFFERENTIAL METHOD BLD: NORMAL
EOSINOPHIL # BLD AUTO: 0.2 10E9/L (ref 0–0.7)
EOSINOPHIL NFR BLD AUTO: 3 %
ERYTHROCYTE [DISTWIDTH] IN BLOOD BY AUTOMATED COUNT: 13.9 % (ref 10–15)
ERYTHROCYTE [SEDIMENTATION RATE] IN BLOOD BY WESTERGREN METHOD: 13 MM/H (ref 0–20)
GFR SERPL CREATININE-BSD FRML MDRD: 87 ML/MIN/1.7M2
GLUCOSE SERPL-MCNC: 97 MG/DL (ref 70–99)
HCT VFR BLD AUTO: 41.6 % (ref 40–53)
HGB BLD-MCNC: 13.7 G/DL (ref 13.3–17.7)
IMM GRANULOCYTES # BLD: 0 10E9/L (ref 0–0.4)
IMM GRANULOCYTES NFR BLD: 0 %
LIPASE SERPL-CCNC: 115 U/L (ref 73–393)
LYMPHOCYTES # BLD AUTO: 1.4 10E9/L (ref 0.8–5.3)
LYMPHOCYTES NFR BLD AUTO: 25.7 %
MCH RBC QN AUTO: 29.3 PG (ref 26.5–33)
MCHC RBC AUTO-ENTMCNC: 32.9 G/DL (ref 31.5–36.5)
MCV RBC AUTO: 89 FL (ref 78–100)
MONOCYTES # BLD AUTO: 0.5 10E9/L (ref 0–1.3)
MONOCYTES NFR BLD AUTO: 8.4 %
NEUTROPHILS # BLD AUTO: 3.5 10E9/L (ref 1.6–8.3)
NEUTROPHILS NFR BLD AUTO: 62.2 %
NRBC # BLD AUTO: 0 10*3/UL
NRBC BLD AUTO-RTO: 0 /100
PLATELET # BLD AUTO: 155 10E9/L (ref 150–450)
POTASSIUM SERPL-SCNC: 4.4 MMOL/L (ref 3.4–5.3)
RBC # BLD AUTO: 4.67 10E12/L (ref 4.4–5.9)
SODIUM SERPL-SCNC: 140 MMOL/L (ref 133–144)
WBC # BLD AUTO: 5.6 10E9/L (ref 4–11)

## 2018-12-10 PROCEDURE — 83690 ASSAY OF LIPASE: CPT | Performed by: INTERNAL MEDICINE

## 2018-12-10 PROCEDURE — 74245 XR UPPER GI W SMALL BOWEL FOLLOW THROUGH: CPT

## 2018-12-10 PROCEDURE — 93798 PHYS/QHP OP CAR RHAB W/ECG: CPT | Performed by: OCCUPATIONAL THERAPIST

## 2018-12-10 PROCEDURE — 86140 C-REACTIVE PROTEIN: CPT | Performed by: INTERNAL MEDICINE

## 2018-12-10 PROCEDURE — 80048 BASIC METABOLIC PNL TOTAL CA: CPT | Performed by: INTERNAL MEDICINE

## 2018-12-10 PROCEDURE — 36415 COLL VENOUS BLD VENIPUNCTURE: CPT | Performed by: INTERNAL MEDICINE

## 2018-12-10 PROCEDURE — 40000116 ZZH STATISTIC OP CR VISIT: Performed by: OCCUPATIONAL THERAPIST

## 2018-12-10 PROCEDURE — 85652 RBC SED RATE AUTOMATED: CPT | Performed by: INTERNAL MEDICINE

## 2018-12-10 PROCEDURE — 85025 COMPLETE CBC W/AUTO DIFF WBC: CPT | Performed by: INTERNAL MEDICINE

## 2018-12-10 NOTE — TELEPHONE ENCOUNTER
Caller has questions regarding  UGI with SBFT  Xray tomorrow    Reviewed order and answered questions   understands   Raven Lundberg RN  [facility]    Additional Information    Question about upcoming scheduled test, no triage required and triager able to answer question    Protocols used: INFORMATION ONLY CALL-ADULT-

## 2018-12-11 ENCOUNTER — HOSPITAL ENCOUNTER (OUTPATIENT)
Dept: CARDIAC REHAB | Facility: CLINIC | Age: 58
End: 2018-12-11
Attending: INTERNAL MEDICINE
Payer: COMMERCIAL

## 2018-12-11 PROCEDURE — 93798 PHYS/QHP OP CAR RHAB W/ECG: CPT | Performed by: OCCUPATIONAL THERAPIST

## 2018-12-11 PROCEDURE — 40000116 ZZH STATISTIC OP CR VISIT: Performed by: OCCUPATIONAL THERAPIST

## 2018-12-13 ENCOUNTER — HOSPITAL ENCOUNTER (OUTPATIENT)
Dept: CARDIAC REHAB | Facility: CLINIC | Age: 58
End: 2018-12-13
Attending: INTERNAL MEDICINE
Payer: COMMERCIAL

## 2018-12-13 PROCEDURE — 40000116 ZZH STATISTIC OP CR VISIT: Performed by: OCCUPATIONAL THERAPIST

## 2018-12-13 PROCEDURE — 93798 PHYS/QHP OP CAR RHAB W/ECG: CPT | Performed by: OCCUPATIONAL THERAPIST

## 2018-12-17 ENCOUNTER — HOSPITAL ENCOUNTER (OUTPATIENT)
Dept: CARDIAC REHAB | Facility: CLINIC | Age: 58
End: 2018-12-17
Attending: INTERNAL MEDICINE
Payer: COMMERCIAL

## 2018-12-17 PROCEDURE — 93798 PHYS/QHP OP CAR RHAB W/ECG: CPT

## 2018-12-17 PROCEDURE — 40000116 ZZH STATISTIC OP CR VISIT

## 2018-12-18 ENCOUNTER — HOSPITAL ENCOUNTER (OUTPATIENT)
Dept: CARDIAC REHAB | Facility: CLINIC | Age: 58
End: 2018-12-18
Attending: INTERNAL MEDICINE
Payer: COMMERCIAL

## 2018-12-18 PROCEDURE — 93798 PHYS/QHP OP CAR RHAB W/ECG: CPT

## 2018-12-18 PROCEDURE — 40000116 ZZH STATISTIC OP CR VISIT

## 2019-02-04 DIAGNOSIS — R07.89 CHEST WALL PAIN: ICD-10-CM

## 2019-02-04 DIAGNOSIS — E78.2 MIXED HYPERLIPIDEMIA: ICD-10-CM

## 2019-02-04 DIAGNOSIS — E03.9 HYPOTHYROIDISM, UNSPECIFIED TYPE: ICD-10-CM

## 2019-02-04 DIAGNOSIS — E78.5 HYPERLIPIDEMIA LDL GOAL <70: ICD-10-CM

## 2019-02-04 LAB
CHOLEST SERPL-MCNC: 131 MG/DL
HDLC SERPL-MCNC: 51 MG/DL
LDLC SERPL CALC-MCNC: 68 MG/DL
NONHDLC SERPL-MCNC: 80 MG/DL
T3FREE SERPL-MCNC: 2.1 PG/ML (ref 2.3–4.2)
T4 FREE SERPL-MCNC: 1.2 NG/DL (ref 0.76–1.46)
TRIGL SERPL-MCNC: 60 MG/DL
TSH SERPL DL<=0.005 MIU/L-ACNC: 2.44 MU/L (ref 0.4–4)

## 2019-02-04 PROCEDURE — 84439 ASSAY OF FREE THYROXINE: CPT | Performed by: INTERNAL MEDICINE

## 2019-02-04 PROCEDURE — 83695 ASSAY OF LIPOPROTEIN(A): CPT | Performed by: INTERNAL MEDICINE

## 2019-02-04 PROCEDURE — 84443 ASSAY THYROID STIM HORMONE: CPT | Performed by: INTERNAL MEDICINE

## 2019-02-04 PROCEDURE — 86141 C-REACTIVE PROTEIN HS: CPT | Performed by: INTERNAL MEDICINE

## 2019-02-04 PROCEDURE — 99000 SPECIMEN HANDLING OFFICE-LAB: CPT | Performed by: INTERNAL MEDICINE

## 2019-02-04 PROCEDURE — 36415 COLL VENOUS BLD VENIPUNCTURE: CPT | Performed by: INTERNAL MEDICINE

## 2019-02-04 PROCEDURE — 84481 FREE ASSAY (FT-3): CPT | Performed by: INTERNAL MEDICINE

## 2019-02-04 PROCEDURE — 83704 LIPOPROTEIN BLD QUAN PART: CPT | Mod: 90 | Performed by: INTERNAL MEDICINE

## 2019-02-04 NOTE — LETTER
"February 11, 2019     TO: Ramiro Jeffers   [address] [place] [address]     Dear Mr. Jeffers,  The results of your recent Laboratory tests.    Results for orders placed or performed in visit on 02/04/19   Lipoprotein A   Result Value Ref Range    Lipoprotein(a) 18 0 - 30 mg/dL   CRP cardiac risk   Result Value Ref Range    CRP Cardiac Risk <0.2 mg/L   LipoFit by NMR   Result Value Ref Range    Total Cholesterol 132 <=199 mg/dL    Triglycerides 59 30 - 149 mg/dL    HDL Cholesterol 53 40 - 59 mg/dL    LDL Cholesterol 67 <=129 mg/dL    HDL Size 9.2 >=8.9 nm    VLDL Size 47.8 (H) <=46.7 nm    LDL Particle Size 21.1 >=20.7 nm    Lge HDL Particle number 6.6 >=4.2 umol/L    HDL Particle Number NMR 31.8 (L) >=33.0 umol/L    Lge VLDL Part number <1.5 <=2.7 nmol/L    Small LDL Particle number 306 <=634 nmol/L    LDL Particle number 750 <=1,135 nmol/L    EER LipoFit by NMR SEE NOTE    T3 Free   Result Value Ref Range    Free T3 2.1 (L) 2.3 - 4.2 pg/mL   T4 free   Result Value Ref Range    T4 Free 1.20 0.76 - 1.46 ng/dL   TSH   Result Value Ref Range    TSH 2.44 0.40 - 4.00 mU/L   Lipid Profile   Result Value Ref Range    Cholesterol 131 <200 mg/dL    Triglycerides 60 <150 mg/dL    HDL Cholesterol 51 >39 mg/dL    LDL Cholesterol Calculated 68 <100 mg/dL    Non HDL Cholesterol 80 <130 mg/dL     Your test results fall within the expected range(s) or remain unchanged from previous results.  Please continue with current treatment plan. Ritu Altamirano NP recommended, \"LDL at goal on current dose of Lipitor. Continue on current therapy.\" Please call [phone] with questions or concerns.     Sincerely,  [facility]        "

## 2019-02-05 LAB
CHOLEST SERPL-MCNC: 132 MG/DL
CRP SERPL HS-MCNC: <0.2 MG/L
HDL SERPL QN: 9.2 NM
HDL SERPL-SCNC: 31.8 UMOL/L
HDLC SERPL-MCNC: 53 MG/DL (ref 40–59)
HLD.LARGE SERPL-SCNC: 6.6 UMOL/L
LDL SERPL QN: 21.1 NM
LDL SERPL-SCNC: 750 NMOL/L
LDL SMALL SERPL-SCNC: 306 NMOL/L
LDLC SERPL CALC-MCNC: 67 MG/DL
PATHOLOGY STUDY: ABNORMAL
TRIGL SERPL-MCNC: 59 MG/DL (ref 30–149)
VLDL LARGE SERPL-SCNC: <1.5 NMOL/L
VLDL SERPL QN: 47.8 NM

## 2019-02-07 LAB — LPA SERPL-MCNC: 18 MG/DL (ref 0–30)

## 2019-02-11 ENCOUNTER — OFFICE VISIT (OUTPATIENT)
Dept: CARDIOLOGY | Facility: CLINIC | Age: 59
End: 2019-02-11
Payer: COMMERCIAL

## 2019-02-11 ENCOUNTER — TELEPHONE (OUTPATIENT)
Dept: CARDIOLOGY | Facility: CLINIC | Age: 59
End: 2019-02-11

## 2019-02-11 VITALS
SYSTOLIC BLOOD PRESSURE: 110 MMHG | BODY MASS INDEX: 31.17 KG/M2 | HEART RATE: 60 BPM | WEIGHT: 205.7 LBS | HEIGHT: 68 IN | DIASTOLIC BLOOD PRESSURE: 64 MMHG

## 2019-02-11 DIAGNOSIS — I25.118 CORONARY ARTERY DISEASE OF NATIVE ARTERY OF NATIVE HEART WITH STABLE ANGINA PECTORIS (H): Primary | ICD-10-CM

## 2019-02-11 DIAGNOSIS — I10 ESSENTIAL HYPERTENSION: ICD-10-CM

## 2019-02-11 DIAGNOSIS — E78.2 MIXED HYPERLIPIDEMIA: ICD-10-CM

## 2019-02-11 DIAGNOSIS — R07.89 ATYPICAL CHEST PAIN: ICD-10-CM

## 2019-02-11 DIAGNOSIS — I25.5 ISCHEMIC CARDIOMYOPATHY: ICD-10-CM

## 2019-02-11 PROCEDURE — 99214 OFFICE O/P EST MOD 30 MIN: CPT | Performed by: NURSE PRACTITIONER

## 2019-02-11 RX ORDER — NITROGLYCERIN 0.4 MG/1
TABLET SUBLINGUAL
Qty: 25 TABLET | Refills: 3 | Status: SHIPPED | OUTPATIENT
Start: 2019-02-11 | End: 2019-12-26

## 2019-02-11 RX ORDER — ISOSORBIDE MONONITRATE 30 MG/1
30 TABLET, EXTENDED RELEASE ORAL DAILY
Qty: 30 TABLET | Refills: 11 | Status: SHIPPED | OUTPATIENT
Start: 2019-02-11 | End: 2020-01-20

## 2019-02-11 ASSESSMENT — MIFFLIN-ST. JEOR: SCORE: 1727.55
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
## 2019-02-11 NOTE — LETTER
2/11/2019    Addison Davis MD  [address]  [facility] [address]    RE: Ramiro Jeffers       Dear Colleague,    I had the pleasure of seeing Ramiro Jeffers in the [facility].    Cardiology Clinic Progress Note  Ramiro Jeffers MRN# [id number]   YOB: 1960 Age: 58 year old          Assessment and Plan:     1. Coronary artery disease with stable angina    Status post DORY to pLAD for 80% stenosis 11/2018    Recurrent chest tightness with exertion (snow shoveling) relieved with 1-2 SL nitroglycerin     Patient called our office after his visit and confirmed that he has not been taking Plavix since the time of his stenting in 11/2018.     Discussed with Dr. Key and due to progressive symptoms and not being on DAPT since time of stenting, the recommendation is a direct admission and presentation to the ED. 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 Our staff discussed in detail that he should not be shoveling snow this evening.     Imdur 30 mg daily and SL nitroglycerin were also prescribed.    2. Ischemic cardiomyopathy    LVEF 40-45% with mild to moderate gloabal hypokinesia of the LV 11/2018.    Metoprolol XL 75 mg daily    3. PVC's     Holter monitor showed a 4% burden    Improvement with up titration of Metoprolol XL to 75 mg daily    4. Hypertension    BP soft    Continue hydrochlorothiazide 25 mg daily, metoprolol XL 75 mg daily    5. Hyperlipidemia    Fasting lipids from 2/4/2019 showed , HDL 51, LDL 68, trig 60    Continue Lipitor 40 mg daily         History of Presenting Illness:    Ramiro Jeffers is a very pleasant 58 year old patient of Dr. Key who presents today for a follow up. He has a past medical history of DVT/PE, hypertension, gastric bypass in 2010 and hyperlipidemia.     He was initially evaluated by Dr. Key in November 2018 for crescendo angina for 1-3 months prior to being seen.  He  underwent a nuclear stress test and unfortunately the images were unreliable due to gut uptake and attenuation.  He then underwent a CTA that was unreliable due to frequent PVCs.  The calcium score did show significant calcification in the LAD with a total score of 563.  Due to his symptoms as well as his calcium score it was recommended that he undergo a coronary angiogram.  The angiogram was completed on 11/15/2018 and he underwent stenting to the proximal LAD with a drug-eluting stent.  An echocardiogram revealed that his LV EF was mildly reduced at 40-45% with mild to moderate global hypokinesia.  There is no significant valvular disease.    He was seen by Dr. Key in follow-up on 12/6/2018 with overall improvement in his symptoms following PCI.  He did have one episode of chest discomfort during cardiac rehabilitation and therefore his metoprolol succinate was increased to 75 mg daily.  He was asked to return today to follow-up his symptoms.    Today in clinic he reports he is still having chest discomfort particularly with shoveling during the recent heavy snow falls.  He describes recurrent chest tightness with activity despite the recent increase of his metoprolol to 75 mg daily.  He also has had to use sublingual nitroglycerin 3-4 times with symptomatic improvement.  Upon med reconciliation today the patient was unsure if he had been taking clopidogrel and he was asked to call the clinic with an update. Unfortunately, he has not been taking his Plavix since the time of his LAD stenting. Over the last 3-4 days he has noted intermittent right sided facial numbness. He denies visual changes, facial drooping, difficulty in word finding or speech issues.                Review of Systems:   Review of Systems:  Skin:  Positive for nail changes   Eyes:  Positive for glasses  ENT:  Positive for nasal congestion  Respiratory:  Negative    Cardiovascular:    Positive for;chest pain;edema  Gastroenterology: Positive  "for heartburn  Genitourinary:  Negative    Musculoskeletal:  Positive for arthritis  Neurologic:  Positive for    Psychiatric:  Negative    Heme/Lymph/Imm:  Negative    Endocrine:  Positive for thyroid disorder            Physical Exam:     Vitals: /64   Pulse 60   Ht 1.727 m (5' 8\")   Wt 93.3 kg (205 lb 11.2 oz)   BMI 31.28 kg/m     Constitutional:  cooperative        Skin:  warm and dry to the touch        Head:  normocephalic        Eyes:  pupils equal and round        ENT:  no pallor or cyanosis, dentition good        Neck:  no stiffness        Chest:  clear to auscultation;normal symmetry        Cardiac: normal S1 and S2;regular rhythm                  Abdomen:  abdomen soft        Vascular: not assessed this visit                                      Extremities and Back:  no edema        Neurological:  affect appropriate               Medications:     Current Outpatient Medications   Medication Sig Dispense Refill     acetaminophen (TYLENOL) 325 MG tablet Take 2 tablets (650 mg) by mouth every 4 hours as needed for mild pain 100 tablet 0     aspirin 81 MG tablet Take 1 tablet (81 mg) by mouth daily Start tomorrow morning.       atorvastatin (LIPITOR) 40 MG tablet Take 1 tablet (40 mg) by mouth daily 90 tablet 3     Cholecalciferol (VITAMIN D) 2000 units tablet Take 1 tablet by mouth daily 100 tablet 12     clopidogrel (PLAVIX) 75 MG tablet Take 1 tablet (75 mg) by mouth daily 30 tablet 11     cyanocobalamin 1000 MCG SUBL Place 1,000 mcg under the tongue daily       hydrochlorothiazide (HYDRODIURIL) 25 MG tablet Take 1 tablet (25 mg) by mouth every morning 90 tablet 3     isosorbide mononitrate (IMDUR) 30 MG 24 hr tablet Take 1 tablet (30 mg) by mouth daily 30 tablet 11     levothyroxine (SYNTHROID/LEVOTHROID) 137 MCG tablet Take 1 tablet (137 mcg) by mouth daily 90 tablet 3     metoprolol succinate ER (TOPROL-XL) 50 MG 24 hr tablet Take 1.5 tablets (75 mg) by mouth daily 135 tablet 3     Multiple " Vitamin (MULTI-VITAMIN) per tablet Take 1 tablet by mouth 2 times daily        nitroGLYcerin (NITROSTAT) 0.4 MG sublingual tablet For chest pain place 1 tablet under the tongue every 5 minutes for 3 doses. If symptoms persist 5 minutes after 1st dose call 911. 25 tablet 3       Family History   Problem Relation Age of Onset     Osteoporosis Mother      Thyroid Disease Mother         no thyroid     Cancer Maternal Grandmother         ? lung cancer     Cancer Father         melanoma     Coronary Artery Disease Brother      Coronary Artery Disease Brother        Social History     Socioeconomic History     Marital status: Single     Spouse name: Not on file     Number of children: Not on file     Years of education: Not on file     Highest education level: Not on file   Social Needs     Financial resource strain: Not on file     Food insecurity - worry: Not on file     Food insecurity - inability: Not on file     Transportation needs - medical: Not on file     Transportation needs - non-medical: Not on file   Occupational History     Occupation:      Employer: SELF   Tobacco Use     Smoking status: Never Smoker     Smokeless tobacco: Never Used   Substance and Sexual Activity     Alcohol use: No     Alcohol/week: 0.0 oz     Drug use: No     Sexual activity: Not on file   Other Topics Concern     Parent/sibling w/ CABG, MI or angioplasty before 65F 55M? Not Asked   Social History Narrative     Not on file            Past Medical History:     Past Medical History:   Diagnosis Date     ACQUIRED HYPOTHYROIDISM       Blood clot in the legs      CAD (coronary artery disease)     11/15/18 Cath: PCI with 3.5-16mm DORY to proximal LAD     Calculus of gallbladder without mention of cholecystitis or obstruction      Cataract      Chest pressure 11/5/2018     Hyperlipidemia      Hypertension      Ischemic cardiomyopathy     EF 40-45% on 11/2018 Echo     Obesity, unspecified     significant weight loss w/diet alone,  on 10-15-10, BMI was 56.4     pulmonary emboli 6-2010    Unprovoked large bilateral pulmonary emboli without source identified on lower extremity dopplers, pt had a transient moderate lupus inhibitor upon initial presentation in June , 2010 which was gone in September, 2010;  all other thrombophilic workup is normal     Pulmonary embolus, bilateral      PVC's (premature ventricular contractions)     4% burden on 11/2018 Holter     Shortness of breath     on exertion     Venous insufficiency      Viral pneumonia, unspecified      Vitamin D deficiency               Past Surgical History:     Past Surgical History:   Procedure Laterality Date     C NONSPECIFIC PROCEDURE  10-    Laparoscopic gastric bypass,     C NONSPECIFIC PROCEDURE  10-    1.  Attempted laparoscopic exploration with conversion to: .  Abdominal exploration. 3.  Reduction of incarcerated incisional hernia. 4.  Repair of incisional hernia.5.  Gastrostomy tube placement (#22 Swedish) into defunctionalized stomach.6.  Enterotomy with bowel decompression and primary repair. 7.  Abdominal lavage.      COLONOSCOPY  11/17/2011    Procedure:COLONOSCOPY; colonoscopy; Surgeon:ELENA OROURKE; Location: GI     LAPAROSCOPIC CHOLECYSTECTOMY  9/28/2012    Procedure: LAPAROSCOPIC CHOLECYSTECTOMY;  LAPAROSCOPIC CHOLECYSTECTOMY, LYSIS OF ADHESIONS;  Surgeon: Dutch Matta MD;  Location:  OR     LAPAROSCOPIC LYSIS ADHESIONS  9/28/2012    Procedure: LAPAROSCOPIC LYSIS ADHESIONS;;  Surgeon: Dutch Matta MD;  Location:  OR     LAPAROSCOPY, SURGICAL; REPAIR INCISIONAL OR VENTRAL HERNIA      repair of ventral strangulated surgery     PHACOEMULSIFICATION CLEAR CORNEA WITH STANDARD INTRAOCULAR LENS IMPLANT  12/19/2011    Procedure:PHACOEMULSIFICATION CLEAR CORNEA WITH STANDARD INTRAOCULAR LENS IMPLANT; RIGHT PHACOEMULSIFICATION CLEAR CORNEA WITH STANDARD INTRAOCULAR LENS IMPLANT ; Surgeon:ALLISON ANTONIO; Location:[facility]              Allergies:   No  known allergies       Data:   All laboratory data reviewed:    Recent Labs   Lab Test 02/04/19  0742 02/04/19  0738 10/19/18  0716 10/12/17  0835  04/09/14  1003   LDL 68  --  98 82   < > 98   HDL 51  --  52 61   < > 49   NHDL 80  --  111 97   < >  --    CHOL 131  --  163 158   < > 170   TRIG 60  --  66 77   < > 110   TSH  --  2.44 4.20* 3.49   < > 4.70   IRON  --   --   --   --   --  45   FEB  --   --   --   --   --  278   IRONSAT  --   --   --   --   --  16   ELSA  --   --   --   --   --  66    < > = values in this interval not displayed.       Lab Results   Component Value Date    WBC 5.6 12/10/2018    RBC 4.67 12/10/2018    HGB 13.7 12/10/2018    HCT 41.6 12/10/2018    MCV 89 12/10/2018    MCH 29.3 12/10/2018    MCHC 32.9 12/10/2018    RDW 13.9 12/10/2018     12/10/2018       Lab Results   Component Value Date     12/10/2018    POTASSIUM 4.4 12/10/2018    CHLORIDE 105 12/10/2018    CO2 31 12/10/2018    ANIONGAP 4 12/10/2018    GLC 97 12/10/2018    BUN 25 12/10/2018    CR 0.89 12/10/2018    GFRESTIMATED 87 12/10/2018    GFRESTBLACK >90 12/10/2018    GUERITA 9.1 12/10/2018      Lab Results   Component Value Date    AST 21 10/19/2018    ALT 21 10/19/2018       Lab Results   Component Value Date    A1C 5.4 10/19/2018       Lab Results   Component Value Date    INR 1.06 11/15/2018    INR 1.06 12/27/2016         CHARO HOLLINGSWORTH, CNP  [facility]    Thank you for allowing me to participate in the care of your patient.      Sincerely,     CHARO HOLLINGSWORTH CNP     [facility]    cc:   Tereza Key MD  [address]  [place] [address]

## 2019-02-11 NOTE — TELEPHONE ENCOUNTER
"Patient called and reported that after he left the OV he recalled that when he attempted to  plavix over 1 month ago he advised that [facility] pharmacy told him \"you cant take this with lipitor.\" RN advised patient that RN is unsure why they would say that,but stressed to patient the importance of this medication. Patient advised he will call pharmacy and  rx for plavix today. RN will send to [employer] for review.       2/11/19  Today's Recommendations     1. Call to confirm if taking Plavix  2. Start Imdur 30 mg daily  3. Will discuss with Dr. Key regarding symptoms  4. Continue all other medications without changes.     Please send a [employer] message or call [phone] with questions or concerns.      Scheduling number [phone].  "

## 2019-02-11 NOTE — PROGRESS NOTES
Cardiology Clinic Progress Note  Ramior Jeffers MRN# [id number]   YOB: 1960 Age: 58 year old          Assessment and Plan:     1. Coronary artery disease with stable angina    Status post DORY to pLAD for 80% stenosis 11/2018    Recurrent chest tightness with exertion (snow shoveling) relieved with 1-2 SL nitroglycerin     Patient called our office after his visit and confirmed that he has not been taking Plavix since the time of his stenting in 11/2018.     Discussed with Dr. Key and due to progressive symptoms and not being on DAPT since time of stenting, the recommendation is a direct admission and presentation to the ED. 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 Our staff discussed in detail that he should not be shoveling snow this evening.     Imdur 30 mg daily and SL nitroglycerin were also prescribed.    2. Ischemic cardiomyopathy    LVEF 40-45% with mild to moderate gloabal hypokinesia of the LV 11/2018.    Metoprolol XL 75 mg daily    3. PVC's     Holter monitor showed a 4% burden    Improvement with up titration of Metoprolol XL to 75 mg daily    4. Hypertension    BP soft    Continue hydrochlorothiazide 25 mg daily, metoprolol XL 75 mg daily    5. Hyperlipidemia    Fasting lipids from 2/4/2019 showed , HDL 51, LDL 68, trig 60    Continue Lipitor 40 mg daily         History of Presenting Illness:    Ramiro Jeffers is a very pleasant 58 year old patient of Dr. Key who presents today for a follow up. He has a past medical history of DVT/PE, hypertension, gastric bypass in 2010 and hyperlipidemia.     He was initially evaluated by Dr. Key in November 2018 for crescendo angina for 1-3 months prior to being seen.  He underwent a nuclear stress test and unfortunately the images were unreliable due to gut uptake and attenuation.  He then underwent a CTA that was unreliable due to frequent PVCs.  The calcium score did show significant  calcification in the LAD with a total score of 563.  Due to his symptoms as well as his calcium score it was recommended that he undergo a coronary angiogram.  The angiogram was completed on 11/15/2018 and he underwent stenting to the proximal LAD with a drug-eluting stent.  An echocardiogram revealed that his LV EF was mildly reduced at 40-45% with mild to moderate global hypokinesia.  There is no significant valvular disease.    He was seen by Dr. Key in follow-up on 12/6/2018 with overall improvement in his symptoms following PCI.  He did have one episode of chest discomfort during cardiac rehabilitation and therefore his metoprolol succinate was increased to 75 mg daily.  He was asked to return today to follow-up his symptoms.    Today in clinic he reports he is still having chest discomfort particularly with shoveling during the recent heavy snow falls.  He describes recurrent chest tightness with activity despite the recent increase of his metoprolol to 75 mg daily.  He also has had to use sublingual nitroglycerin 3-4 times with symptomatic improvement.  Upon med reconciliation today the patient was unsure if he had been taking clopidogrel and he was asked to call the clinic with an update. Unfortunately, he has not been taking his Plavix since the time of his LAD stenting. Over the last 3-4 days he has noted intermittent right sided facial numbness. He denies visual changes, facial drooping, difficulty in word finding or speech issues.                Review of Systems:   Review of Systems:  Skin:  Positive for nail changes   Eyes:  Positive for glasses  ENT:  Positive for nasal congestion  Respiratory:  Negative    Cardiovascular:    Positive for;chest pain;edema  Gastroenterology: Positive for heartburn  Genitourinary:  Negative    Musculoskeletal:  Positive for arthritis  Neurologic:  Positive for    Psychiatric:  Negative    Heme/Lymph/Imm:  Negative    Endocrine:  Positive for thyroid disorder            " Physical Exam:     Vitals: /64   Pulse 60   Ht 1.727 m (5' 8\")   Wt 93.3 kg (205 lb 11.2 oz)   BMI 31.28 kg/m    Constitutional:  cooperative        Skin:  warm and dry to the touch        Head:  normocephalic        Eyes:  pupils equal and round        ENT:  no pallor or cyanosis, dentition good        Neck:  no stiffness        Chest:  clear to auscultation;normal symmetry        Cardiac: normal S1 and S2;regular rhythm                  Abdomen:  abdomen soft        Vascular: not assessed this visit                                      Extremities and Back:  no edema        Neurological:  affect appropriate               Medications:     Current Outpatient Medications   Medication Sig Dispense Refill     acetaminophen (TYLENOL) 325 MG tablet Take 2 tablets (650 mg) by mouth every 4 hours as needed for mild pain 100 tablet 0     aspirin 81 MG tablet Take 1 tablet (81 mg) by mouth daily Start tomorrow morning.       atorvastatin (LIPITOR) 40 MG tablet Take 1 tablet (40 mg) by mouth daily 90 tablet 3     Cholecalciferol (VITAMIN D) 2000 units tablet Take 1 tablet by mouth daily 100 tablet 12     clopidogrel (PLAVIX) 75 MG tablet Take 1 tablet (75 mg) by mouth daily 30 tablet 11     cyanocobalamin 1000 MCG SUBL Place 1,000 mcg under the tongue daily       hydrochlorothiazide (HYDRODIURIL) 25 MG tablet Take 1 tablet (25 mg) by mouth every morning 90 tablet 3     isosorbide mononitrate (IMDUR) 30 MG 24 hr tablet Take 1 tablet (30 mg) by mouth daily 30 tablet 11     levothyroxine (SYNTHROID/LEVOTHROID) 137 MCG tablet Take 1 tablet (137 mcg) by mouth daily 90 tablet 3     metoprolol succinate ER (TOPROL-XL) 50 MG 24 hr tablet Take 1.5 tablets (75 mg) by mouth daily 135 tablet 3     Multiple Vitamin (MULTI-VITAMIN) per tablet Take 1 tablet by mouth 2 times daily        nitroGLYcerin (NITROSTAT) 0.4 MG sublingual tablet For chest pain place 1 tablet under the tongue every 5 minutes for 3 doses. If symptoms " persist 5 minutes after 1st dose call 272. 16 tablet 3       Family History   Problem Relation Age of Onset     Osteoporosis Mother      Thyroid Disease Mother         no thyroid     Cancer Maternal Grandmother         ? lung cancer     Cancer Father         melanoma     Coronary Artery Disease Brother      Coronary Artery Disease Brother        Social History     Socioeconomic History     Marital status: Single     Spouse name: Not on file     Number of children: Not on file     Years of education: Not on file     Highest education level: Not on file   Social Needs     Financial resource strain: Not on file     Food insecurity - worry: Not on file     Food insecurity - inability: Not on file     Transportation needs - medical: Not on file     Transportation needs - non-medical: Not on file   Occupational History     Occupation:      Employer: SELF   Tobacco Use     Smoking status: Never Smoker     Smokeless tobacco: Never Used   Substance and Sexual Activity     Alcohol use: No     Alcohol/week: 0.0 oz     Drug use: No     Sexual activity: Not on file   Other Topics Concern     Parent/sibling w/ CABG, MI or angioplasty before 65F 55M? Not Asked   Social History Narrative     Not on file            Past Medical History:     Past Medical History:   Diagnosis Date     ACQUIRED HYPOTHYROIDISM       Blood clot in the legs      CAD (coronary artery disease)     11/15/18 Cath: PCI with 3.5-16mm DORY to proximal LAD     Calculus of gallbladder without mention of cholecystitis or obstruction      Cataract      Chest pressure 11/5/2018     Hyperlipidemia      Hypertension      Ischemic cardiomyopathy     EF 40-45% on 11/2018 Echo     Obesity, unspecified     significant weight loss w/diet alone, on 10-15-10, BMI was 56.4     pulmonary emboli 6-2010    Unprovoked large bilateral pulmonary emboli without source identified on lower extremity dopplers, pt had a transient moderate lupus inhibitor upon initial  presentation in June , 2010 which was gone in September, 2010;  all other thrombophilic workup is normal     Pulmonary embolus, bilateral      PVC's (premature ventricular contractions)     4% burden on 11/2018 Holter     Shortness of breath     on exertion     Venous insufficiency      Viral pneumonia, unspecified      Vitamin D deficiency               Past Surgical History:     Past Surgical History:   Procedure Laterality Date     C NONSPECIFIC PROCEDURE  10-    Laparoscopic gastric bypass,     C NONSPECIFIC PROCEDURE  10-    1.  Attempted laparoscopic exploration with conversion to: .  Abdominal exploration. 3.  Reduction of incarcerated incisional hernia. 4.  Repair of incisional hernia.5.  Gastrostomy tube placement (#22 Albanian) into defunctionalized stomach.6.  Enterotomy with bowel decompression and primary repair. 7.  Abdominal lavage.      COLONOSCOPY  11/17/2011    Procedure:COLONOSCOPY; colonoscopy; Surgeon:ELENA OROURKE; Location:[facility]     LAPAROSCOPIC CHOLECYSTECTOMY  9/28/2012    Procedure: LAPAROSCOPIC CHOLECYSTECTOMY;  LAPAROSCOPIC CHOLECYSTECTOMY, LYSIS OF ADHESIONS;  Surgeon: Dutch Matta MD;  Location:  OR     LAPAROSCOPIC LYSIS ADHESIONS  9/28/2012    Procedure: LAPAROSCOPIC LYSIS ADHESIONS;;  Surgeon: Dutch Matta MD;  Location:  OR     LAPAROSCOPY, SURGICAL; REPAIR INCISIONAL OR VENTRAL HERNIA      repair of ventral strangulated surgery     PHACOEMULSIFICATION CLEAR CORNEA WITH STANDARD INTRAOCULAR LENS IMPLANT  12/19/2011    Procedure:PHACOEMULSIFICATION CLEAR CORNEA WITH STANDARD INTRAOCULAR LENS IMPLANT; RIGHT PHACOEMULSIFICATION CLEAR CORNEA WITH STANDARD INTRAOCULAR LENS IMPLANT ; Surgeon:ALLISON ANTONIO; Location:[facility]              Allergies:   No known allergies       Data:   All laboratory data reviewed:    Recent Labs   Lab Test 02/04/19  0742 02/04/19  0738 10/19/18  0716 10/12/17  0835  04/09/14  1003   LDL 68  --  98 82   < > 98   HDL 51  --  52 61   <  > 49   NHDL 80  --  111 97   < >  --    CHOL 131  --  163 158   < > 170   TRIG 60  --  66 77   < > 110   TSH  --  2.44 4.20* 3.49   < > 4.70   IRON  --   --   --   --   --  45   FEB  --   --   --   --   --  278   IRONSAT  --   --   --   --   --  16   ELSA  --   --   --   --   --  66    < > = values in this interval not displayed.       Lab Results   Component Value Date    WBC 5.6 12/10/2018    RBC 4.67 12/10/2018    HGB 13.7 12/10/2018    HCT 41.6 12/10/2018    MCV 89 12/10/2018    MCH 29.3 12/10/2018    MCHC 32.9 12/10/2018    RDW 13.9 12/10/2018     12/10/2018       Lab Results   Component Value Date     12/10/2018    POTASSIUM 4.4 12/10/2018    CHLORIDE 105 12/10/2018    CO2 31 12/10/2018    ANIONGAP 4 12/10/2018    GLC 97 12/10/2018    BUN 25 12/10/2018    CR 0.89 12/10/2018    GFRESTIMATED 87 12/10/2018    GFRESTBLACK >90 12/10/2018    GUERITA 9.1 12/10/2018      Lab Results   Component Value Date    AST 21 10/19/2018    ALT 21 10/19/2018       Lab Results   Component Value Date    A1C 5.4 10/19/2018       Lab Results   Component Value Date    INR 1.06 11/15/2018    INR 1.06 12/27/2016         FRANCESCO PRICE, APRN, CNP  [facility]

## 2019-02-11 NOTE — TELEPHONE ENCOUNTER
Oriana, if he has symptoms that are concerning for ACS, he needs to go to the ED. If not, please see him by [facility] or me or DOD to discuss symptoms. If he has not been taking his plavix, please educate patient, have some one find out why pharmacy told him such, and please RELOAD with 300 mg today and then 75 from tomorrow.

## 2019-02-11 NOTE — PATIENT INSTRUCTIONS
Today's Recommendations    1. Call to confirm if taking Plavix  2. Start Imdur 30 mg daily  3. Will discuss with Dr. Key regarding symptoms  4. Continue all other medications without changes.    Please send a [employer] message or call [phone] with questions or concerns.     Scheduling number [phone].

## 2019-02-11 NOTE — LETTER
2/11/2019    Addison Davis MD  [address]  [facility] [address]    RE: Ramiro Jeffers       Dear Colleague,    I had the pleasure of seeing Ramiro Jeffers in the [facility].    Cardiology Clinic Progress Note  Ramiro Jeffers MRN# [id number]   YOB: 1960 Age: 58 year old          Assessment and Plan:     1. Coronary artery disease with stable angina    Status post DORY to pLAD for 80% stenosis 11/2018    Recurrent chest tightness with exertion (snow shoveling) relieved with 1-2 SL nitroglycerin     Patient called our office after his visit and confirmed that he has not been taking Plavix since the time of his stenting in 11/2018.     Discussed with Dr. Key and due to progressive symptoms and not being on DAPT since time of stenting, the recommendation is a direct admission and presentation to the ED. 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 Our staff discussed in detail that he should not be shoveling snow this evening.     Imdur 30 mg daily and SL nitroglycerin were also prescribed.    2. Ischemic cardiomyopathy    LVEF 40-45% with mild to moderate gloabal hypokinesia of the LV 11/2018.    Metoprolol XL 75 mg daily    3. PVC's     Holter monitor showed a 4% burden    Improvement with up titration of Metoprolol XL to 75 mg daily    4. Hypertension    BP soft    Continue hydrochlorothiazide 25 mg daily, metoprolol XL 75 mg daily    5. Hyperlipidemia    Fasting lipids from 2/4/2019 showed , HDL 51, LDL 68, trig 60    Continue Lipitor 40 mg daily         History of Presenting Illness:    Ramiro Jeffers is a very pleasant 58 year old patient of Dr. Key who presents today for a follow up. He has a past medical history of DVT/PE, hypertension, gastric bypass in 2010 and hyperlipidemia.     He was initially evaluated by Dr. Key in November 2018 for crescendo angina for 1-3 months prior to being seen.  He  underwent a nuclear stress test and unfortunately the images were unreliable due to gut uptake and attenuation.  He then underwent a CTA that was unreliable due to frequent PVCs.  The calcium score did show significant calcification in the LAD with a total score of 563.  Due to his symptoms as well as his calcium score it was recommended that he undergo a coronary angiogram.  The angiogram was completed on 11/15/2018 and he underwent stenting to the proximal LAD with a drug-eluting stent.  An echocardiogram revealed that his LV EF was mildly reduced at 40-45% with mild to moderate global hypokinesia.  There is no significant valvular disease.    He was seen by Dr. Key in follow-up on 12/6/2018 with overall improvement in his symptoms following PCI.  He did have one episode of chest discomfort during cardiac rehabilitation and therefore his metoprolol succinate was increased to 75 mg daily.  He was asked to return today to follow-up his symptoms.    Today in clinic he reports he is still having chest discomfort particularly with shoveling during the recent heavy snow falls.  He describes recurrent chest tightness with activity despite the recent increase of his metoprolol to 75 mg daily.  He also has had to use sublingual nitroglycerin 3-4 times with symptomatic improvement.  Upon med reconciliation today the patient was unsure if he had been taking clopidogrel and he was asked to call the clinic with an update. Unfortunately, he has not been taking his Plavix since the time of his LAD stenting. Over the last 3-4 days he has noted intermittent right sided facial numbness. He denies visual changes, facial drooping, difficulty in word finding or speech issues.                Review of Systems:   Review of Systems:  Skin:  Positive for nail changes   Eyes:  Positive for glasses  ENT:  Positive for nasal congestion  Respiratory:  Negative    Cardiovascular:    Positive for;chest pain;edema  Gastroenterology: Positive  "for heartburn  Genitourinary:  Negative    Musculoskeletal:  Positive for arthritis  Neurologic:  Positive for    Psychiatric:  Negative    Heme/Lymph/Imm:  Negative    Endocrine:  Positive for thyroid disorder            Physical Exam:     Vitals: /64   Pulse 60   Ht 1.727 m (5' 8\")   Wt 93.3 kg (205 lb 11.2 oz)   BMI 31.28 kg/m     Constitutional:  cooperative        Skin:  warm and dry to the touch        Head:  normocephalic        Eyes:  pupils equal and round        ENT:  no pallor or cyanosis, dentition good        Neck:  no stiffness        Chest:  clear to auscultation;normal symmetry        Cardiac: normal S1 and S2;regular rhythm                  Abdomen:  abdomen soft        Vascular: not assessed this visit                                      Extremities and Back:  no edema        Neurological:  affect appropriate               Medications:     Current Outpatient Medications   Medication Sig Dispense Refill     acetaminophen (TYLENOL) 325 MG tablet Take 2 tablets (650 mg) by mouth every 4 hours as needed for mild pain 100 tablet 0     aspirin 81 MG tablet Take 1 tablet (81 mg) by mouth daily Start tomorrow morning.       atorvastatin (LIPITOR) 40 MG tablet Take 1 tablet (40 mg) by mouth daily 90 tablet 3     Cholecalciferol (VITAMIN D) 2000 units tablet Take 1 tablet by mouth daily 100 tablet 12     clopidogrel (PLAVIX) 75 MG tablet Take 1 tablet (75 mg) by mouth daily 30 tablet 11     cyanocobalamin 1000 MCG SUBL Place 1,000 mcg under the tongue daily       hydrochlorothiazide (HYDRODIURIL) 25 MG tablet Take 1 tablet (25 mg) by mouth every morning 90 tablet 3     isosorbide mononitrate (IMDUR) 30 MG 24 hr tablet Take 1 tablet (30 mg) by mouth daily 30 tablet 11     levothyroxine (SYNTHROID/LEVOTHROID) 137 MCG tablet Take 1 tablet (137 mcg) by mouth daily 90 tablet 3     metoprolol succinate ER (TOPROL-XL) 50 MG 24 hr tablet Take 1.5 tablets (75 mg) by mouth daily 135 tablet 3     Multiple " Vitamin (MULTI-VITAMIN) per tablet Take 1 tablet by mouth 2 times daily        nitroGLYcerin (NITROSTAT) 0.4 MG sublingual tablet For chest pain place 1 tablet under the tongue every 5 minutes for 3 doses. If symptoms persist 5 minutes after 1st dose call 911. 25 tablet 3       Family History   Problem Relation Age of Onset     Osteoporosis Mother      Thyroid Disease Mother         no thyroid     Cancer Maternal Grandmother         ? lung cancer     Cancer Father         melanoma     Coronary Artery Disease Brother      Coronary Artery Disease Brother        Social History     Socioeconomic History     Marital status: Single     Spouse name: Not on file     Number of children: Not on file     Years of education: Not on file     Highest education level: Not on file   Social Needs     Financial resource strain: Not on file     Food insecurity - worry: Not on file     Food insecurity - inability: Not on file     Transportation needs - medical: Not on file     Transportation needs - non-medical: Not on file   Occupational History     Occupation:      Employer: SELF   Tobacco Use     Smoking status: Never Smoker     Smokeless tobacco: Never Used   Substance and Sexual Activity     Alcohol use: No     Alcohol/week: 0.0 oz     Drug use: No     Sexual activity: Not on file   Other Topics Concern     Parent/sibling w/ CABG, MI or angioplasty before 65F 55M? Not Asked   Social History Narrative     Not on file            Past Medical History:     Past Medical History:   Diagnosis Date     ACQUIRED HYPOTHYROIDISM       Blood clot in the legs      CAD (coronary artery disease)     11/15/18 Cath: PCI with 3.5-16mm DORY to proximal LAD     Calculus of gallbladder without mention of cholecystitis or obstruction      Cataract      Chest pressure 11/5/2018     Hyperlipidemia      Hypertension      Ischemic cardiomyopathy     EF 40-45% on 11/2018 Echo     Obesity, unspecified     significant weight loss w/diet alone,  on 10-15-10, BMI was 56.4     pulmonary emboli 6-2010    Unprovoked large bilateral pulmonary emboli without source identified on lower extremity dopplers, pt had a transient moderate lupus inhibitor upon initial presentation in June , 2010 which was gone in September, 2010;  all other thrombophilic workup is normal     Pulmonary embolus, bilateral      PVC's (premature ventricular contractions)     4% burden on 11/2018 Holter     Shortness of breath     on exertion     Venous insufficiency      Viral pneumonia, unspecified      Vitamin D deficiency               Past Surgical History:     Past Surgical History:   Procedure Laterality Date     C NONSPECIFIC PROCEDURE  10-    Laparoscopic gastric bypass,     C NONSPECIFIC PROCEDURE  10-    1.  Attempted laparoscopic exploration with conversion to: .  Abdominal exploration. 3.  Reduction of incarcerated incisional hernia. 4.  Repair of incisional hernia.5.  Gastrostomy tube placement (#22 Frisian) into defunctionalized stomach.6.  Enterotomy with bowel decompression and primary repair. 7.  Abdominal lavage.      COLONOSCOPY  11/17/2011    Procedure:COLONOSCOPY; colonoscopy; Surgeon:ELENA OROURKE; Location: GI     LAPAROSCOPIC CHOLECYSTECTOMY  9/28/2012    Procedure: LAPAROSCOPIC CHOLECYSTECTOMY;  LAPAROSCOPIC CHOLECYSTECTOMY, LYSIS OF ADHESIONS;  Surgeon: Dutch Matta MD;  Location:  OR     LAPAROSCOPIC LYSIS ADHESIONS  9/28/2012    Procedure: LAPAROSCOPIC LYSIS ADHESIONS;;  Surgeon: Dutch Matta MD;  Location:  OR     LAPAROSCOPY, SURGICAL; REPAIR INCISIONAL OR VENTRAL HERNIA      repair of ventral strangulated surgery     PHACOEMULSIFICATION CLEAR CORNEA WITH STANDARD INTRAOCULAR LENS IMPLANT  12/19/2011    Procedure:PHACOEMULSIFICATION CLEAR CORNEA WITH STANDARD INTRAOCULAR LENS IMPLANT; RIGHT PHACOEMULSIFICATION CLEAR CORNEA WITH STANDARD INTRAOCULAR LENS IMPLANT ; Surgeon:ALLISON ANTONIO; Location[facility]              Allergies:   No  known allergies       Data:   All laboratory data reviewed:    Recent Labs   Lab Test 02/04/19  0742 02/04/19  0738 10/19/18  0716 10/12/17  0835  04/09/14  1003   LDL 68  --  98 82   < > 98   HDL 51  --  52 61   < > 49   NHDL 80  --  111 97   < >  --    CHOL 131  --  163 158   < > 170   TRIG 60  --  66 77   < > 110   TSH  --  2.44 4.20* 3.49   < > 4.70   IRON  --   --   --   --   --  45   FEB  --   --   --   --   --  278   IRONSAT  --   --   --   --   --  16   ELSA  --   --   --   --   --  66    < > = values in this interval not displayed.       Lab Results   Component Value Date    WBC 5.6 12/10/2018    RBC 4.67 12/10/2018    HGB 13.7 12/10/2018    HCT 41.6 12/10/2018    MCV 89 12/10/2018    MCH 29.3 12/10/2018    MCHC 32.9 12/10/2018    RDW 13.9 12/10/2018     12/10/2018       Lab Results   Component Value Date     12/10/2018    POTASSIUM 4.4 12/10/2018    CHLORIDE 105 12/10/2018    CO2 31 12/10/2018    ANIONGAP 4 12/10/2018    GLC 97 12/10/2018    BUN 25 12/10/2018    CR 0.89 12/10/2018    GFRESTIMATED 87 12/10/2018    GFRESTBLACK >90 12/10/2018    GUERITA 9.1 12/10/2018      Lab Results   Component Value Date    AST 21 10/19/2018    ALT 21 10/19/2018       Lab Results   Component Value Date    A1C 5.4 10/19/2018       Lab Results   Component Value Date    INR 1.06 11/15/2018    INR 1.06 12/27/2016         Thank you for allowing me to participate in the care of your patient.    Sincerely,     CHARO HOLLINGSWORTH [facility]

## 2019-02-12 ENCOUNTER — APPOINTMENT (OUTPATIENT)
Dept: GENERAL RADIOLOGY | Facility: CLINIC | Age: 59
End: 2019-02-12
Attending: EMERGENCY MEDICINE
Payer: COMMERCIAL

## 2019-02-12 ENCOUNTER — HOSPITAL ENCOUNTER (INPATIENT)
Facility: CLINIC | Age: 59
LOS: 1 days | Discharge: HOME | End: 2019-02-13
Attending: EMERGENCY MEDICINE | Admitting: HOSPITALIST
Payer: COMMERCIAL

## 2019-02-12 DIAGNOSIS — R07.9 EXERTIONAL CHEST PAIN: ICD-10-CM

## 2019-02-12 DIAGNOSIS — R07.9 CHEST PAIN, UNSPECIFIED TYPE: ICD-10-CM

## 2019-02-12 DIAGNOSIS — I25.10 CORONARY ARTERY DISEASE INVOLVING NATIVE CORONARY ARTERY OF NATIVE HEART WITHOUT ANGINA PECTORIS: Primary | ICD-10-CM

## 2019-02-12 DIAGNOSIS — I20.0 UNSTABLE ANGINA (H): ICD-10-CM

## 2019-02-12 LAB
ANION GAP SERPL CALCULATED.3IONS-SCNC: 5 MMOL/L (ref 3–14)
BASOPHILS # BLD AUTO: 0.1 10E9/L (ref 0–0.2)
BASOPHILS NFR BLD AUTO: 0.9 %
BUN SERPL-MCNC: 21 MG/DL (ref 7–30)
CALCIUM SERPL-MCNC: 8.7 MG/DL (ref 8.5–10.1)
CHLORIDE SERPL-SCNC: 101 MMOL/L (ref 94–109)
CO2 SERPL-SCNC: 32 MMOL/L (ref 20–32)
CREAT SERPL-MCNC: 0.87 MG/DL (ref 0.66–1.25)
D DIMER PPP FEU-MCNC: 0.3 UG/ML FEU (ref 0–0.5)
DIFFERENTIAL METHOD BLD: ABNORMAL
EOSINOPHIL # BLD AUTO: 0.3 10E9/L (ref 0–0.7)
EOSINOPHIL NFR BLD AUTO: 5.4 %
ERYTHROCYTE [DISTWIDTH] IN BLOOD BY AUTOMATED COUNT: 14.2 % (ref 10–15)
GFR SERPL CREATININE-BSD FRML MDRD: >90 ML/MIN/{1.73_M2}
GLUCOSE SERPL-MCNC: 102 MG/DL (ref 70–99)
HCT VFR BLD AUTO: 41.1 % (ref 40–53)
HGB BLD-MCNC: 13.6 G/DL (ref 13.3–17.7)
IMM GRANULOCYTES # BLD: 0 10E9/L (ref 0–0.4)
IMM GRANULOCYTES NFR BLD: 0.2 %
INR PPP: 1.04 (ref 0.86–1.14)
INTERPRETATION ECG - MUSE: NORMAL
INTERPRETATION ECG - MUSE: NORMAL
LYMPHOCYTES # BLD AUTO: 1.3 10E9/L (ref 0.8–5.3)
LYMPHOCYTES NFR BLD AUTO: 25.1 %
MCH RBC QN AUTO: 29.7 PG (ref 26.5–33)
MCHC RBC AUTO-ENTMCNC: 33.1 G/DL (ref 31.5–36.5)
MCV RBC AUTO: 90 FL (ref 78–100)
MONOCYTES # BLD AUTO: 0.5 10E9/L (ref 0–1.3)
MONOCYTES NFR BLD AUTO: 9.4 %
NEUTROPHILS # BLD AUTO: 3.1 10E9/L (ref 1.6–8.3)
NEUTROPHILS NFR BLD AUTO: 59 %
NRBC # BLD AUTO: 0 10*3/UL
NRBC BLD AUTO-RTO: 0 /100
NT-PROBNP SERPL-MCNC: 307 PG/ML (ref 0–900)
PLATELET # BLD AUTO: 130 10E9/L (ref 150–450)
POTASSIUM SERPL-SCNC: 3.5 MMOL/L (ref 3.4–5.3)
RBC # BLD AUTO: 4.58 10E12/L (ref 4.4–5.9)
SODIUM SERPL-SCNC: 138 MMOL/L (ref 133–144)
T4 FREE SERPL-MCNC: 1.06 NG/DL (ref 0.76–1.46)
TROPONIN I SERPL-MCNC: <0.015 UG/L (ref 0–0.04)
TROPONIN I SERPL-MCNC: <0.015 UG/L (ref 0–0.04)
TSH SERPL DL<=0.005 MIU/L-ACNC: 5.6 MU/L (ref 0.4–4)
WBC # BLD AUTO: 5.3 10E9/L (ref 4–11)

## 2019-02-12 PROCEDURE — 25000128 H RX IP 250 OP 636: Performed by: EMERGENCY MEDICINE

## 2019-02-12 PROCEDURE — 71046 X-RAY EXAM CHEST 2 VIEWS: CPT

## 2019-02-12 PROCEDURE — 85379 FIBRIN DEGRADATION QUANT: CPT | Performed by: EMERGENCY MEDICINE

## 2019-02-12 PROCEDURE — 85610 PROTHROMBIN TIME: CPT | Performed by: EMERGENCY MEDICINE

## 2019-02-12 PROCEDURE — 21000000 ZZH R&B IMCU HEART CARE

## 2019-02-12 PROCEDURE — 93005 ELECTROCARDIOGRAM TRACING: CPT

## 2019-02-12 PROCEDURE — 36415 COLL VENOUS BLD VENIPUNCTURE: CPT | Performed by: HOSPITALIST

## 2019-02-12 PROCEDURE — 80048 BASIC METABOLIC PNL TOTAL CA: CPT | Performed by: EMERGENCY MEDICINE

## 2019-02-12 PROCEDURE — 96374 THER/PROPH/DIAG INJ IV PUSH: CPT

## 2019-02-12 PROCEDURE — 84439 ASSAY OF FREE THYROXINE: CPT | Performed by: EMERGENCY MEDICINE

## 2019-02-12 PROCEDURE — 84443 ASSAY THYROID STIM HORMONE: CPT | Performed by: EMERGENCY MEDICINE

## 2019-02-12 PROCEDURE — 99223 1ST HOSP IP/OBS HIGH 75: CPT | Mod: AI | Performed by: HOSPITALIST

## 2019-02-12 PROCEDURE — 85025 COMPLETE CBC W/AUTO DIFF WBC: CPT | Performed by: EMERGENCY MEDICINE

## 2019-02-12 PROCEDURE — 99285 EMERGENCY DEPT VISIT HI MDM: CPT | Mod: 25

## 2019-02-12 PROCEDURE — 25800030 ZZH RX IP 258 OP 636: Performed by: HOSPITALIST

## 2019-02-12 PROCEDURE — 84484 ASSAY OF TROPONIN QUANT: CPT | Performed by: HOSPITALIST

## 2019-02-12 PROCEDURE — 84484 ASSAY OF TROPONIN QUANT: CPT | Performed by: EMERGENCY MEDICINE

## 2019-02-12 PROCEDURE — 83880 ASSAY OF NATRIURETIC PEPTIDE: CPT | Performed by: EMERGENCY MEDICINE

## 2019-02-12 RX ORDER — POTASSIUM CHLORIDE 29.8 MG/ML
20 INJECTION INTRAVENOUS
Status: DISCONTINUED | OUTPATIENT
Start: 2019-02-12 | End: 2019-02-13 | Stop reason: HOSPADM

## 2019-02-12 RX ORDER — POLYETHYLENE GLYCOL 3350 17 G/17G
17 POWDER, FOR SOLUTION ORAL DAILY PRN
Status: DISCONTINUED | OUTPATIENT
Start: 2019-02-12 | End: 2019-02-13 | Stop reason: HOSPADM

## 2019-02-12 RX ORDER — BISACODYL 10 MG
10 SUPPOSITORY, RECTAL RECTAL DAILY PRN
Status: DISCONTINUED | OUTPATIENT
Start: 2019-02-12 | End: 2019-02-13 | Stop reason: HOSPADM

## 2019-02-12 RX ORDER — POTASSIUM CHLORIDE 1.5 G/1.58G
20-40 POWDER, FOR SOLUTION ORAL
Status: DISCONTINUED | OUTPATIENT
Start: 2019-02-12 | End: 2019-02-13 | Stop reason: HOSPADM

## 2019-02-12 RX ORDER — ASPIRIN 81 MG/1
81 TABLET ORAL DAILY
Status: DISCONTINUED | OUTPATIENT
Start: 2019-02-13 | End: 2019-02-13 | Stop reason: HOSPADM

## 2019-02-12 RX ORDER — ACETAMINOPHEN 650 MG/1
650 SUPPOSITORY RECTAL EVERY 4 HOURS PRN
Status: DISCONTINUED | OUTPATIENT
Start: 2019-02-12 | End: 2019-02-13 | Stop reason: HOSPADM

## 2019-02-12 RX ORDER — ISOSORBIDE MONONITRATE 30 MG/1
30 TABLET, EXTENDED RELEASE ORAL DAILY
Status: DISCONTINUED | OUTPATIENT
Start: 2019-02-13 | End: 2019-02-13 | Stop reason: HOSPADM

## 2019-02-12 RX ORDER — ONDANSETRON 4 MG/1
4 TABLET, ORALLY DISINTEGRATING ORAL EVERY 6 HOURS PRN
Status: DISCONTINUED | OUTPATIENT
Start: 2019-02-12 | End: 2019-02-13 | Stop reason: HOSPADM

## 2019-02-12 RX ORDER — POTASSIUM CL/LIDO/0.9 % NACL 10MEQ/0.1L
10 INTRAVENOUS SOLUTION, PIGGYBACK (ML) INTRAVENOUS
Status: DISCONTINUED | OUTPATIENT
Start: 2019-02-12 | End: 2019-02-13 | Stop reason: HOSPADM

## 2019-02-12 RX ORDER — AMOXICILLIN 250 MG
2 CAPSULE ORAL 2 TIMES DAILY PRN
Status: DISCONTINUED | OUTPATIENT
Start: 2019-02-12 | End: 2019-02-13 | Stop reason: HOSPADM

## 2019-02-12 RX ORDER — NALOXONE HYDROCHLORIDE 0.4 MG/ML
.1-.4 INJECTION, SOLUTION INTRAMUSCULAR; INTRAVENOUS; SUBCUTANEOUS
Status: DISCONTINUED | OUTPATIENT
Start: 2019-02-12 | End: 2019-02-13 | Stop reason: HOSPADM

## 2019-02-12 RX ORDER — CLOPIDOGREL BISULFATE 75 MG/1
75 TABLET ORAL DAILY
Status: DISCONTINUED | OUTPATIENT
Start: 2019-02-13 | End: 2019-02-13 | Stop reason: HOSPADM

## 2019-02-12 RX ORDER — ACETAMINOPHEN 325 MG/1
650 TABLET ORAL EVERY 4 HOURS PRN
Status: DISCONTINUED | OUTPATIENT
Start: 2019-02-12 | End: 2019-02-13 | Stop reason: HOSPADM

## 2019-02-12 RX ORDER — MAGNESIUM SULFATE HEPTAHYDRATE 40 MG/ML
4 INJECTION, SOLUTION INTRAVENOUS EVERY 4 HOURS PRN
Status: DISCONTINUED | OUTPATIENT
Start: 2019-02-12 | End: 2019-02-13 | Stop reason: HOSPADM

## 2019-02-12 RX ORDER — POTASSIUM CHLORIDE 7.45 MG/ML
10 INJECTION INTRAVENOUS
Status: DISCONTINUED | OUTPATIENT
Start: 2019-02-12 | End: 2019-02-13 | Stop reason: HOSPADM

## 2019-02-12 RX ORDER — ATORVASTATIN CALCIUM 40 MG/1
40 TABLET, FILM COATED ORAL DAILY
Status: DISCONTINUED | OUTPATIENT
Start: 2019-02-13 | End: 2019-02-13 | Stop reason: HOSPADM

## 2019-02-12 RX ORDER — LIDOCAINE 40 MG/G
CREAM TOPICAL
Status: DISCONTINUED | OUTPATIENT
Start: 2019-02-12 | End: 2019-02-13 | Stop reason: HOSPADM

## 2019-02-12 RX ORDER — SODIUM CHLORIDE 9 MG/ML
INJECTION, SOLUTION INTRAVENOUS CONTINUOUS
Status: DISCONTINUED | OUTPATIENT
Start: 2019-02-12 | End: 2019-02-13 | Stop reason: HOSPADM

## 2019-02-12 RX ORDER — POTASSIUM CHLORIDE 1500 MG/1
20-40 TABLET, EXTENDED RELEASE ORAL
Status: DISCONTINUED | OUTPATIENT
Start: 2019-02-12 | End: 2019-02-13 | Stop reason: HOSPADM

## 2019-02-12 RX ORDER — ONDANSETRON 2 MG/ML
4 INJECTION INTRAMUSCULAR; INTRAVENOUS EVERY 6 HOURS PRN
Status: DISCONTINUED | OUTPATIENT
Start: 2019-02-12 | End: 2019-02-13 | Stop reason: HOSPADM

## 2019-02-12 RX ORDER — AMOXICILLIN 250 MG
1 CAPSULE ORAL 2 TIMES DAILY PRN
Status: DISCONTINUED | OUTPATIENT
Start: 2019-02-12 | End: 2019-02-13 | Stop reason: HOSPADM

## 2019-02-12 RX ADMIN — Medication 4700 UNITS: at 20:38

## 2019-02-12 RX ADMIN — SODIUM CHLORIDE: 9 INJECTION, SOLUTION INTRAVENOUS at 22:50

## 2019-02-12 RX ADMIN — HEPARIN SODIUM 950 UNITS/HR: 10000 INJECTION, SOLUTION INTRAVENOUS at 20:39

## 2019-02-12 ASSESSMENT — ENCOUNTER SYMPTOMS
CHILLS: 0
PALPITATIONS: 1
SHORTNESS OF BREATH: 1
ABDOMINAL PAIN: 0
FEVER: 0
NAUSEA: 0
VOMITING: 0
COUGH: 0

## 2019-02-12 ASSESSMENT — MIFFLIN-ST. JEOR
SCORE: 1724.37
SCORE: 1723.01

## 2019-02-12 NOTE — TELEPHONE ENCOUNTER
Spoke to patient and he stated that he was on his way home to meet his nieces  to get a ride to the ED. Pt aware to tell the ED that he has been having chest heaviness, recently received a test and was not taking his plavix. Will verify patient went to ER when RN returns to work tomorrow.

## 2019-02-12 NOTE — TELEPHONE ENCOUNTER
"Spoke to patient as he has not checked into the ER. 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 RN reinforced the importance of getting to the ER especially if he is currently having symptoms. Pt stated that he felt slight heaviness in chest today but currently feels well. RN again encouraged patient to get to the ER. Patient stated that he is aware of the importance but needs to finish his plowing and get his dog situated before he can go. RN verified that patient it no shoveling or doing heavy labor outside. 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 Pt stated he will get to the ER before 4:30 pm today. RN requested patient call team 4 on his way to the ER and RN will call ER to update. Pt agreed to plan.     Spoke with the pharmacy and inquired about the pharmacy not giving the patient plavix and telling patient that it \"interacted with his lipitor\". Pharmacy on call could not find any report that the system would flag plavix and Lipitor as an issue or that there was an issue with patient picking up his plavix. Pharmacy stated she will notify her staff as this has turned into a dangerous situation and apologizes for any miscommunication on their part.   "

## 2019-02-13 ENCOUNTER — APPOINTMENT (OUTPATIENT)
Dept: CARDIOLOGY | Facility: CLINIC | Age: 59
End: 2019-02-13
Attending: INTERNAL MEDICINE
Payer: COMMERCIAL

## 2019-02-13 VITALS
OXYGEN SATURATION: 97 % | BODY MASS INDEX: 31.1 KG/M2 | RESPIRATION RATE: 16 BRPM | HEIGHT: 68 IN | WEIGHT: 205.2 LBS | TEMPERATURE: 98 F | HEART RATE: 60 BPM | DIASTOLIC BLOOD PRESSURE: 52 MMHG | SYSTOLIC BLOOD PRESSURE: 102 MMHG

## 2019-02-13 LAB
ANION GAP SERPL CALCULATED.3IONS-SCNC: 6 MMOL/L (ref 3–14)
BUN SERPL-MCNC: 16 MG/DL (ref 7–30)
CALCIUM SERPL-MCNC: 8.3 MG/DL (ref 8.5–10.1)
CHLORIDE SERPL-SCNC: 106 MMOL/L (ref 94–109)
CO2 SERPL-SCNC: 29 MMOL/L (ref 20–32)
CREAT SERPL-MCNC: 0.73 MG/DL (ref 0.66–1.25)
ERYTHROCYTE [DISTWIDTH] IN BLOOD BY AUTOMATED COUNT: 14.3 % (ref 10–15)
GFR SERPL CREATININE-BSD FRML MDRD: >90 ML/MIN/{1.73_M2}
GLUCOSE SERPL-MCNC: 96 MG/DL (ref 70–99)
HCT VFR BLD AUTO: 36 % (ref 40–53)
HGB BLD-MCNC: 12 G/DL (ref 13.3–17.7)
LMWH PPP CHRO-ACNC: 0.24 IU/ML
LMWH PPP CHRO-ACNC: 0.33 IU/ML
LMWH PPP CHRO-ACNC: 0.55 IU/ML
MAGNESIUM SERPL-MCNC: 2.2 MG/DL (ref 1.6–2.3)
MCH RBC QN AUTO: 29.9 PG (ref 26.5–33)
MCHC RBC AUTO-ENTMCNC: 33.3 G/DL (ref 31.5–36.5)
MCV RBC AUTO: 90 FL (ref 78–100)
PLATELET # BLD AUTO: 98 10E9/L (ref 150–450)
POTASSIUM SERPL-SCNC: 3.5 MMOL/L (ref 3.4–5.3)
RBC # BLD AUTO: 4.02 10E12/L (ref 4.4–5.9)
SODIUM SERPL-SCNC: 141 MMOL/L (ref 133–144)
TROPONIN I SERPL-MCNC: <0.015 UG/L (ref 0–0.04)
WBC # BLD AUTO: 3.8 10E9/L (ref 4–11)

## 2019-02-13 PROCEDURE — 99239 HOSP IP/OBS DSCHRG MGMT >30: CPT | Performed by: HOSPITALIST

## 2019-02-13 PROCEDURE — 85520 HEPARIN ASSAY: CPT | Performed by: EMERGENCY MEDICINE

## 2019-02-13 PROCEDURE — 93325 DOPPLER ECHO COLOR FLOW MAPG: CPT | Mod: 26 | Performed by: INTERNAL MEDICINE

## 2019-02-13 PROCEDURE — 83735 ASSAY OF MAGNESIUM: CPT | Performed by: EMERGENCY MEDICINE

## 2019-02-13 PROCEDURE — 25000132 ZZH RX MED GY IP 250 OP 250 PS 637: Performed by: HOSPITALIST

## 2019-02-13 PROCEDURE — 93350 STRESS TTE ONLY: CPT | Mod: 26 | Performed by: INTERNAL MEDICINE

## 2019-02-13 PROCEDURE — 40000264 ECHO STRESS ECHOCARDIOGRAM

## 2019-02-13 PROCEDURE — 80048 BASIC METABOLIC PNL TOTAL CA: CPT | Performed by: HOSPITALIST

## 2019-02-13 PROCEDURE — 85027 COMPLETE CBC AUTOMATED: CPT | Performed by: HOSPITALIST

## 2019-02-13 PROCEDURE — 25000128 H RX IP 250 OP 636: Performed by: HOSPITALIST

## 2019-02-13 PROCEDURE — 90682 RIV4 VACC RECOMBINANT DNA IM: CPT | Performed by: HOSPITALIST

## 2019-02-13 PROCEDURE — 93016 CV STRESS TEST SUPVJ ONLY: CPT | Performed by: INTERNAL MEDICINE

## 2019-02-13 PROCEDURE — 99222 1ST HOSP IP/OBS MODERATE 55: CPT | Performed by: INTERNAL MEDICINE

## 2019-02-13 PROCEDURE — 93321 DOPPLER ECHO F-UP/LMTD STD: CPT | Mod: 26 | Performed by: INTERNAL MEDICINE

## 2019-02-13 PROCEDURE — 25800030 ZZH RX IP 258 OP 636: Performed by: HOSPITALIST

## 2019-02-13 PROCEDURE — 25500064 ZZH RX 255 OP 636: Performed by: HOSPITALIST

## 2019-02-13 PROCEDURE — 85520 HEPARIN ASSAY: CPT | Performed by: HOSPITALIST

## 2019-02-13 PROCEDURE — 93018 CV STRESS TEST I&R ONLY: CPT | Performed by: INTERNAL MEDICINE

## 2019-02-13 PROCEDURE — 36415 COLL VENOUS BLD VENIPUNCTURE: CPT | Performed by: HOSPITALIST

## 2019-02-13 PROCEDURE — 84484 ASSAY OF TROPONIN QUANT: CPT | Performed by: HOSPITALIST

## 2019-02-13 PROCEDURE — 36415 COLL VENOUS BLD VENIPUNCTURE: CPT | Performed by: EMERGENCY MEDICINE

## 2019-02-13 RX ORDER — LANOLIN ALCOHOL/MO/W.PET/CERES
1000 CREAM (GRAM) TOPICAL DAILY
Qty: 30 TABLET | Refills: 0 | Status: SHIPPED | OUTPATIENT
Start: 2019-02-13 | End: 2019-03-15

## 2019-02-13 RX ADMIN — HUMAN ALBUMIN MICROSPHERES AND PERFLUTREN 9 ML: 10; .22 INJECTION, SOLUTION INTRAVENOUS at 14:58

## 2019-02-13 RX ADMIN — LEVOTHYROXINE SODIUM 137 MCG: 112 TABLET ORAL at 06:40

## 2019-02-13 RX ADMIN — VITAMIN D, TAB 1000IU (100/BT) 2000 UNITS: 25 TAB at 08:29

## 2019-02-13 RX ADMIN — INFLUENZA A VIRUS A/MICHIGAN/45/2015 (H1N1) RECOMBINANT HEMAGGLUTININ ANTIGEN, INFLUENZA A VIRUS A/SINGAPORE/INFIMH-16-0019/2016 (H3N2) RECOMBINANT HEMAGGLUTININ ANTIGEN, INFLUENZA B VIRUS B/MARYLAND/15/2016 RECOMBINANT HEMAGGLUTININ ANTIGEN, AND INFLUENZA B VIRUS B/PHUKET/3073/2013 RECOMBINANT HEMAGGLUTININ ANTIGEN 0.5 ML: 45; 45; 45; 45 INJECTION INTRAMUSCULAR at 11:47

## 2019-02-13 RX ADMIN — CLOPIDOGREL BISULFATE 75 MG: 75 TABLET, FILM COATED ORAL at 08:30

## 2019-02-13 RX ADMIN — ASPIRIN 81 MG: 81 TABLET, COATED ORAL at 08:29

## 2019-02-13 RX ADMIN — SODIUM CHLORIDE: 9 INJECTION, SOLUTION INTRAVENOUS at 12:00

## 2019-02-13 RX ADMIN — ATORVASTATIN CALCIUM 40 MG: 40 TABLET, FILM COATED ORAL at 08:29

## 2019-02-13 ASSESSMENT — ACTIVITIES OF DAILY LIVING (ADL)
ADLS_ACUITY_SCORE: 11

## 2019-02-13 ASSESSMENT — MIFFLIN-ST. JEOR: SCORE: 1725.28

## 2019-02-13 NOTE — PLAN OF CARE
VSS, no complaints of numbness of pain since the stress test. Stress test results came back with no significant changes, patient was cleared for discharge from cardiology. Patient's PIV was removed. Education provided on follow-up appointments and medications. All questions answered by RN. Patient was transported downstairs by transport aide with all belongings and drove home.

## 2019-02-13 NOTE — PLAN OF CARE
Patient A&O. VSS on RA. Denies pain. Denies numbness/tingling. Lung sounds clear. Tele: SB with 1st degree AV block. Up SBA. Denies pain in calf. Palpable pulses. Heparin gtt paused for 30 minutes and then restarted at 8 units/hr per pharmacy. NPO since midnight. Plan for cardiology consult today.

## 2019-02-13 NOTE — PROGRESS NOTES
[facility]  Hospitalist Progress Note        Jeraldallie Chaney Justin,   02/13/2019        Interval History:      Patient anticipating stress test today, denies chest pain.          Assessment and Plan:        58 year old male who presents with chest pain. Admitted for further evaluation and treatment.      Suspected unstable angina, in the setting of drug-eluting stent to the LAD in November 2018: Patient reporting chest pain with exertion and intermittently at rest.  Was seen in cardiology clinic and advised to seek further evaluation and admission to the hospital previously, however, declined at that time due to occupational reasons.  Evaluated in the emergency department this evening with subsequent admission and initiation of heparin drip.  Initial troponin is less than 0.015.  D-dimer returned at 0.3.  EKG shows no ST elevation.  Chest x-ray on admission is negative with no active infiltrate, per report.  Patient reports he has not been taking Plavix due to complications with pharmacy.  Patient with last echocardiogram showing ejection fraction of 40-45% with mild to moderate global hypokinesia of the left ventricle in November 2018  - Cardiology consulted.   - Serial troponins negative.   - Close hemodynamic monitoring.  - Stress testing.   - Heparin gtt.      Cardiac, ischemic cardiomyopathy, PVCs, hypertension, hyperlipidemia:Patient reports he has not been taking Plavix due to complications with pharmacy.  Patient with last echocardiogram showing ejection fraction of 40-45% with mild to moderate global hypokinesia of the left ventricle in November 2018  - Hold prior to admission hydrochlorthiazide at this time.     Hypothyroidism: Patient with elevated TSH, reflex T4 pending.  - Free T4 level is 1.06.   - PTA Levothyroxine.      History of pulmonary emboli: Patient with previous pulmonary emboli reported in 2010.  - Monitor.  - PTA ASA 81mg  - PTA Atorvastatin.   - PTA Plavix.   - PTA Imdur  "with hold parameters.  - PTA Metoprolol with hold parameters.      DVT Prophylaxis: Heparin gtt.   Code Status: Full Code     Disposition: Pending stress test, discharge likely today or tomorrow.                   Physical Exam:      Heart Rate: 63    Blood pressure 109/62, pulse 51, temperature 97.5  F (36.4  C), temperature source Oral, resp. rate 16, height 1.727 m (5' 8\"), weight 93.1 kg (205 lb 3.2 oz), SpO2 94 %.    Vitals:    19 1842 19 2100 19 0601   Weight: 93 kg (205 lb) 92.9 kg (204 lb 11.2 oz) 93.1 kg (205 lb 3.2 oz)       Vital Sign Ranges  Temperature Temp  Av.8  F (36.6  C)  Min: 97.5  F (36.4  C)  Max: 98.1  F (36.7  C)   Blood pressure Systolic (24hrs), Av , Min:99 , Max:125        Diastolic (24hrs), Av, Min:46, Max:73      Pulse Pulse  Av.7  Min: 51  Max: 64   Respirations Resp  Av.6  Min: 12  Max: 18   Pulse oximetry SpO2  Av.5 %  Min: 94 %  Max: 98 %     Vital Signs with Ranges  Temp:  [97.5  F (36.4  C)-98.1  F (36.7  C)] 97.5  F (36.4  C)  Pulse:  [51-64] 51  Heart Rate:  [52-63] 63  Resp:  [12-18] 16  BP: ()/(46-73) 109/62  SpO2:  [94 %-98 %] 94 %    I/O Last 3 Shifts:   I/O last 3 completed shifts:  In: 113.94 [I.V.:113.94]  Out: -     I/O past 24 hours:     Intake/Output Summary (Last 24 hours) at 2019  Last data filed at 2019 0000  Gross per 24 hour   Intake 113.94 ml   Output --   Net 113.94 ml     GENERAL: Alert and oriented. NAD. Conversational, appropriate. Pleasant.   HEENT: Normocephalic. EOMI. No icterus or injection. Nares normal.   LUNGS: Clear to auscultation. No dyspnea at rest.   HEART: Regular rate. Extremities perfused.   ABDOMEN: Soft, nontender, and nondistended. Positive bowel sounds.   EXTREMITIES: LE edema noted.   NEUROLOGIC: Moves extremities x4 on command. No acute focal neurologic abnormalities noted.          Prior to Admission Medications:        Medications Prior to Admission   Medication Sig " Dispense Refill Last Dose     acetaminophen (TYLENOL) 325 MG tablet Take 2 tablets (650 mg) by mouth every 4 hours as needed for mild pain 100 tablet 0 prn     aspirin 81 MG tablet Take 1 tablet (81 mg) by mouth daily Start tomorrow morning.   2/12/2019 at am     atorvastatin (LIPITOR) 40 MG tablet Take 1 tablet (40 mg) by mouth daily 90 tablet 3 2/12/2019 at am     Cholecalciferol (VITAMIN D) 2000 units tablet Take 1 tablet by mouth daily 100 tablet 12 Past Week at Unknown time     clopidogrel (PLAVIX) 75 MG tablet Take 1 tablet (75 mg) by mouth daily 30 tablet 11 2/12/2019 at Unknown time     cyanocobalamin 1000 MCG SUBL Place 1,000 mcg under the tongue daily   2/12/2019 at am     hydrochlorothiazide (HYDRODIURIL) 25 MG tablet Take 1 tablet (25 mg) by mouth every morning 90 tablet 3 2/12/2019 at am     isosorbide mononitrate (IMDUR) 30 MG 24 hr tablet Take 1 tablet (30 mg) by mouth daily 30 tablet 11 2/12/2019 at am     levothyroxine (SYNTHROID/LEVOTHROID) 137 MCG tablet Take 1 tablet (137 mcg) by mouth daily 90 tablet 3 2/12/2019 at am     metoprolol succinate ER (TOPROL-XL) 50 MG 24 hr tablet Take 1.5 tablets (75 mg) by mouth daily 135 tablet 3 2/12/2019 at am     Multiple Vitamin (MULTI-VITAMIN) per tablet Take 1 tablet by mouth 2 times daily    Past Week at Unknown time     nitroGLYcerin (NITROSTAT) 0.4 MG sublingual tablet For chest pain place 1 tablet under the tongue every 5 minutes for 3 doses. If symptoms persist 5 minutes after 1st dose call 911. 25 tablet 3 prn            Medications:        Current Facility-Administered Medications   Medication Last Rate     HEParin 800 Units/hr (02/13/19 0831)     - MEDICATION INSTRUCTIONS -       sodium chloride 75 mL/hr at 02/13/19 0831     Current Facility-Administered Medications   Medication Dose Route Frequency     aspirin  81 mg Oral Daily     atorvastatin  40 mg Oral Daily     clopidogrel  75 mg Oral Daily     influenza vaccine adult (product based on age)   0.5 mL Intramuscular Prior to discharge     isosorbide mononitrate  30 mg Oral Daily     levothyroxine  137 mcg Oral Daily     metoprolol succinate ER  75 mg Oral Daily     sodium chloride (PF)  3 mL Intracatheter Q8H     vitamin D3  2,000 Units Oral Daily     Current Facility-Administered Medications   Medication Dose Route Frequency     acetaminophen  650 mg Rectal Q4H PRN     acetaminophen  650 mg Oral Q4H PRN     bisacodyl  10 mg Rectal Daily PRN     lidocaine 4%   Topical Q1H PRN     lidocaine (buffered or not buffered)  0.1-1 mL Other Q1H PRN     magnesium sulfate  4 g Intravenous Q4H PRN     melatonin  1 mg Oral At Bedtime PRN     naloxone  0.1-0.4 mg Intravenous Q2 Min PRN     ondansetron  4 mg Oral Q6H PRN    Or     ondansetron  4 mg Intravenous Q6H PRN     - MEDICATION INSTRUCTIONS -   Does not apply Continuous PRN     polyethylene glycol  17 g Oral Daily PRN     potassium chloride  20-40 mEq Oral or Feeding Tube Q2H PRN     potassium chloride with lidocaine  10 mEq Intravenous Q1H PRN     potassium chloride  10 mEq Intravenous Q1H PRN     potassium chloride  20 mEq Intravenous Q1H PRN     potassium chloride  20-40 mEq Oral Q2H PRN     senna-docusate  1 tablet Oral BID PRN    Or     senna-docusate  2 tablet Oral BID PRN     sodium chloride (PF)  3 mL Intracatheter q1 min prn            Data:      Lab data reviewed.   Recent Labs   Lab 02/13/19  0534 02/13/19  0210 02/12/19  2155 02/12/19  1856   HGB 12.0*  --   --  13.6   MCV 90  --   --  90   PLT 98*  --   --  130*   INR  --   --   --  1.04     --   --  138   POTASSIUM 3.5  --   --  3.5   CHLORIDE 106  --   --  101   CO2 29  --   --  32   BUN 16  --   --  21   CR 0.73  --   --  0.87   ANIONGAP 6  --   --  5   GUERITA 8.3*  --   --  8.7   GLC 96  --   --  102*   TROPI  --  <0.015 <0.015 <0.015           Imaging:      Imaging data reviewed.     Dr. Jerald Anderson D.O.  [facility]  Pager [phone]

## 2019-02-13 NOTE — ED NOTES
"[facility]  ED Nurse Handoff Report    ED Chief complaint: Chest Pain (sent here from heart clinic for left sided chest pain. stent placed 1 month ago, states that he has not had plavix for approx. 1 month.)      ED Diagnosis:   Final diagnoses:   Unstable angina (H)       Code Status: Full Code... According to prior visit    Allergies:   Allergies   Allergen Reactions     No Known Allergies        Activity level - Baseline/Home:  Independent    Activity Level - Current:   Independent     Needed?: No    Isolation: No  Infection: Not Applicable  Bariatric?: No    Vital Signs:   Vitals:    02/12/19 1842   BP: 112/60   Pulse: 64   Resp: 18   Temp: 98.1  F (36.7  C)   TempSrc: Oral   SpO2: 97%   Weight: 93 kg (205 lb)   Height: 1.727 m (5' 8\")       Cardiac Rhythm: ,   Cardiac  Cardiac Rhythm: Normal sinus rhythm    Pain level: 0-10 Pain Scale: 3    Is this patient confused?: No   Does this patient have a guardian?  No         If yes, is there guardianship documents in the Epic \"Code/ACP\" activity?  N/A         Guardian Notified?  N/A  [place] - Suicide Severity Rating Scale Completed?  Yes  If yes, what color did the patient score?  White    Patient Report: Initial Complaint: chest pain  Focused Assessment: sent here from heart clinic for left sided chest pain.  He had stent placed 1 month ago and apparently has not had plavix as ordered.  Tests Performed: labs, EKG, CXR  Abnormal Results:   Results for orders placed or performed during the hospital encounter of 02/12/19   CBC with platelets differential   Result Value Ref Range    WBC 5.3 4.0 - 11.0 10e9/L    RBC Count 4.58 4.4 - 5.9 10e12/L    Hemoglobin 13.6 13.3 - 17.7 g/dL    Hematocrit 41.1 40.0 - 53.0 %    MCV 90 78 - 100 fl    MCH 29.7 26.5 - 33.0 pg    MCHC 33.1 31.5 - 36.5 g/dL    RDW 14.2 10.0 - 15.0 %    Platelet Count 130 (L) 150 - 450 10e9/L    Diff Method Automated Method     % Neutrophils 59.0 %    % Lymphocytes 25.1 %    % " Monocytes 9.4 %    % Eosinophils 5.4 %    % Basophils 0.9 %    % Immature Granulocytes 0.2 %    Nucleated RBCs 0 0 /100    Absolute Neutrophil 3.1 1.6 - 8.3 10e9/L    Absolute Lymphocytes 1.3 0.8 - 5.3 10e9/L    Absolute Monocytes 0.5 0.0 - 1.3 10e9/L    Absolute Eosinophils 0.3 0.0 - 0.7 10e9/L    Absolute Basophils 0.1 0.0 - 0.2 10e9/L    Abs Immature Granulocytes 0.0 0 - 0.4 10e9/L    Absolute Nucleated RBC 0.0    Basic metabolic panel   Result Value Ref Range    Sodium 138 133 - 144 mmol/L    Potassium 3.5 3.4 - 5.3 mmol/L    Chloride 101 94 - 109 mmol/L    Carbon Dioxide 32 20 - 32 mmol/L    Anion Gap 5 3 - 14 mmol/L    Glucose 102 (H) 70 - 99 mg/dL    Urea Nitrogen 21 7 - 30 mg/dL    Creatinine 0.87 0.66 - 1.25 mg/dL    GFR Estimate >90 >60 mL/min/[1.73_m2]    GFR Estimate If Black >90 >60 mL/min/[1.73_m2]    Calcium 8.7 8.5 - 10.1 mg/dL   Troponin I   Result Value Ref Range    Troponin I ES <0.015 0.000 - 0.045 ug/L   TSH with free T4 reflex   Result Value Ref Range    TSH 5.60 (H) 0.40 - 4.00 mU/L   INR   Result Value Ref Range    INR 1.04 0.86 - 1.14   D dimer quantitative   Result Value Ref Range    D Dimer 0.3 0.0 - 0.50 ug/ml FEU   Nt probnp inpatient   Result Value Ref Range    N-Terminal Pro BNP Inpatient 307 0 - 900 pg/mL   T4 free   Result Value Ref Range    T4 Free 1.06 0.76 - 1.46 ng/dL   EKG 12 lead   Result Value Ref Range    Interpretation ECG Click View Image link to view waveform and result    EKG 12 lead   Result Value Ref Range    Interpretation ECG Click View Image link to view waveform and result        Treatments provided: hepartin bolus and drip    Family Comments: none present    OBS brochure/video discussed/provided to patient/family: N/A              Name of person given brochure if not patient: NA              Relationship to patient: NA    ED Medications:   Medications   heparin infusion 25,000 units in 0.45% NaCl 250 mL (not administered)   heparin Loading Dose bolus dose from  infusion pump 4,700 Units (not administered)       Drips infusing?:  Yes    For the majority of the shift this patient was Green.   Interventions performed were routine cares and interventions.    Severe Sepsis OR Septic Shock Diagnosis Present: No    To be done/followed up on inpatient unit:      ED NURSE PHONE NUMBER: *[phone]

## 2019-02-13 NOTE — PROGRESS NOTES
Normal stress echo (non-diagnostic due to inadequate HR with beta blockers).  No symptoms and no evidence of cardiac ischemia.  Likely non-cardiac chest pain.  OK to go home.  F/U with cardiology MELECIO within one month please.  Discussed with the patient.  Cody Bronson MD, FACC  February 13, 2019 4:20 PM

## 2019-02-13 NOTE — CONSULTS
Consult Date:  02/12/2019      CARDIOLOGY CONSULTATION      PRIMARY CARE PHYSICIAN:  Addison Davis MD      CARDIOLOGIST:  Tereza Key MD      REASON FOR CONSULTATION:  I have been asked by Dr. Jerald Anderson to see Ramiro Jeffers for evaluation of precordial chest tightness in a patient with known coronary artery disease.      HISTORY OF PRESENT ILLNESS:  Ramiro Jeffers is a pleasant 58-year-old male status post proximal LAD stenting on 11/15/2018 for an 80% stenosis with mild-to-moderate calcification.  There was an ostial 50% narrowing at the origin of the moderate-size second diagonal branch artery and only mild nonsignificant atheromatous changes in the circumflex artery.  Right coronary artery was dominant and normal.  At the end of the procedure, there was minimal pre-stent narrowing in the LAD and no evidence of dissection.  A grade 3 perfusion was noted.  The patient states that approximately a month ago, he had some issues obtaining his Plavix at the pharmacy, and he has been without the Plavix for the last month.  He was finally able to obtain the Plavix again 2 days ago, at which point he was loaded with 300 mg and then has been taking 75 mg yesterday and today.  He presents after referral from the Cardiology office for recurrent precordial chest tightness.  This would occur generally with activities or at rest.  The patient was participating in cardiac rehabilitation until the last few weeks but missed 1-1/2 weeks to travel to [place] to open a winter home for his mother.  When he came back, he was told that he no longer qualified for cardiac rehabilitation.  He has not been exercising on his own but notes the chest tightness with exertion, otherwise.  Because of these symptoms, he was referred to the emergency room last evening.  His troponin has been less than 0.015 and D-dimer 0.3.  Initial EKG has been stable without ST or T-wave abnormalities.  His last echocardiogram showed an ejection fraction of  40% -45% with mild global hypokinesis of the left ventricle in 11/2018.  He denies nausea or vomiting.  He does have ongoing palpitations, which have been shown to be premature ventricular contractions.  He denies syncope or presyncope.  He denies any recent trauma.      At the time of the patient's previous hospitalization in November, initial attempts at a CT angiogram were unsuccessful, and an invasive angiogram was performed as above.      ALLERGIES:  NONE KNOWN ABOUT.        MEDICATIONS ON ADMISSION:   1.  Aspirin 81 mg daily.   2.  Atorvastatin 40 mg daily.   3.  Vitamin D 2000 units daily.   4.  Clopidogrel 75 mg daily.   5.  Acetaminophen 325 mg 2 tablets by mouth q.4 hours p.r.n.   6.  Cyanocobalamin 1000 mcg sublingual daily.   7.  Hydrochlorothiazide 25 mg each morning.   8.  Isosorbide mononitrate 30 mg tablet by mouth daily.   9.  Levothyroxine 137 mcg daily.   10.  Toprol-XL 50 mg 1-1/2 tablets by mouth daily.   11.  Multivitamin 1 p.o. 2 times daily.   12.  Nitrostat p.r.n.      PAST MEDICAL HISTORY:   1.  Coronary artery disease as mentioned above.   2.  Hypothyroidism on replacement.   3.  DVT with pulmonary emboli in 06/2010.  Thrombophilic workup was normal.  He had a transient moderate lupus inhibitor initially, which was gone 3 months later.  No recurrence.     4.  Premature ventricular contractions with 4% burden on Holter monitor in 11/2018.   5.  Vitamin D deficiency.   6.  Venous insufficiency with periodic peripheral edema.   7.  Previous obesity with significant weight loss with diet.  On 10/15/2010, BMI was 56.4.   8.  Laparoscopic gastric bypass on 10/21/2010 with reduction of an incarcerated incisional hernia on 10/23/2010 with repair of the incisional hernia.   9.  Laparoscopic cholecystectomy on 09/28/2012 with lysis of adhesions.   10.  Laparoscopic surgical incisional repair of a ventral hernia.   11.  Cataract extraction on 12/19/2011 with intraocular lens implant.      No history  of peptic ulcer disease, cancer, tuberculosis, kidney stones or disease, hyper or hypothyroidism.      SOCIAL HISTORY:  The patient is a nonsmoker.  He does not drink alcohol.  He works in a snow plow service.  He has noted some increased chest discomfort with shoveling snow recently.      FAMILY HISTORY:  Coronary artery disease in 2 brothers but no other premature atherosclerosis.      REVIEW OF SYSTEMS:  A 12-point review of system is performed with pertinent positives and negatives listed in the HPI.  All other review of systems is asked and is negative.  He does note some pain and swelling, especially in the left greater than right lower extremity.      PHYSICAL EXAMINATION:   VITAL SIGNS:  Current blood pressure is 109/62, pulse 51 and regular.  Respiratory rate is 14 and unlabored.  The patient is afebrile.  Current weight 205 pounds (93 kg).   GENERAL:  The patient is an alert white male, appearing his stated age, in no acute distress.   HEENT:  Normocephalic, atraumatic without xanthelasma.  No arcus senilis.  No oropharyngeal lesions.  Mucous membranes are moist.   NECK:  Supple without thyromegaly.  Carotid upstroke is brisk without bruits.   CHEST:  Clear to auscultation without wheezes, rales or rhonchi.  No kyphosis or scoliosis.   CARDIAC:  Regular rate and rhythm.  Normal S1 and S2 without gallop or murmur.  No heave, rub or thrill.  No JVD or HJR.  Pulses were all intact without bruits.  No tenderness to palpation across the precordium.   ABDOMEN:  Soft, nontender without organomegaly.  Bowel sounds are present.  No bruits.   EXTREMITIES:  Without cyanosis, clubbing or edema.   SKIN:  Warm, dry and intact.   NEUROLOGIC:  Alert and oriented x3.  Cranial nerves II through XII grossly intact.     PSYHCIATRIC:  Affect is normal.      IMAGING:  EKG demonstrates sinus bradycardia with first-degree AV block.  No acute ST-T wave abnormalities.  Poor R-wave progression V1 through V3.  Other EKGs demonstrate  occasional premature ventricular contractions.      Chest x-ray reveals pulmonary vasculature is normal and normal heart size.  I personally reviewed this patient's chest x-ray.      LABORATORY:  Troponin levels as above.  BUN 16, creatinine 0.73, potassium 3.5.  Magnesium 2.2.  White count 3800, hemoglobin 12.0, platelet count 98,000.      ASSESSMENT:   1.  Ramiro Jeffers is a pleasant 58-year-old male status post intracoronary stenting of the left anterior descending (LAD) in 2018.  The patient has had recurrent typical precordial chest tightness.  No evidence of myocardial infarction or ischemia on EKG.  Troponins are normal.  A stress echocardiogram is warranted.  The patient was without Plavix for 1 month, but there is no current evidence of an acute thrombotic myocardial infarction (MI).   2.  History of hyperlipidemia, currently on a statin.   3.  Hypertension, currently well controlled.   4.  Premature ventricular contractions (PVCs).  This is chronic and being treated with a beta blocker.   5.  History of pulmonary emboli and deep venous thrombosis (DVT) in 2010 without recurrence.   6.  History of hypothyroidism, on replacement.  The patient's TSH on 02/12/2019 is 5.6 with free T4 of 1.06.      PLAN:   1.  Stress echocardiogram.   2.  Continue current medications.   3.  Discharge to home if normal stress echo.  If abnormal stress echo, a coronary angiography may be necessary.  It is always possible that a side branch has been jailed, and he is having angina on that basis.  We would also verify that the diagonal branch artery is normal.   4.  Thrombocytopenia.  This can be evaluated by primary care.  I do not know if this is related to the patient's heparin or not.      It is my pleasure to assist in the care of Ramiro Jeffers.  All his questions were answered to his satisfaction.         JAYANT ROJAS MD, FACC             D: 02/13/2019   T: 02/13/2019   MT: ALIYA      Name:     RAMIRO JEFFERS   MRN:       [id number]        Account:       [id number]   :      1960           Consult Date:  2019      Document: [id number]

## 2019-02-13 NOTE — ED PROVIDER NOTES
History     Chief Complaint:  Chest Pain       KRISTA Jeffers is a 58 year old male with a past medical history significant for DVT/PE, hypertension, gastric bypass surgery in 2010, hyperlipidemia, and stent placement in the proximal LAD in November of 2018 who presents for evaluation of chest pain after he was seen at his Cardiology clinic yesterday. During his cardiology follow up appointment it was noted that the patient had not been taking his Plavix for the past month and with his past medical history of coronary artery disease with stable angina they recommended that he go to the emergency department for treatment and workup. The patient reports that he has been experiencing chest pain with exertion and occasionally has some pain at rest. He mentions that over the past month he has not been taking Plavix because his pharmacy would not fill his prescription. He has been taking a baby aspirin daily. He last experienced chest pain earlier today while he was lifting a  into the bed of a truck. Additionally, the patient reports palpitations whenever he experiences shortness of breath. He also mentions that he has leg swelling. He denies fevers, chills, cough, abdominal pain, vomiting, diarrhea. No new surgeries.     Allergies:  No known drug allergies     Medications:    acetaminophen (TYLENOL) 325 MG tablet  aspirin 81 MG tablet  atorvastatin (LIPITOR) 40 MG tablet  Cholecalciferol (VITAMIN D) 2000 units tablet  clopidogrel (PLAVIX) 75 MG tablet  cyanocobalamin 1000 MCG SUBL  hydrochlorothiazide (HYDRODIURIL) 25 MG tablet  isosorbide mononitrate (IMDUR) 30 MG 24 hr tablet  levothyroxine (SYNTHROID/LEVOTHROID) 137 MCG tablet  metoprolol succinate ER (TOPROL-XL) 50 MG 24 hr tablet  Multiple Vitamin (MULTI-VITAMIN) per tablet  nitroGLYcerin (NITROSTAT) 0.4 MG sublingual tablet      Past Medical History:    ACQUIRED HYPOTHYROIDISM    Blood clot in the legs   CAD (coronary artery disease)   Calculus of  "gallbladder without mention of cholecystitis or obstruction   Cataract   Chest pressure   Hyperlipidemia   Hypertension   Ischemic cardiomyopathy   Obesity, unspecified   pulmonary emboli   Pulmonary embolus, bilateral   PVC's (premature ventricular contractions)   Shortness of breath   Venous insufficiency   Viral pneumonia, unspecified   Vitamin D deficiency    Past Surgical History:    Laparoscopic gastric bypass,   COLONOSCOPY   LAPAROSCOPIC CHOLECYSTECTOMY   LAPAROSCOPIC LYSIS ADHESIONS   repair of ventral strangulated surgery  PHACOEMULSIFICATION CLEAR CORNEA WITH STANDARD INTRAOCULAR LENS IMPLANT     Family History:    Osteoporosis   Thyroid disease  Cancer      Social History:  Smoking status: Never smoker  Alcohol use: No  Marital Status:  Single [1]       Review of Systems   Constitutional: Negative for chills and fever.   Respiratory: Positive for shortness of breath. Negative for cough.    Cardiovascular: Positive for chest pain, palpitations and leg swelling.   Gastrointestinal: Negative for abdominal pain, nausea and vomiting.   All other systems reviewed and are negative.    Physical Exam     Patient Vitals for the past 24 hrs:   BP Temp Temp src Pulse Heart Rate Resp SpO2 Height Weight   02/12/19 2100 125/67 97.6  F (36.4  C) Oral -- 53 12 94 % 1.727 m (5' 8\") 92.9 kg (204 lb 11.2 oz)   02/12/19 2036 -- -- -- -- 52 12 96 % -- --   02/12/19 2032 99/73 -- -- 52 -- -- 98 % -- --   02/12/19 1842 112/60 98.1  F (36.7  C) Oral 64 -- 18 97 % 1.727 m (5' 8\") 93 kg (205 lb)       Physical Exam  General: Alert, appears well-developed and well-nourished. Cooperative.     In mild distress  HEENT:  Head:  Atraumatic  Ears:  External ears are normal  Mouth/Throat:  Oropharynx is without erythema or exudate and mucous membranes are moist.   Eyes:   Conjunctivae normal and EOM are normal. No scleral icterus.  CV:  Normal rate, regular rhythm, normal heart sounds and radial pulses are 2+ and symmetric.  No " murmur.  Resp:  Breath sounds are clear bilaterally    Non-labored, no retractions or accessory muscle use  GI:  Abdomen is soft, no distension, no tenderness. No rebound or guarding.  No CVA tenderness bilaterally  MS:  Normal range of motion. No edema.    Normal strength in all 4 extremities.     Back atraumatic.    No midline cervical, thoracic, or lumbar tenderness  Skin:  Warm and dry.  No rash or lesions noted.  Neuro: Alert. Normal strength.  GCS: 15  Psych:  Normal mood and affect.    Emergency Department Course   ECG (18:47:52):  Rate 66 bpm. CO interval 210. QRS duration 114. QT/QTc 406/425. P-R-T axes 66 7 55. Sinus rhythm with 1st degree AV block with occasional premature ventricular complexes, Incomplete left bundle branch block, Borderline ECG, No significant change compared to EKG dated 1/15/18, Interpreted at 1850 by Guerrero Quinn MD.      Imaging:  Radiographic findings were communicated with the patient who voiced understanding of the findings.    XR chest  IMPRESSION: Negative, no active infiltrate. No pneumothorax is  identified.   As read by radiology     Laboratory:  T4 free: 1.06  Troponin I: <0.015  D dimer: 0.3  INR: 1. 04  TSH with free T4 reflex; 5.60  BMP: Glucose 102, WNL (Creatinine 0.87)  CBC: WBC 5.3, HGB 13.6,     Interventions:  2038: Heparin loading dose 4700 units IV Bolus  2114: Heparin infusion 25,000 units at 950 units/hr IV Infusion      Emergency Department Course:  Past medical records, nursing notes, and vitals reviewed.  1850: I performed an exam of the patient and obtained history, as documented above.    IV inserted and blood drawn.    Findings and plan explained to the Patient who consents to admission.     1948: Discussed the patient with , who will admit the patient to a [facility] bed for further monitoring, evaluation, and treatment.       Impression & Plan      Medical Decision Making:  Patient is a 58-year-old male with a past medical history pertinent  for CAD with recent stent placement in proximal LAD in November 2018 who presents with exertional chest pain as well as intermittent chest pain episodes while at rest.  Patient's presentation is concerning for unstable angina particularly in the setting of missed dual antiplatelet therapy post stent placement.  Patient has not been taking his Plavix regularly in the last 30 days and has had increasing symptoms in the last 1-2 weeks.  His symptoms are concerning for unstable angina and ultimately patient was heparinized here in the emergency department with plans for cardiology consultation early tomorrow.  Patient's initial EKG showed no concerning ischemic changes comparison with prior EKGs and his initial troponin is undetectable.  Chest x-ray shows no active infiltrates or pneumothorax.  Ultimately d-dimer reassuringly negative, low concern for pulmonary embolism.  Patient is asymptomatic at bedside although his stuttering chest pain symptoms including discomfort wall active as well as chest pain while at rest very concerning for potential ACS.  Spoke with  of the hospitalist service who agreed to admission at this time.    Critical Care time was 30 minutes for this patient excluding procedures.      Diagnosis:    ICD-10-CM    1. Unstable angina (H) I20.0 TSH with free T4 reflex     T4 free     T4 free     Troponin I     CANCELED: T4 free (Add on or recollect)   2. Chest pain, unspecified type R07.9    3. Exertional chest pain R07.9        Disposition:  Admitted to [facility]    Jelani Rahman  2/12/2019    EMERGENCY DEPARTMENT    Scribe Disclosure:  IJelani, am serving as a scribe at 6:50 PM on 2/12/2019 to document services personally performed by Guerrero Quinn MD based on my observations and the provider's statements to me.          Guerrero uQinn MD  02/12/19 [id number]

## 2019-02-13 NOTE — H&P
[facility]    History and Physical  Hospitalist       Date of Admission:  2/12/2019  Date of Service (when I saw the patient): 02/12/19    Assessment & Plan   Ramiro Jeffers is a 58 year old male who presents with chest pain. Admitted for further evaluation and treatment.     Suspected unstable angina, in the setting of drug-eluting stent to the LAD in November 2018: Patient reporting chest pain with exertion and intermittently at rest.  Was seen in cardiology clinic and advised to seek further evaluation and admission to the hospital previously, however, declined at that time due to occupational reasons.  Evaluated in the emergency department this evening with subsequent admission and initiation of heparin drip.  Initial troponin is less than 0.015.  D-dimer returned at 0.3.  EKG shows no ST elevation.  Chest x-ray on admission is negative with no active infiltrate, per report.  Patient reports he has not been taking Plavix due to complications with pharmacy.  Patient with last echocardiogram showing ejection fraction of 40-45% with mild to moderate global hypokinesia of the left ventricle in November 2018  - Cardiology consulted.   - Serial troponins.  - Close hemodynamic monitoring.  - N.p.o. at midnight.  - Heparin gtt.     Cardiac, ischemic cardiomyopathy, PVCs, hypertension, hyperlipidemia:Patient reports he has not been taking Plavix due to complications with pharmacy.  Patient with last echocardiogram showing ejection fraction of 40-45% with mild to moderate global hypokinesia of the left ventricle in November 2018  - Hold prior to admission hydrochlorthiazide at this time.    Hypothyroidism: Patient with elevated TSH, reflex T4 pending.  - Free T4 level.  - PTA Levothyroxine.     History of pulmonary emboli: Patient with previous pulmonary emboli reported in 2010.  - Monitor.  - PTA ASA 81mg  - PTA Atorvastatin.   - PTA Plavix.   - PTA Imdur with hold parameters.  - PTA Metoprolol with hold  parameters.     DVT Prophylaxis: Heparin gtt.   Code Status: Full Code    Disposition: Inpatient.    Dr. Jerald Anderson D.O.  [facility] Hospitalist  Pager [phone]    Primary Care Physician   Addison Davis    Chief Complaint   Chest pain    History is obtained from the patient and medical records.     History of Present Illness   Ramiro Jeffers is a 58 year old male who presents with chest pain. Admitted for further evaluation and treatment. Patient reporting chest pain with exertion and intermittently at rest.  Was seen in cardiology clinic and advised to seek further evaluation and admission to the hospital previously, however, declined at that time due to occupational reasons.  Evaluated in the emergency department this evening with subsequent admission and initiation of heparin drip.  Initial troponin is less than 0.015.  D-dimer returned at 0.3.  EKG shows no ST elevation.  Chest x-ray on admission is negative with no active infiltrate, per report.  Patient was in the cardiology clinic yesterday and advised for admission.  Patient states that he has not been taking Plavix, due to complications with pharmacy.  Patient works as snow removal, with intermittent chest pain during exertion.  Patient does endorse leg swelling, denies fevers, abdominal pain, nausea, vomiting, blood in the stool or urine, rash, dysuria, headache.    Past Medical History    I have reviewed this patient's medical history and updated it with pertinent information if needed.   Past Medical History:   Diagnosis Date     ACQUIRED HYPOTHYROIDISM       Blood clot in the legs      CAD (coronary artery disease)     11/15/18 Cath: PCI with 3.5-16mm DORY to proximal LAD     Calculus of gallbladder without mention of cholecystitis or obstruction      Cataract      Chest pressure 11/5/2018     Hyperlipidemia      Hypertension      Ischemic cardiomyopathy     EF 40-45% on 11/2018 Echo     Obesity, unspecified     significant  weight loss w/diet alone, on 10-15-10, BMI was 56.4     pulmonary emboli 6-2010    Unprovoked large bilateral pulmonary emboli without source identified on lower extremity dopplers, pt had a transient moderate lupus inhibitor upon initial presentation in June , 2010 which was gone in September, 2010;  all other thrombophilic workup is normal     Pulmonary embolus, bilateral      PVC's (premature ventricular contractions)     4% burden on 11/2018 Holter     Shortness of breath     on exertion     Venous insufficiency      Viral pneumonia, unspecified      Vitamin D deficiency        Past Surgical History   I have reviewed this patient's surgical history and updated it with pertinent information if needed.  Past Surgical History:   Procedure Laterality Date     C NONSPECIFIC PROCEDURE  10-    Laparoscopic gastric bypass,     C NONSPECIFIC PROCEDURE  10-    1.  Attempted laparoscopic exploration with conversion to: .  Abdominal exploration. 3.  Reduction of incarcerated incisional hernia. 4.  Repair of incisional hernia.5.  Gastrostomy tube placement (#22 Y[place]) into defunctionalized stomach.6.  Enterotomy with bowel decompression and primary repair. 7.  Abdominal lavage.      COLONOSCOPY  11/17/2011    Procedure:COLONOSCOPY; colonoscopy; Surgeon:ELENA OROURKE; Location: GI     LAPAROSCOPIC CHOLECYSTECTOMY  9/28/2012    Procedure: LAPAROSCOPIC CHOLECYSTECTOMY;  LAPAROSCOPIC CHOLECYSTECTOMY, LYSIS OF ADHESIONS;  Surgeon: Dutch Matta MD;  Location:  OR     LAPAROSCOPIC LYSIS ADHESIONS  9/28/2012    Procedure: LAPAROSCOPIC LYSIS ADHESIONS;;  Surgeon: Dutch Matta MD;  Location:  OR     LAPAROSCOPY, SURGICAL; REPAIR INCISIONAL OR VENTRAL HERNIA      repair of ventral strangulated surgery     PHACOEMULSIFICATION CLEAR CORNEA WITH STANDARD INTRAOCULAR LENS IMPLANT  12/19/2011    Procedure:PHACOEMULSIFICATION CLEAR CORNEA WITH STANDARD INTRAOCULAR LENS IMPLANT; RIGHT PHACOEMULSIFICATION CLEAR  CORNEA WITH STANDARD INTRAOCULAR LENS IMPLANT ; Surgeon:ALLISON ANTONIO; Location:[facility]       Prior to Admission Medications   Prior to Admission Medications   Prescriptions Last Dose Informant Patient Reported? Taking?   Cholecalciferol (VITAMIN D) 2000 units tablet Past Week at Unknown time  No Yes   Sig: Take 1 tablet by mouth daily   Multiple Vitamin (MULTI-VITAMIN) per tablet Past Week at Unknown time Self Yes Yes   Sig: Take 1 tablet by mouth 2 times daily    acetaminophen (TYLENOL) 325 MG tablet prn  No Yes   Sig: Take 2 tablets (650 mg) by mouth every 4 hours as needed for mild pain   aspirin 81 MG tablet 2/12/2019 at am  No Yes   Sig: Take 1 tablet (81 mg) by mouth daily Start tomorrow morning.   atorvastatin (LIPITOR) 40 MG tablet 2/12/2019 at am  No Yes   Sig: Take 1 tablet (40 mg) by mouth daily   clopidogrel (PLAVIX) 75 MG tablet 2/12/2019 at Unknown time  No Yes   Sig: Take 1 tablet (75 mg) by mouth daily   cyanocobalamin 1000 MCG SUBL 2/12/2019 at am Self Yes Yes   Sig: Place 1,000 mcg under the tongue daily   hydrochlorothiazide (HYDRODIURIL) 25 MG tablet 2/12/2019 at am  No Yes   Sig: Take 1 tablet (25 mg) by mouth every morning   isosorbide mononitrate (IMDUR) 30 MG 24 hr tablet 2/12/2019 at am  No Yes   Sig: Take 1 tablet (30 mg) by mouth daily   levothyroxine (SYNTHROID/LEVOTHROID) 137 MCG tablet 2/12/2019 at am  No Yes   Sig: Take 1 tablet (137 mcg) by mouth daily   metoprolol succinate ER (TOPROL-XL) 50 MG 24 hr tablet 2/12/2019 at am  No Yes   Sig: Take 1.5 tablets (75 mg) by mouth daily   nitroGLYcerin (NITROSTAT) 0.4 MG sublingual tablet prn  No Yes   Sig: For chest pain place 1 tablet under the tongue every 5 minutes for 3 doses. If symptoms persist 5 minutes after 1st dose call 911.      Facility-Administered Medications: None     Allergies   Allergies   Allergen Reactions     No Known Allergies        Social History   I have reviewed this patient's social history and updated it with  pertinent information if needed. Ramiro Jeffers  reports that  has never smoked. he has never used smokeless tobacco. He reports that he does not drink alcohol or use drugs.    Family History   I have reviewed this patient's family history and updated it with pertinent information if needed.   Family History   Problem Relation Age of Onset     Osteoporosis Mother      Thyroid Disease Mother         no thyroid     Cancer Maternal Grandmother         ? lung cancer     Cancer Father         melanoma     Coronary Artery Disease Brother      Coronary Artery Disease Brother        Review of Systems   The 10 point Review of Systems is negative other than noted in the HPI or here.     Physical Exam   Temp: 98.1  F (36.7  C) Temp src: Oral BP: 112/60 Pulse: 64   Resp: 18 SpO2: 97 %      Vital Signs with Ranges  Temp:  [98.1  F (36.7  C)] 98.1  F (36.7  C)  Pulse:  [64] 64  Resp:  [18] 18  BP: (112)/(60) 112/60  SpO2:  [97 %] 97 %  205 lbs 0 oz    GENERAL: Alert and oriented. NAD. Conversational, appropriate.   HEENT: Normocephalic. EOMI. No icterus or injection. Nares normal.   LUNGS: Clear to auscultation. No dyspnea at rest.   HEART: Regular rate. Extremities perfused.   ABDOMEN: Soft, nontender, and nondistended. Positive bowel sounds.   EXTREMITIES: LE edema noted.   NEUROLOGIC: Moves extremities x4 on command. No acute focal neurologic abnormalities noted.     Data   Data reviewed today:  I personally reviewed both laboratory and imaging data.   Recent Labs   Lab 02/12/19  1856   WBC 5.3   HGB 13.6   MCV 90   *   INR 1.04      POTASSIUM 3.5   CHLORIDE 101   CO2 32   BUN 21   CR 0.87   ANIONGAP 5   GUERITA 8.7   *   TROPI <0.015       No results found for this or any previous visit (from the past 24 hour(s)).

## 2019-02-13 NOTE — CONSULTS
Full Cardiology consultation dictated.  Typical exertional chest tightness with LAD stenting in 11/2018.  Without Plavix for the last one month with 300 mg bolus given yesterday and normal dose today.  Normal troponin and EKG.  Will perform stress echo today.  Needs to return to outpatient cardiac rehab.  Cody Bronson MD, FACC  February 13, 2019 11:42 AM  [id number]

## 2019-02-13 NOTE — PROVIDER NOTIFICATION
MD Notification    Notified Person: MD    Notified Person Name: Justin    Notification Date/Time: 2/13 1630    Notification Interaction: paged    Purpose of Notification: cleared for discharge from cards    Orders Received:    Comments:

## 2019-02-13 NOTE — DISCHARGE SUMMARY
[facility]    Discharge Summary  Hospitalist    Date of Admission:  2/12/2019  Date of Discharge:  2/13/2019  Discharging Provider: Jerald Anderson  Date of Service (when I saw the patient): 02/13/19    History of Present Illness   Ramiro Jeffers is a 58 year old male who presents with chest pain. Admitted for further evaluation and treatment. Patient reporting chest pain with exertion and intermittently at rest.  Was seen in cardiology clinic and advised to seek further evaluation and admission to the hospital previously, however, declined at that time due to occupational reasons.  Evaluated in the emergency department this evening with subsequent admission and initiation of heparin drip.  Initial troponin is less than 0.015.  D-dimer returned at 0.3.  EKG shows no ST elevation.  Chest x-ray on admission is negative with no active infiltrate, per report.  Patient was in the cardiology clinic yesterday and advised for admission.  Patient states that he has not been taking Plavix, due to complications with pharmacy.  Patient works as snow removal, with intermittent chest pain during exertion.  Patient does endorse leg swelling, denies fevers, abdominal pain, nausea, vomiting, blood in the stool or urine, rash, dysuria, headache.    Hospital Course   Ramiro Jeffers was admitted on 2/12/2019.  The following problems were addressed during his hospitalization:    58 year old male who presents with chest pain. Admitted for further evaluation and treatment.      Non-cardiac chest pain, in the setting of drug-eluting stent to the LAD in November 2018: Patient reporting chest pain with exertion and intermittently at rest.  Was seen in cardiology clinic and advised to seek further evaluation and admission to the hospital previously, however, declined at that time due to occupational reasons.  Evaluated in the emergency department this evening with subsequent admission and initiation of heparin drip.  Initial  troponin is less than 0.015.  D-dimer returned at 0.3.  EKG shows no ST elevation.  Chest x-ray on admission is negative with no active infiltrate, per report.  Patient reports he has not been taking Plavix due to complications with pharmacy.  Patient with last echocardiogram showing ejection fraction of 40-45% with mild to moderate global hypokinesia of the left ventricle in November 2018  - Cardiology follow up.   - No evidence of cardiac ischemia per Cardiology.  - Serial troponins negative.   - Close hemodynamic monitoring.  - Stress testing negative.   - DAPT.      Cardiac, ischemic cardiomyopathy, PVCs, hypertension, hyperlipidemia:Patient reports he has not been taking Plavix due to complications with pharmacy.  Patient with last echocardiogram showing ejection fraction of 40-45% with mild to moderate global hypokinesia of the left ventricle in November 2018  - Continue hydrochlorthiazide.     Hypothyroidism: Patient with elevated TSH, reflex T4 pending.  - Free T4 level is 1.06.   - PTA Levothyroxine.      History of pulmonary emboli: Patient with previous pulmonary emboli reported in 2010.  - Monitor.  - PTA ASA  - PTA Atorvastatin.   - PTA Plavix.   - PTA Imdur   - PTA Metoprolol    Pending Results   These results will be followed up by PCP  Unresulted Labs Ordered in the Past 30 Days of this Admission     Date and Time Order Name Status Description    2/12/2019 1856 T4 free In process           Code Status   Full Code       Primary Care Physician   Addison Davis    Physical Exam   Temp: 97.8  F (36.6  C) Temp src: Oral BP: 126/61 Pulse: 60 Heart Rate: 63 Resp: 16 SpO2: 97 % O2 Device: None (Room air)    Vitals:    02/12/19 1842 02/12/19 2100 02/13/19 0601   Weight: 93 kg (205 lb) 92.9 kg (204 lb 11.2 oz) 93.1 kg (205 lb 3.2 oz)     Vital Signs with Ranges  Temp:  [97.5  F (36.4  C)-98.1  F (36.7  C)] 97.8  F (36.6  C)  Pulse:  [51-64] 60  Heart Rate:  [52-63] 63  Resp:  [12-18] 16  BP:  ()/(46-73) 126/61  SpO2:  [94 %-98 %] 97 %  I/O last 3 completed shifts:  In: 113.94 [I.V.:113.94]  Out: -     GENERAL: Alert and oriented. NAD. Conversational, appropriate. Pleasant.   HEENT: Normocephalic. EOMI. No icterus or injection. Nares normal.   LUNGS: Clear to auscultation. No dyspnea at rest.   HEART: Regular rate. Extremities perfused.   ABDOMEN: Soft, nontender, and nondistended. Positive bowel sounds.   EXTREMITIES: LE edema noted.   NEUROLOGIC: Moves extremities x4 on command. No acute focal neurologic abnormalities noted.     Discharge Disposition   Discharged to home  Condition at discharge: Stable    Consultations This Hospital Stay   PHARMACY TO DOSE HEPARIN  CARDIOLOGY IP CONSULT  PHARMACY TO DOSE HEPARIN    Time Spent on this Encounter   I, Jerald Anderson, personally saw the patient today and spent greater than 30 minutes discharging this patient.    Discharge Orders      Follow-Up with Cardiac Advanced Practice Provider      Reason for your hospital stay    Chest pain     Follow-up and recommended labs and tests     Follow up with primary care provider, Addison Davis, within 7 days for hospital follow- up.  The following labs/tests are recommended: cbc/bmp.    Follow up with Cardiology as directed.     Activity    Your activity upon discharge: activity as tolerated     Full Code     Diet    Follow this diet upon discharge: Orders Placed This Encounter      Combination Diet Low Saturated Fat Na <2400mg Diet, No Caffeine Diet     Discharge Medications   Current Discharge Medication List      START taking these medications    Details   vitamin B-12 (CYANOCOBALAMIN) 1000 MCG tablet Take 1 tablet (1,000 mcg) by mouth daily  Qty: 30 tablet, Refills: 0    Associated Diagnoses: Unstable angina (H)         CONTINUE these medications which have NOT CHANGED    Details   acetaminophen (TYLENOL) 325 MG tablet Take 2 tablets (650 mg) by mouth every 4 hours as needed for mild pain  Qty:  100 tablet, Refills: 0    Associated Diagnoses: Chest wall pain      aspirin 81 MG tablet Take 1 tablet (81 mg) by mouth daily Start tomorrow morning.    Associated Diagnoses: Coronary artery disease involving native coronary artery of native heart with unstable angina pectoris (H)      atorvastatin (LIPITOR) 40 MG tablet Take 1 tablet (40 mg) by mouth daily  Qty: 90 tablet, Refills: 3    Associated Diagnoses: Hyperlipidemia LDL goal <70      Cholecalciferol (VITAMIN D) 2000 units tablet Take 1 tablet by mouth daily  Qty: 100 tablet, Refills: 12    Associated Diagnoses: Vitamin D deficiency      clopidogrel (PLAVIX) 75 MG tablet Take 1 tablet (75 mg) by mouth daily  Qty: 30 tablet, Refills: 11    Associated Diagnoses: Coronary artery disease involving native coronary artery of native heart with unstable angina pectoris (H)      hydrochlorothiazide (HYDRODIURIL) 25 MG tablet Take 1 tablet (25 mg) by mouth every morning  Qty: 90 tablet, Refills: 3    Associated Diagnoses: Essential hypertension with goal blood pressure less than 130/80      isosorbide mononitrate (IMDUR) 30 MG 24 hr tablet Take 1 tablet (30 mg) by mouth daily  Qty: 30 tablet, Refills: 11    Associated Diagnoses: Coronary artery disease of native artery of native heart with stable angina pectoris (H); Ischemic cardiomyopathy; Essential hypertension      levothyroxine (SYNTHROID/LEVOTHROID) 137 MCG tablet Take 1 tablet (137 mcg) by mouth daily  Qty: 90 tablet, Refills: 3    Associated Diagnoses: Hypothyroidism, unspecified type      metoprolol succinate ER (TOPROL-XL) 50 MG 24 hr tablet Take 1.5 tablets (75 mg) by mouth daily  Qty: 135 tablet, Refills: 3    Comments: Dose increase form 50 mg daily as FYI to pharmacist.  Associated Diagnoses: Coronary artery disease involving native coronary artery of native heart with unstable angina pectoris (H); Ischemic cardiomyopathy      Multiple Vitamin (MULTI-VITAMIN) per tablet Take 1 tablet by mouth 2 times  daily       nitroGLYcerin (NITROSTAT) 0.4 MG sublingual tablet For chest pain place 1 tablet under the tongue every 5 minutes for 3 doses. If symptoms persist 5 minutes after 1st dose call 911.  Qty: 25 tablet, Refills: 3    Associated Diagnoses: Atypical chest pain         STOP taking these medications       cyanocobalamin 1000 MCG SUBL Comments:   Reason for Stopping:             Allergies   Allergies   Allergen Reactions     No Known Allergies      Data   Most Recent 3 CBC's:  Recent Labs   Lab Test 02/13/19  0534 02/12/19  1856 12/10/18  0805   WBC 3.8* 5.3 5.6   HGB 12.0* 13.6 13.7   MCV 90 90 89   PLT 98* 130* 155      Most Recent 3 BMP's:  Recent Labs   Lab Test 02/13/19  0534 02/12/19  1856 12/10/18  0805    138 140   POTASSIUM 3.5 3.5 4.4   CHLORIDE 106 101 105   CO2 29 32 31   BUN 16 21 25   CR 0.73 0.87 0.89   ANIONGAP 6 5 4   GUERITA 8.3* 8.7 9.1   GLC 96 102* 97     Most Recent 2 LFT's:  Recent Labs   Lab Test 10/19/18  0716 10/12/17  0835   AST 21 22   ALT 21 22   ALKPHOS 76 76   BILITOTAL 0.4 0.5     Most Recent INR's and Anticoagulation Dosing History:  Anticoagulation Dose History     Recent Dosing and Labs Latest Ref Rng & Units 12/20/2010 12/7/2011 9/13/2012 10/13/2014 12/27/2016 11/15/2018 2/12/2019    INR 0.86 - 1.14 - 1.13 1.07 1.01 1.06 1.06 1.04    INR - 2.7 - - - - - -    Chromogenic Factor 10 60 - 140 % - - - - - - -        Most Recent 3 Troponin's:  Recent Labs   Lab Test 02/13/19  0210 02/12/19  2155 02/12/19  1856   TROPI <0.015 <0.015 <0.015     Most Recent Cholesterol Panel:  Recent Labs   Lab Test 02/04/19  0742   CHOL 131   LDL 68   HDL 51   TRIG 60     Most Recent 6 Bacteria Isolates From Any Culture (See EPIC Reports for Culture Details):  Recent Labs   Lab Test 07/29/11  1000 07/07/11  0040 01/28/11  0850   CULT Heavy growth Bacteroides stercoralis Moderate growth Prevotella melaninogenica Moderate growth Peptostreptococcus asaccharolyticus Faxed final result to [phone] on  8511sh  Light growth Diptheroids Light growth Strain 2 Diptheroids Faxed final report to [phone] Dr. Matta on 8.4.11 / [place] Moderate growth Escherichia coli Moderate growth Enterobacter amnigenus Light growth Streptococcus mitis Light growth Diptheroids FAXED .[phone] 07/10/11 KD >100,000 colonies/mL Escherichia coli     Most Recent TSH, T4 and A1c Labs:  Recent Labs   Lab Test 02/12/19  1856  10/19/18  0716   TSH 5.60*   < > 4.20*   T4 1.06   < > 1.01   A1C  --   --  5.4    < > = values in this interval not displayed.     Results for orders placed or performed during the hospital encounter of 02/12/19   XR Chest 2 Views    Narrative    XR CHEST 2 VW   2/12/2019 8:22 PM     HISTORY: chest pain    COMPARISON: Chest x-ray dated 3/9/2017    FINDINGS: The heart is negative.  The lungs are clear. The pulmonary  vasculature is normal.  The bones and soft tissues are unremarkable.      Impression    IMPRESSION: Negative, no active infiltrate. No pneumothorax is  identified.        KRISTINE STEARNS MD

## 2019-02-13 NOTE — PLAN OF CARE
Arrived on unit at 2100. A&O x4, VSS, SBA, IVF Heparin and NS, LS clear, BS+, Tele NSR with 1st degree AV block, NPO at midnight - courtesy meal given @11pm. No numbness or tingling in BLE. No calf pain noted. No dizziness, SOB, N/V noted. Continue to monitor.

## 2019-02-13 NOTE — PHARMACY-ADMISSION MEDICATION HISTORY
Admission medication history interview status for the 2/12/2019  admission is complete. See EPIC admission navigator for prior to admission medications     Medication history source reliability:Good- patient & [employer]    Actions taken by pharmacist (provider contacted, etc):as above     Additional medication history information not noted on PTA med list :  -He had stopped Plavix about 1 month ago due to confusion with provider and pharmacy. He resumed 2/11 & took 75mg x 4 tablets (300mg) 2/11 per cardiology instruction and then 75mg 2/12.  -Imdur new Rx 2/11/19 - he took 1st dose 2/12    Medication reconciliation/reorder completed by provider prior to medication history? No    Time spent in this activity: 15 min    Prior to Admission medications    Medication Sig Last Dose Taking? Auth Provider   acetaminophen (TYLENOL) 325 MG tablet Take 2 tablets (650 mg) by mouth every 4 hours as needed for mild pain prn Yes Addison Davis MD   aspirin 81 MG tablet Take 1 tablet (81 mg) by mouth daily Start tomorrow morning. 2/12/2019 at am Yes Mathew Muro MD   atorvastatin (LIPITOR) 40 MG tablet Take 1 tablet (40 mg) by mouth daily 2/12/2019 at am Yes Addison Davis MD   Cholecalciferol (VITAMIN D) 2000 units tablet Take 1 tablet by mouth daily Past Week at Unknown time Yes Addison Davis MD   clopidogrel (PLAVIX) 75 MG tablet Take 1 tablet (75 mg) by mouth daily 2/12/2019 at Unknown time Yes Ritu Altamirano APRN CNP   cyanocobalamin 1000 MCG SUBL Place 1,000 mcg under the tongue daily 2/12/2019 at am Yes Reported, Patient   hydrochlorothiazide (HYDRODIURIL) 25 MG tablet Take 1 tablet (25 mg) by mouth every morning 2/12/2019 at am Yes Addison Davis MD   isosorbide mononitrate (IMDUR) 30 MG 24 hr tablet Take 1 tablet (30 mg) by mouth daily 2/12/2019 at am Yes Oriana Malave APRN CNP   levothyroxine (SYNTHROID/LEVOTHROID) 137 MCG tablet Take 1 tablet (137 mcg) by  mouth daily 2/12/2019 at am Yes Addison Davis MD   metoprolol succinate ER (TOPROL-XL) 50 MG 24 hr tablet Take 1.5 tablets (75 mg) by mouth daily 2/12/2019 at am Yes Addison Davis MD   Multiple Vitamin (MULTI-VITAMIN) per tablet Take 1 tablet by mouth 2 times daily  Past Week at Unknown time Yes Reported, Patient   nitroGLYcerin (NITROSTAT) 0.4 MG sublingual tablet For chest pain place 1 tablet under the tongue every 5 minutes for 3 doses. If symptoms persist 5 minutes after 1st dose call 911. prn Yes Oriana Malave, APRN CNP

## 2019-02-13 NOTE — PROGRESS NOTES
RECEIVING UNIT ED HANDOFF REVIEW    ED Nurse Handoff Report was reviewed by: Janice Guido on February 12, 2019 at 8:26 PM

## 2019-02-14 ENCOUNTER — TELEPHONE (OUTPATIENT)
Dept: CARDIOLOGY | Facility: CLINIC | Age: 59
End: 2019-02-14

## 2019-02-14 ENCOUNTER — E-VISIT (OUTPATIENT)
Dept: CARDIOLOGY | Facility: CLINIC | Age: 59
End: 2019-02-14
Payer: COMMERCIAL

## 2019-02-14 DIAGNOSIS — I25.119 CORONARY ARTERY DISEASE INVOLVING NATIVE HEART WITH ANGINA PECTORIS, UNSPECIFIED VESSEL OR LESION TYPE (H): Primary | ICD-10-CM

## 2019-02-14 DIAGNOSIS — R07.89 CHEST PRESSURE: Primary | ICD-10-CM

## 2019-02-14 PROCEDURE — 99207 ZZC NO DOCUMENTATION ON VISIT: CPT | Performed by: INTERNAL MEDICINE

## 2019-02-14 RX ORDER — LIDOCAINE 40 MG/G
CREAM TOPICAL
Status: CANCELLED | OUTPATIENT
Start: 2019-02-14

## 2019-02-14 RX ORDER — SODIUM CHLORIDE 9 MG/ML
INJECTION, SOLUTION INTRAVENOUS CONTINUOUS
Status: CANCELLED | OUTPATIENT
Start: 2019-02-14

## 2019-02-14 RX ORDER — POTASSIUM CHLORIDE 1500 MG/1
20 TABLET, EXTENDED RELEASE ORAL
Status: CANCELLED | OUTPATIENT
Start: 2019-02-14

## 2019-02-14 RX ORDER — ASPIRIN 81 MG/1
81 TABLET ORAL DAILY
Status: CANCELLED | OUTPATIENT
Start: 2019-02-14

## 2019-02-14 NOTE — TELEPHONE ENCOUNTER
Called by RN that patient still complaining of exertional CP. He had a stress echo yesterday where he did not have symptoms, but the test was non-diagnostic. Given ongoing CP that he has been complaining for quite sometime after his PCI now, and a non diagnostic study, I recommend coronary angiogram. I also discussed this with Dr. Bronson who saw him as inpatient yesterday and he is in agreement. Please schedule angiogram and advise patient to NOT do heavy exertional activities until then.

## 2019-02-14 NOTE — TELEPHONE ENCOUNTER
Writer called pt back and updated him with Dr. Key's recommendation for LHC tomorrow. Pt states he would be able to do LHC tomorrow afternoon. Order placed. Instructed pt we would call him back with final schedule and plan of care once determined. Instructed pt no shoveling, heavy lifting or strenuous activity. Verbalized understanding and agreeable with plan of care. ANGELA Steinberg RN.

## 2019-02-14 NOTE — TELEPHONE ENCOUNTER
"Pt called back and denies any medication questions. Reminded to continue to take Plavix and aspirin uninterrupted as prescribed. Encouraged to not hesitate to call us with any medication questions and to not discontinue any heart medications without discussing with our clinic. Mild lightheadedness, denies SOB. States has had 2 episodes of anterior chest \"pressure\" that intermittently radiates to left arm, and rates as 3-4/10-always associated with being outside in the cold air. Denies other symptoms during chest pressure and has not taken any PRN SL NTG for pain.Taking Imdur as prescribed. Documented SBP's soft in the 100's, with HR 50-70's bpm. Pt also c/o intermittent palpitations. PMH of PVC's, but only 4% burden and is taking Metoprolol-explanation of PVC's reviewed and reassurance provided. Scheduled to see Oriana Ekman on 2/25/19. Pt verbalized understanding of all instructions without further questions.  Writer paged pt's primary cardiologist, Dr. Key, and reviewed pt symptoms and he would like pt to come in for [facility], as stress echo was non-diagnostic. ANGELA Steinberg RN.  "

## 2019-02-14 NOTE — TELEPHONE ENCOUNTER
"Patient was evaluated by cardiology while inpatient for chest tightness with exertion. PMH of DORY to LAD 11/18, but stopped taking Plavix one month ago. Restarted Plavix 2 days ago. Troponins and EKG WNL. Dr. Bronson's note as follows, \"Normal stress echo (non-diagnostic due to inadequate HR with beta blockers). No symptoms and no evidence of cardiac ischemia. Likely non-cardiac chest pain. OK to go home.\" Attempted to call patient to discuss any post hospital d/c questions he may have, review medication changes, and confirm f/u appts, but no answer. VM left and pt instructed to call back if he has any medication questions, any symptoms such as recurrent chest pain, SOB or lightheadedness, or with any questions or concerns.  RN reminded patient that he needs to schedule a f/u cardiology MELECIO OV as ordered, and writer's and scheduling phone numbers provided. Patient advised to call clinic with any cardiac related questions or concerns prior to this melecio't. ANGELA Steinberg RN.     "

## 2019-02-15 ENCOUNTER — SURGERY (OUTPATIENT)
Age: 59
End: 2019-02-15
Payer: COMMERCIAL

## 2019-02-15 ENCOUNTER — HOSPITAL ENCOUNTER (OUTPATIENT)
Facility: CLINIC | Age: 59
Discharge: HOME | End: 2019-02-15
Admitting: INTERNAL MEDICINE
Payer: COMMERCIAL

## 2019-02-15 VITALS
HEIGHT: 68 IN | TEMPERATURE: 97 F | SYSTOLIC BLOOD PRESSURE: 94 MMHG | RESPIRATION RATE: 16 BRPM | BODY MASS INDEX: 31.1 KG/M2 | OXYGEN SATURATION: 99 % | DIASTOLIC BLOOD PRESSURE: 65 MMHG | HEART RATE: 53 BPM | WEIGHT: 205.2 LBS

## 2019-02-15 DIAGNOSIS — I25.119 CORONARY ARTERY DISEASE INVOLVING NATIVE HEART WITH ANGINA PECTORIS, UNSPECIFIED VESSEL OR LESION TYPE (H): ICD-10-CM

## 2019-02-15 DIAGNOSIS — E55.9 VITAMIN D DEFICIENCY: ICD-10-CM

## 2019-02-15 DIAGNOSIS — Z98.84 BARIATRIC SURGERY STATUS: ICD-10-CM

## 2019-02-15 DIAGNOSIS — I20.0 UNSTABLE ANGINA (H): ICD-10-CM

## 2019-02-15 DIAGNOSIS — I25.5 ISCHEMIC CARDIOMYOPATHY: ICD-10-CM

## 2019-02-15 DIAGNOSIS — Z98.890 STATUS POST CORONARY ANGIOGRAM: ICD-10-CM

## 2019-02-15 DIAGNOSIS — K91.2 POSTSURGICAL NONABSORPTION: ICD-10-CM

## 2019-02-15 DIAGNOSIS — I49.3 PVC'S (PREMATURE VENTRICULAR CONTRACTIONS): ICD-10-CM

## 2019-02-15 DIAGNOSIS — I10 ESSENTIAL HYPERTENSION: ICD-10-CM

## 2019-02-15 DIAGNOSIS — R07.89 CHEST PRESSURE: ICD-10-CM

## 2019-02-15 DIAGNOSIS — I87.2 VENOUS INSUFFICIENCY: ICD-10-CM

## 2019-02-15 DIAGNOSIS — E03.9 ACQUIRED HYPOTHYROIDISM: Primary | ICD-10-CM

## 2019-02-15 DIAGNOSIS — R00.2 PALPITATIONS: ICD-10-CM

## 2019-02-15 LAB
ANION GAP SERPL CALCULATED.3IONS-SCNC: 4 MMOL/L (ref 3–14)
APTT PPP: 28 SEC (ref 22–37)
BUN SERPL-MCNC: 24 MG/DL (ref 7–30)
CALCIUM SERPL-MCNC: 8.7 MG/DL (ref 8.5–10.1)
CHLORIDE SERPL-SCNC: 106 MMOL/L (ref 94–109)
CO2 SERPL-SCNC: 30 MMOL/L (ref 20–32)
CREAT SERPL-MCNC: 0.73 MG/DL (ref 0.66–1.25)
ERYTHROCYTE [DISTWIDTH] IN BLOOD BY AUTOMATED COUNT: 14.1 % (ref 10–15)
GFR SERPL CREATININE-BSD FRML MDRD: >90 ML/MIN/{1.73_M2}
GLUCOSE SERPL-MCNC: 91 MG/DL (ref 70–99)
HCT VFR BLD AUTO: 37.2 % (ref 40–53)
HGB BLD-MCNC: 12.2 G/DL (ref 13.3–17.7)
INR PPP: 1.08 (ref 0.86–1.14)
MCH RBC QN AUTO: 29.4 PG (ref 26.5–33)
MCHC RBC AUTO-ENTMCNC: 32.8 G/DL (ref 31.5–36.5)
MCV RBC AUTO: 90 FL (ref 78–100)
PLATELET # BLD AUTO: 109 10E9/L (ref 150–450)
POTASSIUM SERPL-SCNC: 4 MMOL/L (ref 3.4–5.3)
RBC # BLD AUTO: 4.15 10E12/L (ref 4.4–5.9)
SODIUM SERPL-SCNC: 140 MMOL/L (ref 133–144)
WBC # BLD AUTO: 3.8 10E9/L (ref 4–11)

## 2019-02-15 PROCEDURE — 40000852 ZZH STATISTIC HEART CATH LAB OR EP LAB

## 2019-02-15 PROCEDURE — 25000125 ZZHC RX 250: Performed by: INTERNAL MEDICINE

## 2019-02-15 PROCEDURE — 80048 BASIC METABOLIC PNL TOTAL CA: CPT

## 2019-02-15 PROCEDURE — 93005 ELECTROCARDIOGRAM TRACING: CPT

## 2019-02-15 PROCEDURE — 85730 THROMBOPLASTIN TIME PARTIAL: CPT

## 2019-02-15 PROCEDURE — 99153 MOD SED SAME PHYS/QHP EA: CPT | Performed by: INTERNAL MEDICINE

## 2019-02-15 PROCEDURE — C1769 GUIDE WIRE: HCPCS | Performed by: INTERNAL MEDICINE

## 2019-02-15 PROCEDURE — 85610 PROTHROMBIN TIME: CPT

## 2019-02-15 PROCEDURE — 25000128 H RX IP 250 OP 636: Performed by: INTERNAL MEDICINE

## 2019-02-15 PROCEDURE — 85027 COMPLETE CBC AUTOMATED: CPT

## 2019-02-15 PROCEDURE — 93010 ELECTROCARDIOGRAM REPORT: CPT | Performed by: INTERNAL MEDICINE

## 2019-02-15 PROCEDURE — 99152 MOD SED SAME PHYS/QHP 5/>YRS: CPT | Performed by: INTERNAL MEDICINE

## 2019-02-15 PROCEDURE — 93458 L HRT ARTERY/VENTRICLE ANGIO: CPT | Mod: 26 | Performed by: INTERNAL MEDICINE

## 2019-02-15 PROCEDURE — 93458 L HRT ARTERY/VENTRICLE ANGIO: CPT | Performed by: INTERNAL MEDICINE

## 2019-02-15 PROCEDURE — 25800030 ZZH RX IP 258 OP 636: Performed by: INTERNAL MEDICINE

## 2019-02-15 PROCEDURE — 27210794 ZZH OR GENERAL SUPPLY STERILE: Performed by: INTERNAL MEDICINE

## 2019-02-15 PROCEDURE — 36415 COLL VENOUS BLD VENIPUNCTURE: CPT

## 2019-02-15 RX ORDER — ISOSORBIDE MONONITRATE 30 MG/1
30 TABLET, EXTENDED RELEASE ORAL EVERY MORNING
Qty: 30 TABLET | Refills: 0 | Status: SHIPPED | OUTPATIENT
Start: 2019-02-15 | End: 2019-03-17

## 2019-02-15 RX ORDER — ASPIRIN 81 MG/1
81 TABLET ORAL DAILY
Status: DISCONTINUED | OUTPATIENT
Start: 2019-02-15 | End: 2019-02-15 | Stop reason: HOSPADM

## 2019-02-15 RX ORDER — SODIUM NITROPRUSSIDE 25 MG/ML
100-200 INJECTION INTRAVENOUS
Status: DISCONTINUED | OUTPATIENT
Start: 2019-02-15 | End: 2019-02-15 | Stop reason: HOSPADM

## 2019-02-15 RX ORDER — METHYLPREDNISOLONE SODIUM SUCCINATE 125 MG/2ML
125 INJECTION, POWDER, LYOPHILIZED, FOR SOLUTION INTRAMUSCULAR; INTRAVENOUS
Status: DISCONTINUED | OUTPATIENT
Start: 2019-02-15 | End: 2019-02-15 | Stop reason: HOSPADM

## 2019-02-15 RX ORDER — NIFEDIPINE 10 MG/1
10 CAPSULE ORAL
Status: DISCONTINUED | OUTPATIENT
Start: 2019-02-15 | End: 2019-02-15 | Stop reason: HOSPADM

## 2019-02-15 RX ORDER — BUPIVACAINE HYDROCHLORIDE 2.5 MG/ML
1-10 INJECTION, SOLUTION EPIDURAL; INFILTRATION; INTRACAUDAL
Status: DISCONTINUED | OUTPATIENT
Start: 2019-02-15 | End: 2019-02-15 | Stop reason: HOSPADM

## 2019-02-15 RX ORDER — IOPAMIDOL 755 MG/ML
INJECTION, SOLUTION INTRAVASCULAR
Status: DISCONTINUED | OUTPATIENT
Start: 2019-02-15 | End: 2019-02-15 | Stop reason: HOSPADM

## 2019-02-15 RX ORDER — NICARDIPINE HYDROCHLORIDE 2.5 MG/ML
100 INJECTION INTRAVENOUS
Status: DISCONTINUED | OUTPATIENT
Start: 2019-02-15 | End: 2019-02-15 | Stop reason: HOSPADM

## 2019-02-15 RX ORDER — ONDANSETRON 2 MG/ML
4 INJECTION INTRAMUSCULAR; INTRAVENOUS EVERY 4 HOURS PRN
Status: DISCONTINUED | OUTPATIENT
Start: 2019-02-15 | End: 2019-02-15 | Stop reason: HOSPADM

## 2019-02-15 RX ORDER — ASPIRIN 81 MG/1
81-324 TABLET, CHEWABLE ORAL
Status: DISCONTINUED | OUTPATIENT
Start: 2019-02-15 | End: 2019-02-15 | Stop reason: HOSPADM

## 2019-02-15 RX ORDER — DEXTROSE MONOHYDRATE 25 G/50ML
12.5-5 INJECTION, SOLUTION INTRAVENOUS EVERY 30 MIN PRN
Status: DISCONTINUED | OUTPATIENT
Start: 2019-02-15 | End: 2019-02-15 | Stop reason: HOSPADM

## 2019-02-15 RX ORDER — POTASSIUM CHLORIDE 1500 MG/1
20 TABLET, EXTENDED RELEASE ORAL
Status: DISCONTINUED | OUTPATIENT
Start: 2019-02-15 | End: 2019-02-15 | Stop reason: HOSPADM

## 2019-02-15 RX ORDER — CLOPIDOGREL 300 MG/1
300-600 TABLET, FILM COATED ORAL
Status: DISCONTINUED | OUTPATIENT
Start: 2019-02-15 | End: 2019-02-15 | Stop reason: HOSPADM

## 2019-02-15 RX ORDER — PRASUGREL 10 MG/1
10-60 TABLET, FILM COATED ORAL
Status: DISCONTINUED | OUTPATIENT
Start: 2019-02-15 | End: 2019-02-15 | Stop reason: HOSPADM

## 2019-02-15 RX ORDER — NITROGLYCERIN 5 MG/ML
100-500 VIAL (ML) INTRAVENOUS
Status: DISCONTINUED | OUTPATIENT
Start: 2019-02-15 | End: 2019-02-15 | Stop reason: HOSPADM

## 2019-02-15 RX ORDER — SODIUM CHLORIDE 9 MG/ML
INJECTION, SOLUTION INTRAVENOUS CONTINUOUS
Status: DISCONTINUED | OUTPATIENT
Start: 2019-02-15 | End: 2019-02-15 | Stop reason: HOSPADM

## 2019-02-15 RX ORDER — HYDROCODONE BITARTRATE AND ACETAMINOPHEN 5; 325 MG/1; MG/1
1-2 TABLET ORAL EVERY 4 HOURS PRN
Status: DISCONTINUED | OUTPATIENT
Start: 2019-02-15 | End: 2019-02-15 | Stop reason: HOSPADM

## 2019-02-15 RX ORDER — DIPHENHYDRAMINE HYDROCHLORIDE 50 MG/ML
25-50 INJECTION INTRAMUSCULAR; INTRAVENOUS
Status: DISCONTINUED | OUTPATIENT
Start: 2019-02-15 | End: 2019-02-15 | Stop reason: HOSPADM

## 2019-02-15 RX ORDER — PROTAMINE SULFATE 10 MG/ML
1-5 INJECTION, SOLUTION INTRAVENOUS
Status: DISCONTINUED | OUTPATIENT
Start: 2019-02-15 | End: 2019-02-15 | Stop reason: HOSPADM

## 2019-02-15 RX ORDER — LORAZEPAM 2 MG/ML
.5-2 INJECTION INTRAMUSCULAR EVERY 4 HOURS PRN
Status: DISCONTINUED | OUTPATIENT
Start: 2019-02-15 | End: 2019-02-15 | Stop reason: HOSPADM

## 2019-02-15 RX ORDER — ATROPINE SULFATE 0.1 MG/ML
.5-1 INJECTION INTRAVENOUS
Status: DISCONTINUED | OUTPATIENT
Start: 2019-02-15 | End: 2019-02-15 | Stop reason: HOSPADM

## 2019-02-15 RX ORDER — METOPROLOL TARTRATE 1 MG/ML
5 INJECTION, SOLUTION INTRAVENOUS EVERY 5 MIN PRN
Status: DISCONTINUED | OUTPATIENT
Start: 2019-02-15 | End: 2019-02-15 | Stop reason: HOSPADM

## 2019-02-15 RX ORDER — FUROSEMIDE 10 MG/ML
20-100 INJECTION INTRAMUSCULAR; INTRAVENOUS
Status: DISCONTINUED | OUTPATIENT
Start: 2019-02-15 | End: 2019-02-15 | Stop reason: HOSPADM

## 2019-02-15 RX ORDER — DOPAMINE HYDROCHLORIDE 160 MG/100ML
2-20 INJECTION, SOLUTION INTRAVENOUS CONTINUOUS PRN
Status: DISCONTINUED | OUTPATIENT
Start: 2019-02-15 | End: 2019-02-15 | Stop reason: HOSPADM

## 2019-02-15 RX ORDER — LIDOCAINE HYDROCHLORIDE 10 MG/ML
1-10 INJECTION, SOLUTION EPIDURAL; INFILTRATION; INTRACAUDAL; PERINEURAL
Status: COMPLETED | OUTPATIENT
Start: 2019-02-15 | End: 2019-02-15

## 2019-02-15 RX ORDER — CLOPIDOGREL BISULFATE 75 MG/1
75 TABLET ORAL
Status: DISCONTINUED | OUTPATIENT
Start: 2019-02-15 | End: 2019-02-15 | Stop reason: HOSPADM

## 2019-02-15 RX ORDER — ENALAPRILAT 1.25 MG/ML
1.25-2.5 INJECTION INTRAVENOUS
Status: DISCONTINUED | OUTPATIENT
Start: 2019-02-15 | End: 2019-02-15 | Stop reason: HOSPADM

## 2019-02-15 RX ORDER — NALOXONE HYDROCHLORIDE 0.4 MG/ML
0.4 INJECTION, SOLUTION INTRAMUSCULAR; INTRAVENOUS; SUBCUTANEOUS EVERY 5 MIN PRN
Status: DISCONTINUED | OUTPATIENT
Start: 2019-02-15 | End: 2019-02-15 | Stop reason: HOSPADM

## 2019-02-15 RX ORDER — MORPHINE SULFATE 2 MG/ML
1-2 INJECTION, SOLUTION INTRAMUSCULAR; INTRAVENOUS EVERY 5 MIN PRN
Status: DISCONTINUED | OUTPATIENT
Start: 2019-02-15 | End: 2019-02-15 | Stop reason: HOSPADM

## 2019-02-15 RX ORDER — ATROPINE SULFATE 0.1 MG/ML
0.5 INJECTION INTRAVENOUS EVERY 5 MIN PRN
Status: DISCONTINUED | OUTPATIENT
Start: 2019-02-15 | End: 2019-02-15 | Stop reason: HOSPADM

## 2019-02-15 RX ORDER — PROPOFOL 10 MG/ML
10-20 INJECTION, EMULSION INTRAVENOUS EVERY 30 MIN PRN
Status: DISCONTINUED | OUTPATIENT
Start: 2019-02-15 | End: 2019-02-15 | Stop reason: HOSPADM

## 2019-02-15 RX ORDER — FLUMAZENIL 0.1 MG/ML
0.2 INJECTION, SOLUTION INTRAVENOUS
Status: DISCONTINUED | OUTPATIENT
Start: 2019-02-15 | End: 2019-02-15 | Stop reason: HOSPADM

## 2019-02-15 RX ORDER — NITROGLYCERIN 5 MG/ML
100-200 VIAL (ML) INTRAVENOUS
Status: DISCONTINUED | OUTPATIENT
Start: 2019-02-15 | End: 2019-02-15 | Stop reason: HOSPADM

## 2019-02-15 RX ORDER — LIDOCAINE HYDROCHLORIDE 10 MG/ML
30 INJECTION, SOLUTION EPIDURAL; INFILTRATION; INTRACAUDAL; PERINEURAL
Status: DISCONTINUED | OUTPATIENT
Start: 2019-02-15 | End: 2019-02-15 | Stop reason: HOSPADM

## 2019-02-15 RX ORDER — POTASSIUM CHLORIDE 29.8 MG/ML
20 INJECTION INTRAVENOUS
Status: DISCONTINUED | OUTPATIENT
Start: 2019-02-15 | End: 2019-02-15 | Stop reason: HOSPADM

## 2019-02-15 RX ORDER — POTASSIUM CHLORIDE 7.45 MG/ML
10 INJECTION INTRAVENOUS
Status: DISCONTINUED | OUTPATIENT
Start: 2019-02-15 | End: 2019-02-15 | Stop reason: HOSPADM

## 2019-02-15 RX ORDER — VERAPAMIL HYDROCHLORIDE 2.5 MG/ML
1-2.5 INJECTION, SOLUTION INTRAVENOUS
Status: DISCONTINUED | OUTPATIENT
Start: 2019-02-15 | End: 2019-02-15 | Stop reason: HOSPADM

## 2019-02-15 RX ORDER — LIDOCAINE 40 MG/G
CREAM TOPICAL
Status: DISCONTINUED | OUTPATIENT
Start: 2019-02-15 | End: 2019-02-15 | Stop reason: HOSPADM

## 2019-02-15 RX ORDER — DOBUTAMINE HYDROCHLORIDE 200 MG/100ML
2-20 INJECTION INTRAVENOUS CONTINUOUS PRN
Status: DISCONTINUED | OUTPATIENT
Start: 2019-02-15 | End: 2019-02-15 | Stop reason: HOSPADM

## 2019-02-15 RX ORDER — HYDRALAZINE HYDROCHLORIDE 20 MG/ML
10-20 INJECTION INTRAMUSCULAR; INTRAVENOUS
Status: DISCONTINUED | OUTPATIENT
Start: 2019-02-15 | End: 2019-02-15 | Stop reason: HOSPADM

## 2019-02-15 RX ORDER — FENTANYL CITRATE 50 UG/ML
25-50 INJECTION, SOLUTION INTRAMUSCULAR; INTRAVENOUS
Status: DISCONTINUED | OUTPATIENT
Start: 2019-02-15 | End: 2019-02-15 | Stop reason: HOSPADM

## 2019-02-15 RX ORDER — NITROGLYCERIN 0.4 MG/1
0.4 TABLET SUBLINGUAL EVERY 5 MIN PRN
Status: DISCONTINUED | OUTPATIENT
Start: 2019-02-15 | End: 2019-02-15 | Stop reason: HOSPADM

## 2019-02-15 RX ORDER — ADENOSINE 3 MG/ML
12-12000 INJECTION, SOLUTION INTRAVENOUS
Status: DISCONTINUED | OUTPATIENT
Start: 2019-02-15 | End: 2019-02-15 | Stop reason: HOSPADM

## 2019-02-15 RX ORDER — ACETAMINOPHEN 325 MG/1
325-650 TABLET ORAL EVERY 4 HOURS PRN
Status: DISCONTINUED | OUTPATIENT
Start: 2019-02-15 | End: 2019-02-15 | Stop reason: HOSPADM

## 2019-02-15 RX ORDER — NITROGLYCERIN 20 MG/100ML
.07-2 INJECTION INTRAVENOUS CONTINUOUS PRN
Status: DISCONTINUED | OUTPATIENT
Start: 2019-02-15 | End: 2019-02-15 | Stop reason: HOSPADM

## 2019-02-15 RX ORDER — NALOXONE HYDROCHLORIDE 0.4 MG/ML
.2-.4 INJECTION, SOLUTION INTRAMUSCULAR; INTRAVENOUS; SUBCUTANEOUS
Status: DISCONTINUED | OUTPATIENT
Start: 2019-02-15 | End: 2019-02-15 | Stop reason: HOSPADM

## 2019-02-15 RX ORDER — PROPOFOL 10 MG/ML
5-75 INJECTION, EMULSION INTRAVENOUS CONTINUOUS
Status: DISCONTINUED | OUTPATIENT
Start: 2019-02-15 | End: 2019-02-15 | Stop reason: HOSPADM

## 2019-02-15 RX ORDER — PHENYLEPHRINE HCL IN 0.9% NACL 1 MG/10 ML
20-100 SYRINGE (ML) INTRAVENOUS
Status: DISCONTINUED | OUTPATIENT
Start: 2019-02-15 | End: 2019-02-15 | Stop reason: HOSPADM

## 2019-02-15 RX ORDER — EPINEPHRINE 1 MG/ML
0.3 INJECTION, SOLUTION, CONCENTRATE INTRAVENOUS
Status: DISCONTINUED | OUTPATIENT
Start: 2019-02-15 | End: 2019-02-15 | Stop reason: HOSPADM

## 2019-02-15 RX ORDER — PROTAMINE SULFATE 10 MG/ML
25-100 INJECTION, SOLUTION INTRAVENOUS EVERY 5 MIN PRN
Status: DISCONTINUED | OUTPATIENT
Start: 2019-02-15 | End: 2019-02-15 | Stop reason: HOSPADM

## 2019-02-15 RX ORDER — ASPIRIN 325 MG
325 TABLET ORAL
Status: DISCONTINUED | OUTPATIENT
Start: 2019-02-15 | End: 2019-02-15 | Stop reason: HOSPADM

## 2019-02-15 RX ORDER — NALOXONE HYDROCHLORIDE 0.4 MG/ML
.1-.4 INJECTION, SOLUTION INTRAMUSCULAR; INTRAVENOUS; SUBCUTANEOUS
Status: DISCONTINUED | OUTPATIENT
Start: 2019-02-15 | End: 2019-02-15 | Stop reason: HOSPADM

## 2019-02-15 RX ORDER — HEPARIN SODIUM 1000 [USP'U]/ML
1000-10000 INJECTION, SOLUTION INTRAVENOUS; SUBCUTANEOUS EVERY 5 MIN PRN
Status: DISCONTINUED | OUTPATIENT
Start: 2019-02-15 | End: 2019-02-15 | Stop reason: HOSPADM

## 2019-02-15 RX ADMIN — LIDOCAINE HYDROCHLORIDE 9 ML: 10 INJECTION, SOLUTION EPIDURAL; INFILTRATION; INTRACAUDAL; PERINEURAL at 08:49

## 2019-02-15 RX ADMIN — FENTANYL CITRATE 50 MCG: 50 INJECTION, SOLUTION INTRAMUSCULAR; INTRAVENOUS at 08:46

## 2019-02-15 RX ADMIN — IOPAMIDOL 45 ML: 755 INJECTION, SOLUTION INTRAVENOUS at 09:02

## 2019-02-15 RX ADMIN — SODIUM CHLORIDE: 9 INJECTION, SOLUTION INTRAVENOUS at 07:27

## 2019-02-15 RX ADMIN — MIDAZOLAM 1 MG: 1 INJECTION INTRAMUSCULAR; INTRAVENOUS at 08:46

## 2019-02-15 ASSESSMENT — MIFFLIN-ST. JEOR: SCORE: 1725.28

## 2019-02-15 NOTE — PROGRESS NOTES
Reviewed discharge instructions with patient.  No further questions at this time.  Patient eating a light meal while recovering.

## 2019-02-15 NOTE — IP AVS SNAPSHOT
[address] [place] [address]  Phone:  [phone]                                    After Visit Summary   2/15/2019    Ramiro Jeffers    MRN: [id number]           After Visit Summary Signature Page    I have received my discharge instructions, and my questions have been answered. I have discussed any challenges I see with this plan with the nurse or doctor.    ..........................................................................................................................................  Patient/Patient Representative Signature      ..........................................................................................................................................  Patient Representative Print Name and Relationship to Patient    ..................................................               ................................................  Date                                   Time    ..........................................................................................................................................  Reviewed by Signature/Title    ...................................................              ..............................................  Date                                               Time          22EPIC Rev 08/18

## 2019-02-15 NOTE — IP AVS SNAPSHOT
MRN:[id number]                      After Visit Summary   2/15/2019    Ramiro Jeffers    MRN: [id number]           Visit Information        Department      2/15/2019  5:54 AM [facility]          Review of your medicines      UNREVIEWED medicines. Ask your doctor about these medicines       Dose / Directions   acetaminophen 325 MG tablet  Commonly known as:  TYLENOL  Used for:  Crescendo angina      Dose:  650 mg  Take 2 tablets (650 mg) by mouth every 4 hours as needed for mild pain  Quantity:  100 tablet  Refills:  0     aspirin 81 MG tablet  Commonly known as:  ASA  Used for:  Coronary artery disease involving native coronary artery of native heart with unstable angina pectoris (H)      Dose:  81 mg  Take 1 tablet (81 mg) by mouth daily Start tomorrow morning.  Refills:  0     atorvastatin 40 MG tablet  Commonly known as:  LIPITOR  Used for:  Hyperlipidemia LDL goal <70      Dose:  40 mg  Take 1 tablet (40 mg) by mouth daily  Quantity:  90 tablet  Refills:  3     clopidogrel 75 MG tablet  Commonly known as:  PLAVIX  Used for:  Coronary artery disease involving native coronary artery of native heart with unstable angina pectoris (H)      Dose:  75 mg  Take 1 tablet (75 mg) by mouth daily  Quantity:  30 tablet  Refills:  11     hydrochlorothiazide 25 MG tablet  Commonly known as:  HYDRODIURIL  Used for:  Essential hypertension with goal blood pressure less than 130/80      Dose:  25 mg  Take 1 tablet (25 mg) by mouth every morning  Quantity:  90 tablet  Refills:  3     * isosorbide mononitrate 30 MG 24 hr tablet  Commonly known as:  IMDUR  Used for:  Coronary artery disease of native artery of native heart with stable angina pectoris (H), Ischemic cardiomyopathy  Ask about: Which instructions should I use?      Dose:  30 mg  Take 1 tablet (30 mg) by mouth daily  Quantity:  30 tablet  Refills:  11     * isosorbide mononitrate 30 MG 24 hr tablet  Commonly known as:  IMDUR  Used for:   Coronary artery disease involving native heart with angina pectoris, unspecified vessel or lesion type (H), Chest pressure  Ask about: Which instructions should I use?      Dose:  30 mg  Take 1 tablet (30 mg) by mouth every morning Hold if Systolic Blood Pressure is less than 85.  Quantity:  30 tablet  Refills:  0     levothyroxine 137 MCG tablet  Commonly known as:  SYNTHROID/LEVOTHROID  Used for:  Hypothyroidism, unspecified type      Dose:  137 mcg  Take 1 tablet (137 mcg) by mouth daily  Quantity:  90 tablet  Refills:  3     metoprolol succinate ER 50 MG 24 hr tablet  Commonly known as:  TOPROL-XL  Used for:  Coronary artery disease involving native coronary artery of native heart with unstable angina pectoris (H), Ischemic cardiomyopathy      Dose:  75 mg  Take 1.5 tablets (75 mg) by mouth daily  Quantity:  135 tablet  Refills:  3     Multi-vitamin tablet  Generic drug:  multivitamin w/minerals      Dose:  1 tablet  Take 1 tablet by mouth 2 times daily  Refills:  0     nitroGLYcerin 0.4 MG sublingual tablet  Commonly known as:  NITROSTAT  Used for:  Atypical chest pain      For chest pain place 1 tablet under the tongue every 5 minutes for 3 doses. If symptoms persist 5 minutes after 1st dose call 911.  Quantity:  25 tablet  Refills:  3     vitamin B-12 1000 MCG tablet  Commonly known as:  CYANOCOBALAMIN  Used for:  Unstable angina (H)      Dose:  1000 mcg  Take 1 tablet (1,000 mcg) by mouth daily  Quantity:  30 tablet  Refills:  0     vitamin D3 2000 units tablet  Commonly known as:  CHOLECALCIFEROL  Used for:  Vitamin D deficiency      Dose:  1 tablet  Take 1 tablet by mouth daily  Quantity:  100 tablet  Refills:  12         * This list has 2 medication(s) that are the same as other medications prescribed for you. Read the directions carefully, and ask your doctor or other care provider to review them with you.               Where to get your medicines      Some of these will need a paper prescription and  others can be bought over the counter. Ask your nurse if you have questions.    Bring a paper prescription for each of these medications  isosorbide mononitrate 30 MG 24 hr tablet           Prescriptions were sent or printed at these locations (1 Prescription)            [id number] - [place] - [address] AT [address] [place] [address] & [facility]   [address] [address]    Telephone:  [phone]   Fax:  [phone]   Hours:                  Printed at Department/Unit printer (1 of 1)         isosorbide mononitrate (IMDUR) 30 MG 24 hr tablet                Protect others around you: Learn how to safely use, store and throw away your medicines at www.disposemymeds.org.       Follow-ups after your visit       Your next 10 appointments already scheduled    Feb 19, 2019  9:10 AM CST  Return Visit with Addison Davis MD  [facility] ([facility]) [address] [place] [address] [address]  [place] [address] [phone]   Feb 25, 2019  8:00 AM CST  Return Visit with CHARO Lopez CNP  [facility] ([facility]) [address] [place] [address] [address]  [place] [address] [phone] OPT 2      Care Instructions       After Care Instructions     Discharge Instructions - Follow up with [facility] Nurse Practitioner       Follow up with [facility] Nurse Practitioner at [facility] Clinic of patient preference in 7-10 days.         Discharge Instructions - IF on Metformin (Glucophage or Glucovance) or Metformin containing medications      IF on Metformin (Glucophage or Glucovance) or Metformin containing medications , schedule a Basic Metabolic Panel at [facility] or Primary Clinic in 48 - 72 hours post procedure and PRIOR TO resuming the Metformin or Metformin containing medications.  Hold Metformin (Glucophage or Glucovance) or Metformin containing medications until after the Basic  Metabolic Panel on the 2nd or 3rd day following the procedure.  May resume after blood draw is complete.           Further instructions from your care team       Cardiac Angiogram Discharge Instructions - Femoral    After you go home:      Have an adult stay with you until tomorrow.    Drink extra fluids for 2 days.    You may resume your normal diet.    No smoking       For 24 hours - due to the sedation you received:    Relax and take it easy.    Do NOT make any important or legal decisions.    Do NOT drive or operate machines at home or at work.    Do NOT drink alcohol.    Care of Groin Puncture Site:      For the first 24 hrs - check the puncture site every 1-2 hours while awake.    For 2 days, when you cough, sneeze, laugh or move your bowels, hold your hand over the puncture site and press firmly.    Remove the bandaid after 24 hours. If there is minor oozing, apply another bandaid and remove it after 12 hours.    It is normal to have a small bruise or pea size lump at the site.    You may shower tomorrow. Do NOT take a bath, or use a hot tub or pool for at least 3 days. Do NOT scrub the site. Do not use lotion or powder near the puncture site.    Activity:            For 2 days:    No stooping or squatting    Do NOT do any heavy activity such as exercise, lifting, or straining.     No housework, yard work or any activity that make you sweat    Do NOT lift more than 10 pounds    Bleeding:      If you start bleeding from the site in your groin, lie down flat and press firmly on/above the site for 10 minutes.     Once bleeding stops, lay flat for 2 hours.     Call [facility] as soon as you can.       Call 911 right away if you have heavy bleeding or bleeding that does not stop.      Medicines:      If you are taking an antiplatelet medication such as Plavix, Brilinta or Effient, do not stop taking it until you talk to your cardiologist.      If you are on Metformin (Glucophage), do not restart it until you have  "blood tests (within 2 to 3 days after discharge).  After you have your blood drawn, you may restart the Metformin.     Take your medications, including blood thinners, unless your provider tells you not to.  If you take Coumadin (Warfarin), have your INR checked by your provider in  3-5 days. Call your clinic to schedule this.    If you have stopped any medicines, check with your provider about when to restart them.    Follow Up Appointments:      Follow up with [facility] Nurse Practitioner at [facility] Clinic of patient preference in 7-10 days.    Call the clinic if:      You have increased pain or a large or growing hard lump around the site.    The site is red, swollen, hot or tender.    Blood or fluid is draining from the site.    You have chills or a fever greater than 101 F (38 C).    Your leg feels numb, cool or changes color.    You have hives, a rash or unusual itching.    New pain in the back or belly that you cannot control with Tylenol.    Any questions or concerns.          [facility] [place]:    [phone] [facility] (7 days a week)          Additional Information About Your Visit       [employer] Information    [employer] gives you secure access to your electronic health record. If you see a primary care provider, you can also send messages to your care team and make appointments. If you have questions, please call your primary care clinic.  If you do not have a primary care provider, please call [phone] and they will assist you.       Care EveryWhere ID    This is your Care EveryWhere ID. This could be used by other organizations to access your [place] medical records  [id number]       Your Vitals Were     Blood Pressure   119/67          Pulse   56          Temperature   97  F (36.1  C) (Oral)          Respirations   16          Height   1.727 m (5' 8\")             Weight   93.1 kg (205 lb 3.2 oz)    Pulse Oximetry   99%    BMI (Body Mass Index)   31.20 kg/m           " Primary Care Provider Office Phone # Fax #    Rufinosarah Tobi Davis [phone]      Equal Access to Services    LARY REYES : Hadii aad ku hadeileengato Cheng, paulinedrake zuniga, alexcarole berriosbyron alexandriacarlos, israel robbiein hayaajennifer ibrahimtressa moss laphanijennifer kenyetta. So [facility] [phone].    ATENCIÓN: Si habla español, tiene a reynoso disposición servicios gratuitos de asistencia lingüística. Llame al [phone].    We comply with applicable federal civil rights laws and [place] laws. We do not discriminate on the basis of race, color, national origin, age, disability, sex, sexual orientation, or gender identity.           Thank you!    Thank you for choosing [facility] for your care. Our goal is always to provide you with excellent care. Hearing back from our patients is one way we can continue to improve our services. Please take a few minutes to complete the written survey that you may receive in the mail after you visit with us. Thank you!            Medication List      ASK your doctor about these medications          Morning Afternoon Evening Bedtime As Needed    acetaminophen 325 MG tablet  Also known as:  TYLENOL  INSTRUCTIONS:  Take 2 tablets (650 mg) by mouth every 4 hours as needed for mild pain                     aspirin 81 MG tablet  Also known as:  ASA  INSTRUCTIONS:  Take 1 tablet (81 mg) by mouth daily Start tomorrow morning.                     atorvastatin 40 MG tablet  Also known as:  LIPITOR  INSTRUCTIONS:  Take 1 tablet (40 mg) by mouth daily                     clopidogrel 75 MG tablet  Also known as:  PLAVIX  INSTRUCTIONS:  Take 1 tablet (75 mg) by mouth daily                     hydrochlorothiazide 25 MG tablet  Also known as:  HYDRODIURIL  INSTRUCTIONS:  Take 1 tablet (25 mg) by mouth every morning                     * isosorbide mononitrate 30 MG 24 hr tablet  Also known as:  IMDUR  INSTRUCTIONS:  Take 1 tablet (30 mg) by mouth daily  Ask about: Which instructions should I use?                      * isosorbide mononitrate 30 MG 24 hr tablet  Also known as:  IMDUR  INSTRUCTIONS:  Take 1 tablet (30 mg) by mouth every morning Hold if Systolic Blood Pressure is less than 85.  Ask about: Which instructions should I use?                     levothyroxine 137 MCG tablet  Also known as:  SYNTHROID/LEVOTHROID  INSTRUCTIONS:  Take 1 tablet (137 mcg) by mouth daily                     metoprolol succinate ER 50 MG 24 hr tablet  Also known as:  TOPROL-XL  INSTRUCTIONS:  Take 1.5 tablets (75 mg) by mouth daily  Doctor's comments:  Dose increase form 50 mg daily as FYI to pharmacist.                     Multi-vitamin tablet  INSTRUCTIONS:  Take 1 tablet by mouth 2 times daily  Generic drug:  multivitamin w/minerals                     nitroGLYcerin 0.4 MG sublingual tablet  Also known as:  NITROSTAT  INSTRUCTIONS:  For chest pain place 1 tablet under the tongue every 5 minutes for 3 doses. If symptoms persist 5 minutes after 1st dose call 911.                     vitamin B-12 1000 MCG tablet  Also known as:  CYANOCOBALAMIN  INSTRUCTIONS:  Take 1 tablet (1,000 mcg) by mouth daily                     vitamin D3 2000 units tablet  Also known as:  CHOLECALCIFEROL  INSTRUCTIONS:  Take 1 tablet by mouth daily                        * This list has 2 medication(s) that are the same as other medications prescribed for you. Read the directions carefully, and ask your doctor or other care provider to review them with you.

## 2019-02-15 NOTE — DISCHARGE INSTRUCTIONS

## 2019-02-15 NOTE — Clinical Note
The DP pulses are detected w/ doppler bilaterally. The PT pulses are detected w/ doppler bilaterally.

## 2019-02-15 NOTE — PROGRESS NOTES
Pt remains stable post procedure. Ambulating without difficulty. Tolerating regular diet. PIV removed, clean dressing applied. Wheelchair escorted to hospital door in medically stable condition, all belongings sent with pt.

## 2019-02-15 NOTE — TELEPHONE ENCOUNTER
LATE POST:   Spoke to patient at 4:55pm 2/14/19 and informed him that we have gotten him in for [facility] at 8:30am on 2/15/19 with a 6:30am arrival. Spoke with Dr. Ortiz and he was ok with the H & P from the hospital, patient does not need updated. Reviewed instructions below.   Contrast allergy: No  Anticoagulation: No   Metformin: No  Oral DM meds: No  Insulin: No  Diuretic: pt aware to hold morning of procedure   Use of phosphodiesterase type 5 inhibitor: No  Aspirin: Pt aware to take ASA 81 mg day before and morning of procedure. Pt aware to continue plavix    Pt informed to be NPO at midnight  Renal issues No   Pt has transportation and 24 hours post procedure monitoring set up.   Pt aware of no driving for 24 hours post procedure.     Pt aware of arrival time and location. Pt verbalized understanding of instructions.

## 2019-02-20 ENCOUNTER — OFFICE VISIT (OUTPATIENT)
Dept: OTHER | Facility: CLINIC | Age: 59
End: 2019-02-20
Attending: INTERNAL MEDICINE
Payer: COMMERCIAL

## 2019-02-20 VITALS
SYSTOLIC BLOOD PRESSURE: 111 MMHG | WEIGHT: 204 LBS | BODY MASS INDEX: 30.92 KG/M2 | HEIGHT: 68 IN | HEART RATE: 56 BPM | DIASTOLIC BLOOD PRESSURE: 65 MMHG | RESPIRATION RATE: 16 BRPM | OXYGEN SATURATION: 95 %

## 2019-02-20 DIAGNOSIS — E78.5 HYPERLIPIDEMIA LDL GOAL <100: ICD-10-CM

## 2019-02-20 DIAGNOSIS — I25.110 CORONARY ARTERY DISEASE INVOLVING NATIVE CORONARY ARTERY OF NATIVE HEART WITH UNSTABLE ANGINA PECTORIS (H): Primary | ICD-10-CM

## 2019-02-20 DIAGNOSIS — R00.2 PALPITATIONS: ICD-10-CM

## 2019-02-20 LAB — INTERPRETATION ECG - MUSE: NORMAL

## 2019-02-20 PROCEDURE — G0463 HOSPITAL OUTPT CLINIC VISIT: HCPCS

## 2019-02-20 PROCEDURE — 99214 OFFICE O/P EST MOD 30 MIN: CPT | Mod: ZP | Performed by: INTERNAL MEDICINE

## 2019-02-20 RX ORDER — CHOLECALCIFEROL (VITAMIN D3) 50 MCG
1 TABLET ORAL DAILY
Qty: 100 TABLET | Refills: 12 | Status: CANCELLED | OUTPATIENT
Start: 2019-02-20

## 2019-02-20 RX ORDER — HYDROCHLOROTHIAZIDE 25 MG/1
25 TABLET ORAL EVERY MORNING
Qty: 90 TABLET | Refills: 3 | Status: CANCELLED | OUTPATIENT
Start: 2019-02-20

## 2019-02-20 RX ORDER — ACETAMINOPHEN 325 MG/1
650 TABLET ORAL EVERY 4 HOURS PRN
Qty: 100 TABLET | Refills: 0 | Status: CANCELLED | OUTPATIENT
Start: 2019-02-20

## 2019-02-20 RX ORDER — METOPROLOL SUCCINATE 50 MG/1
75 TABLET, EXTENDED RELEASE ORAL DAILY
Qty: 135 TABLET | Refills: 3 | Status: CANCELLED | OUTPATIENT
Start: 2019-02-20

## 2019-02-20 RX ORDER — LEVOTHYROXINE SODIUM 137 UG/1
137 TABLET ORAL DAILY
Qty: 90 TABLET | Refills: 3 | Status: CANCELLED | OUTPATIENT
Start: 2019-02-20

## 2019-02-20 RX ORDER — CLOPIDOGREL BISULFATE 75 MG/1
75 TABLET ORAL DAILY
Qty: 30 TABLET | Refills: 11 | Status: CANCELLED | OUTPATIENT
Start: 2019-02-20

## 2019-02-20 RX ORDER — NITROGLYCERIN 0.4 MG/1
TABLET SUBLINGUAL
Qty: 25 TABLET | Refills: 3 | Status: CANCELLED | OUTPATIENT
Start: 2019-02-20

## 2019-02-20 RX ORDER — ATORVASTATIN CALCIUM 40 MG/1
40 TABLET, FILM COATED ORAL DAILY
Qty: 90 TABLET | Refills: 3 | Status: CANCELLED | OUTPATIENT
Start: 2019-02-20

## 2019-02-20 ASSESSMENT — MIFFLIN-ST. JEOR: SCORE: 1719.84

## 2019-02-20 NOTE — PROGRESS NOTES
Ramiro Jeffers is a 58 year old male who is presenting at the current time to discuss his diagnosi(es) of        Coronary artery disease involving native coronary artery of native heart with unstable angina pectoris (H)  Palpitations  Hyperlipidemia LDL goal <100 .      Review Of Systems  Skin: negative  Eyes: negative  Ears/Nose/Throat: negative  Respiratory: No shortness of breath, dyspnea on exertion, cough, or hemoptysis  Cardiovascular: negative  Gastrointestinal: negative  Genitourinary: negative  Musculoskeletal: negative  Neurologic: negative  Psychiatric: negative  Hematologic/Lymphatic/Immunologic: negative  Endocrine: negative    PAST MEDICAL HISTORY:                  Past Medical History:   Diagnosis Date     ACQUIRED HYPOTHYROIDISM       Blood clot in the legs      CAD (coronary artery disease)     11/15/18 Cath: PCI with 3.5-16mm DORY to proximal LAD     Calculus of gallbladder without mention of cholecystitis or obstruction      Cataract      Chest pressure 11/5/2018     Hyperlipidemia      Hypertension      Ischemic cardiomyopathy     EF 40-45% on 11/2018 Echo     Obesity, unspecified     significant weight loss w/diet alone, on 10-15-10, BMI was 56.4     pulmonary emboli 6-2010    Unprovoked large bilateral pulmonary emboli without source identified on lower extremity dopplers, pt had a transient moderate lupus inhibitor upon initial presentation in June , 2010 which was gone in September, 2010;  all other thrombophilic workup is normal     Pulmonary embolus, bilateral      PVC's (premature ventricular contractions)     4% burden on 11/2018 Holter     Shortness of breath     on exertion     Venous insufficiency      Viral pneumonia, unspecified      Vitamin D deficiency        PAST SURGICAL HISTORY:                  Past Surgical History:   Procedure Laterality Date     C NONSPECIFIC PROCEDURE  10-    Laparoscopic gastric bypass,     C NONSPECIFIC PROCEDURE  10-    1.  Attempted  laparoscopic exploration with conversion to: .  Abdominal exploration. 3.  Reduction of incarcerated incisional hernia. 4.  Repair of incisional hernia.5.  Gastrostomy tube placement (#22 French) into defunctionalized stomach.6.  Enterotomy with bowel decompression and primary repair. 7.  Abdominal lavage.      COLONOSCOPY  11/17/2011    Procedure:COLONOSCOPY; colonoscopy; Surgeon:ELENA OROURKE; Location: GI     CV HEART CATHETERIZATION WITH POSSIBLE INTERVENTION Left 2/15/2019    Procedure: Coronary Angiogram;  Surgeon: Tulio Ortiz MD;  Location:  HEART CARDIAC CATH LAB     LAPAROSCOPIC CHOLECYSTECTOMY  9/28/2012    Procedure: LAPAROSCOPIC CHOLECYSTECTOMY;  LAPAROSCOPIC CHOLECYSTECTOMY, LYSIS OF ADHESIONS;  Surgeon: Dutch Matta MD;  Location:  OR     LAPAROSCOPIC LYSIS ADHESIONS  9/28/2012    Procedure: LAPAROSCOPIC LYSIS ADHESIONS;;  Surgeon: Dutch Matta MD;  Location:  OR     LAPAROSCOPY, SURGICAL; REPAIR INCISIONAL OR VENTRAL HERNIA      repair of ventral strangulated surgery     PHACOEMULSIFICATION CLEAR CORNEA WITH STANDARD INTRAOCULAR LENS IMPLANT  12/19/2011    Procedure:PHACOEMULSIFICATION CLEAR CORNEA WITH STANDARD INTRAOCULAR LENS IMPLANT; RIGHT PHACOEMULSIFICATION CLEAR CORNEA WITH STANDARD INTRAOCULAR LENS IMPLANT ; Surgeon:ALLISON ANTONIO; Location:[facility]       CURRENT MEDICATIONS:                  Current Outpatient Medications   Medication Sig Dispense Refill     acetaminophen (TYLENOL) 325 MG tablet Take 2 tablets (650 mg) by mouth every 4 hours as needed for mild pain 100 tablet 0     aspirin 81 MG tablet Take 1 tablet (81 mg) by mouth daily Start tomorrow morning.       atorvastatin (LIPITOR) 40 MG tablet Take 1 tablet (40 mg) by mouth daily 90 tablet 3     Cholecalciferol (VITAMIN D) 2000 units tablet Take 1 tablet by mouth daily 100 tablet 12     clopidogrel (PLAVIX) 75 MG tablet Take 1 tablet (75 mg) by mouth daily 30 tablet 11     hydrochlorothiazide (HYDRODIURIL)  25 MG tablet Take 1 tablet (25 mg) by mouth every morning 90 tablet 3     isosorbide mononitrate (IMDUR) 30 MG 24 hr tablet Take 1 tablet (30 mg) by mouth every morning Hold if Systolic Blood Pressure is less than 85. 30 tablet 0     levothyroxine (SYNTHROID/LEVOTHROID) 137 MCG tablet Take 1 tablet (137 mcg) by mouth daily 90 tablet 3     metoprolol succinate ER (TOPROL-XL) 50 MG 24 hr tablet Take 1.5 tablets (75 mg) by mouth daily 135 tablet 3     Multiple Vitamin (MULTI-VITAMIN) per tablet Take 1 tablet by mouth 2 times daily        nitroGLYcerin (NITROSTAT) 0.4 MG sublingual tablet For chest pain place 1 tablet under the tongue every 5 minutes for 3 doses. If symptoms persist 5 minutes after 1st dose call 911. 25 tablet 3     vitamin B-12 (CYANOCOBALAMIN) 1000 MCG tablet Take 1 tablet (1,000 mcg) by mouth daily 30 tablet 0     isosorbide mononitrate (IMDUR) 30 MG 24 hr tablet Take 1 tablet (30 mg) by mouth daily (Patient not taking: Reported on 2/20/2019) 30 tablet 11       ALLERGIES:                  Allergies   Allergen Reactions     No Known Allergies        SOCIAL HISTORY:                  Social History     Socioeconomic History     Marital status: Single     Spouse name: Not on file     Number of children: Not on file     Years of education: Not on file     Highest education level: Not on file   Occupational History     Occupation:      Employer: SELF   Social Needs     Financial resource strain: Not on file     Food insecurity:     Worry: Not on file     Inability: Not on file     Transportation needs:     Medical: Not on file     Non-medical: Not on file   Tobacco Use     Smoking status: Never Smoker     Smokeless tobacco: Never Used   Substance and Sexual Activity     Alcohol use: No     Alcohol/week: 0.0 oz     Drug use: No     Sexual activity: Not on file   Lifestyle     Physical activity:     Days per week: Not on file     Minutes per session: Not on file     Stress: Not on file    Relationships     Social connections:     Talks on phone: Not on file     Gets together: Not on file     Attends Hoahaoism service: Not on file     Active member of club or organization: Not on file     Attends meetings of clubs or organizations: Not on file     Relationship status: Not on file     Intimate partner violence:     Fear of current or ex partner: Not on file     Emotionally abused: Not on file     Physically abused: Not on file     Forced sexual activity: Not on file   Other Topics Concern     Parent/sibling w/ CABG, MI or angioplasty before 65F 55M? Not Asked   Social History Narrative     Not on file       FAMILY HISTORY:                   Family History   Problem Relation Age of Onset     Osteoporosis Mother      Thyroid Disease Mother         no thyroid     Cancer Maternal Grandmother         ? lung cancer     Cancer Father         melanoma     Coronary Artery Disease Brother      Coronary Artery Disease Brother           Physical exam Reveals:    O/P: WNL  HEENT: WNL  NECK: No JVD, thyromegaly, or lymphadenopathy  HEART: RRR, no murmurs, gallops, or rubs  LUNGS: CTA bilaterally without rales, wheezes, or rhonchi  GI: NABS, nondistended, nontender, soft  EXT:without cyanosis, clubbing, or edema  NEURO: nonfocal  : no flank tenderness      A/P:    (I25.110) Coronary artery disease involving native coronary artery of native heart with unstable angina pectoris (H)  (primary encounter diagnosis)  Comment: he was recently hospitalized . He has CAD, recently received a DORY in . Did not get Plavix for one month as the pharmacy would not give it to him.  Plan: restarted Plavix, not having CP currently.    (R00.2) Palpitations  Comment: he had a Holter monitor in  which revealed frequent PVCs, SV beats.  Plan: he is to see his cardiology RN practitioner.

## 2019-02-20 NOTE — PROGRESS NOTES
"Ramiro Jeffers is a 58 year old male who presents for:  Chief Complaint   Patient presents with     RECHECK     Return-follow up-labs        Vitals:    Vitals:    02/20/19 1005 02/20/19 1006   BP: 112/62 111/65   BP Location: Right arm Left arm   Patient Position: Chair Chair   Cuff Size: Adult Regular Adult Regular   Pulse: 56    Resp: 16    SpO2: 95%    Weight: 204 lb (92.5 kg)    Height: 5' 8\" (1.727 m)        BMI:  Estimated body mass index is 31.02 kg/m  as calculated from the following:    Height as of this encounter: 5' 8\" (1.727 m).    Weight as of this encounter: 204 lb (92.5 kg).    Pain Score:  Data Unavailable        Santos Vaughan MA    "

## 2019-06-25 DIAGNOSIS — E78.5 HYPERLIPIDEMIA LDL GOAL <100: ICD-10-CM

## 2019-06-25 DIAGNOSIS — I25.118 CORONARY ARTERY DISEASE OF NATIVE ARTERY OF NATIVE HEART WITH STABLE ANGINA PECTORIS (H): ICD-10-CM

## 2019-06-25 LAB
ALT SERPL W P-5'-P-CCNC: 24 U/L (ref 0–70)
ANION GAP SERPL CALCULATED.3IONS-SCNC: 6 MMOL/L (ref 3–14)
BASOPHILS # BLD AUTO: 0 10E9/L (ref 0–0.2)
BASOPHILS NFR BLD AUTO: 0.2 %
BUN SERPL-MCNC: 22 MG/DL (ref 7–30)
CALCIUM SERPL-MCNC: 8.8 MG/DL (ref 8.5–10.1)
CHLORIDE SERPL-SCNC: 107 MMOL/L (ref 94–109)
CHOLEST SERPL-MCNC: 134 MG/DL
CO2 SERPL-SCNC: 30 MMOL/L (ref 20–32)
CREAT SERPL-MCNC: 0.85 MG/DL (ref 0.66–1.25)
DIFFERENTIAL METHOD BLD: ABNORMAL
EOSINOPHIL # BLD AUTO: 0.2 10E9/L (ref 0–0.7)
EOSINOPHIL NFR BLD AUTO: 3.6 %
ERYTHROCYTE [DISTWIDTH] IN BLOOD BY AUTOMATED COUNT: 14 % (ref 10–15)
GFR SERPL CREATININE-BSD FRML MDRD: >90 ML/MIN/{1.73_M2}
GLUCOSE SERPL-MCNC: 99 MG/DL (ref 70–99)
HBA1C MFR BLD: 5.5 % (ref 0–5.6)
HCT VFR BLD AUTO: 40.7 % (ref 40–53)
HDLC SERPL-MCNC: 60 MG/DL
HGB BLD-MCNC: 13.1 G/DL (ref 13.3–17.7)
LDLC SERPL CALC-MCNC: 64 MG/DL
LYMPHOCYTES # BLD AUTO: 1.6 10E9/L (ref 0.8–5.3)
LYMPHOCYTES NFR BLD AUTO: 29.4 %
MCH RBC QN AUTO: 29.2 PG (ref 26.5–33)
MCHC RBC AUTO-ENTMCNC: 32.2 G/DL (ref 31.5–36.5)
MCV RBC AUTO: 91 FL (ref 78–100)
MONOCYTES # BLD AUTO: 0.6 10E9/L (ref 0–1.3)
MONOCYTES NFR BLD AUTO: 10.4 %
NEUTROPHILS # BLD AUTO: 3.1 10E9/L (ref 1.6–8.3)
NEUTROPHILS NFR BLD AUTO: 56.4 %
NONHDLC SERPL-MCNC: 74 MG/DL
PLATELET # BLD AUTO: 130 10E9/L (ref 150–450)
POTASSIUM SERPL-SCNC: 4.1 MMOL/L (ref 3.4–5.3)
RBC # BLD AUTO: 4.48 10E12/L (ref 4.4–5.9)
SODIUM SERPL-SCNC: 143 MMOL/L (ref 133–144)
T3FREE SERPL-MCNC: 1.8 PG/ML (ref 2.3–4.2)
T4 FREE SERPL-MCNC: 0.99 NG/DL (ref 0.76–1.46)
TRIGL SERPL-MCNC: 51 MG/DL
TSH SERPL DL<=0.005 MIU/L-ACNC: 5.56 MU/L (ref 0.4–4)
WBC # BLD AUTO: 5.5 10E9/L (ref 4–11)

## 2019-06-25 PROCEDURE — 84439 ASSAY OF FREE THYROXINE: CPT | Performed by: INTERNAL MEDICINE

## 2019-06-25 PROCEDURE — 80061 LIPID PANEL: CPT | Performed by: INTERNAL MEDICINE

## 2019-06-25 PROCEDURE — 84443 ASSAY THYROID STIM HORMONE: CPT | Performed by: INTERNAL MEDICINE

## 2019-06-25 PROCEDURE — 83036 HEMOGLOBIN GLYCOSYLATED A1C: CPT | Performed by: INTERNAL MEDICINE

## 2019-06-25 PROCEDURE — 84481 FREE ASSAY (FT-3): CPT | Performed by: INTERNAL MEDICINE

## 2019-06-25 PROCEDURE — 84460 ALANINE AMINO (ALT) (SGPT): CPT | Performed by: INTERNAL MEDICINE

## 2019-06-25 PROCEDURE — 36415 COLL VENOUS BLD VENIPUNCTURE: CPT | Performed by: INTERNAL MEDICINE

## 2019-06-25 PROCEDURE — 85025 COMPLETE CBC W/AUTO DIFF WBC: CPT | Performed by: INTERNAL MEDICINE

## 2019-06-25 PROCEDURE — 80048 BASIC METABOLIC PNL TOTAL CA: CPT | Performed by: INTERNAL MEDICINE

## 2019-06-27 ENCOUNTER — OFFICE VISIT (OUTPATIENT)
Dept: OTHER | Facility: CLINIC | Age: 59
End: 2019-06-27
Attending: INTERNAL MEDICINE
Payer: COMMERCIAL

## 2019-06-27 VITALS
BODY MASS INDEX: 31.37 KG/M2 | DIASTOLIC BLOOD PRESSURE: 66 MMHG | OXYGEN SATURATION: 95 % | HEIGHT: 68 IN | HEART RATE: 58 BPM | SYSTOLIC BLOOD PRESSURE: 112 MMHG | RESPIRATION RATE: 16 BRPM | WEIGHT: 207 LBS

## 2019-06-27 DIAGNOSIS — I25.5 ISCHEMIC CARDIOMYOPATHY: ICD-10-CM

## 2019-06-27 DIAGNOSIS — I25.110 CORONARY ARTERY DISEASE INVOLVING NATIVE CORONARY ARTERY OF NATIVE HEART WITH UNSTABLE ANGINA PECTORIS (H): Primary | ICD-10-CM

## 2019-06-27 DIAGNOSIS — M54.9 MUSCULOSKELETAL BACK PAIN: ICD-10-CM

## 2019-06-27 DIAGNOSIS — E03.9 HYPOTHYROIDISM, UNSPECIFIED TYPE: ICD-10-CM

## 2019-06-27 DIAGNOSIS — E78.5 HYPERLIPIDEMIA LDL GOAL <100: ICD-10-CM

## 2019-06-27 PROCEDURE — 93010 ELECTROCARDIOGRAM REPORT: CPT | Mod: ZP | Performed by: INTERNAL MEDICINE

## 2019-06-27 PROCEDURE — 99215 OFFICE O/P EST HI 40 MIN: CPT | Mod: ZP | Performed by: INTERNAL MEDICINE

## 2019-06-27 PROCEDURE — G0463 HOSPITAL OUTPT CLINIC VISIT: HCPCS

## 2019-06-27 RX ORDER — LEVOTHYROXINE SODIUM 150 UG/1
150 TABLET ORAL DAILY
Qty: 90 TABLET | Refills: 3 | Status: SHIPPED | OUTPATIENT
Start: 2019-06-27 | End: 2019-09-04 | Stop reason: DRUGHIGH

## 2019-06-27 ASSESSMENT — MIFFLIN-ST. JEOR: SCORE: 1733.45

## 2019-06-27 NOTE — PROGRESS NOTES
"Ramiro Jeffers is a 58 year old male who presents for:  Chief Complaint   Patient presents with     RECHECK     follow up labs (scheduled 6/20/19)         Vitals:    Vitals:    06/27/19 0908   BP: 112/66   BP Location: Right arm   Patient Position: Chair   Cuff Size: Adult Regular   Pulse: 58   Resp: 16   SpO2: 95%   Weight: 207 lb (93.9 kg)   Height: 5' 8\" (1.727 m)       BMI:  Estimated body mass index is 31.47 kg/m  as calculated from the following:    Height as of this encounter: 5' 8\" (1.727 m).    Weight as of this encounter: 207 lb (93.9 kg).    Pain Score:  Data Unavailable        Santos Vaughan    "

## 2019-06-27 NOTE — PROGRESS NOTES
Ramiro Jeffers is a 58 year old male who is presenting at the current time to discuss his diagnosi(es) of        Coronary artery disease involving native coronary artery of native heart with unstable angina pectoris (H)  Hyperlipidemia LDL goal <100  Hypothyroidism, unspecified type  Ischemic cardiomyopathy  Musculoskeletal back pain         Review Of Systems  Skin: negative  Eyes: negative  Ears/Nose/Throat: negative  Respiratory: No shortness of breath, dyspnea on exertion, cough, or hemoptysis  Cardiovascular: typical anginal sxs, provoked with activity, alleviated with 2 SL NTG at most within five minutes, feels generally fatigued  Gastrointestinal: negative  Genitourinary: negative  Musculoskeletal: has paraspinous thoracic pain at all times, not made worse with Valsalva or there activity  Neurologic: negative  Psychiatric: negative  Hematologic/Lymphatic/Immunologic: negative  Endocrine: negative       PAST MEDICAL HISTORY:                  Past Medical History:   Diagnosis Date     ACQUIRED HYPOTHYROIDISM       Blood clot in the legs      CAD (coronary artery disease)     11/15/18 Cath: PCI with 3.5-16mm DORY to proximal LAD     Calculus of gallbladder without mention of cholecystitis or obstruction      Cataract      Chest pressure 11/5/2018     Hyperlipidemia      Hypertension      Ischemic cardiomyopathy     EF 40-45% on 11/2018 Echo     Obesity, unspecified     significant weight loss w/diet alone, on 10-15-10, BMI was 56.4     pulmonary emboli 6-2010    Unprovoked large bilateral pulmonary emboli without source identified on lower extremity dopplers, pt had a transient moderate lupus inhibitor upon initial presentation in June , 2010 which was gone in September, 2010;  all other thrombophilic workup is normal     Pulmonary embolus, bilateral      PVC's (premature ventricular contractions)     4% burden on 11/2018 Holter     Shortness of breath     on exertion     Venous insufficiency      Viral pneumonia,  unspecified      Vitamin D deficiency        PAST SURGICAL HISTORY:                  Past Surgical History:   Procedure Laterality Date     C NONSPECIFIC PROCEDURE  10-    Laparoscopic gastric bypass,     C NONSPECIFIC PROCEDURE  10-    1.  Attempted laparoscopic exploration with conversion to: .  Abdominal exploration. 3.  Reduction of incarcerated incisional hernia. 4.  Repair of incisional hernia.5.  Gastrostomy tube placement (#22 French) into defunctionalized stomach.6.  Enterotomy with bowel decompression and primary repair. 7.  Abdominal lavage.      COLONOSCOPY  11/17/2011    Procedure:COLONOSCOPY; colonoscopy; Surgeon:ELENA OROURKE; Location: GI     CV HEART CATHETERIZATION WITH POSSIBLE INTERVENTION Left 2/15/2019    Procedure: Coronary Angiogram;  Surgeon: Tulio Ortiz MD;  Location:  HEART CARDIAC CATH LAB     LAPAROSCOPIC CHOLECYSTECTOMY  9/28/2012    Procedure: LAPAROSCOPIC CHOLECYSTECTOMY;  LAPAROSCOPIC CHOLECYSTECTOMY, LYSIS OF ADHESIONS;  Surgeon: Dutch Matta MD;  Location:  OR     LAPAROSCOPIC LYSIS ADHESIONS  9/28/2012    Procedure: LAPAROSCOPIC LYSIS ADHESIONS;;  Surgeon: Dutch Matta MD;  Location:  OR     LAPAROSCOPY, SURGICAL; REPAIR INCISIONAL OR VENTRAL HERNIA      repair of ventral strangulated surgery     PHACOEMULSIFICATION CLEAR CORNEA WITH STANDARD INTRAOCULAR LENS IMPLANT  12/19/2011    Procedure:PHACOEMULSIFICATION CLEAR CORNEA WITH STANDARD INTRAOCULAR LENS IMPLANT; RIGHT PHACOEMULSIFICATION CLEAR CORNEA WITH STANDARD INTRAOCULAR LENS IMPLANT ; Surgeon:ALLISON ANTONIO; Location[facility]       CURRENT MEDICATIONS:                  Current Outpatient Medications   Medication Sig Dispense Refill     acetaminophen (TYLENOL) 325 MG tablet Take 2 tablets (650 mg) by mouth every 4 hours as needed for mild pain 100 tablet 0     aspirin 81 MG tablet Take 1 tablet (81 mg) by mouth daily Start tomorrow morning.       atorvastatin (LIPITOR) 40 MG tablet  Take 1 tablet (40 mg) by mouth daily 90 tablet 3     Cholecalciferol (VITAMIN D) 2000 units tablet Take 1 tablet by mouth daily 100 tablet 12     clopidogrel (PLAVIX) 75 MG tablet Take 1 tablet (75 mg) by mouth daily 30 tablet 11     hydrochlorothiazide (HYDRODIURIL) 25 MG tablet Take 1 tablet (25 mg) by mouth every morning 90 tablet 3     isosorbide mononitrate (IMDUR) 30 MG 24 hr tablet Take 1 tablet (30 mg) by mouth daily 30 tablet 11     levothyroxine (SYNTHROID/LEVOTHROID) 137 MCG tablet Take 1 tablet (137 mcg) by mouth daily 90 tablet 3     metoprolol succinate ER (TOPROL-XL) 50 MG 24 hr tablet Take 1.5 tablets (75 mg) by mouth daily 135 tablet 3     Multiple Vitamin (MULTI-VITAMIN) per tablet Take 1 tablet by mouth 2 times daily        nitroGLYcerin (NITROSTAT) 0.4 MG sublingual tablet For chest pain place 1 tablet under the tongue every 5 minutes for 3 doses. If symptoms persist 5 minutes after 1st dose call 911. 25 tablet 3     isosorbide mononitrate (IMDUR) 30 MG 24 hr tablet Take 1 tablet (30 mg) by mouth every morning Hold if Systolic Blood Pressure is less than 85. 30 tablet 0       ALLERGIES:                  Allergies   Allergen Reactions     No Known Allergies        SOCIAL HISTORY:                  Social History     Socioeconomic History     Marital status: Single     Spouse name: Not on file     Number of children: Not on file     Years of education: Not on file     Highest education level: Not on file   Occupational History     Occupation:      Employer: SELF   Social Needs     Financial resource strain: Not on file     Food insecurity:     Worry: Not on file     Inability: Not on file     Transportation needs:     Medical: Not on file     Non-medical: Not on file   Tobacco Use     Smoking status: Never Smoker     Smokeless tobacco: Never Used   Substance and Sexual Activity     Alcohol use: No     Alcohol/week: 0.0 oz     Drug use: No     Sexual activity: Not on file   Lifestyle      Physical activity:     Days per week: Not on file     Minutes per session: Not on file     Stress: Not on file   Relationships     Social connections:     Talks on phone: Not on file     Gets together: Not on file     Attends Anabaptism service: Not on file     Active member of club or organization: Not on file     Attends meetings of clubs or organizations: Not on file     Relationship status: Not on file     Intimate partner violence:     Fear of current or ex partner: Not on file     Emotionally abused: Not on file     Physically abused: Not on file     Forced sexual activity: Not on file   Other Topics Concern     Parent/sibling w/ CABG, MI or angioplasty before 65F 55M? Not Asked   Social History Narrative     Not on file       FAMILY HISTORY:                   Family History   Problem Relation Age of Onset     Osteoporosis Mother      Thyroid Disease Mother         no thyroid     Cancer Maternal Grandmother         ? lung cancer     Cancer Father         melanoma     Coronary Artery Disease Brother      Coronary Artery Disease Brother          Physical exam Reveals:    O/P: WNL  HEENT: WNL  NECK: No JVD, thyromegaly, or lymphadenopathy  HEART: RRR, no murmurs, gallops, or rubs  LUNGS: CTA bilaterally without rales, wheezes, or rhonchi  GI: NABS, nondistended, nontender, soft  EXT:without cyanosis, clubbing, or edema  NEURO: nonfocal  : no flank tenderness      Component      Latest Ref Rng & Units 6/25/2019   WBC      4.0 - 11.0 10e9/L 5.5   RBC Count      4.4 - 5.9 10e12/L 4.48   Hemoglobin      13.3 - 17.7 g/dL 13.1 (L)   Hematocrit      40.0 - 53.0 % 40.7   MCV      78 - 100 fl 91   MCH      26.5 - 33.0 pg 29.2   MCHC      31.5 - 36.5 g/dL 32.2   RDW      10.0 - 15.0 % 14.0   Platelet Count      150 - 450 10e9/L 130 (L)   % Neutrophils      % 56.4   % Lymphocytes      % 29.4   % Monocytes      % 10.4   % Eosinophils      % 3.6   % Basophils      % 0.2   Absolute Neutrophil      1.6 - 8.3 10e9/L 3.1    Absolute Lymphocytes      0.8 - 5.3 10e9/L 1.6   Absolute Monocytes      0.0 - 1.3 10e9/L 0.6   Absolute Eosinophils      0.0 - 0.7 10e9/L 0.2   Absolute Basophils      0.0 - 0.2 10e9/L 0.0   Diff Method       Automated Method   Sodium      133 - 144 mmol/L 143   Potassium      3.4 - 5.3 mmol/L 4.1   Chloride      94 - 109 mmol/L 107   Carbon Dioxide      20 - 32 mmol/L 30   Anion Gap      3 - 14 mmol/L 6   Glucose      70 - 99 mg/dL 99   Urea Nitrogen      7 - 30 mg/dL 22   Creatinine      0.66 - 1.25 mg/dL 0.85   GFR Estimate      >60 mL/min/1.73:m2 >90   GFR Estimate If Black      >60 mL/min/1.73:m2 >90   Calcium      8.5 - 10.1 mg/dL 8.8   Cholesterol      <200 mg/dL 134   Triglycerides      <150 mg/dL 51   HDL Cholesterol      >39 mg/dL 60   LDL Cholesterol Calculated      <100 mg/dL 64   Non HDL Cholesterol      <130 mg/dL 74   Hemoglobin A1C      0 - 5.6 % 5.5   TSH      0.40 - 4.00 mU/L 5.56 (H)   T4 Free      0.76 - 1.46 ng/dL 0.99   Free T3      2.3 - 4.2 pg/mL 1.8 (L)   ALT      0 - 70 U/L 24       A/P:    (I25.110) Coronary artery disease involving native coronary artery of native heart with unstable angina pectoris (H)  (primary encounter diagnosis)  Comment: he is having typical anginal sxs (mowing lawns, walking up an incline, etc.), he should have a nuc med stress test, his EKG shows no acute ST changes presently. He is needing to go to [place] tomorrow. He is told to go to the ER if having anginal sxs not improved by SL NTG; 11/2018 S/P percutaneous coronary intervention proximal LAD: 3.5 x 16 mm length  everolimus eluting Synergy stent  Plan: NM Lexiscan stress test, EKG 12-lead complete         w/read - Clinics           (E78.5) Hyperlipidemia LDL goal <100  Comment: at goal  Plan: continue current meds    (E03.9) Hypothyroidism, unspecified type  Comment: chemically hypothyroid  Plan: Needs 150 mcg daily, will fill, repeat labs in three months    (I25.5) Ischemic cardiomyopathy  Comment:not  having PND  Plan: await EF off of nuc med stress test    (M54.9) Musculoskeletal back pain  Comment: reproducible with palpation over paraspinous muscles  Plan: reassurance, doubt aortic dissection       Greater than one half the fortyfive minutes total spent on the pt's visit were spent providing education and counselling to the patient regarding the above matters.

## 2019-07-08 ENCOUNTER — TELEPHONE (OUTPATIENT)
Dept: OTHER | Facility: CLINIC | Age: 59
End: 2019-07-08

## 2019-07-08 DIAGNOSIS — I20.0 UNSTABLE ANGINA (H): Primary | ICD-10-CM

## 2019-07-08 NOTE — TELEPHONE ENCOUNTER
Geno from [facility] called regarding Nuc Stress test ordered for this Wednesday. (Return call at *[phone])    These are Dr. Mullins's notes from that encounter on 10/30/18:     Patient presented [10/30/18] for a Lexiscan nuclear stress test.  Resting images had significant inferior attenuation due to GI uptake, likely from previous gastric bypass.  Despite drinking soda and reimaging, there was still significant inferior attenuation.  Test was canceled as it would have been uninterpretable.     Patient was set up for coronary CTA the same day.  Due to frequent PACs, only the calcium score portion could be done.  Calcium score was 700 with significant calcification of the LAD.     Results were communicated to Dr. Davis's office. Patient will be seen tomorrow by Dr. Davis.  Referral to cardiology and angiogram may be needed.  As far as other noninvasive testing options, a dobutamine stress echo or cardiac MRI could be considered (unable to do a treadmill).  If patient has symptoms more suggestive of angina, moving forward with angiogram may be a better option.    Nuc med is concerned that the imaging would be of inadequate quality this Wednesday and would like to know if a dobutamine stress echo or cardiac MRI would be preferable.    Please advise.  Routing to Dr. Davis

## 2019-07-09 NOTE — TELEPHONE ENCOUNTER
Per Dr. Davis, cancel Nuc Stress test that was ordered for 7/10/19 at 08:00.    Order dobutamine stress echo test.    FROM OV 6/27/19  (I25.110) Coronary artery disease involving native coronary artery of native heart with unstable angina pectoris (H)  (primary encounter diagnosis)  Comment: he is having typical anginal sxs (mowing lawns, walking up an incline, etc.)    Called scheduling at  *[phone]. Reported patient weight (207 lb on 6/27/19) and that he does take a betablocker (metoprolol).      will contact patient to schedule dobutamine stress echo and relay the message that he does not need to be here on 7/10 as that procedure was canceled.    Once this is scheduled, I will follow up and confirm that the patient's return appointment with Dr. Davis on 8/16 will still work.      Cristal Sotomayor RN BSN CMSRN

## 2019-08-08 ENCOUNTER — CARE COORDINATION (OUTPATIENT)
Dept: CARDIOLOGY | Facility: CLINIC | Age: 59
End: 2019-08-08
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
## 2019-08-09 ENCOUNTER — OFFICE VISIT (OUTPATIENT)
Dept: CARDIOLOGY | Facility: CLINIC | Age: 59
End: 2019-08-09
Attending: INTERNAL MEDICINE
Payer: COMMERCIAL

## 2019-08-09 ENCOUNTER — HOSPITAL ENCOUNTER (OUTPATIENT)
Dept: CARDIOLOGY | Facility: CLINIC | Age: 59
Discharge: HOME | End: 2019-08-09
Attending: INTERNAL MEDICINE | Admitting: INTERNAL MEDICINE
Payer: COMMERCIAL

## 2019-08-09 VITALS
DIASTOLIC BLOOD PRESSURE: 59 MMHG | HEART RATE: 65 BPM | SYSTOLIC BLOOD PRESSURE: 99 MMHG | BODY MASS INDEX: 31.17 KG/M2 | WEIGHT: 205 LBS

## 2019-08-09 DIAGNOSIS — I25.118 CORONARY ARTERY DISEASE OF NATIVE ARTERY OF NATIVE HEART WITH STABLE ANGINA PECTORIS (H): ICD-10-CM

## 2019-08-09 PROCEDURE — 99214 OFFICE O/P EST MOD 30 MIN: CPT | Mod: 25 | Performed by: INTERNAL MEDICINE

## 2019-08-09 PROCEDURE — 93306 TTE W/DOPPLER COMPLETE: CPT

## 2019-08-09 PROCEDURE — 93000 ELECTROCARDIOGRAM COMPLETE: CPT | Performed by: INTERNAL MEDICINE

## 2019-08-09 PROCEDURE — 93306 TTE W/DOPPLER COMPLETE: CPT | Mod: 26 | Performed by: INTERNAL MEDICINE

## 2019-08-09 NOTE — PROGRESS NOTES
HPI and Plan:   See dictation [id number]    Orders Placed This Encounter   Procedures     Follow-Up with Cardiologist     EKG 12-lead complete w/read - Clinics (performed today)     Holter Monitor 48 hour Adult Pediatric     No orders of the defined types were placed in this encounter.    There are no discontinued medications.      Encounter Diagnosis   Name Primary?     Coronary artery disease of native artery of native heart with stable angina pectoris (H)        CURRENT MEDICATIONS:  Current Outpatient Medications   Medication Sig Dispense Refill     acetaminophen (TYLENOL) 325 MG tablet Take 2 tablets (650 mg) by mouth every 4 hours as needed for mild pain 100 tablet 0     aspirin 81 MG tablet Take 1 tablet (81 mg) by mouth daily Start tomorrow morning.       atorvastatin (LIPITOR) 40 MG tablet Take 1 tablet (40 mg) by mouth daily 90 tablet 3     Cholecalciferol (VITAMIN D) 2000 units tablet Take 1 tablet by mouth daily 100 tablet 12     clopidogrel (PLAVIX) 75 MG tablet Take 1 tablet (75 mg) by mouth daily 30 tablet 11     hydrochlorothiazide (HYDRODIURIL) 25 MG tablet Take 1 tablet (25 mg) by mouth every morning 90 tablet 3     isosorbide mononitrate (IMDUR) 30 MG 24 hr tablet Take 1 tablet (30 mg) by mouth daily 30 tablet 11     levothyroxine (SYNTHROID/LEVOTHROID) 150 MCG tablet Take 1 tablet (150 mcg) by mouth daily 90 tablet 3     metoprolol succinate ER (TOPROL-XL) 50 MG 24 hr tablet Take 1.5 tablets (75 mg) by mouth daily 135 tablet 3     Multiple Vitamin (MULTI-VITAMIN) per tablet Take 1 tablet by mouth 2 times daily        nitroGLYcerin (NITROSTAT) 0.4 MG sublingual tablet For chest pain place 1 tablet under the tongue every 5 minutes for 3 doses. If symptoms persist 5 minutes after 1st dose call 911. 25 tablet 3     isosorbide mononitrate (IMDUR) 30 MG 24 hr tablet Take 1 tablet (30 mg) by mouth every morning Hold if Systolic Blood Pressure is less than 85. 30 tablet 0       ALLERGIES     Allergies    Allergen Reactions     No Known Allergies        PAST MEDICAL HISTORY:  Past Medical History:   Diagnosis Date     ACQUIRED HYPOTHYROIDISM       Blood clot in the legs      CAD (coronary artery disease)     11/15/18 Cath: PCI with 3.5-16mm DORY to proximal LAD     Calculus of gallbladder without mention of cholecystitis or obstruction      Cataract      Chest pressure 11/5/2018     Hyperlipidemia      Hypertension      Ischemic cardiomyopathy     EF 40-45% on 11/2018 Echo     Obesity, unspecified     significant weight loss w/diet alone, on 10-15-10, BMI was 56.4     pulmonary emboli 6-2010    Unprovoked large bilateral pulmonary emboli without source identified on lower extremity dopplers, pt had a transient moderate lupus inhibitor upon initial presentation in June , 2010 which was gone in September, 2010;  all other thrombophilic workup is normal     Pulmonary embolus, bilateral      PVC's (premature ventricular contractions)     4% burden on 11/2018 Holter     Shortness of breath     on exertion     Venous insufficiency      Viral pneumonia, unspecified      Vitamin D deficiency        PAST SURGICAL HISTORY:  Past Surgical History:   Procedure Laterality Date     C NONSPECIFIC PROCEDURE  10-    Laparoscopic gastric bypass,     C NONSPECIFIC PROCEDURE  10-    1.  Attempted laparoscopic exploration with conversion to: .  Abdominal exploration. 3.  Reduction of incarcerated incisional hernia. 4.  Repair of incisional hernia.5.  Gastrostomy tube placement (#22 [place]) into defunctionalized stomach.6.  Enterotomy with bowel decompression and primary repair. 7.  Abdominal lavage.      COLONOSCOPY  11/17/2011    Procedure:COLONOSCOPY; colonoscopy; Surgeon:ELENA OROURKE; Location: GI     CV HEART CATHETERIZATION WITH POSSIBLE INTERVENTION Left 2/15/2019    Procedure: Coronary Angiogram;  Surgeon: Tulio Ortiz MD;  Location: [facility] CARDIAC CATH LAB     LAPAROSCOPIC CHOLECYSTECTOMY   9/28/2012    Procedure: LAPAROSCOPIC CHOLECYSTECTOMY;  LAPAROSCOPIC CHOLECYSTECTOMY, LYSIS OF ADHESIONS;  Surgeon: Dutch Matta MD;  Location:  OR     LAPAROSCOPIC LYSIS ADHESIONS  9/28/2012    Procedure: LAPAROSCOPIC LYSIS ADHESIONS;;  Surgeon: Dutch Matta MD;  Location:  OR     LAPAROSCOPY, SURGICAL; REPAIR INCISIONAL OR VENTRAL HERNIA      repair of ventral strangulated surgery     PHACOEMULSIFICATION CLEAR CORNEA WITH STANDARD INTRAOCULAR LENS IMPLANT  12/19/2011    Procedure:PHACOEMULSIFICATION CLEAR CORNEA WITH STANDARD INTRAOCULAR LENS IMPLANT; RIGHT PHACOEMULSIFICATION CLEAR CORNEA WITH STANDARD INTRAOCULAR LENS IMPLANT ; Surgeon:ALLISON ANTONIO; Location:[facility]       FAMILY HISTORY:  Family History   Problem Relation Age of Onset     Osteoporosis Mother      Thyroid Disease Mother         no thyroid     Cancer Maternal Grandmother         ? lung cancer     Cancer Father         melanoma     Coronary Artery Disease Brother      Coronary Artery Disease Brother        SOCIAL HISTORY:  Social History     Socioeconomic History     Marital status: Single     Spouse name: None     Number of children: None     Years of education: None     Highest education level: None   Occupational History     Occupation:      Employer: SELF   Social Needs     Financial resource strain: None     Food insecurity:     Worry: None     Inability: None     Transportation needs:     Medical: None     Non-medical: None   Tobacco Use     Smoking status: Never Smoker     Smokeless tobacco: Never Used   Substance and Sexual Activity     Alcohol use: No     Alcohol/week: 0.0 oz     Drug use: No     Sexual activity: None   Lifestyle     Physical activity:     Days per week: None     Minutes per session: None     Stress: None   Relationships     Social connections:     Talks on phone: None     Gets together: None     Attends Buddhist service: None     Active member of club or organization: None     Attends meetings  of clubs or organizations: None     Relationship status: None     Intimate partner violence:     Fear of current or ex partner: None     Emotionally abused: None     Physically abused: None     Forced sexual activity: None   Other Topics Concern     Parent/sibling w/ CABG, MI or angioplasty before 65F 55M? Not Asked   Social History Narrative     None       Review of Systems:  Skin:  not assessed     Eyes:  Positive for glasses  ENT:  not assessed    Respiratory:  Positive for dyspnea on exertion  Cardiovascular:    Positive for;chest pain;edema  Gastroenterology: Positive for heartburn  Genitourinary:  Negative    Musculoskeletal:  Positive for arthritis  Neurologic:  Positive for headaches;numbness or tingling of hands;numbness or tingling of feet  Psychiatric:  Negative    Heme/Lymph/Imm:  Negative    Endocrine:  Positive for thyroid disorder    Physical Exam:  Vitals: BP 99/59   Pulse 65   Wt 93 kg (205 lb)   BMI 31.17 kg/m      Recent Lab Results:  LIPID RESULTS:  Lab Results   Component Value Date    CHOL 134 06/25/2019    HDL 60 06/25/2019    LDL 64 06/25/2019    TRIG 51 06/25/2019    CHOLHDLRATIO 3.0 10/13/2014       LIVER ENZYME RESULTS:  Lab Results   Component Value Date    AST 21 10/19/2018    ALT 24 06/25/2019       CBC RESULTS:  Lab Results   Component Value Date    WBC 5.5 06/25/2019    RBC 4.48 06/25/2019    HGB 13.1 (L) 06/25/2019    HCT 40.7 06/25/2019    MCV 91 06/25/2019    MCH 29.2 06/25/2019    MCHC 32.2 06/25/2019    RDW 14.0 06/25/2019     (L) 06/25/2019       BMP RESULTS:  Lab Results   Component Value Date     06/25/2019    POTASSIUM 4.1 06/25/2019    CHLORIDE 107 06/25/2019    CO2 30 06/25/2019    ANIONGAP 6 06/25/2019    GLC 99 06/25/2019    BUN 22 06/25/2019    CR 0.85 06/25/2019    GFRESTIMATED >90 06/25/2019    GFRESTBLACK >90 06/25/2019    GUERITA 8.8 06/25/2019        A1C RESULTS:  Lab Results   Component Value Date    A1C 5.5 06/25/2019       INR RESULTS:  Lab Results    Component Value Date    INR 1.08 02/15/2019    INR 1.04 02/12/2019           CC  Tereza Key MD  [address]  [place] [address]
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
## 2019-08-09 NOTE — LETTER
8/9/2019    dAdison Davis MD  [address]  [place] [address]    RE: Ramiro Jeffers       Dear Colleague,    I had the pleasure of seeing Ramiro Jeffers in the [facility].    HPI and Plan:   See dictation [id number]    Orders Placed This Encounter   Procedures     Follow-Up with Cardiologist     EKG 12-lead complete w/read - Clinics (performed today)     Holter Monitor 48 hour Adult Pediatric     No orders of the defined types were placed in this encounter.    There are no discontinued medications.      Encounter Diagnosis   Name Primary?     Coronary artery disease of native artery of native heart with stable angina pectoris (H)        CURRENT MEDICATIONS:  Current Outpatient Medications   Medication Sig Dispense Refill     acetaminophen (TYLENOL) 325 MG tablet Take 2 tablets (650 mg) by mouth every 4 hours as needed for mild pain 100 tablet 0     aspirin 81 MG tablet Take 1 tablet (81 mg) by mouth daily Start tomorrow morning.       atorvastatin (LIPITOR) 40 MG tablet Take 1 tablet (40 mg) by mouth daily 90 tablet 3     Cholecalciferol (VITAMIN D) 2000 units tablet Take 1 tablet by mouth daily 100 tablet 12     clopidogrel (PLAVIX) 75 MG tablet Take 1 tablet (75 mg) by mouth daily 30 tablet 11     hydrochlorothiazide (HYDRODIURIL) 25 MG tablet Take 1 tablet (25 mg) by mouth every morning 90 tablet 3     isosorbide mononitrate (IMDUR) 30 MG 24 hr tablet Take 1 tablet (30 mg) by mouth daily 30 tablet 11     levothyroxine (SYNTHROID/LEVOTHROID) 150 MCG tablet Take 1 tablet (150 mcg) by mouth daily 90 tablet 3     metoprolol succinate ER (TOPROL-XL) 50 MG 24 hr tablet Take 1.5 tablets (75 mg) by mouth daily 135 tablet 3     Multiple Vitamin (MULTI-VITAMIN) per tablet Take 1 tablet by mouth 2 times daily        nitroGLYcerin (NITROSTAT) 0.4 MG sublingual tablet For chest pain place 1 tablet under the tongue every 5 minutes for 3 doses. If symptoms persist 5 minutes after 1st  dose call 911. 25 tablet 3     isosorbide mononitrate (IMDUR) 30 MG 24 hr tablet Take 1 tablet (30 mg) by mouth every morning Hold if Systolic Blood Pressure is less than 85. 30 tablet 0       ALLERGIES     Allergies   Allergen Reactions     No Known Allergies        PAST MEDICAL HISTORY:  Past Medical History:   Diagnosis Date     ACQUIRED HYPOTHYROIDISM       Blood clot in the legs      CAD (coronary artery disease)     11/15/18 Cath: PCI with 3.5-16mm DORY to proximal LAD     Calculus of gallbladder without mention of cholecystitis or obstruction      Cataract      Chest pressure 11/5/2018     Hyperlipidemia      Hypertension      Ischemic cardiomyopathy     EF 40-45% on 11/2018 Echo     Obesity, unspecified     significant weight loss w/diet alone, on 10-15-10, BMI was 56.4     pulmonary emboli 6-2010    Unprovoked large bilateral pulmonary emboli without source identified on lower extremity dopplers, pt had a transient moderate lupus inhibitor upon initial presentation in June , 2010 which was gone in September, 2010;  all other thrombophilic workup is normal     Pulmonary embolus, bilateral      PVC's (premature ventricular contractions)     4% burden on 11/2018 Holter     Shortness of breath     on exertion     Venous insufficiency      Viral pneumonia, unspecified      Vitamin D deficiency        PAST SURGICAL HISTORY:  Past Surgical History:   Procedure Laterality Date     C NONSPECIFIC PROCEDURE  10-    Laparoscopic gastric bypass,     C NONSPECIFIC PROCEDURE  10-    1.  Attempted laparoscopic exploration with conversion to: .  Abdominal exploration. 3.  Reduction of incarcerated incisional hernia. 4.  Repair of incisional hernia.5.  Gastrostomy tube placement (#22 [place]) into defunctionalized stomach.6.  Enterotomy with bowel decompression and primary repair. 7.  Abdominal lavage.      COLONOSCOPY  11/17/2011    Procedure:COLONOSCOPY; colonoscopy; Surgeon:ELENA OROURKE; Location:[facility]      CV HEART CATHETERIZATION WITH POSSIBLE INTERVENTION Left 2/15/2019    Procedure: Coronary Angiogram;  Surgeon: Tulio Ortiz MD;  Location:  HEART CARDIAC CATH LAB     LAPAROSCOPIC CHOLECYSTECTOMY  9/28/2012    Procedure: LAPAROSCOPIC CHOLECYSTECTOMY;  LAPAROSCOPIC CHOLECYSTECTOMY, LYSIS OF ADHESIONS;  Surgeon: Dutch Matta MD;  Location:  OR     LAPAROSCOPIC LYSIS ADHESIONS  9/28/2012    Procedure: LAPAROSCOPIC LYSIS ADHESIONS;;  Surgeon: Dutch Matta MD;  Location:  OR     LAPAROSCOPY, SURGICAL; REPAIR INCISIONAL OR VENTRAL HERNIA      repair of ventral strangulated surgery     PHACOEMULSIFICATION CLEAR CORNEA WITH STANDARD INTRAOCULAR LENS IMPLANT  12/19/2011    Procedure:PHACOEMULSIFICATION CLEAR CORNEA WITH STANDARD INTRAOCULAR LENS IMPLANT; RIGHT PHACOEMULSIFICATION CLEAR CORNEA WITH STANDARD INTRAOCULAR LENS IMPLANT ; Surgeon:ALLISON ANTONIO; Location:[facility]       FAMILY HISTORY:  Family History   Problem Relation Age of Onset     Osteoporosis Mother      Thyroid Disease Mother         no thyroid     Cancer Maternal Grandmother         ? lung cancer     Cancer Father         melanoma     Coronary Artery Disease Brother      Coronary Artery Disease Brother        SOCIAL HISTORY:  Social History     Socioeconomic History     Marital status: Single     Spouse name: None     Number of children: None     Years of education: None     Highest education level: None   Occupational History     Occupation:      Employer: SELF   Social Needs     Financial resource strain: None     Food insecurity:     Worry: None     Inability: None     Transportation needs:     Medical: None     Non-medical: None   Tobacco Use     Smoking status: Never Smoker     Smokeless tobacco: Never Used   Substance and Sexual Activity     Alcohol use: No     Alcohol/week: 0.0 oz     Drug use: No     Sexual activity: None   Lifestyle     Physical activity:     Days per week: None     Minutes per session: None      Stress: None   Relationships     Social connections:     Talks on phone: None     Gets together: None     Attends Tenriism service: None     Active member of club or organization: None     Attends meetings of clubs or organizations: None     Relationship status: None     Intimate partner violence:     Fear of current or ex partner: None     Emotionally abused: None     Physically abused: None     Forced sexual activity: None   Other Topics Concern     Parent/sibling w/ CABG, MI or angioplasty before 65F 55M? Not Asked   Social History Narrative     None       Review of Systems:  Skin:  not assessed     Eyes:  Positive for glasses  ENT:  not assessed    Respiratory:  Positive for dyspnea on exertion  Cardiovascular:    Positive for;chest pain;edema  Gastroenterology: Positive for heartburn  Genitourinary:  Negative    Musculoskeletal:  Positive for arthritis  Neurologic:  Positive for headaches;numbness or tingling of hands;numbness or tingling of feet  Psychiatric:  Negative    Heme/Lymph/Imm:  Negative    Endocrine:  Positive for thyroid disorder    Physical Exam:  Vitals: BP 99/59   Pulse 65   Wt 93 kg (205 lb)   BMI 31.17 kg/m       Recent Lab Results:  LIPID RESULTS:  Lab Results   Component Value Date    CHOL 134 06/25/2019    HDL 60 06/25/2019    LDL 64 06/25/2019    TRIG 51 06/25/2019    CHOLHDLRATIO 3.0 10/13/2014       LIVER ENZYME RESULTS:  Lab Results   Component Value Date    AST 21 10/19/2018    ALT 24 06/25/2019       CBC RESULTS:  Lab Results   Component Value Date    WBC 5.5 06/25/2019    RBC 4.48 06/25/2019    HGB 13.1 (L) 06/25/2019    HCT 40.7 06/25/2019    MCV 91 06/25/2019    MCH 29.2 06/25/2019    MCHC 32.2 06/25/2019    RDW 14.0 06/25/2019     (L) 06/25/2019       BMP RESULTS:  Lab Results   Component Value Date     06/25/2019    POTASSIUM 4.1 06/25/2019    CHLORIDE 107 06/25/2019    CO2 30 06/25/2019    ANIONGAP 6 06/25/2019    GLC 99 06/25/2019    BUN 22 06/25/2019    CR  0.85 06/25/2019    GFRESTIMATED >90 06/25/2019    GFRESTBLACK >90 06/25/2019    GUERITA 8.8 06/25/2019        A1C RESULTS:  Lab Results   Component Value Date    A1C 5.5 06/25/2019       INR RESULTS:  Lab Results   Component Value Date    INR 1.08 02/15/2019    INR 1.04 02/12/2019           CC  Tereza Key MD  [address]  [place] [address]                    Thank you for allowing me to participate in the care of your patient.      Sincerely,     Tereza Key MD     [facility]    cc:   Tereza Key MD  [address]  [place] [address]
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
## 2019-08-27 ENCOUNTER — TELEPHONE (OUTPATIENT)
Dept: OTHER | Facility: CLINIC | Age: 59
End: 2019-08-27

## 2019-08-27 DIAGNOSIS — Z98.84 HISTORY OF GASTRIC BYPASS: Primary | ICD-10-CM

## 2019-08-27 NOTE — TELEPHONE ENCOUNTER
Patient would like blood work for vitamin B12 levels added to his labs on 8/29.    Routing to Dr. Davis to advise.  Cristal Sotomayor RN BSN

## 2019-08-29 DIAGNOSIS — I25.110 CORONARY ARTERY DISEASE INVOLVING NATIVE CORONARY ARTERY OF NATIVE HEART WITH UNSTABLE ANGINA PECTORIS (H): ICD-10-CM

## 2019-08-29 DIAGNOSIS — E03.9 HYPOTHYROIDISM, UNSPECIFIED TYPE: ICD-10-CM

## 2019-08-29 DIAGNOSIS — Z98.84 HISTORY OF GASTRIC BYPASS: ICD-10-CM

## 2019-08-29 DIAGNOSIS — E78.5 HYPERLIPIDEMIA LDL GOAL <100: ICD-10-CM

## 2019-08-29 LAB
ALBUMIN SERPL-MCNC: 3.5 G/DL (ref 3.4–5)
ALP SERPL-CCNC: 80 U/L (ref 40–150)
ALT SERPL W P-5'-P-CCNC: 23 U/L (ref 0–70)
ANION GAP SERPL CALCULATED.3IONS-SCNC: 6 MMOL/L (ref 3–14)
AST SERPL W P-5'-P-CCNC: 22 U/L (ref 0–45)
BILIRUB DIRECT SERPL-MCNC: 0.1 MG/DL (ref 0–0.2)
BILIRUB SERPL-MCNC: 0.5 MG/DL (ref 0.2–1.3)
BUN SERPL-MCNC: 19 MG/DL (ref 7–30)
CALCIUM SERPL-MCNC: 9.1 MG/DL (ref 8.5–10.1)
CHLORIDE SERPL-SCNC: 107 MMOL/L (ref 94–109)
CHOLEST SERPL-MCNC: 137 MG/DL
CO2 SERPL-SCNC: 31 MMOL/L (ref 20–32)
CREAT SERPL-MCNC: 0.85 MG/DL (ref 0.66–1.25)
GFR SERPL CREATININE-BSD FRML MDRD: >90 ML/MIN/{1.73_M2}
GLUCOSE SERPL-MCNC: 99 MG/DL (ref 70–99)
HBA1C MFR BLD: 5.5 % (ref 0–5.6)
HDLC SERPL-MCNC: 52 MG/DL
LDLC SERPL CALC-MCNC: 75 MG/DL
NONHDLC SERPL-MCNC: 85 MG/DL
POTASSIUM SERPL-SCNC: 3.9 MMOL/L (ref 3.4–5.3)
PROT SERPL-MCNC: 7 G/DL (ref 6.8–8.8)
SODIUM SERPL-SCNC: 144 MMOL/L (ref 133–144)
T3FREE SERPL-MCNC: 2.1 PG/ML (ref 2.3–4.2)
T4 FREE SERPL-MCNC: 1.24 NG/DL (ref 0.76–1.46)
TRIGL SERPL-MCNC: 51 MG/DL
TSH SERPL DL<=0.005 MIU/L-ACNC: 0.97 MU/L (ref 0.4–4)
VIT B12 SERPL-MCNC: 988 PG/ML (ref 193–986)

## 2019-08-29 PROCEDURE — 83036 HEMOGLOBIN GLYCOSYLATED A1C: CPT | Performed by: INTERNAL MEDICINE

## 2019-08-29 PROCEDURE — 80076 HEPATIC FUNCTION PANEL: CPT | Performed by: INTERNAL MEDICINE

## 2019-08-29 PROCEDURE — 36415 COLL VENOUS BLD VENIPUNCTURE: CPT | Performed by: INTERNAL MEDICINE

## 2019-08-29 PROCEDURE — 84443 ASSAY THYROID STIM HORMONE: CPT | Performed by: INTERNAL MEDICINE

## 2019-08-29 PROCEDURE — 84481 FREE ASSAY (FT-3): CPT | Performed by: INTERNAL MEDICINE

## 2019-08-29 PROCEDURE — 80061 LIPID PANEL: CPT | Performed by: INTERNAL MEDICINE

## 2019-08-29 PROCEDURE — 82607 VITAMIN B-12: CPT | Performed by: INTERNAL MEDICINE

## 2019-08-29 PROCEDURE — 84439 ASSAY OF FREE THYROXINE: CPT | Performed by: INTERNAL MEDICINE

## 2019-08-29 PROCEDURE — 80048 BASIC METABOLIC PNL TOTAL CA: CPT | Performed by: INTERNAL MEDICINE

## 2019-09-04 ENCOUNTER — OFFICE VISIT (OUTPATIENT)
Dept: OTHER | Facility: CLINIC | Age: 59
End: 2019-09-04
Attending: INTERNAL MEDICINE
Payer: COMMERCIAL

## 2019-09-04 VITALS
OXYGEN SATURATION: 95 % | HEIGHT: 68 IN | WEIGHT: 205 LBS | DIASTOLIC BLOOD PRESSURE: 58 MMHG | SYSTOLIC BLOOD PRESSURE: 101 MMHG | HEART RATE: 60 BPM | BODY MASS INDEX: 31.07 KG/M2 | RESPIRATION RATE: 14 BRPM

## 2019-09-04 DIAGNOSIS — M54.9 MUSCULOSKELETAL BACK PAIN: ICD-10-CM

## 2019-09-04 DIAGNOSIS — E78.5 HYPERLIPIDEMIA LDL GOAL <100: ICD-10-CM

## 2019-09-04 DIAGNOSIS — E03.9 HYPOTHYROIDISM, UNSPECIFIED TYPE: ICD-10-CM

## 2019-09-04 DIAGNOSIS — I25.5 ISCHEMIC CARDIOMYOPATHY: ICD-10-CM

## 2019-09-04 DIAGNOSIS — Z00.00 ROUTINE GENERAL MEDICAL EXAMINATION AT A HEALTH CARE FACILITY: Primary | ICD-10-CM

## 2019-09-04 DIAGNOSIS — I25.110 CORONARY ARTERY DISEASE INVOLVING NATIVE CORONARY ARTERY OF NATIVE HEART WITH UNSTABLE ANGINA PECTORIS (H): ICD-10-CM

## 2019-09-04 PROCEDURE — G0463 HOSPITAL OUTPT CLINIC VISIT: HCPCS

## 2019-09-04 PROCEDURE — 99214 OFFICE O/P EST MOD 30 MIN: CPT | Mod: ZP | Performed by: INTERNAL MEDICINE

## 2019-09-04 RX ORDER — LEVOTHYROXINE SODIUM 175 UG/1
175 TABLET ORAL DAILY
Qty: 90 TABLET | Refills: 3 | Status: SHIPPED | OUTPATIENT
Start: 2019-09-04 | End: 2020-09-01

## 2019-09-04 ASSESSMENT — MIFFLIN-ST. JEOR: SCORE: 1719.37

## 2019-09-04 NOTE — PROGRESS NOTES
"Ramiro Jeffers is a 59 year old male who presents for:  Chief Complaint   Patient presents with     RECHECK     follow up  - labs done 08/29/19 *LMB 06/27/19 resched from 09/17/19        Vitals:    Vitals:    09/04/19 1055   BP: 101/58   BP Location: Right arm   Patient Position: Chair   Cuff Size: Adult Regular   Pulse: 60   Resp: 14   SpO2: 95%   Weight: 205 lb (93 kg)   Height: 5' 8\" (1.727 m)       BMI:  Estimated body mass index is 31.17 kg/m  as calculated from the following:    Height as of this encounter: 5' 8\" (1.727 m).    Weight as of this encounter: 205 lb (93 kg).    Pain Score:  Data Unavailable        Santos Vaughan [facility]    "

## 2019-09-04 NOTE — PROGRESS NOTES
Ramiro Jeffers is a 59 year old male who is presenting at the current time to discuss his diagnosi(es) of        Coronary artery disease involving native coronary artery of native heart with unstable angina pectoris (H)  Hyperlipidemia LDL goal <100  Hypothyroidism, unspecified type  Ischemic cardiomyopathy  Musculoskeletal back pain  Routine general medical examination at a health care facility .      Review Of Systems  Skin: negative  Eyes: negative  Ears/Nose/Throat: negative  Respiratory: No shortness of breath, dyspnea on exertion, cough, or hemoptysis  Cardiovascular: positive for palpitations continuing   Gastrointestinal: negative  Genitourinary: negative  Musculoskeletal: negative  Neurologic: negative  Psychiatric: negative  Hematologic/Lymphatic/Immunologic: negative  Endocrine: negative     PAST MEDICAL HISTORY:                  Past Medical History:   Diagnosis Date     ACQUIRED HYPOTHYROIDISM       Blood clot in the legs      CAD (coronary artery disease)     11/15/18 Cath: PCI with 3.5-16mm DORY to proximal LAD     Calculus of gallbladder without mention of cholecystitis or obstruction      Cataract      Chest pressure 11/5/2018     Hyperlipidemia      Hypertension      Ischemic cardiomyopathy     EF 40-45% on 11/2018 Echo     Obesity, unspecified     significant weight loss w/diet alone, on 10-15-10, BMI was 56.4     pulmonary emboli 6-2010    Unprovoked large bilateral pulmonary emboli without source identified on lower extremity dopplers, pt had a transient moderate lupus inhibitor upon initial presentation in June , 2010 which was gone in September, 2010;  all other thrombophilic workup is normal     Pulmonary embolus, bilateral      PVC's (premature ventricular contractions)     4% burden on 11/2018 Holter     Shortness of breath     on exertion     Venous insufficiency      Viral pneumonia, unspecified      Vitamin D deficiency        PAST SURGICAL HISTORY:                  Past Surgical History:    Procedure Laterality Date     C NONSPECIFIC PROCEDURE  10-    Laparoscopic gastric bypass,     C NONSPECIFIC PROCEDURE  10-    1.  Attempted laparoscopic exploration with conversion to: .  Abdominal exploration. 3.  Reduction of incarcerated incisional hernia. 4.  Repair of incisional hernia.5.  Gastrostomy tube placement (#22 French) into defunctionalized stomach.6.  Enterotomy with bowel decompression and primary repair. 7.  Abdominal lavage.      COLONOSCOPY  11/17/2011    Procedure:COLONOSCOPY; colonoscopy; Surgeon:ELENA OROURKE; Location: GI     CV HEART CATHETERIZATION WITH POSSIBLE INTERVENTION Left 2/15/2019    Procedure: Coronary Angiogram;  Surgeon: Tulio Ortiz MD;  Location:  HEART CARDIAC CATH LAB     LAPAROSCOPIC CHOLECYSTECTOMY  9/28/2012    Procedure: LAPAROSCOPIC CHOLECYSTECTOMY;  LAPAROSCOPIC CHOLECYSTECTOMY, LYSIS OF ADHESIONS;  Surgeon: Dutch Matta MD;  Location:  OR     LAPAROSCOPIC LYSIS ADHESIONS  9/28/2012    Procedure: LAPAROSCOPIC LYSIS ADHESIONS;;  Surgeon: Dutch Matta MD;  Location:  OR     LAPAROSCOPY, SURGICAL; REPAIR INCISIONAL OR VENTRAL HERNIA      repair of ventral strangulated surgery     PHACOEMULSIFICATION CLEAR CORNEA WITH STANDARD INTRAOCULAR LENS IMPLANT  12/19/2011    Procedure:PHACOEMULSIFICATION CLEAR CORNEA WITH STANDARD INTRAOCULAR LENS IMPLANT; RIGHT PHACOEMULSIFICATION CLEAR CORNEA WITH STANDARD INTRAOCULAR LENS IMPLANT ; Surgeon:ALLISON ANTONIO; Location:[facility]       CURRENT MEDICATIONS:                  Current Outpatient Medications   Medication Sig Dispense Refill     acetaminophen (TYLENOL) 325 MG tablet Take 2 tablets (650 mg) by mouth every 4 hours as needed for mild pain 100 tablet 0     aspirin 81 MG tablet Take 1 tablet (81 mg) by mouth daily Start tomorrow morning.       atorvastatin (LIPITOR) 40 MG tablet Take 1 tablet (40 mg) by mouth daily 90 tablet 3     Cholecalciferol (VITAMIN D) 2000 units tablet Take 1  tablet by mouth daily 100 tablet 12     clopidogrel (PLAVIX) 75 MG tablet Take 1 tablet (75 mg) by mouth daily 30 tablet 11     hydrochlorothiazide (HYDRODIURIL) 25 MG tablet Take 1 tablet (25 mg) by mouth every morning 90 tablet 3     isosorbide mononitrate (IMDUR) 30 MG 24 hr tablet Take 1 tablet (30 mg) by mouth daily 30 tablet 11     levothyroxine (SYNTHROID/LEVOTHROID) 150 MCG tablet Take 1 tablet (150 mcg) by mouth daily 90 tablet 3     metoprolol succinate ER (TOPROL-XL) 50 MG 24 hr tablet Take 1.5 tablets (75 mg) by mouth daily 135 tablet 3     Multiple Vitamin (MULTI-VITAMIN) per tablet Take 1 tablet by mouth 2 times daily        nitroGLYcerin (NITROSTAT) 0.4 MG sublingual tablet For chest pain place 1 tablet under the tongue every 5 minutes for 3 doses. If symptoms persist 5 minutes after 1st dose call 911. 25 tablet 3     isosorbide mononitrate (IMDUR) 30 MG 24 hr tablet Take 1 tablet (30 mg) by mouth every morning Hold if Systolic Blood Pressure is less than 85. 30 tablet 0       ALLERGIES:                  Allergies   Allergen Reactions     No Known Allergies        SOCIAL HISTORY:                  Social History     Socioeconomic History     Marital status: Single     Spouse name: Not on file     Number of children: Not on file     Years of education: Not on file     Highest education level: Not on file   Occupational History     Occupation:      Employer: SELF   Social Needs     Financial resource strain: Not on file     Food insecurity:     Worry: Not on file     Inability: Not on file     Transportation needs:     Medical: Not on file     Non-medical: Not on file   Tobacco Use     Smoking status: Never Smoker     Smokeless tobacco: Never Used   Substance and Sexual Activity     Alcohol use: No     Alcohol/week: 0.0 oz     Drug use: No     Sexual activity: Not on file   Lifestyle     Physical activity:     Days per week: Not on file     Minutes per session: Not on file     Stress: Not  on file   Relationships     Social connections:     Talks on phone: Not on file     Gets together: Not on file     Attends [employer] service: Not on file     Active member of club or organization: Not on file     Attends meetings of clubs or organizations: Not on file     Relationship status: Not on file     Intimate partner violence:     Fear of current or ex partner: Not on file     Emotionally abused: Not on file     Physically abused: Not on file     Forced sexual activity: Not on file   Other Topics Concern     Parent/sibling w/ CABG, MI or angioplasty before 65F 55M? Not Asked   Social History Narrative     Not on file       FAMILY HISTORY:                   Family History   Problem Relation Age of Onset     Osteoporosis Mother      Thyroid Disease Mother         no thyroid     Cancer Maternal Grandmother         ? lung cancer     Cancer Father         melanoma     Coronary Artery Disease Brother      Coronary Artery Disease Brother          Physical exam Reveals:    O/P: WNL  HEENT: WNL  NECK: No JVD, thyromegaly, or lymphadenopathy  HEART: RRR, no murmurs, gallops, or rubs  LUNGS: CTA bilaterally without rales, wheezes, or rhonchi  GI: NABS, nondistended, nontender, soft  EXT:without cyanosis, clubbing, or edema  NEURO: nonfocal  : no flank tenderness        [facility]  [facility]  Echocardiography [facility]  [address]  [place] [address]  Phone [phone]  Fax [phone]        Name: TASIA ANDREA  MRN: [id number]  : 1960  Study Date: 2019 10:01 AM  Age: 59 yrs  Gender: Male  Patient Location: [facility]  Reason For Study: Coronary artery disease of native artery of native heart  with st  Ordering Physician: MERY PEACOCK  Referring Physician: Mohit Davis MD  Performed By: Anabelle De Luna RDCS     BSA: 2.1 m2  Height: 68 in  Weight: 207 lb  HR: 57  BP: 116/64  mmHg  _____________________________________________________________________________  __     Procedure  Complete Echo Adult.     _____________________________________________________________________________  __        Interpretation Summary     1. Left ventricular systolic function is normal. The visual ejection fraction  is estimated at 60-65%.  2. No regional wall motion abnormalities noted.  3. The right ventricle is normal in structure, function and size.  4. The left atrium is mildly dilated.  5. Aortic valve is mildly sclerotic without stenosis.  _____________________________________________________________________________  __        Left Ventricle  The left ventricle is normal in size. There is mild concentric left  ventricular hypertrophy. Left ventricular systolic function is normal. The  visual ejection fraction is estimated at 60-65%. Left ventricular diastolic  function is normal. No regional wall motion abnormalities noted.     Right Ventricle  The right ventricle is normal in structure, function and size.     Atria  The left atrium is mildly dilated. Right atrial size is normal.     Mitral Valve  The mitral valve is normal in structure and function. There is trace mitral  regurgitation.     Tricuspid Valve  The tricuspid valve is normal in structure and function. There is trace  tricuspid regurgitation.        Aortic Valve  The aortic valve is trileaflet with aortic valve sclerosis.     Pulmonic Valve  The pulmonic valve is normal in structure and function.     Vessels  Normal size aorta. IVC diameter <2.1 cm collapsing >50% with sniff suggests a  normal RA pressure of 3 mmHg.     Pericardium  There is no pericardial effusion.     Rhythm  Sinus rhythm was noted.     _____________________________________________________________________________  __  MMode/2D Measurements & Calculations  IVSd: 1.1 cm  LVIDd: 5.1 cm  LVIDs: 3.2 cm  LVPWd: 1.2 cm  FS: 37.7 %  LV mass(C)d: 223.5 grams  LV mass(C)dI: 107.8  grams/m2  Ao root diam: 3.0 cm  LA dimension: 4.1 cm     asc Aorta Diam: 3.1 cm  LA/Ao: 1.4  LA Volume Index (BP): 35.0 ml/m2  RWT: 0.45        Doppler Measurements & Calculations  MV E max nacho: 80.3 cm/sec  MV A max nacho: 57.6 cm/sec  MV E/A: 1.4  MV dec slope: 336.0 cm/sec2  MV dec time: 0.24 sec  TV max P.5 mmHg  PA acc time: 0.12 sec     TR max nacho: 203.0 cm/sec  TR max P.5 mmHg  E/E': 7.8  Peak E' Nacho: 10.3 cm/sec           _____________________________________________________________________________  __           Report approved by: Diane Weinstein 2019 11:05 AM    Component      Latest Ref Rng & Units 2019   Sodium      133 - 144 mmol/L 144   Potassium      3.4 - 5.3 mmol/L 3.9   Chloride      94 - 109 mmol/L 107   Carbon Dioxide      20 - 32 mmol/L 31   Anion Gap      3 - 14 mmol/L 6   Glucose      70 - 99 mg/dL 99   Urea Nitrogen      7 - 30 mg/dL 19   Creatinine      0.66 - 1.25 mg/dL 0.85   GFR Estimate      >60 mL/min/1.73:m2 >90   GFR Estimate If Black      >60 mL/min/1.73:m2 >90   Calcium      8.5 - 10.1 mg/dL 9.1   Bilirubin Direct      0.0 - 0.2 mg/dL 0.1   Bilirubin Total      0.2 - 1.3 mg/dL 0.5   Albumin      3.4 - 5.0 g/dL 3.5   Protein Total      6.8 - 8.8 g/dL 7.0   Alkaline Phosphatase      40 - 150 U/L 80   ALT      0 - 70 U/L 23   AST      0 - 45 U/L 22   Cholesterol      <200 mg/dL 137   Triglycerides      <150 mg/dL 51   HDL Cholesterol      >39 mg/dL 52   LDL Cholesterol Calculated      <100 mg/dL 75   Non HDL Cholesterol      <130 mg/dL 85   Vitamin B12      193 - 986 pg/mL 988 (H)   Hemoglobin A1C      0 - 5.6 % 5.5   TSH      0.40 - 4.00 mU/L 0.97   T4 Free      0.76 - 1.46 ng/dL 1.24   Free T3      2.3 - 4.2 pg/mL 2.1 (L)           A/P:    (I25.110) Coronary artery disease involving native coronary artery of native heart with unstable angina pectoris (H)  Comment: In 2018 he was complaining of crescendo angina.  CTA was ordered which showed a significantly  elevated calcium score, which prompted a coronary angiography that showed a proximal LAD lesion and that was subsequently stented.  EF initially was mildly reduced at 40%-45%, which subsequently improved to normal on most recent TTE.  A few months after that, he complained of ongoing exertional symptoms concerning for classic angina, which prompted another coronary angiogram in February of this year, which showed patent stents and no other significant disease was noted on that.  He clinically does not have any heart failure but does still complain of mild occasional skipped beats and palpitations. He has not gotten a Holter monitor done as ordered by his cardiologist. Plan: Follow-Up with Vascular Medicine, CBC with         platelets differential, T3 Free, T4 free, TSH,         Basic metabolic panel, Hepatic panel, LipoFit         by NMR, CRP cardiac risk, Lipoprotein (a)        Get Holter monitor done, RTC after 10-27-19 for annual physical.    (E78.5) Hyperlipidemia LDL goal <100  Comment: at goal  Plan: Follow-Up with Vascular Medicine, LipoFit by         NMR, CRP cardiac risk, Lipoprotein (a)           (E03.9) Hypothyroidism, unspecified type  Comment: FT3 low, increase synthroid from 150 to 175 mcg daily.  Plan: Follow-Up with Vascular Medicine           (I25.5) Ischemic cardiomyopathy  Comment: EF has recovered  Plan: Follow-Up with Vascular Medicine                (Z00.00) Routine general medical examination at a health care facility  (primary encounter diagnosis)  Comment: RTC after 10-27-19  Plan: Follow-Up with Vascular Medicine, CBC with         platelets differential, T3 Free, T4 free, TSH,         Basic metabolic panel, Hepatic panel, LipoFit         by NMR, CRP cardiac risk, Lipoprotein (a),         PROSTATE SPEC ANTIGEN SCREEN

## 2019-09-05 ENCOUNTER — HOSPITAL ENCOUNTER (OUTPATIENT)
Dept: CARDIOLOGY | Facility: CLINIC | Age: 59
Discharge: HOME | End: 2019-09-05
Attending: INTERNAL MEDICINE | Admitting: INTERNAL MEDICINE
Payer: COMMERCIAL

## 2019-09-05 DIAGNOSIS — I25.118 CORONARY ARTERY DISEASE OF NATIVE ARTERY OF NATIVE HEART WITH STABLE ANGINA PECTORIS (H): ICD-10-CM

## 2019-09-05 PROCEDURE — 93226 XTRNL ECG REC<48 HR SCAN A/R: CPT

## 2019-09-05 PROCEDURE — 93227 XTRNL ECG REC<48 HR R&I: CPT | Performed by: INTERNAL MEDICINE

## 2019-10-01 ENCOUNTER — HEALTH MAINTENANCE LETTER (OUTPATIENT)
Age: 59
End: 2019-10-01

## 2019-10-04 DIAGNOSIS — I10 ESSENTIAL HYPERTENSION WITH GOAL BLOOD PRESSURE LESS THAN 130/80: ICD-10-CM

## 2019-10-04 RX ORDER — HYDROCHLOROTHIAZIDE 25 MG/1
25 TABLET ORAL EVERY MORNING
Qty: 90 TABLET | Refills: 3 | Status: SHIPPED | OUTPATIENT
Start: 2019-10-04 | End: 2020-10-15

## 2019-10-04 NOTE — TELEPHONE ENCOUNTER
"hydrochlorothiazide (HYDRODIURIL) 25 MG tablet  Last Written Prescription Date:  10/6/18  Last Fill Quantity: 90,  # refills: 3   Last office visit: 9/14/19    Future Office Visit:   Next 5 appointments (look out 90 days)    Oct 30, 2019  8:40 AM CDT  Return Visit with Addison Davis MD  [facility] ([facility]) [address]  [place] [address] [phone]         Requested Prescriptions   Pending Prescriptions Disp Refills     hydrochlorothiazide (HYDRODIURIL) 25 MG tablet 90 tablet 3     Sig: Take 1 tablet (25 mg) by mouth every morning       Diuretics (Including Combos) Protocol Passed - 10/4/2019  9:18 AM        Passed - Blood pressure under 140/90 in past 12 months     BP Readings from Last 3 Encounters:   09/04/19 101/58   08/09/19 99/59   06/27/19 112/66                 Passed - Recent (12 mo) or future (30 days) visit within the authorizing provider's specialty     Patient has had an office visit with the authorizing provider or a provider within the authorizing providers department within the previous 12 mos or has a future within next 30 days. See \"Patient Info\" tab in inbasket, or \"Choose Columns\" in Meds & Orders section of the refill encounter.              Passed - Medication is active on med list        Passed - Patient is age 18 or older        Passed - Normal serum creatinine on file in past 12 months     Recent Labs   Lab Test 08/29/19  0804   CR 0.85              Passed - Normal serum potassium on file in past 12 months     Recent Labs   Lab Test 08/29/19  0804   POTASSIUM 3.9                    Passed - Normal serum sodium on file in past 12 months     Recent Labs   Lab Test 08/29/19  0804                 Prescription approved per [facility] Refill Protocol.    "

## 2019-10-21 DIAGNOSIS — E78.5 HYPERLIPIDEMIA LDL GOAL <100: ICD-10-CM

## 2019-10-21 DIAGNOSIS — Z00.00 ROUTINE GENERAL MEDICAL EXAMINATION AT A HEALTH CARE FACILITY: ICD-10-CM

## 2019-10-21 DIAGNOSIS — I25.110 CORONARY ARTERY DISEASE INVOLVING NATIVE CORONARY ARTERY OF NATIVE HEART WITH UNSTABLE ANGINA PECTORIS (H): ICD-10-CM

## 2019-10-21 LAB
ALBUMIN SERPL-MCNC: 3.4 G/DL (ref 3.4–5)
ALP SERPL-CCNC: 81 U/L (ref 40–150)
ALT SERPL W P-5'-P-CCNC: 21 U/L (ref 0–70)
ANION GAP SERPL CALCULATED.3IONS-SCNC: 3 MMOL/L (ref 3–14)
AST SERPL W P-5'-P-CCNC: 16 U/L (ref 0–45)
BASOPHILS # BLD AUTO: 0 10E9/L (ref 0–0.2)
BASOPHILS NFR BLD AUTO: 0.6 %
BILIRUB DIRECT SERPL-MCNC: 0.2 MG/DL (ref 0–0.2)
BILIRUB SERPL-MCNC: 0.5 MG/DL (ref 0.2–1.3)
BUN SERPL-MCNC: 23 MG/DL (ref 7–30)
CALCIUM SERPL-MCNC: 8.4 MG/DL (ref 8.5–10.1)
CHLORIDE SERPL-SCNC: 106 MMOL/L (ref 94–109)
CO2 SERPL-SCNC: 30 MMOL/L (ref 20–32)
CREAT SERPL-MCNC: 0.74 MG/DL (ref 0.66–1.25)
CRP SERPL HS-MCNC: <0.2 MG/L
DIFFERENTIAL METHOD BLD: ABNORMAL
EOSINOPHIL # BLD AUTO: 0.2 10E9/L (ref 0–0.7)
EOSINOPHIL NFR BLD AUTO: 3.2 %
ERYTHROCYTE [DISTWIDTH] IN BLOOD BY AUTOMATED COUNT: 13.8 % (ref 10–15)
GFR SERPL CREATININE-BSD FRML MDRD: >90 ML/MIN/{1.73_M2}
GLUCOSE SERPL-MCNC: 96 MG/DL (ref 70–99)
HCT VFR BLD AUTO: 41.1 % (ref 40–53)
HGB BLD-MCNC: 13.4 G/DL (ref 13.3–17.7)
LYMPHOCYTES # BLD AUTO: 1.6 10E9/L (ref 0.8–5.3)
LYMPHOCYTES NFR BLD AUTO: 31.1 %
MCH RBC QN AUTO: 29.4 PG (ref 26.5–33)
MCHC RBC AUTO-ENTMCNC: 32.6 G/DL (ref 31.5–36.5)
MCV RBC AUTO: 90 FL (ref 78–100)
MONOCYTES # BLD AUTO: 0.5 10E9/L (ref 0–1.3)
MONOCYTES NFR BLD AUTO: 10.7 %
NEUTROPHILS # BLD AUTO: 2.8 10E9/L (ref 1.6–8.3)
NEUTROPHILS NFR BLD AUTO: 54.4 %
PLATELET # BLD AUTO: 148 10E9/L (ref 150–450)
POTASSIUM SERPL-SCNC: 3.4 MMOL/L (ref 3.4–5.3)
PROT SERPL-MCNC: 6.8 G/DL (ref 6.8–8.8)
PSA SERPL-ACNC: <0.01 UG/L (ref 0–4)
RBC # BLD AUTO: 4.56 10E12/L (ref 4.4–5.9)
SODIUM SERPL-SCNC: 139 MMOL/L (ref 133–144)
T3FREE SERPL-MCNC: 2 PG/ML (ref 2.3–4.2)
T4 FREE SERPL-MCNC: 1.21 NG/DL (ref 0.76–1.46)
TSH SERPL DL<=0.005 MIU/L-ACNC: 0.45 MU/L (ref 0.4–4)
WBC # BLD AUTO: 5.1 10E9/L (ref 4–11)

## 2019-10-21 PROCEDURE — 83704 LIPOPROTEIN BLD QUAN PART: CPT | Mod: 90 | Performed by: INTERNAL MEDICINE

## 2019-10-21 PROCEDURE — 83695 ASSAY OF LIPOPROTEIN(A): CPT | Mod: 90 | Performed by: INTERNAL MEDICINE

## 2019-10-21 PROCEDURE — 86141 C-REACTIVE PROTEIN HS: CPT | Performed by: INTERNAL MEDICINE

## 2019-10-21 PROCEDURE — 84443 ASSAY THYROID STIM HORMONE: CPT | Performed by: INTERNAL MEDICINE

## 2019-10-21 PROCEDURE — 84439 ASSAY OF FREE THYROXINE: CPT | Performed by: INTERNAL MEDICINE

## 2019-10-21 PROCEDURE — 36415 COLL VENOUS BLD VENIPUNCTURE: CPT | Performed by: INTERNAL MEDICINE

## 2019-10-21 PROCEDURE — 85025 COMPLETE CBC W/AUTO DIFF WBC: CPT | Performed by: INTERNAL MEDICINE

## 2019-10-21 PROCEDURE — 80048 BASIC METABOLIC PNL TOTAL CA: CPT | Performed by: INTERNAL MEDICINE

## 2019-10-21 PROCEDURE — 99000 SPECIMEN HANDLING OFFICE-LAB: CPT | Performed by: INTERNAL MEDICINE

## 2019-10-21 PROCEDURE — G0103 PSA SCREENING: HCPCS | Performed by: INTERNAL MEDICINE

## 2019-10-21 PROCEDURE — 80076 HEPATIC FUNCTION PANEL: CPT | Performed by: INTERNAL MEDICINE

## 2019-10-21 PROCEDURE — 84481 FREE ASSAY (FT-3): CPT | Performed by: INTERNAL MEDICINE

## 2019-10-22 LAB — LPA SERPL-MCNC: 11 MG/DL

## 2019-10-23 LAB
CHOLEST SERPL-MCNC: 130 MG/DL
HDL SERPL QN: 9.1 NM
HDL SERPL-SCNC: 31.2 UMOL/L
HDLC SERPL-MCNC: 49 MG/DL (ref 40–59)
HLD.LARGE SERPL-SCNC: 5.8 UMOL/L
LDL SERPL QN: 21 NM
LDL SERPL-SCNC: 805 NMOL/L
LDL SMALL SERPL-SCNC: 354 NMOL/L
LDLC SERPL CALC-MCNC: 72 MG/DL
PATHOLOGY STUDY: ABNORMAL
TRIGL SERPL-MCNC: 46 MG/DL (ref 30–149)
VLDL LARGE SERPL-SCNC: <1.5 NMOL/L
VLDL SERPL QN: 47.3 NM

## 2019-11-25 DIAGNOSIS — E78.5 HYPERLIPIDEMIA LDL GOAL <70: ICD-10-CM

## 2019-11-25 RX ORDER — ATORVASTATIN CALCIUM 40 MG/1
40 TABLET, FILM COATED ORAL DAILY
Qty: 90 TABLET | Refills: 3 | Status: SHIPPED | OUTPATIENT
Start: 2019-11-25 | End: 2020-10-26

## 2019-11-25 NOTE — TELEPHONE ENCOUNTER
"atorvastatin (LIPITOR) 40 MG tablet  Last Written Prescription Date:  11/13/18  Last Fill Quantity: 90,  # refills: 3   Last office visit: 9/4/19    Requested Prescriptions   Pending Prescriptions Disp Refills     atorvastatin (LIPITOR) 40 MG tablet 90 tablet 3     Sig: Take 1 tablet (40 mg) by mouth daily       Statins Protocol Passed - 11/25/2019  1:03 PM        Passed - LDL on file in past 12 months     Recent Labs   Lab Test 08/29/19  0804   LDL 75             Passed - No abnormal creatine kinase in past 12 months     Recent Labs   Lab Test 10/13/14  1510                   Passed - Recent (12 mo) or future (30 days) visit within the authorizing provider's specialty     Patient has had an office visit with the authorizing provider or a provider within the authorizing providers department within the previous 12 mos or has a future within next 30 days. See \"Patient Info\" tab in inbasket, or \"Choose Columns\" in Meds & Orders section of the refill encounter.              Passed - Medication is active on med list        Passed - Patient is age 18 or older        Prescription approved per [facility] Refill Protocol.  Cristal Sotomayor RN BSN  [facility]        "

## 2019-11-29 DIAGNOSIS — I25.110 CORONARY ARTERY DISEASE INVOLVING NATIVE CORONARY ARTERY OF NATIVE HEART WITH UNSTABLE ANGINA PECTORIS (H): ICD-10-CM

## 2019-11-29 DIAGNOSIS — I25.5 ISCHEMIC CARDIOMYOPATHY: ICD-10-CM

## 2019-11-29 RX ORDER — METOPROLOL SUCCINATE 50 MG/1
75 TABLET, EXTENDED RELEASE ORAL DAILY
Qty: 135 TABLET | Refills: 3 | Status: SHIPPED | OUTPATIENT
Start: 2019-11-29 | End: 2020-12-02

## 2019-11-29 NOTE — TELEPHONE ENCOUNTER
"metoprolol succinate ER (TOPROL-XL) 50 MG 24 hr tablet  Last Written Prescription Date:  12/7/18  Last Fill Quantity: 135,  # refills: 3   Last office visit: 9/4/19    Requested Prescriptions   Pending Prescriptions Disp Refills     metoprolol succinate ER (TOPROL-XL) 50 MG 24 hr tablet 135 tablet 3     Sig: Take 1.5 tablets (75 mg) by mouth daily       Beta-Blockers Protocol Passed - 11/29/2019  8:55 AM        Passed - Blood pressure under 140/90 in past 12 months     BP Readings from Last 3 Encounters:   09/04/19 101/58   08/09/19 99/59   06/27/19 112/66                 Passed - Patient is age 6 or older        Passed - Recent (12 mo) or future (30 days) visit within the authorizing provider's specialty     Patient has had an office visit with the authorizing provider or a provider within the authorizing providers department within the previous 12 mos or has a future within next 30 days. See \"Patient Info\" tab in inbasket, or \"Choose Columns\" in Meds & Orders section of the refill encounter.              Passed - Medication is active on med list        Prescription approved per [facility] Refill Protocol.  Cristal Sotomayor RN BSN  [facility]          "

## 2019-12-02 DIAGNOSIS — I25.110 CORONARY ARTERY DISEASE INVOLVING NATIVE CORONARY ARTERY OF NATIVE HEART WITH UNSTABLE ANGINA PECTORIS (H): ICD-10-CM

## 2019-12-02 RX ORDER — CLOPIDOGREL BISULFATE 75 MG/1
75 TABLET ORAL DAILY
Qty: 90 TABLET | Refills: 0 | Status: SHIPPED | OUTPATIENT
Start: 2019-12-02 | End: 2020-02-26

## 2019-12-15 ENCOUNTER — HEALTH MAINTENANCE LETTER (OUTPATIENT)
Age: 59
End: 2019-12-15

## 2019-12-26 DIAGNOSIS — R07.89 ATYPICAL CHEST PAIN: ICD-10-CM

## 2019-12-26 RX ORDER — NITROGLYCERIN 0.4 MG/1
TABLET SUBLINGUAL
Qty: 25 TABLET | Refills: 3 | Status: SHIPPED | OUTPATIENT
Start: 2019-12-26 | End: 2020-12-02

## 2019-12-26 NOTE — TELEPHONE ENCOUNTER
nitroGLYcerin (NITROSTAT) 0.4 MG sublingual tablet  Last Written Prescription Date:  2/11/19  Last Fill Quantity: 25,  # refills: 3   Last office visit: 9/4/19    Unable to fill per FM protocol.    Nitrates Wlptxr06/26 3:42 PM   Sublingual nitro order needs review       Medication and pharmacy loaded.    Cristal Sotomayor RN BSN  [facility]

## 2020-01-20 DIAGNOSIS — I10 ESSENTIAL HYPERTENSION: ICD-10-CM

## 2020-01-20 DIAGNOSIS — I25.5 ISCHEMIC CARDIOMYOPATHY: ICD-10-CM

## 2020-01-20 DIAGNOSIS — I25.118 CORONARY ARTERY DISEASE OF NATIVE ARTERY OF NATIVE HEART WITH STABLE ANGINA PECTORIS (H): ICD-10-CM

## 2020-01-20 RX ORDER — ISOSORBIDE MONONITRATE 30 MG/1
30 TABLET, EXTENDED RELEASE ORAL DAILY
Qty: 90 TABLET | Refills: 2 | Status: SHIPPED | OUTPATIENT
Start: 2020-01-20 | End: 2020-11-12

## 2020-02-19 ENCOUNTER — TELEPHONE (OUTPATIENT)
Dept: CARDIOLOGY | Facility: CLINIC | Age: 60
End: 2020-02-19

## 2020-02-19 NOTE — TELEPHONE ENCOUNTER
"Refill request for Plavix received from pharmacy.  Per last OV note from Dr Key Aug 2019 - pt to follow up in 6 months to discuss stopping Plavix.    Spoke with pt.  He has been experiencing more \"chest heaviness\" in the last few weeks.  States he has been taking NTG.  Has been more stressed and shoveling snow too.    Offered first available with Oriana Malave - pt not avaliable  No Dr Key openings for >1 month  Scheduled to see Maliha Fabian NP 2/28/20.  CHARLEE PETERSON, RN on 2/19/2020 at 9:41 AM    "

## 2020-02-26 DIAGNOSIS — I25.110 CORONARY ARTERY DISEASE INVOLVING NATIVE CORONARY ARTERY OF NATIVE HEART WITH UNSTABLE ANGINA PECTORIS (H): ICD-10-CM

## 2020-02-26 RX ORDER — CLOPIDOGREL BISULFATE 75 MG/1
75 TABLET ORAL DAILY
Qty: 90 TABLET | Refills: 0 | Status: SHIPPED | OUTPATIENT
Start: 2020-02-26 | End: 2020-05-08

## 2020-05-08 ENCOUNTER — VIRTUAL VISIT (OUTPATIENT)
Dept: CARDIOLOGY | Facility: CLINIC | Age: 60
End: 2020-05-08
Attending: INTERNAL MEDICINE
Payer: COMMERCIAL

## 2020-05-08 VITALS — WEIGHT: 212 LBS | BODY MASS INDEX: 32.23 KG/M2

## 2020-05-08 DIAGNOSIS — I25.118 CORONARY ARTERY DISEASE OF NATIVE ARTERY OF NATIVE HEART WITH STABLE ANGINA PECTORIS (H): ICD-10-CM

## 2020-05-08 PROCEDURE — 99213 OFFICE O/P EST LOW 20 MIN: CPT | Mod: 95 | Performed by: INTERNAL MEDICINE

## 2020-05-08 NOTE — PROGRESS NOTES
"Ramiro Jeffers is a 59 year old male who is being evaluated via a billable video visit.      The patient has been notified of following:     \"This video visit will be conducted via a call between you and your physician/provider. We have found that certain health care needs can be provided without the need for an in-person physical exam.  This service lets us provide the care you need with a video conversation.  If a prescription is necessary we can send it directly to your pharmacy.  If lab work is needed we can place an order for that and you can then stop by our lab to have the test done at a later time.    Video visits are billed at different rates depending on your insurance coverage.  Please reach out to your insurance provider with any questions.    If during the course of the call the physician/provider feels a video visit is not appropriate, you will not be charged for this service.\"    Patient has given verbal consent for Video visit? Yes    How would you like to obtain your AVS? Pilar    Patient would like the video invitation sent by: Text to cell phone: [phone]    Will anyone else be joining your video visit? No      HPI and Plan:   See dictation [id number]    No orders of the defined types were placed in this encounter.    No orders of the defined types were placed in this encounter.    There are no discontinued medications.      Encounter Diagnosis   Name Primary?     Coronary artery disease of native artery of native heart with stable angina pectoris (H)        CURRENT MEDICATIONS:  Current Outpatient Medications   Medication Sig Dispense Refill     acetaminophen (TYLENOL) 325 MG tablet Take 2 tablets (650 mg) by mouth every 4 hours as needed for mild pain 100 tablet 0     aspirin 81 MG tablet Take 1 tablet (81 mg) by mouth daily Start tomorrow morning.       Cholecalciferol (VITAMIN D) 2000 units tablet Take 1 tablet by mouth daily 100 tablet 12     clopidogrel (PLAVIX) 75 MG tablet Take 1 tablet " (75 mg) by mouth daily 90 tablet 0     hydrochlorothiazide (HYDRODIURIL) 25 MG tablet Take 1 tablet (25 mg) by mouth every morning 90 tablet 3     isosorbide mononitrate (IMDUR) 30 MG 24 hr tablet Take 1 tablet (30 mg) by mouth daily 90 tablet 2     levothyroxine (SYNTHROID/LEVOTHROID) 175 MCG tablet Take 1 tablet (175 mcg) by mouth daily 90 tablet 3     metoprolol succinate ER (TOPROL-XL) 50 MG 24 hr tablet Take 1.5 tablets (75 mg) by mouth daily 135 tablet 3     Multiple Vitamin (MULTI-VITAMIN) per tablet Take 1 tablet by mouth 2 times daily        nitroGLYcerin (NITROSTAT) 0.4 MG sublingual tablet For chest pain place 1 tablet under the tongue every 5 minutes for 3 doses. If symptoms persist 5 minutes after 1st dose call 911. 25 tablet 3     atorvastatin (LIPITOR) 40 MG tablet Take 1 tablet (40 mg) by mouth daily (Patient not taking: Reported on 5/8/2020) 90 tablet 3     isosorbide mononitrate (IMDUR) 30 MG 24 hr tablet Take 1 tablet (30 mg) by mouth every morning Hold if Systolic Blood Pressure is less than 85. 30 tablet 0       ALLERGIES     Allergies   Allergen Reactions     No Known Allergies        PAST MEDICAL HISTORY:  Past Medical History:   Diagnosis Date     ACQUIRED HYPOTHYROIDISM       Blood clot in the legs      CAD (coronary artery disease)     11/15/18 Cath: PCI with 3.5-16mm DORY to proximal LAD     Calculus of gallbladder without mention of cholecystitis or obstruction      Cataract      Chest pressure 11/5/2018     Hyperlipidemia      Hypertension      Ischemic cardiomyopathy     EF 40-45% on 11/2018 Echo     Obesity, unspecified     significant weight loss w/diet alone, on 10-15-10, BMI was 56.4     pulmonary emboli 6-2010    Unprovoked large bilateral pulmonary emboli without source identified on lower extremity dopplers, pt had a transient moderate lupus inhibitor upon initial presentation in June , 2010 which was gone in September, 2010;  all other thrombophilic workup is normal     Pulmonary  embolus, bilateral      PVC's (premature ventricular contractions)     4% burden on 11/2018 Holter     Shortness of breath     on exertion     Venous insufficiency      Viral pneumonia, unspecified      Vitamin D deficiency        PAST SURGICAL HISTORY:  Past Surgical History:   Procedure Laterality Date     C NONSPECIFIC PROCEDURE  10-    Laparoscopic gastric bypass,     C NONSPECIFIC PROCEDURE  10-    1.  Attempted laparoscopic exploration with conversion to: .  Abdominal exploration. 3.  Reduction of incarcerated incisional hernia. 4.  Repair of incisional hernia.5.  Gastrostomy tube placement (#22 Arabic) into defunctionalized stomach.6.  Enterotomy with bowel decompression and primary repair. 7.  Abdominal lavage.      COLONOSCOPY  11/17/2011    Procedure:COLONOSCOPY; colonoscopy; Surgeon:ELENA OROURKE; Location: GI     CV HEART CATHETERIZATION WITH POSSIBLE INTERVENTION Left 2/15/2019    Procedure: Coronary Angiogram;  Surgeon: Tulio Ortiz MD;  Location:  HEART CARDIAC CATH LAB     LAPAROSCOPIC CHOLECYSTECTOMY  9/28/2012    Procedure: LAPAROSCOPIC CHOLECYSTECTOMY;  LAPAROSCOPIC CHOLECYSTECTOMY, LYSIS OF ADHESIONS;  Surgeon: Dutch Matta MD;  Location:  OR     LAPAROSCOPIC LYSIS ADHESIONS  9/28/2012    Procedure: LAPAROSCOPIC LYSIS ADHESIONS;;  Surgeon: Dutch Matta MD;  Location:  OR     LAPAROSCOPY, SURGICAL; REPAIR INCISIONAL OR VENTRAL HERNIA      repair of ventral strangulated surgery     PHACOEMULSIFICATION CLEAR CORNEA WITH STANDARD INTRAOCULAR LENS IMPLANT  12/19/2011    Procedure:PHACOEMULSIFICATION CLEAR CORNEA WITH STANDARD INTRAOCULAR LENS IMPLANT; RIGHT PHACOEMULSIFICATION CLEAR CORNEA WITH STANDARD INTRAOCULAR LENS IMPLANT ; Surgeon:ALLISON ANTONIO; Location[facility]       FAMILY HISTORY:  Family History   Problem Relation Age of Onset     Osteoporosis Mother      Thyroid Disease Mother         no thyroid     Cancer Maternal Grandmother         ? lung cancer      Cancer Father         melanoma     Coronary Artery Disease Brother      Coronary Artery Disease Brother        SOCIAL HISTORY:  Social History     Socioeconomic History     Marital status: Single     Spouse name: None     Number of children: None     Years of education: None     Highest education level: None   Occupational History     Occupation:      Employer: SELF   Social Needs     Financial resource strain: None     Food insecurity     Worry: None     Inability: None     Transportation needs     Medical: None     Non-medical: None   Tobacco Use     Smoking status: Never Smoker     Smokeless tobacco: Never Used   Substance and Sexual Activity     Alcohol use: No     Alcohol/week: 0.0 standard drinks     Drug use: No     Sexual activity: None   Lifestyle     Physical activity     Days per week: None     Minutes per session: None     Stress: None   Relationships     Social connections     Talks on phone: None     Gets together: None     Attends [employer] service: None     Active member of club or organization: None     Attends meetings of clubs or organizations: None     Relationship status: None     Intimate partner violence     Fear of current or ex partner: None     Emotionally abused: None     Physically abused: None     Forced sexual activity: None   Other Topics Concern     Parent/sibling w/ CABG, MI or angioplasty before 65F 55M? Not Asked   Social History Narrative     None       Review of Systems:  Skin:  not assessed nail changes   Eyes:  Positive for glasses  ENT:  not assessed nasal congestion  Respiratory:  Positive for dyspnea on exertion;shortness of breath  Cardiovascular:    Positive for;chest pain;edema;lightheadedness  Gastroenterology: Positive for heartburn  Genitourinary:  Negative urinary frequency;hesitancy  Musculoskeletal:  Positive for arthritis  Neurologic:  Positive for headaches;numbness or tingling of hands;numbness or tingling of feet  Psychiatric:  Negative sleep  disturbances  Heme/Lymph/Imm:  Negative    Endocrine:  Positive for thyroid disorder    Physical Exam:  Vitals: Wt 96.2 kg (212 lb)   BMI 32.23 kg/m      Recent Lab Results:  LIPID RESULTS:  Lab Results   Component Value Date    CHOL 137 08/29/2019    HDL 52 08/29/2019    LDL 75 08/29/2019    TRIG 51 08/29/2019    CHOLHDLRATIO 3.0 10/13/2014       LIVER ENZYME RESULTS:  Lab Results   Component Value Date    AST 16 10/21/2019    ALT 21 10/21/2019       CBC RESULTS:  Lab Results   Component Value Date    WBC 5.1 10/21/2019    RBC 4.56 10/21/2019    HGB 13.4 10/21/2019    HCT 41.1 10/21/2019    MCV 90 10/21/2019    MCH 29.4 10/21/2019    MCHC 32.6 10/21/2019    RDW 13.8 10/21/2019     (L) 10/21/2019       BMP RESULTS:  Lab Results   Component Value Date     10/21/2019    POTASSIUM 3.4 10/21/2019    CHLORIDE 106 10/21/2019    CO2 30 10/21/2019    ANIONGAP 3 10/21/2019    GLC 96 10/21/2019    BUN 23 10/21/2019    CR 0.74 10/21/2019    GFRESTIMATED >90 10/21/2019    GFRESTBLACK >90 10/21/2019    GUERITA 8.4 (L) 10/21/2019        A1C RESULTS:  Lab Results   Component Value Date    A1C 5.5 08/29/2019       INR RESULTS:  Lab Results   Component Value Date    INR 1.08 02/15/2019    INR 1.04 02/12/2019           CC  Tereza Key MD  [address] [facility] [address] [facility] [address]  [place] [address]        BP  Pt didn't take     Review Of Systems  Skin: Eyes:Ears/Nose/Throat:   Cardiovascular:Weight gain, fatigue, edema, chest pain, palp,    Gastrointestinal:   Genitourinary:   Musculoskeletal:  Neurologic:   Psychiatric:   Hematologic/Lymphatic/Immunologic:  Endocrine:  thyroid    Video-Visit Details    Type of service:  Video Visit    Video Start Time: 10:04 AM  Video End Time: 10:09 AM    Originating Location (pt. Location): Home    Distant Location (provider location):  [facility]     Platform used for Video Visit: Joseph Key MD

## 2020-05-08 NOTE — LETTER
5/8/2020    Addison Davis MD   [facility]    [address] [place] [address]     RE: Ramiro Jeffers  [address] [place] [id number]       Dear Colleague,    Thank you for the opportunity to participate in the care of your patient, Ramiro Jeffers, at the [facility]. Please see a copy of my visit note below.     HISTORY OF PRESENTING ILLNESS:  I am doing this video visit with Mr. Jeffers in the setting of this coronavirus pandemic.  He is a delightful, 59-year-old gentleman who had seen me in 11/2018 when he complained of crescendo angina.  A coronary CTA had showed a significant amount of calcium, and then the coronary angiography showed a tight lesion in the proximal LAD that was stented.  EF was mildly reduced at 40%-45%, which subsequently improved to normal.  He does not have any valve disease.  No clinical heart failure.  He does have CCS class II stable angina.  Overall, he denies any cardiovascular symptoms.  He was last seen in 08/2019.  Of note, a few months after his stent that was in 02/2019, he had another angiogram for ongoing chest discomfort that showed patent stents.  There is a possibility he does have microvascular disease.      Overall, Mr. Jeffers says he is doing well.  He denies any significant cardiovascular symptoms.  He does have mild shortness of breath, and he has been gaining weight in the setting of this coronavirus pandemic, but otherwise denies any major cardiovascular issues.      PHYSICAL EXAMINATION:   VITAL SIGNS:  BMI is 32.   GENERAL:  Alert and oriented x3, in no acute distress.   NECK:  Appears supple.  JVP is difficult to see.     LUNGS:  Respirations are normal.  He is not tachypneic or short of breath.  No use of accessory muscles.   ABDOMEN:  Nondistended.   EXTREMITIES:  Denies edema.   PSYCHIATRIC:  Appropriate affect.   HEENT:  No icterus or  julia.      ASSESSMENT AND PLAN:  Mr. Ramiro Jeffers is doing well from a cardiovascular standpoint.  I have not made any changes to his regimen except for discontinuing his Plavix.  It has been over a year since his last stent.  Needs to continue Aspirin for life. He denies any cardiovascular symptoms.  I will plan on seeing him back in a year for a routine lipid panel, sooner if anything changes.     Please do not hesitate to contact me if you have any questions/concerns.     Sincerely,     Tereza Key MD

## 2020-05-08 NOTE — PROGRESS NOTES
Service Date: 05/08/2020      VIDEO VISIT       CARDIOLOGY CLINIC CONSULTATION NOTE      HISTORY OF PRESENTING ILLNESS:  I am doing this video visit with Mr. Jeffers in the setting of this coronavirus pandemic.  He is a delightful, 59-year-old gentleman who had seen me in 11/2018 when he complained of crescendo angina.  A coronary CTA had showed a significant amount of calcium, and then the coronary angiography showed a tight lesion in the proximal LAD that was stented.  EF was mildly reduced at 40%-45%, which subsequently improved to normal.  He does not have any valve disease.  No clinical heart failure.  He does have CCS class II stable angina.  Overall, he denies any cardiovascular symptoms.  He was last seen in 08/2019.  Of note, a few months after his stent that was in 02/2019, he had another angiogram for ongoing chest discomfort that showed patent stents.  There is a possibility he does have microvascular disease.      Overall, Mr. Jeffers says he is doing well.  He denies any significant cardiovascular symptoms.  He does have mild shortness of breath, and he has been gaining weight in the setting of this coronavirus pandemic, but otherwise denies any major cardiovascular issues.      PHYSICAL EXAMINATION:   VITAL SIGNS:  BMI is 32.   GENERAL:  Alert and oriented x3, in no acute distress.   NECK:  Appears supple.  JVP is difficult to see.     LUNGS:  Respirations are normal.  He is not tachypneic or short of breath.  No use of accessory muscles.   ABDOMEN:  Nondistended.   EXTREMITIES:  Denies edema.   PSYCHIATRIC:  Appropriate affect.   HEENT:  No icterus or pallor.      ASSESSMENT AND PLAN:  Mr. Ramiro Jeffers is doing well from a cardiovascular standpoint.  I have not made any changes to his regimen except for discontinuing his Plavix.  It has been over a year since his last stent.  Needs to continue Aspirin for life. He denies any cardiovascular symptoms.  I will plan on seeing him back in a year for a routine  lipid panel, sooner if anything changes.      cc:   Addison Davis MD   [facility]    [address] [place] [address]         MERY PEACOCK MD             D: 2020   T: 2020   MT: [email or web address]      Name:     TASIA ANDREA   MRN:      [id number]        Account:      [id number]   :      1960           Service Date: 2020      Document: [id number]

## 2020-09-01 DIAGNOSIS — E03.9 HYPOTHYROIDISM, UNSPECIFIED TYPE: ICD-10-CM

## 2020-09-01 RX ORDER — LEVOTHYROXINE SODIUM 175 UG/1
175 TABLET ORAL DAILY
Qty: 90 TABLET | Refills: 3 | Status: SHIPPED | OUTPATIENT
Start: 2020-09-01 | End: 2020-12-02 | Stop reason: DRUGHIGH

## 2020-09-01 NOTE — TELEPHONE ENCOUNTER
"Requested Prescriptions   Pending Prescriptions Disp Refills     levothyroxine (SYNTHROID/LEVOTHROID) 175 MCG tablet 90 tablet 3     Sig: Take 1 tablet (175 mcg) by mouth daily       Thyroid Protocol Passed - 9/1/2020  9:00 AM        Passed - Patient is 12 years or older        Passed - Recent (12 mo) or future (30 days) visit within the authorizing provider's specialty     Patient has had an office visit with the authorizing provider or a provider within the authorizing providers department within the previous 12 mos or has a future within next 30 days. See \"Patient Info\" tab in inbasket, or \"Choose Columns\" in Meds & Orders section of the refill encounter.              Passed - Medication is active on med list        Passed - Normal TSH on file in past 12 months     Recent Labs   Lab Test 10/21/19  0727   TSH 0.45                   "

## 2020-10-04 DIAGNOSIS — I10 ESSENTIAL HYPERTENSION WITH GOAL BLOOD PRESSURE LESS THAN 130/80: ICD-10-CM

## 2020-10-05 RX ORDER — HYDROCHLOROTHIAZIDE 25 MG/1
TABLET ORAL
Qty: 90 TABLET | Refills: 3 | OUTPATIENT
Start: 2020-10-05

## 2020-10-05 NOTE — TELEPHONE ENCOUNTER
hydrochlorothiazide (HYDRODIURIL) 25 MG tablet  Last Written Prescription Date:  10/4/19  Last Fill Quantity: 90,  # refills: 3     Last office visit: 9/4/2019   Patient cancelled last appointment 10/30/19.    Due for office visit.    Rx request denied.    Cristal ZALDIVAR BSN  [facility]  [phone]

## 2020-10-15 DIAGNOSIS — Z00.00 ANNUAL PHYSICAL EXAM: ICD-10-CM

## 2020-10-15 DIAGNOSIS — Z12.5 SCREENING FOR PROSTATE CANCER: ICD-10-CM

## 2020-10-15 DIAGNOSIS — E55.9 VITAMIN D DEFICIENCY: ICD-10-CM

## 2020-10-15 DIAGNOSIS — I25.119 CORONARY ARTERY DISEASE INVOLVING NATIVE HEART WITH ANGINA PECTORIS, UNSPECIFIED VESSEL OR LESION TYPE (H): ICD-10-CM

## 2020-10-15 DIAGNOSIS — I10 ESSENTIAL HYPERTENSION WITH GOAL BLOOD PRESSURE LESS THAN 130/80: ICD-10-CM

## 2020-10-15 DIAGNOSIS — E78.5 HYPERLIPIDEMIA LDL GOAL <100: ICD-10-CM

## 2020-10-15 DIAGNOSIS — E03.9 HYPOTHYROIDISM, UNSPECIFIED TYPE: Primary | ICD-10-CM

## 2020-10-15 RX ORDER — HYDROCHLOROTHIAZIDE 25 MG/1
TABLET ORAL
Qty: 90 TABLET | Refills: 0 | Status: SHIPPED | OUTPATIENT
Start: 2020-10-15 | End: 2020-12-02

## 2020-10-15 NOTE — TELEPHONE ENCOUNTER
hydrochlorothiazide (HYDRODIURIL) 25 MG tablet  Last Written Prescription Date:  10/4/19  Last Fill Quantity: 90,  # refills: 3     Last office visit: 9/4/2019     Future Office Visit:   Next 5 appointments (look out 90 days)    Nov 16, 2020 12:40 PM  Return Visit with Addison Davis MD  [facility] [place] [facility] [address]  [facility] [address] [phone]           Diuretics (Including Combos) Protocol Pkaxew00/15/2020 10:37 AM   Blood pressure under 140/90 in past 12 months Protocol Details    Recent (12 mo) or future (30 days) visit within the authorizing provider's specialty      Rx filled per [facility]: Dr. Ryan Bee RN BSN  [facility]  [phone]

## 2020-10-20 DIAGNOSIS — Z00.00 ANNUAL PHYSICAL EXAM: Primary | ICD-10-CM

## 2020-10-20 DIAGNOSIS — Z12.5 PROSTATE CANCER SCREENING: ICD-10-CM

## 2020-10-22 DIAGNOSIS — S31.109A WOUND OF ABDOMEN: Primary | ICD-10-CM

## 2020-10-22 NOTE — PROGRESS NOTES
Order for Wound Care REferral placed for abdominal wall ulcer per Dr. Davis.  Cristal RN BSN  [facility]  [phone]

## 2020-10-26 ENCOUNTER — HOSPITAL ENCOUNTER (OUTPATIENT)
Dept: WOUND CARE | Facility: CLINIC | Age: 60
Discharge: HOME | End: 2020-10-26
Attending: SURGERY | Admitting: SURGERY
Payer: COMMERCIAL

## 2020-10-26 VITALS
HEART RATE: 56 BPM | RESPIRATION RATE: 16 BRPM | DIASTOLIC BLOOD PRESSURE: 60 MMHG | SYSTOLIC BLOOD PRESSURE: 110 MMHG | TEMPERATURE: 95.9 F

## 2020-10-26 DIAGNOSIS — L98.492 SKIN ULCER OF ABDOMEN WITH FAT LAYER EXPOSED (H): ICD-10-CM

## 2020-10-26 DIAGNOSIS — S31.109A WOUND OF ABDOMEN: ICD-10-CM

## 2020-10-26 PROCEDURE — 87077 CULTURE AEROBIC IDENTIFY: CPT | Performed by: SURGERY

## 2020-10-26 PROCEDURE — 87102 FUNGUS ISOLATION CULTURE: CPT | Performed by: SURGERY

## 2020-10-26 PROCEDURE — 87075 CULTR BACTERIA EXCEPT BLOOD: CPT | Performed by: SURGERY

## 2020-10-26 PROCEDURE — G0463 HOSPITAL OUTPT CLINIC VISIT: HCPCS | Mod: 25

## 2020-10-26 PROCEDURE — 99203 OFFICE O/P NEW LOW 30 MIN: CPT | Mod: 25 | Performed by: SURGERY

## 2020-10-26 PROCEDURE — 88305 TISSUE EXAM BY PATHOLOGIST: CPT | Mod: 26 | Performed by: PATHOLOGY

## 2020-10-26 PROCEDURE — 11042 DBRDMT SUBQ TIS 1ST 20SQCM/<: CPT | Performed by: SURGERY

## 2020-10-26 PROCEDURE — 11106 INCAL BX SKN SINGLE LES: CPT

## 2020-10-26 PROCEDURE — 88305 TISSUE EXAM BY PATHOLOGIST: CPT | Mod: TC | Performed by: SURGERY

## 2020-10-26 PROCEDURE — 11042 DBRDMT SUBQ TIS 1ST 20SQCM/<: CPT

## 2020-10-26 PROCEDURE — 87176 TISSUE HOMOGENIZATION CULTR: CPT | Performed by: SURGERY

## 2020-10-26 PROCEDURE — 87070 CULTURE OTHR SPECIMN AEROBIC: CPT | Performed by: SURGERY

## 2020-10-26 PROCEDURE — 88313 SPECIAL STAINS GROUP 2: CPT | Mod: TC | Performed by: SURGERY

## 2020-10-26 RX ORDER — CLOPIDOGREL BISULFATE 75 MG/1
TABLET ORAL
COMMUNITY
Start: 2020-04-27 | End: 2020-12-02

## 2020-10-26 NOTE — ADDENDUM NOTE
Encounter addended by: Katie Peterson RN on: 10/26/2020 9:24 AM   Actions taken: Charge Capture section accepted

## 2020-10-26 NOTE — PROGRESS NOTES
Patient arrived for wound care visit. Certified Wound Care Nurse time spent evaluating patient record, completed a full evaluation and documented wound(s) & keeley-wound skin; provided recommendation based on treatment plan. Applied dressing, reviewed discharge instructions, patient education, and discussed plan of care with appropriate medical team staff members and patient and/or family members.

## 2020-10-26 NOTE — DISCHARGE INSTRUCTIONS
[facility]  [address] [address]    Call us at [phone] if you have any questions about your wounds, have redness or swelling around your wound, have a fever of 101 or greater or if you have any other problems or concerns. We answer the phone Monday through Friday 8 am to 4 pm, please leave a message as we check the voicemail frequently throughout the day.     Ramiro Jeffers      1960    Supplements to aid in healin. Vitamin D3 10,000 iu per day.  2. Vitamin C 2,000 mg per day    To Order: shop[email or web address] or call [phone] to place an order for 1.75 oz jar or tube  Use PLUROHEALS (all caps) at checkout in the discount code section  Abdomen:   After cleansing with saline or wound cleanser, apply small amount of VASHE on gauze, lay into wound bed, let sit for 10 minutes, remove gauze (do not rinse) then apply dressing.Apply Plurogel to wound base or on ioplex dressing  Cover wound with gauze and secure with tape  Change dressing daily       GALINA Mccartney M.D.. 2020

## 2020-10-26 NOTE — PROGRESS NOTES
[facility] [place] Progress Note    Subject: Ramiro WILKERSON Chose consultation for new left abdominal wound.  Patient is a 60-year-old male with a history of open gastric bypass, history of pulmonary embolus, not currently on anticoagulant, history of coronary disease,, hyperlipidemia, hypertension, does not utilize tobacco, nondiabetic, no significant alcohol utilization.  States he developed a spontaneous ulceration on the left abdomen wall within the past week, does not recall initiating or inciting event, noted some drainage in the shower.  Specifically denies trauma.  Denies fevers chills sweats.  No history of malignancy.  Medical record reviewed.  The complete review systems otherwise negative.    PMH:   Past Medical History:   Diagnosis Date     ACQUIRED HYPOTHYROIDISM       Blood clot in the legs      CAD (coronary artery disease)     11/15/18 Cath: PCI with 3.5-16mm DORY to proximal LAD     Calculus of gallbladder without mention of cholecystitis or obstruction      Cataract      Chest pressure 11/5/2018     Hyperlipidemia      Hypertension      Ischemic cardiomyopathy     EF 40-45% on 11/2018 Echo     Obesity, unspecified     significant weight loss w/diet alone, on 10-15-10, BMI was 56.4     pulmonary emboli 6-2010    Unprovoked large bilateral pulmonary emboli without source identified on lower extremity dopplers, pt had a transient moderate lupus inhibitor upon initial presentation in June , 2010 which was gone in September, 2010;  all other thrombophilic workup is normal     Pulmonary embolus, bilateral      PVC's (premature ventricular contractions)     4% burden on 11/2018 Holter     Shortness of breath     on exertion     Venous insufficiency      Viral pneumonia, unspecified      Vitamin D deficiency      Patient Active Problem List   Diagnosis     Acquired hypothyroidism     pulmonary emboli     Venous insufficiency     Vitamin D deficiency     Bariatric surgery status     Postsurgical  nonabsorption     Bilateral knee pain     Anemia     Pneumothorax     Chest pressure     Hypertension     Hyperlipidemia     Status post coronary angiogram     Palpitations     PVC's (premature ventricular contractions)     Ischemic cardiomyopathy     CAD (coronary artery disease)     Unstable angina (H)     Coronary artery disease involving native heart with angina pectoris, unspecified vessel or lesion type (H)     Skin ulcer of abdomen with fat layer exposed (H)     Social Hx:   Social History     Socioeconomic History     Marital status: Single     Spouse name: Not on file     Number of children: Not on file     Years of education: Not on file     Highest education level: Not on file   Occupational History     Occupation:      Employer: SELF   Social Needs     Financial resource strain: Not on file     Food insecurity     Worry: Not on file     Inability: Not on file     Transportation needs     Medical: Not on file     Non-medical: Not on file   Tobacco Use     Smoking status: Never Smoker     Smokeless tobacco: Never Used   Substance and Sexual Activity     Alcohol use: No     Alcohol/week: 0.0 standard drinks     Drug use: No     Sexual activity: Not on file   Lifestyle     Physical activity     Days per week: Not on file     Minutes per session: Not on file     Stress: Not on file   Relationships     Social connections     Talks on phone: Not on file     Gets together: Not on file     Attends Evangelical service: Not on file     Active member of club or organization: Not on file     Attends meetings of clubs or organizations: Not on file     Relationship status: Not on file     Intimate partner violence     Fear of current or ex partner: Not on file     Emotionally abused: Not on file     Physically abused: Not on file     Forced sexual activity: Not on file   Other Topics Concern     Parent/sibling w/ CABG, MI or angioplasty before 65F 55M? Not Asked   Social History Narrative     Not on file        Surgical Hx:   Past Surgical History:   Procedure Laterality Date     COLONOSCOPY  11/17/2011    Procedure:COLONOSCOPY; colonoscopy; Surgeon:ELENA OROURKE; Location: GI     CV HEART CATHETERIZATION WITH POSSIBLE INTERVENTION Left 2/15/2019    Procedure: Coronary Angiogram;  Surgeon: Tulio Ortiz MD;  Location:  HEART CARDIAC CATH LAB     LAPAROSCOPIC CHOLECYSTECTOMY  9/28/2012    Procedure: LAPAROSCOPIC CHOLECYSTECTOMY;  LAPAROSCOPIC CHOLECYSTECTOMY, LYSIS OF ADHESIONS;  Surgeon: Dutch Matta MD;  Location:  OR     LAPAROSCOPIC LYSIS ADHESIONS  9/28/2012    Procedure: LAPAROSCOPIC LYSIS ADHESIONS;;  Surgeon: Dutch Matta MD;  Location:  OR     LAPAROSCOPY, SURGICAL; REPAIR INCISIONAL OR VENTRAL HERNIA      repair of ventral strangulated surgery     PHACOEMULSIFICATION CLEAR CORNEA WITH STANDARD INTRAOCULAR LENS IMPLANT  12/19/2011    Procedure:PHACOEMULSIFICATION CLEAR CORNEA WITH STANDARD INTRAOCULAR LENS IMPLANT; RIGHT PHACOEMULSIFICATION CLEAR CORNEA WITH STANDARD INTRAOCULAR LENS IMPLANT ; Surgeon:ALLISON ANTONIO; Location:[facility] NONSPECIFIC PROCEDURE  10-    Laparoscopic gastric bypass,     [facility] NONSPECIFIC PROCEDURE  10-    1.  Attempted laparoscopic exploration with conversion to: .  Abdominal exploration. 3.  Reduction of incarcerated incisional hernia. 4.  Repair of incisional hernia.5.  Gastrostomy tube placement (#22 [place]) into defunctionalized stomach.6.  Enterotomy with bowel decompression and primary repair. 7.  Abdominal lavage.        Allergies:    Allergies   Allergen Reactions     No Known Allergies        Medications:   Current Outpatient Medications   Medication     acetaminophen (TYLENOL) 325 MG tablet     aspirin 81 MG tablet     Cholecalciferol (VITAMIN D) 2000 units tablet     clopidogrel (PLAVIX) 75 MG tablet     hydrochlorothiazide (HYDRODIURIL) 25 MG tablet     isosorbide mononitrate (IMDUR) 30 MG 24 hr tablet     levothyroxine  (SYNTHROID/LEVOTHROID) 175 MCG tablet     metoprolol succinate ER (TOPROL-XL) 50 MG 24 hr tablet     Multiple Vitamin (MULTI-VITAMIN) per tablet     nitroGLYcerin (NITROSTAT) 0.4 MG sublingual tablet     No current facility-administered medications for this encounter.        Labs:   Recent Labs   Lab Test 10/21/19  0727 08/29/19  0804 02/15/19  0714 02/15/19  0714   ALBUMIN 3.4 3.5  --   --    HGB 13.4  --    < > 12.2*   INR  --   --   --  1.08   WBC 5.1  --    < > 3.8*   A1C  --  5.5   < >  --    CRP <0.2  --   --   --     < > = values in this interval not displayed.     Creatinine   Date Value Ref Range Status   10/21/2019 0.74 0.66 - 1.25 mg/dL Final     GFR Estimate   Date Value Ref Range Status   10/21/2019 >90 >60 mL/min/[1.73_m2] Final     Comment:     Non  GFR Calc  Starting 12/18/2018, serum creatinine based estimated GFR (eGFR) will be   calculated using the Chronic Kidney Disease Epidemiology Collaboration   (CKD-EPI) equation.       GFR Estimate If Black   Date Value Ref Range Status   10/21/2019 >90 >60 mL/min/[1.73_m2] Final     Comment:      GFR Calc  Starting 12/18/2018, serum creatinine based estimated GFR (eGFR) will be   calculated using the Chronic Kidney Disease Epidemiology Collaboration   (CKD-EPI) equation.       Lab Results   Component Value Date    WBC 5.1 10/21/2019     Lab Results   Component Value Date    RBC 4.56 10/21/2019     Lab Results   Component Value Date    HGB 13.4 10/21/2019     Lab Results   Component Value Date    HCT 41.1 10/21/2019     No components found for: MCT  Lab Results   Component Value Date    MCV 90 10/21/2019     Lab Results   Component Value Date    MCH 29.4 10/21/2019     Lab Results   Component Value Date    MCHC 32.6 10/21/2019     Lab Results   Component Value Date    RDW 13.8 10/21/2019     Lab Results   Component Value Date     10/21/2019          Nutrition requirements were discussed with patient today.  Objective:   /60   Pulse 56   Temp 95.9  F (35.5  C) (Temporal)   Resp 16   Wound (used by OP [facility] only) 10/26/20 0718 (Active)   Thickness/Stage full thickness 10/26/20 0700   Dressing Appearance moist drainage 10/26/20 0700   Base granulating 10/26/20 0700   Periwound intact 10/26/20 0700   Periwound Temperature warm 10/26/20 0700   Periwound Skin Turgor soft 10/26/20 0700   Edges open 10/26/20 0700   Length (cm) 2.9 10/26/20 0700   Width (cm) 3 10/26/20 0700   Depth (cm) 0.1 10/26/20 0700   Wound (cm^2) 8.7 cm^2 10/26/20 0700   Wound Volume (cm^3) 0.87 cm^3 10/26/20 0700   Drainage Characteristics/Odor serosanguineous 10/26/20 0700   Drainage Amount moderate 10/26/20 0700   Care, Wound debrided 10/26/20 0700        General:  Patient is alert and orientated, no acute distress.  Conversant.  Abdominal wall examined, wound measuring approximately 3 x 3 cm, no periwound blistering, no cellulitis, no purulent drainage, no odor.  On a moderate sized pannus.  1% lidocaine was infiltrated, full-thickness biopsy obtained with a 15 blade, sent to dermatopathology for evaluation, aerobic and anaerobic cultures obtained.  Chemical cautery applied to obtain hemostasis.  Wound was also debrided of all biofilm with a 15 blade.  Patient tolerated procedure well without complications.            Impression: Abdominal wound, 3 x 3 cm, history of open gastric bypass, nondiabetic, does not utilize tobacco      Plan:  We will dress the wounds with hypochlorous acid washes on a daily basis, application of pleura gel on Ioplex.  Vitamin D 10,000 units on a daily basis until wound healed.  Send specimen to dermatopathology, aerobic and anaerobic cultures obtained..  Patient will return to the clinic in 2-3 weeks time.     Darryl Mccartney MD on 10/26/2020 at 9:15 AM

## 2020-10-28 LAB — COPATH REPORT: NORMAL

## 2020-10-30 LAB
BACTERIA SPEC CULT: ABNORMAL
BACTERIA SPEC CULT: ABNORMAL
SPECIMEN SOURCE: ABNORMAL

## 2020-11-02 LAB
BACTERIA SPEC CULT: NORMAL
Lab: NORMAL
SPECIMEN SOURCE: NORMAL

## 2020-11-06 ENCOUNTER — TELEPHONE (OUTPATIENT)
Dept: OTHER | Facility: CLINIC | Age: 60
End: 2020-11-06

## 2020-11-06 NOTE — TELEPHONE ENCOUNTER
Unable to LM for patient to reschedule his Labs and Dr Davis 11/16/20 appointment due to mailbox not being set up yet.

## 2020-11-06 NOTE — TELEPHONE ENCOUNTER
Patient is currently scheduled with Dr. Davis on 11/16/20 for labs that were scheduled to be drawn 11/4/20.    The lab appointment was cancelled.    Please reschedule labs & office visit with Dr. Davis.    Thank you.

## 2020-11-09 NOTE — TELEPHONE ENCOUNTER
Patient's appointment with Dr. Davis currently set for 11/16 must be rescheduled at least two weeks after labs rescheduled to 11/11/20.    Thank you.

## 2020-11-10 NOTE — TELEPHONE ENCOUNTER
Patient is scheduled for his labs on 11/11/20 and his 11/16/20 appointment with Dr Davis is rescheduled for 12/02/20.

## 2020-11-11 DIAGNOSIS — E03.9 HYPOTHYROIDISM, UNSPECIFIED TYPE: ICD-10-CM

## 2020-11-11 DIAGNOSIS — Z00.00 ANNUAL PHYSICAL EXAM: ICD-10-CM

## 2020-11-11 DIAGNOSIS — Z12.5 PROSTATE CANCER SCREENING: ICD-10-CM

## 2020-11-11 DIAGNOSIS — E78.5 HYPERLIPIDEMIA LDL GOAL <100: ICD-10-CM

## 2020-11-11 DIAGNOSIS — I25.119 CORONARY ARTERY DISEASE INVOLVING NATIVE HEART WITH ANGINA PECTORIS, UNSPECIFIED VESSEL OR LESION TYPE (H): ICD-10-CM

## 2020-11-11 DIAGNOSIS — I10 ESSENTIAL HYPERTENSION WITH GOAL BLOOD PRESSURE LESS THAN 130/80: ICD-10-CM

## 2020-11-11 LAB
ALBUMIN SERPL-MCNC: 3.4 G/DL (ref 3.4–5)
ALP SERPL-CCNC: 84 U/L (ref 40–150)
ALT SERPL W P-5'-P-CCNC: 20 U/L (ref 0–70)
ANION GAP SERPL CALCULATED.3IONS-SCNC: 3 MMOL/L (ref 3–14)
AST SERPL W P-5'-P-CCNC: 18 U/L (ref 0–45)
BASOPHILS # BLD AUTO: 0 10E9/L (ref 0–0.2)
BASOPHILS NFR BLD AUTO: 0.2 %
BILIRUB DIRECT SERPL-MCNC: 0.2 MG/DL (ref 0–0.2)
BILIRUB SERPL-MCNC: 0.6 MG/DL (ref 0.2–1.3)
BUN SERPL-MCNC: 18 MG/DL (ref 7–30)
CALCIUM SERPL-MCNC: 8.6 MG/DL (ref 8.5–10.1)
CHLORIDE SERPL-SCNC: 105 MMOL/L (ref 94–109)
CO2 SERPL-SCNC: 31 MMOL/L (ref 20–32)
CREAT SERPL-MCNC: 0.77 MG/DL (ref 0.66–1.25)
CRP SERPL HS-MCNC: <0.2 MG/L
DIFFERENTIAL METHOD BLD: ABNORMAL
EOSINOPHIL # BLD AUTO: 0.2 10E9/L (ref 0–0.7)
EOSINOPHIL NFR BLD AUTO: 2.5 %
ERYTHROCYTE [DISTWIDTH] IN BLOOD BY AUTOMATED COUNT: 13.4 % (ref 10–15)
GFR SERPL CREATININE-BSD FRML MDRD: >90 ML/MIN/{1.73_M2}
GLUCOSE SERPL-MCNC: 99 MG/DL (ref 70–99)
HBA1C MFR BLD: 5.7 % (ref 0–5.6)
HCT VFR BLD AUTO: 38.9 % (ref 40–53)
HGB BLD-MCNC: 12.8 G/DL (ref 13.3–17.7)
LYMPHOCYTES # BLD AUTO: 1.4 10E9/L (ref 0.8–5.3)
LYMPHOCYTES NFR BLD AUTO: 23.9 %
MCH RBC QN AUTO: 29.9 PG (ref 26.5–33)
MCHC RBC AUTO-ENTMCNC: 32.9 G/DL (ref 31.5–36.5)
MCV RBC AUTO: 91 FL (ref 78–100)
MONOCYTES # BLD AUTO: 0.5 10E9/L (ref 0–1.3)
MONOCYTES NFR BLD AUTO: 8.5 %
NEUTROPHILS # BLD AUTO: 3.8 10E9/L (ref 1.6–8.3)
NEUTROPHILS NFR BLD AUTO: 64.9 %
PLATELET # BLD AUTO: 147 10E9/L (ref 150–450)
POTASSIUM SERPL-SCNC: 3.5 MMOL/L (ref 3.4–5.3)
PROT SERPL-MCNC: 6.8 G/DL (ref 6.8–8.8)
PSA SERPL-ACNC: <0.01 UG/L (ref 0–4)
RBC # BLD AUTO: 4.28 10E12/L (ref 4.4–5.9)
SODIUM SERPL-SCNC: 139 MMOL/L (ref 133–144)
T3FREE SERPL-MCNC: 3 PG/ML (ref 2.3–4.2)
T4 FREE SERPL-MCNC: 1.7 NG/DL (ref 0.76–1.46)
TSH SERPL DL<=0.005 MIU/L-ACNC: 0.03 MU/L (ref 0.4–4)
WBC # BLD AUTO: 5.9 10E9/L (ref 4–11)

## 2020-11-11 PROCEDURE — 84443 ASSAY THYROID STIM HORMONE: CPT | Performed by: INTERNAL MEDICINE

## 2020-11-11 PROCEDURE — 84481 FREE ASSAY (FT-3): CPT | Performed by: INTERNAL MEDICINE

## 2020-11-11 PROCEDURE — 86141 C-REACTIVE PROTEIN HS: CPT | Performed by: INTERNAL MEDICINE

## 2020-11-11 PROCEDURE — 83695 ASSAY OF LIPOPROTEIN(A): CPT | Mod: 90 | Performed by: INTERNAL MEDICINE

## 2020-11-11 PROCEDURE — 84439 ASSAY OF FREE THYROXINE: CPT | Performed by: INTERNAL MEDICINE

## 2020-11-11 PROCEDURE — 85025 COMPLETE CBC W/AUTO DIFF WBC: CPT | Performed by: INTERNAL MEDICINE

## 2020-11-11 PROCEDURE — 80048 BASIC METABOLIC PNL TOTAL CA: CPT | Performed by: INTERNAL MEDICINE

## 2020-11-11 PROCEDURE — 99000 SPECIMEN HANDLING OFFICE-LAB: CPT | Performed by: INTERNAL MEDICINE

## 2020-11-11 PROCEDURE — 80076 HEPATIC FUNCTION PANEL: CPT | Performed by: INTERNAL MEDICINE

## 2020-11-11 PROCEDURE — 36415 COLL VENOUS BLD VENIPUNCTURE: CPT | Performed by: INTERNAL MEDICINE

## 2020-11-11 PROCEDURE — 83704 LIPOPROTEIN BLD QUAN PART: CPT | Mod: 90 | Performed by: INTERNAL MEDICINE

## 2020-11-11 PROCEDURE — 83036 HEMOGLOBIN GLYCOSYLATED A1C: CPT | Performed by: INTERNAL MEDICINE

## 2020-11-11 PROCEDURE — G0103 PSA SCREENING: HCPCS | Performed by: INTERNAL MEDICINE

## 2020-11-12 DIAGNOSIS — I25.5 ISCHEMIC CARDIOMYOPATHY: ICD-10-CM

## 2020-11-12 DIAGNOSIS — I25.118 CORONARY ARTERY DISEASE OF NATIVE ARTERY OF NATIVE HEART WITH STABLE ANGINA PECTORIS (H): ICD-10-CM

## 2020-11-12 DIAGNOSIS — I10 ESSENTIAL HYPERTENSION: ICD-10-CM

## 2020-11-12 LAB — LPA SERPL-MCNC: 9 MG/DL

## 2020-11-12 RX ORDER — ISOSORBIDE MONONITRATE 30 MG/1
30 TABLET, EXTENDED RELEASE ORAL DAILY
Qty: 90 TABLET | Refills: 1 | Status: SHIPPED | OUTPATIENT
Start: 2020-11-12 | End: 2020-12-02

## 2020-11-14 LAB
CHOLEST SERPL-MCNC: 118 MG/DL
HDL SERPL QN: 9.1 NM
HDL SERPL-SCNC: 29.3 UMOL/L
HDLC SERPL-MCNC: 50 MG/DL (ref 40–59)
HLD.LARGE SERPL-SCNC: 5.8 UMOL/L
LDL SERPL QN: 21.1 NM
LDL SERPL-SCNC: 627 NMOL/L
LDL SMALL SERPL-SCNC: 267 NMOL/L
LDLC SERPL CALC-MCNC: 58 MG/DL
PATHOLOGY STUDY: ABNORMAL
TRIGL SERPL-MCNC: 48 MG/DL (ref 30–149)
VLDL LARGE SERPL-SCNC: <1.5 NMOL/L
VLDL SERPL QN: 48.4 NM

## 2020-11-16 ENCOUNTER — HOSPITAL ENCOUNTER (OUTPATIENT)
Dept: WOUND CARE | Facility: CLINIC | Age: 60
Discharge: HOME | End: 2020-11-16
Attending: SURGERY | Admitting: SURGERY
Payer: COMMERCIAL

## 2020-11-16 VITALS
HEART RATE: 68 BPM | TEMPERATURE: 98.5 F | RESPIRATION RATE: 16 BRPM | SYSTOLIC BLOOD PRESSURE: 121 MMHG | DIASTOLIC BLOOD PRESSURE: 67 MMHG

## 2020-11-16 DIAGNOSIS — L98.492 SKIN ULCER OF ABDOMEN WITH FAT LAYER EXPOSED (H): ICD-10-CM

## 2020-11-16 PROCEDURE — 97597 DBRDMT OPN WND 1ST 20 CM/<: CPT | Performed by: SURGERY

## 2020-11-16 PROCEDURE — 97597 DBRDMT OPN WND 1ST 20 CM/<: CPT

## 2020-11-16 RX ORDER — ATORVASTATIN CALCIUM 40 MG/1
TABLET, FILM COATED ORAL
COMMUNITY
Start: 2020-11-02 | End: 2020-12-02

## 2020-11-16 NOTE — DISCHARGE INSTRUCTIONS
[facility]  [address] [address]    Call us at [phone] if you have any questions about your wounds, have redness or swelling around your wound, have a fever of 101 or greater or if you have any other problems or concerns. We answer the phone Monday through Friday 8 am to 4 pm, please leave a message as we check the voicemail frequently throughout the day.     Ramiro Jeffers      1960    Wound Dressing Change:Abdomen  Cleanse wound and surrounding skin with: vashe  Cover wound with endoform antimicrobial and mepilex border  Change dressing NANCY Mccartney M.D.. November 16, 2020

## 2020-11-16 NOTE — PROGRESS NOTES
[facility] [place] Progress Note    Subject: Ramiro WILKERSON Chose to mental pathology of left lower abdominal wound demonstrated no evidence of malignancy.  He is performing moist moist hypochlorous acid dressing changes.    Patient Active Problem List   Diagnosis     Acquired hypothyroidism     pulmonary emboli     Venous insufficiency     Vitamin D deficiency     Bariatric surgery status     Postsurgical nonabsorption     Bilateral knee pain     Anemia     Pneumothorax     Chest pressure     Hypertension     Hyperlipidemia     Status post coronary angiogram     Palpitations     PVC's (premature ventricular contractions)     Ischemic cardiomyopathy     CAD (coronary artery disease)     Unstable angina (H)     Coronary artery disease involving native heart with angina pectoris, unspecified vessel or lesion type (H)     Skin ulcer of abdomen with fat layer exposed (H)     Past Medical History:   Diagnosis Date     ACQUIRED HYPOTHYROIDISM       Blood clot in the legs      CAD (coronary artery disease)     11/15/18 Cath: PCI with 3.5-16mm DORY to proximal LAD     Calculus of gallbladder without mention of cholecystitis or obstruction      Cataract      Chest pressure 11/5/2018     Hyperlipidemia      Hypertension      Ischemic cardiomyopathy     EF 40-45% on 11/2018 Echo     Obesity, unspecified     significant weight loss w/diet alone, on 10-15-10, BMI was 56.4     pulmonary emboli 6-2010    Unprovoked large bilateral pulmonary emboli without source identified on lower extremity dopplers, pt had a transient moderate lupus inhibitor upon initial presentation in June , 2010 which was gone in September, 2010;  all other thrombophilic workup is normal     Pulmonary embolus, bilateral      PVC's (premature ventricular contractions)     4% burden on 11/2018 Holter     Shortness of breath     on exertion     Venous insufficiency      Viral pneumonia, unspecified      Vitamin D deficiency      Exam:  /67   Pulse  68   Temp 98.5  F (36.9  C) (Temporal)   Resp 16   Wound (used by OP [facility] only) 10/26/20 0718 (Active)   Thickness/Stage full thickness 11/16/20 1400   Dressing Appearance moist drainage 11/16/20 1400   Base granulating 11/16/20 1400   Periwound intact 11/16/20 1400   Periwound Temperature warm 11/16/20 1400   Periwound Skin Turgor soft 11/16/20 1400   Edges open 11/16/20 1400   Length (cm) 3.1 11/16/20 1400   Width (cm) 0.7 11/16/20 1400   Depth (cm) 0 11/16/20 1400   Wound (cm^2) 2.17 cm^2 11/16/20 1400   Wound Volume (cm^3) 0 cm^3 11/16/20 1400   Wound healing % 75.06 11/16/20 1400   Drainage Characteristics/Odor serosanguineous 11/16/20 1400   Drainage Amount moderate 11/16/20 1400   Care, Wound debrided 11/16/20 1400     60-year-old male, history of open gastric bypass, mild pannus, left lower quadrant abdominal wound, eschar, sharply debrided with a 15 blade after topical anesthetic application of 4% lidocaine, bleeding controlled with light okay position of pressure, patient tolerated procedure well no complications.  No undermining, no foul odor, no drainage, no tunneling.        Impression: Left lower quadrant abdominal wound, dermatopathology no malignancy, potential lichen sclerosis    Plan: We will dress the wounds with Vashe soak for 15 minutes, then application of antimicrobial endoform covered with Mepilex, change every 3 days.  Keep dry during showers.  Continue adjunct of micronutrients.  Patient will return to the clinic in 4 weeks time    Darryl Mccartney MD on 11/16/2020 at 2:50 PM

## 2020-11-23 LAB
FUNGUS SPEC CULT: NORMAL
SPECIMEN SOURCE: NORMAL

## 2020-12-02 ENCOUNTER — VIRTUAL VISIT (OUTPATIENT)
Dept: OTHER | Facility: CLINIC | Age: 60
End: 2020-12-02
Attending: INTERNAL MEDICINE
Payer: COMMERCIAL

## 2020-12-02 DIAGNOSIS — I10 ESSENTIAL HYPERTENSION: ICD-10-CM

## 2020-12-02 DIAGNOSIS — E06.3 HYPOTHYROIDISM DUE TO HASHIMOTO'S THYROIDITIS: ICD-10-CM

## 2020-12-02 DIAGNOSIS — E78.5 HYPERLIPIDEMIA LDL GOAL <70: Primary | ICD-10-CM

## 2020-12-02 DIAGNOSIS — I25.118 CORONARY ARTERY DISEASE OF NATIVE ARTERY OF NATIVE HEART WITH STABLE ANGINA PECTORIS (H): ICD-10-CM

## 2020-12-02 PROCEDURE — 99214 OFFICE O/P EST MOD 30 MIN: CPT | Mod: 95 | Performed by: INTERNAL MEDICINE

## 2020-12-02 RX ORDER — ATORVASTATIN CALCIUM 40 MG/1
40 TABLET, FILM COATED ORAL DAILY
Qty: 90 TABLET | Refills: 3 | Status: SHIPPED | OUTPATIENT
Start: 2020-12-02 | End: 2021-09-09 | Stop reason: ALTCHOICE

## 2020-12-02 RX ORDER — HYDROCHLOROTHIAZIDE 25 MG/1
25 TABLET ORAL EVERY MORNING
Qty: 90 TABLET | Refills: 3 | Status: SHIPPED | OUTPATIENT
Start: 2020-12-02 | End: 2021-12-06

## 2020-12-02 RX ORDER — LEVOTHYROXINE SODIUM 150 UG/1
150 TABLET ORAL DAILY
Qty: 90 TABLET | Refills: 3 | Status: SHIPPED | OUTPATIENT
Start: 2020-12-02 | End: 2021-02-10

## 2020-12-02 RX ORDER — METOPROLOL SUCCINATE 50 MG/1
75 TABLET, EXTENDED RELEASE ORAL DAILY
Qty: 135 TABLET | Refills: 3 | Status: SHIPPED | OUTPATIENT
Start: 2020-12-02 | End: 2021-12-06

## 2020-12-02 RX ORDER — NITROGLYCERIN 0.4 MG/1
TABLET SUBLINGUAL
Qty: 25 TABLET | Refills: 3 | Status: SHIPPED | OUTPATIENT
Start: 2020-12-02

## 2020-12-02 RX ORDER — ISOSORBIDE MONONITRATE 30 MG/1
30 TABLET, EXTENDED RELEASE ORAL DAILY
Qty: 90 TABLET | Refills: 3 | Status: SHIPPED | OUTPATIENT
Start: 2020-12-02 | End: 2021-10-26

## 2020-12-02 NOTE — PROGRESS NOTES
"Ramiro Jeffers is a 60 year old male who is being evaluated via a billable video visit.      The patient has been notified of following:     \"This video visit will be conducted via a call between you and your physician/provider. We have found that certain health care needs can be provided without the need for an in-person physical exam.  This service lets us provide the care you need with a video conversation.  If a prescription is necessary we can send it directly to your pharmacy.  If lab work is needed we can place an order for that and you can then stop by our lab to have the test done at a later time.    Video visits are billed at different rates depending on your insurance coverage.  Please reach out to your insurance provider with any questions.    If during the course of the call the physician/provider feels a video visit is not appropriate, you will not be charged for this service.\"    Patient has given verbal consent for Video visit? Yes, mobile phone number [phone]    How would you like to obtain your AVS? My chart     Will anyone else be joining your video visit? No   "

## 2020-12-02 NOTE — PROGRESS NOTES
Ramiro Jeffers is a 60 year old male who is presenting at the current time to discuss his diagnosi(es) of        Coronary artery disease involving native coronary artery of native heart with unstable angina pectoris (H)  Ischemic cardiomyopathy  Atypical chest pain  Coronary artery disease of native artery of native heart with stable angina pectoris (H)  Essential hypertension  Essential hypertension with goal blood pressure less than 130/80  Hyperlipidemia LDL goal <70  Hypothyroidism due to Hashimoto's thyroiditis  .    HPI: I am doing this video visit with Mr. Jeffers in the setting of this coronavirus pandemic. He was supposed to have had an annual physical at this visit.  He is a delightful, 60-year-old gentleman who had seen cardiology in 11/2018 when he complained of crescendo angina.  A coronary CTA had showed a significant amount of calcium, and then the coronary angiography showed a tight lesion in the proximal LAD that was stented.  EF was mildly reduced at 40%-45%, which subsequently improved to normal.  He does not have any valve disease.  No clinical heart failure.  He does have CCS class II stable angina.  Overall, he currently denies any cardiovascular symptoms.  He was last seen by myself in 09/2019.  Of note, a few months after his stent that was in 02/2019, he had another angiogram for ongoing chest discomfort that showed patent stents.  There is a possibility he does have microvascular disease.      Overall, Mr. Jeffers says he is doing well.  He denies any significant cardiovascular symptoms.  He denies any major cardiovascular issues.     His labs today reveal he is overdosed on his Synthroid for his Hashimoto's. He has also seen Dr. Mccartney in wound care clinic for a left lower quadrant abdominal wound of his post gastric bypass mild pannus. Dermatopathology revealed no malignancy, but potential lichen sclerosis. He is dressing wounds with Vashe soaks for 15 minutes, then applying antimicrobial  endoform covered with Mepilex, which is changed every 3 days. He will see Dr. Mccartney in f/u on 12/14/2020.           Review Of Systems  Skin: negative  Eyes: negative  Ears/Nose/Throat: negative  Respiratory: No shortness of breath, dyspnea on exertion, cough, or hemoptysis  Cardiovascular: negative  Gastrointestinal: negative  Genitourinary: negative  Musculoskeletal: negative  Neurologic: negative  Psychiatric: negative  Hematologic/Lymphatic/Immunologic: negative  Endocrine: negative    PAST MEDICAL HISTORY:                  Past Medical History:   Diagnosis Date     ACQUIRED HYPOTHYROIDISM       Blood clot in the legs      CAD (coronary artery disease)     11/15/18 Cath: PCI with 3.5-16mm DORY to proximal LAD     Calculus of gallbladder without mention of cholecystitis or obstruction      Cataract      Chest pressure 11/5/2018     Hyperlipidemia      Hypertension      Ischemic cardiomyopathy     EF 40-45% on 11/2018 Echo     Obesity, unspecified     significant weight loss w/diet alone, on 10-15-10, BMI was 56.4     pulmonary emboli 6-2010    Unprovoked large bilateral pulmonary emboli without source identified on lower extremity dopplers, pt had a transient moderate lupus inhibitor upon initial presentation in June , 2010 which was gone in September, 2010;  all other thrombophilic workup is normal     Pulmonary embolus, bilateral      PVC's (premature ventricular contractions)     4% burden on 11/2018 Holter     Shortness of breath     on exertion     Venous insufficiency      Viral pneumonia, unspecified      Vitamin D deficiency        PAST SURGICAL HISTORY:                  Past Surgical History:   Procedure Laterality Date     COLONOSCOPY  11/17/2011    Procedure:COLONOSCOPY; colonoscopy; Surgeon:ELENA OROURKE; Location: GI     CV HEART CATHETERIZATION WITH POSSIBLE INTERVENTION Left 2/15/2019    Procedure: Coronary Angiogram;  Surgeon: Tulio Ortiz MD;  Location:  HEART CARDIAC CATH LAB      LAPAROSCOPIC CHOLECYSTECTOMY  9/28/2012    Procedure: LAPAROSCOPIC CHOLECYSTECTOMY;  LAPAROSCOPIC CHOLECYSTECTOMY, LYSIS OF ADHESIONS;  Surgeon: Dutch Matta MD;  Location:  OR     LAPAROSCOPIC LYSIS ADHESIONS  9/28/2012    Procedure: LAPAROSCOPIC LYSIS ADHESIONS;;  Surgeon: Dutch Matta MD;  Location:  OR     LAPAROSCOPY, SURGICAL; REPAIR INCISIONAL OR VENTRAL HERNIA      repair of ventral strangulated surgery     PHACOEMULSIFICATION CLEAR CORNEA WITH STANDARD INTRAOCULAR LENS IMPLANT  12/19/2011    Procedure:PHACOEMULSIFICATION CLEAR CORNEA WITH STANDARD INTRAOCULAR LENS IMPLANT; RIGHT PHACOEMULSIFICATION CLEAR CORNEA WITH STANDARD INTRAOCULAR LENS IMPLANT ; Surgeon:ALLISON ANTONIO; Location: [facility]     [facility] NONSPECIFIC PROCEDURE  10-    Laparoscopic gastric bypass,     [facility] NONSPECIFIC PROCEDURE  10-    1.  Attempted laparoscopic exploration with conversion to: .  Abdominal exploration. 3.  Reduction of incarcerated incisional hernia. 4.  Repair of incisional hernia.5.  Gastrostomy tube placement (#22 Italian) into defunctionalized stomach.6.  Enterotomy with bowel decompression and primary repair. 7.  Abdominal lavage.        CURRENT MEDICATIONS:                  Current Outpatient Medications   Medication Sig Dispense Refill     aspirin 81 MG tablet Take 1 tablet (81 mg) by mouth daily Start tomorrow morning.       atorvastatin (LIPITOR) 40 MG tablet        Cholecalciferol (VITAMIN D) 2000 units tablet Take 1 tablet by mouth daily 100 tablet 12     clopidogrel (PLAVIX) 75 MG tablet        hydrochlorothiazide (HYDRODIURIL) 25 MG tablet TAKE 1 TABLET BY MOUTH EVERY MORNING 90 tablet 0     isosorbide mononitrate (IMDUR) 30 MG 24 hr tablet Take 1 tablet (30 mg) by mouth daily 90 tablet 1     levothyroxine (SYNTHROID/LEVOTHROID) 175 MCG tablet Take 1 tablet (175 mcg) by mouth daily 90 tablet 3     metoprolol succinate ER (TOPROL-XL) 50 MG 24 hr tablet Take 1.5 tablets (75 mg) by mouth daily 135  tablet 3     Multiple Vitamin (MULTI-VITAMIN) per tablet Take 1 tablet by mouth 2 times daily        nitroGLYcerin (NITROSTAT) 0.4 MG sublingual tablet For chest pain place 1 tablet under the tongue every 5 minutes for 3 doses. If symptoms persist 5 minutes after 1st dose call 911. 68 tablet 3       ALLERGIES:                  Allergies   Allergen Reactions     No Known Allergies        SOCIAL HISTORY:                  Social History     Socioeconomic History     Marital status: Single     Spouse name: Not on file     Number of children: Not on file     Years of education: Not on file     Highest education level: Not on file   Occupational History     Occupation:      Employer: SELF   Social Needs     Financial resource strain: Not on file     Food insecurity     Worry: Not on file     Inability: Not on file     Transportation needs     Medical: Not on file     Non-medical: Not on file   Tobacco Use     Smoking status: Never Smoker     Smokeless tobacco: Never Used   Substance and Sexual Activity     Alcohol use: No     Alcohol/week: 0.0 standard drinks     Drug use: No     Sexual activity: Not on file   Lifestyle     Physical activity     Days per week: Not on file     Minutes per session: Not on file     Stress: Not on file   Relationships     Social connections     Talks on phone: Not on file     Gets together: Not on file     Attends Sabianist service: Not on file     Active member of club or organization: Not on file     Attends meetings of clubs or organizations: Not on file     Relationship status: Not on file     Intimate partner violence     Fear of current or ex partner: Not on file     Emotionally abused: Not on file     Physically abused: Not on file     Forced sexual activity: Not on file   Other Topics Concern     Parent/sibling w/ CABG, MI or angioplasty before 65F 55M? Not Asked   Social History Narrative     Not on file       FAMILY HISTORY:                   Family History   Problem  Relation Age of Onset     Osteoporosis Mother      Thyroid Disease Mother         no thyroid     Cancer Maternal Grandmother         ? lung cancer     Cancer Father         melanoma     Coronary Artery Disease Brother      Coronary Artery Disease Brother            Physical exam was not performed as it was a billable video visit.    Component      Latest Ref Rng & Units 11/11/2020   WBC      4.0 - 11.0 10e9/L 5.9   RBC Count      4.4 - 5.9 10e12/L 4.28 (L)   Hemoglobin      13.3 - 17.7 g/dL 12.8 (L)   Hematocrit      40.0 - 53.0 % 38.9 (L)   MCV      78 - 100 fl 91   MCH      26.5 - 33.0 pg 29.9   MCHC      31.5 - 36.5 g/dL 32.9   RDW      10.0 - 15.0 % 13.4   Platelet Count      150 - 450 10e9/L 147 (L)   % Neutrophils      % 64.9   % Lymphocytes      % 23.9   % Monocytes      % 8.5   % Eosinophils      % 2.5   % Basophils      % 0.2   Absolute Neutrophil      1.6 - 8.3 10e9/L 3.8   Absolute Lymphocytes      0.8 - 5.3 10e9/L 1.4   Absolute Monocytes      0.0 - 1.3 10e9/L 0.5   Absolute Eosinophils      0.0 - 0.7 10e9/L 0.2   Absolute Basophils      0.0 - 0.2 10e9/L 0.0   Diff Method       Automated Method   Total Cholesterol      <=199 mg/dL 118   Triglycerides      30 - 149 mg/dL 48   HDL Cholesterol      40 - 59 mg/dL 50   LDL Cholesterol      <=129 mg/dL 58   HDL Size      >=8.9 nm 9.1   VLDL Size      <=46.7 nm 48.4 (H)   LDL Particle Size      >=20.7 nm 21.1   Lge HDL Particle number      >=4.2 umol/L 5.8   HDL Particle Number NMR      >=33.0 umol/L 29.3 (L)   Lge VLDL Part number      <=2.7 nmol/L <1.5   Small LDL Particle number      <=634 nmol/L 267   LDL Particle Number      <=1,135 nmol/L 627   EER LipoFit by NMR       SEE NOTE   Sodium      133 - 144 mmol/L 139   Potassium      3.4 - 5.3 mmol/L 3.5   Chloride      94 - 109 mmol/L 105   Carbon Dioxide      20 - 32 mmol/L 31   Anion Gap      3 - 14 mmol/L 3   Glucose      70 - 99 mg/dL 99   Urea Nitrogen      7 - 30 mg/dL 18   Creatinine      0.66 - 1.25  mg/dL 0.77   GFR Estimate      >60 mL/min/1.73:m2 >90   GFR Estimate If Black      >60 mL/min/1.73:m2 >90   Calcium      8.5 - 10.1 mg/dL 8.6   Bilirubin Direct      0.0 - 0.2 mg/dL 0.2   Bilirubin Total      0.2 - 1.3 mg/dL 0.6   Albumin      3.4 - 5.0 g/dL 3.4   Protein Total      6.8 - 8.8 g/dL 6.8   Alkaline Phosphatase      40 - 150 U/L 84   ALT      0 - 70 U/L 20   AST      0 - 45 U/L 18   CRP Cardiac Risk      mg/L <0.2   Lipoprotein (a)      <=29 mg/dL 9   TSH      0.40 - 4.00 mU/L 0.03 (L)   T4 Free      0.76 - 1.46 ng/dL 1.70 (H)   Free T3      2.3 - 4.2 pg/mL 3.0   Hemoglobin A1C      0 - 5.6 % 5.7 (H)   PSA      0 - 4 ug/L <0.01       A/P:  (E78.5) Hyperlipidemia LDL goal <70  (primary encounter diagnosis)  Comment: at goal, no med changes  Plan: atorvastatin (LIPITOR) 40 MG tablet           (I25.118) Coronary artery disease of native artery of native heart with stable angina pectoris (H)  Comment: asx presently  Plan: metoprolol succinate ER (TOPROL-XL) 50 MG 24 hr        tablet, isosorbide mononitrate (IMDUR) 30 MG 24        hr tablet           (I10) Essential hypertension  Comment: at goal at home  Plan: isosorbide mononitrate (IMDUR) 30 MG 24 hr         tablet, hydrochlorothiazide (HYDRODIURIL) 25 MG        tablet           (E03.8,  E06.3) Hypothyroidism due to Hashimoto's thyroiditis  Comment: needs dose reduced to below  Plan: levothyroxine (SYNTHROID/LEVOTHROID) 150 MCG         tablet, T3 Free, T4 free, TSH         Video start: 09:10  Video end: 09:43

## 2021-01-15 ENCOUNTER — HEALTH MAINTENANCE LETTER (OUTPATIENT)
Age: 61
End: 2021-01-15

## 2021-02-02 DIAGNOSIS — E78.5 HYPERLIPIDEMIA LDL GOAL <70: ICD-10-CM

## 2021-02-02 DIAGNOSIS — E06.3 HYPOTHYROIDISM DUE TO HASHIMOTO'S THYROIDITIS: ICD-10-CM

## 2021-02-02 DIAGNOSIS — I25.118 CORONARY ARTERY DISEASE OF NATIVE ARTERY OF NATIVE HEART WITH STABLE ANGINA PECTORIS (H): ICD-10-CM

## 2021-02-02 DIAGNOSIS — I10 ESSENTIAL HYPERTENSION: ICD-10-CM

## 2021-02-02 LAB
T3FREE SERPL-MCNC: 1.3 PG/ML (ref 2.3–4.2)
T4 FREE SERPL-MCNC: 0.51 NG/DL (ref 0.76–1.46)
TSH SERPL DL<=0.005 MIU/L-ACNC: 28.97 MU/L (ref 0.4–4)

## 2021-02-02 PROCEDURE — 36415 COLL VENOUS BLD VENIPUNCTURE: CPT | Performed by: INTERNAL MEDICINE

## 2021-02-02 PROCEDURE — 84439 ASSAY OF FREE THYROXINE: CPT | Performed by: INTERNAL MEDICINE

## 2021-02-02 PROCEDURE — 84481 FREE ASSAY (FT-3): CPT | Performed by: INTERNAL MEDICINE

## 2021-02-02 PROCEDURE — 84443 ASSAY THYROID STIM HORMONE: CPT | Performed by: INTERNAL MEDICINE

## 2021-02-10 ENCOUNTER — OFFICE VISIT (OUTPATIENT)
Dept: OTHER | Facility: CLINIC | Age: 61
End: 2021-02-10
Attending: INTERNAL MEDICINE
Payer: COMMERCIAL

## 2021-02-10 VITALS
HEIGHT: 69 IN | OXYGEN SATURATION: 100 % | HEART RATE: 64 BPM | RESPIRATION RATE: 16 BRPM | DIASTOLIC BLOOD PRESSURE: 78 MMHG | WEIGHT: 226 LBS | BODY MASS INDEX: 33.47 KG/M2 | SYSTOLIC BLOOD PRESSURE: 128 MMHG

## 2021-02-10 DIAGNOSIS — I10 ESSENTIAL HYPERTENSION: ICD-10-CM

## 2021-02-10 DIAGNOSIS — Z00.00 ROUTINE GENERAL MEDICAL EXAMINATION AT A HEALTH CARE FACILITY: Primary | ICD-10-CM

## 2021-02-10 DIAGNOSIS — E78.5 HYPERLIPIDEMIA LDL GOAL <70: ICD-10-CM

## 2021-02-10 DIAGNOSIS — E06.3 HYPOTHYROIDISM DUE TO HASHIMOTO'S THYROIDITIS: ICD-10-CM

## 2021-02-10 PROCEDURE — 99213 OFFICE O/P EST LOW 20 MIN: CPT | Mod: 25 | Performed by: INTERNAL MEDICINE

## 2021-02-10 PROCEDURE — G0463 HOSPITAL OUTPT CLINIC VISIT: HCPCS

## 2021-02-10 PROCEDURE — 99396 PREV VISIT EST AGE 40-64: CPT | Performed by: INTERNAL MEDICINE

## 2021-02-10 RX ORDER — LEVOTHYROXINE SODIUM 175 UG/1
TABLET ORAL
Qty: 45 TABLET | Refills: 3 | Status: SHIPPED | OUTPATIENT
Start: 2021-02-10 | End: 2021-09-09 | Stop reason: DRUGHIGH

## 2021-02-10 RX ORDER — LEVOTHYROXINE SODIUM 150 UG/1
TABLET ORAL
Qty: 45 TABLET | Refills: 3 | Status: SHIPPED | OUTPATIENT
Start: 2021-02-10 | End: 2021-09-09 | Stop reason: DRUGHIGH

## 2021-02-10 ASSESSMENT — MIFFLIN-ST. JEOR: SCORE: 1825.51

## 2021-02-10 NOTE — PROGRESS NOTES
Ramiro Jeffers is a 60 year old male who is presenting at the current time to discuss his diagnosi(es) of        Routine general medical examination at a health care facility  Essential hypertension  Hyperlipidemia LDL goal <70  Hypothyroidism due to Hashimoto's thyroiditis  .         HPI:Mr. Jeffers is a delightful, 60-year-old gentleman who had seen cardiology in 11/2018 when he complained of crescendo angina.  A coronary CTA had showed a significant amount of calcium, and then the coronary angiography showed a tight lesion in the proximal LAD that was stented.  EF was mildly reduced at 40%-45%, which subsequently improved to normal. He does not have any valve disease.  No clinical heart failure.  He does have CCS class II stable angina.  Overall, he currently denies any cardiovascular symptoms.  He was last seen by myself in person 09/2019, and last had a video visit on 12/02/2020.  Of note, a few months after his stent that was placed in 02/2019, he had another angiogram for ongoing chest discomfort that showed patent stents.  There is a possibility he does have microvascular disease.      Overall, Mr. Jeffers says he is doing well.  He denies any significant cardiovascular symptoms.  He denies any major cardiovascular issues.      His labs when last seen revealed he was overdosed on his Synthroid 175 mcg daily for his Hashimoto's. I reduced it to 150 mcg daily and labs now reveal he is underdosed.           Review Of Systems  Skin: negative  Eyes: negative  Ears/Nose/Throat: negative  Respiratory: No shortness of breath, dyspnea on exertion, cough, or hemoptysis  Cardiovascular: negative  Gastrointestinal: negative   Genitourinary: negative  Musculoskeletal: negative  Neurologic: negative  Psychiatric: negative  Hematologic/Lymphatic/Immunologic: negative  Endocrine: negative      PAST MEDICAL HISTORY:                  Past Medical History:   Diagnosis Date     ACQUIRED HYPOTHYROIDISM       Blood clot in the legs       CAD (coronary artery disease)     11/15/18 Cath: PCI with 3.5-16mm DORY to proximal LAD     Calculus of gallbladder without mention of cholecystitis or obstruction      Cataract      Chest pressure 11/5/2018     Hyperlipidemia      Hypertension      Ischemic cardiomyopathy     EF 40-45% on 11/2018 Echo     Obesity, unspecified     significant weight loss w/diet alone, on 10-15-10, BMI was 56.4     pulmonary emboli 6-2010    Unprovoked large bilateral pulmonary emboli without source identified on lower extremity dopplers, pt had a transient moderate lupus inhibitor upon initial presentation in June , 2010 which was gone in September, 2010;  all other thrombophilic workup is normal     Pulmonary embolus, bilateral      PVC's (premature ventricular contractions)     4% burden on 11/2018 Holter     Shortness of breath     on exertion     Venous insufficiency      Viral pneumonia, unspecified      Vitamin D deficiency        PAST SURGICAL HISTORY:                  Past Surgical History:   Procedure Laterality Date     COLONOSCOPY  11/17/2011    Procedure:COLONOSCOPY; colonoscopy; Surgeon:ELENA OROURKE; Location: GI     CV HEART CATHETERIZATION WITH POSSIBLE INTERVENTION Left 2/15/2019    Procedure: Coronary Angiogram;  Surgeon: Tuloi Ortiz MD;  Location:  HEART CARDIAC CATH LAB     LAPAROSCOPIC CHOLECYSTECTOMY  9/28/2012    Procedure: LAPAROSCOPIC CHOLECYSTECTOMY;  LAPAROSCOPIC CHOLECYSTECTOMY, LYSIS OF ADHESIONS;  Surgeon: Dutch Matta MD;  Location:  OR     LAPAROSCOPIC LYSIS ADHESIONS  9/28/2012    Procedure: LAPAROSCOPIC LYSIS ADHESIONS;;  Surgeon: Dutch Matta MD;  Location:  OR     LAPAROSCOPY, SURGICAL; REPAIR INCISIONAL OR VENTRAL HERNIA      repair of ventral strangulated surgery     PHACOEMULSIFICATION CLEAR CORNEA WITH STANDARD INTRAOCULAR LENS IMPLANT  12/19/2011    Procedure:PHACOEMULSIFICATION CLEAR CORNEA WITH STANDARD INTRAOCULAR LENS IMPLANT; RIGHT PHACOEMULSIFICATION CLEAR  CORNEA WITH STANDARD INTRAOCULAR LENS IMPLANT ; Surgeon:ALLISON ANTONIO; Location:[facility] NONSPECIFIC PROCEDURE  10-    Laparoscopic gastric bypass,     [facility] NONSPECIFIC PROCEDURE  10-    1.  Attempted laparoscopic exploration with conversion to: .  Abdominal exploration. 3.  Reduction of incarcerated incisional hernia. 4.  Repair of incisional hernia.5.  Gastrostomy tube placement (#22 Welsh) into defunctionalized stomach.6.  Enterotomy with bowel decompression and primary repair. 7.  Abdominal lavage.        CURRENT MEDICATIONS:                  Current Outpatient Medications   Medication Sig Dispense Refill     aspirin 81 MG tablet Take 1 tablet (81 mg) by mouth daily Start tomorrow morning.       atorvastatin (LIPITOR) 40 MG tablet Take 1 tablet (40 mg) by mouth daily 90 tablet 3     Cholecalciferol (VITAMIN D) 2000 units tablet Take 1 tablet by mouth daily 100 tablet 12     hydrochlorothiazide (HYDRODIURIL) 25 MG tablet Take 1 tablet (25 mg) by mouth every morning 90 tablet 3     isosorbide mononitrate (IMDUR) 30 MG 24 hr tablet Take 1 tablet (30 mg) by mouth daily 90 tablet 3     levothyroxine (SYNTHROID/LEVOTHROID) 150 MCG tablet Take 1 tablet (150 mcg) by mouth daily 90 tablet 3     metoprolol succinate ER (TOPROL-XL) 50 MG 24 hr tablet Take 1.5 tablets (75 mg) by mouth daily 135 tablet 3     Multiple Vitamin (MULTI-VITAMIN) per tablet Take 1 tablet by mouth 2 times daily        nitroGLYcerin (NITROSTAT) 0.4 MG sublingual tablet For chest pain place 1 tablet under the tongue every 5 minutes for 3 doses. If symptoms persist 5 minutes after 1st dose call 911. 25 tablet 3       ALLERGIES:                  Allergies   Allergen Reactions     No Known Allergies        SOCIAL HISTORY:                  Social History     Socioeconomic History     Marital status: Single     Spouse name: Not on file     Number of children: Not on file     Years of education: Not on file     Highest education  level: Not on file   Occupational History     Occupation:      Employer: SELF   Social Needs     Financial resource strain: Not on file     Food insecurity     Worry: Not on file     Inability: Not on file     Transportation needs     Medical: Not on file     Non-medical: Not on file   Tobacco Use     Smoking status: Never Smoker     Smokeless tobacco: Never Used   Substance and Sexual Activity     Alcohol use: No     Alcohol/week: 0.0 standard drinks     Drug use: No     Sexual activity: Not on file   Lifestyle     Physical activity     Days per week: Not on file     Minutes per session: Not on file     Stress: Not on file   Relationships     Social connections     Talks on phone: Not on file     Gets together: Not on file     Attends Yarsani service: Not on file     Active member of club or organization: Not on file     Attends meetings of clubs or organizations: Not on file     Relationship status: Not on file     Intimate partner violence     Fear of current or ex partner: Not on file     Emotionally abused: Not on file     Physically abused: Not on file     Forced sexual activity: Not on file   Other Topics Concern     Parent/sibling w/ CABG, MI or angioplasty before 65F 55M? Not Asked   Social History Narrative     Not on file       FAMILY HISTORY:                   Family History   Problem Relation Age of Onset     Osteoporosis Mother      Thyroid Disease Mother         no thyroid     Cancer Maternal Grandmother         ? lung cancer     Cancer Father         melanoma     Coronary Artery Disease Brother      Coronary Artery Disease Brother              Physical exam Reveals:    O/P: WNL  HEENT: WNL  NECK: No JVD, thyromegaly, or lymphadenopathy  HEART: RRR, no murmurs, gallops, or rubs  LUNGS: CTA bilaterally without rales, wheezes, or rhonchi  GI: NABS, nondistended, nontender, soft  EXT:without cyanosis, clubbing, or edema  NEURO: nonfocal  : no flank tenderness    Component      Latest  Ref Rng & Units 11/11/2020 2/2/2021   TSH      0.40 - 4.00 mU/L 0.03 (L) 28.97 (H)   T4 Free      0.76 - 1.46 ng/dL 1.70 (H) 0.51 (L)   Free T3      2.3 - 4.2 pg/mL 3.0 1.3 (L)         A/P:    (Z00.00) Routine general medical examination at a health care facility  (primary encounter diagnosis)  Comment: colonosocpy due this year  Plan: Hemoglobin A1c, Albumin Random Urine         Quantitative with Creat Ratio, Prostate spec         antigen screen, GASTROENTEROLOGY ADULT REF         PROCEDURE ONLY            (E78.5) Hyperlipidemia LDL goal <70  (primary encounter diagnosis)  Comment: at goal, no med changes  Plan: atorvastatin (LIPITOR) 40 MG tablet, check labs in three months            (I25.118) Coronary artery disease of native artery of native heart with stable angina pectoris (H)  Comment: asx presently  Plan: metoprolol succinate ER (TOPROL-XL) 50 MG 24 hr        tablet, isosorbide mononitrate (IMDUR) 30 MG 24        hr tablet            (I10) Essential hypertension  Comment: at goal  Plan: isosorbide mononitrate (IMDUR) 30 MG 24 hr         tablet, hydrochlorothiazide (HYDRODIURIL) 25 MG        tablet, check labs in three months            (E03.8,  E06.3) Hypothyroidism due to Hashimoto's thyroiditis  Comment: needs dose increased to alternating between 150 and 175 daily  Plan: check labs in three months    Greater than one half the 15 minutes not spent on preventive care during this visit was spent providing aducation and counselling regarding item(s) # 2 onward as delineated above.

## 2021-02-10 NOTE — PROGRESS NOTES
"Ramiro Jeffers is a 60 year old male who presents for:  Chief Complaint   Patient presents with     RECHECK       Keeping PCP. F/u to 12/2/20.  Labs completed 2/2/2021. *nh        Vitals:    Vitals:    02/10/21 0910 02/10/21 0912   BP: 132/84 (!) 143/83   BP Location: Right arm Left arm   Patient Position: Chair Chair   Cuff Size: Adult Regular Adult Regular   Pulse: 64    Resp: 16    SpO2: 100%    Weight: 226 lb (102.5 kg)    Height: 5' 9\" (1.753 m)        BMI:  Estimated body mass index is 33.37 kg/m  as calculated from the following:    Height as of this encounter: 5' 9\" (1.753 m).    Weight as of this encounter: 226 lb (102.5 kg).    Pain Score:  Data Unavailable        Santos Vaughan CMA    "

## 2021-02-19 ENCOUNTER — TRANSFERRED RECORDS (OUTPATIENT)
Dept: HEALTH INFORMATION MANAGEMENT | Facility: CLINIC | Age: 61
End: 2021-02-19

## 2021-05-20 ENCOUNTER — TELEPHONE (OUTPATIENT)
Dept: OTHER | Facility: CLINIC | Age: 61
End: 2021-05-20

## 2021-05-20 NOTE — TELEPHONE ENCOUNTER
2+ attempts have been made to schedule follow up with Dr. Davis. No further attempts to be made.     Attempts:  4/13/2021 1st LS  5/12/2021 2nd LS     Lisa SANTIAGO

## 2021-06-22 ENCOUNTER — TELEPHONE (OUTPATIENT)
Dept: OTHER | Facility: CLINIC | Age: 61
End: 2021-06-22

## 2021-06-22 DIAGNOSIS — R53.83 FATIGUE: Primary | ICD-10-CM

## 2021-06-22 NOTE — TELEPHONE ENCOUNTER
Patient is requesting for nurse to call regarding orders for b12 lab draw.     Informed patient will send message to nurse for call back.       Cat COLLINS

## 2021-06-22 NOTE — TELEPHONE ENCOUNTER
Ramiro would like to schedule fasting labs for himself.  (Vitamin B12 lab added).    Please arrange for:      Fating labs and urinalysis    In clinic visit one week later.    Cristal Sotomayor RN BSN  [facility]  [phone]

## 2021-08-05 ENCOUNTER — TELEPHONE (OUTPATIENT)
Dept: OTHER | Facility: CLINIC | Age: 61
End: 2021-08-05

## 2021-08-23 ENCOUNTER — LAB (OUTPATIENT)
Dept: LAB | Facility: CLINIC | Age: 61
End: 2021-08-23
Payer: COMMERCIAL

## 2021-08-23 DIAGNOSIS — E06.3 HYPOTHYROIDISM DUE TO HASHIMOTO'S THYROIDITIS: ICD-10-CM

## 2021-08-23 DIAGNOSIS — I10 ESSENTIAL HYPERTENSION: ICD-10-CM

## 2021-08-23 DIAGNOSIS — E78.5 HYPERLIPIDEMIA LDL GOAL <70: ICD-10-CM

## 2021-08-23 DIAGNOSIS — Z00.00 ROUTINE GENERAL MEDICAL EXAMINATION AT A HEALTH CARE FACILITY: ICD-10-CM

## 2021-08-23 DIAGNOSIS — R53.83 FATIGUE: ICD-10-CM

## 2021-08-23 LAB
ALBUMIN SERPL-MCNC: 3.5 G/DL (ref 3.4–5)
ALP SERPL-CCNC: 72 U/L (ref 40–150)
ALT SERPL W P-5'-P-CCNC: 23 U/L (ref 0–70)
ANION GAP SERPL CALCULATED.3IONS-SCNC: 1 MMOL/L (ref 3–14)
AST SERPL W P-5'-P-CCNC: 23 U/L (ref 0–45)
BASOPHILS # BLD AUTO: 0 10E3/UL (ref 0–0.2)
BASOPHILS NFR BLD AUTO: 0 %
BILIRUB DIRECT SERPL-MCNC: 0.2 MG/DL (ref 0–0.2)
BILIRUB SERPL-MCNC: 0.6 MG/DL (ref 0.2–1.3)
BUN SERPL-MCNC: 21 MG/DL (ref 7–30)
CALCIUM SERPL-MCNC: 8.9 MG/DL (ref 8.5–10.1)
CHLORIDE BLD-SCNC: 106 MMOL/L (ref 94–109)
CHOLEST SERPL-MCNC: 132 MG/DL
CO2 SERPL-SCNC: 32 MMOL/L (ref 20–32)
CREAT SERPL-MCNC: 0.91 MG/DL (ref 0.66–1.25)
CREAT UR-MCNC: 208 MG/DL
EOSINOPHIL # BLD AUTO: 0.2 10E3/UL (ref 0–0.7)
EOSINOPHIL NFR BLD AUTO: 3 %
ERYTHROCYTE [DISTWIDTH] IN BLOOD BY AUTOMATED COUNT: 14 % (ref 10–15)
FASTING STATUS PATIENT QL REPORTED: YES
GFR SERPL CREATININE-BSD FRML MDRD: >90 ML/MIN/1.73M2
GLUCOSE BLD-MCNC: 98 MG/DL (ref 70–99)
HBA1C MFR BLD: 5.6 % (ref 0–5.6)
HCT VFR BLD AUTO: 39.8 % (ref 40–53)
HDLC SERPL-MCNC: 46 MG/DL
HGB BLD-MCNC: 13.4 G/DL (ref 13.3–17.7)
LDLC SERPL CALC-MCNC: 71 MG/DL
LYMPHOCYTES # BLD AUTO: 1.8 10E3/UL (ref 0.8–5.3)
LYMPHOCYTES NFR BLD AUTO: 32 %
MCH RBC QN AUTO: 30.5 PG (ref 26.5–33)
MCHC RBC AUTO-ENTMCNC: 33.7 G/DL (ref 31.5–36.5)
MCV RBC AUTO: 91 FL (ref 78–100)
MICROALBUMIN UR-MCNC: 28 MG/L
MICROALBUMIN/CREAT UR: 13.46 MG/G CR (ref 0–17)
MONOCYTES # BLD AUTO: 0.5 10E3/UL (ref 0–1.3)
MONOCYTES NFR BLD AUTO: 9 %
NEUTROPHILS # BLD AUTO: 3 10E3/UL (ref 1.6–8.3)
NEUTROPHILS NFR BLD AUTO: 55 %
NONHDLC SERPL-MCNC: 86 MG/DL
PLATELET # BLD AUTO: 157 10E3/UL (ref 150–450)
POTASSIUM BLD-SCNC: 3.7 MMOL/L (ref 3.4–5.3)
PROT SERPL-MCNC: 7.1 G/DL (ref 6.8–8.8)
PSA SERPL-MCNC: <0.01 UG/L (ref 0–4)
RBC # BLD AUTO: 4.4 10E6/UL (ref 4.4–5.9)
SODIUM SERPL-SCNC: 139 MMOL/L (ref 133–144)
T3FREE SERPL-MCNC: 1.9 PG/ML (ref 2.3–4.2)
T4 FREE SERPL-MCNC: 0.93 NG/DL (ref 0.76–1.46)
TRIGL SERPL-MCNC: 73 MG/DL
TSH SERPL DL<=0.005 MIU/L-ACNC: 9.57 MU/L (ref 0.4–4)
VIT B12 SERPL-MCNC: 374 PG/ML (ref 193–986)
WBC # BLD AUTO: 5.5 10E3/UL (ref 4–11)

## 2021-08-23 PROCEDURE — G0103 PSA SCREENING: HCPCS

## 2021-08-23 PROCEDURE — 82248 BILIRUBIN DIRECT: CPT

## 2021-08-23 PROCEDURE — 82043 UR ALBUMIN QUANTITATIVE: CPT

## 2021-08-23 PROCEDURE — 80061 LIPID PANEL: CPT

## 2021-08-23 PROCEDURE — 84481 FREE ASSAY (FT-3): CPT

## 2021-08-23 PROCEDURE — 82607 VITAMIN B-12: CPT

## 2021-08-23 PROCEDURE — 84439 ASSAY OF FREE THYROXINE: CPT

## 2021-08-23 PROCEDURE — 83036 HEMOGLOBIN GLYCOSYLATED A1C: CPT

## 2021-08-23 PROCEDURE — 36415 COLL VENOUS BLD VENIPUNCTURE: CPT

## 2021-08-23 PROCEDURE — 80050 GENERAL HEALTH PANEL: CPT

## 2021-09-04 ENCOUNTER — HEALTH MAINTENANCE LETTER (OUTPATIENT)
Age: 61
End: 2021-09-04

## 2021-09-09 ENCOUNTER — TELEPHONE (OUTPATIENT)
Dept: OTHER | Facility: CLINIC | Age: 61
End: 2021-09-09

## 2021-09-09 ENCOUNTER — OFFICE VISIT (OUTPATIENT)
Dept: OTHER | Facility: CLINIC | Age: 61
End: 2021-09-09
Attending: INTERNAL MEDICINE
Payer: COMMERCIAL

## 2021-09-09 ENCOUNTER — MYC MEDICAL ADVICE (OUTPATIENT)
Dept: OTHER | Facility: CLINIC | Age: 61
End: 2021-09-09

## 2021-09-09 VITALS
HEIGHT: 69 IN | OXYGEN SATURATION: 94 % | HEART RATE: 62 BPM | WEIGHT: 239 LBS | SYSTOLIC BLOOD PRESSURE: 118 MMHG | BODY MASS INDEX: 35.4 KG/M2 | DIASTOLIC BLOOD PRESSURE: 76 MMHG | RESPIRATION RATE: 16 BRPM

## 2021-09-09 DIAGNOSIS — I25.118 CORONARY ARTERY DISEASE OF NATIVE ARTERY OF NATIVE HEART WITH STABLE ANGINA PECTORIS (H): ICD-10-CM

## 2021-09-09 DIAGNOSIS — E78.5 HYPERLIPIDEMIA LDL GOAL <70: ICD-10-CM

## 2021-09-09 DIAGNOSIS — I10 ESSENTIAL HYPERTENSION: Primary | ICD-10-CM

## 2021-09-09 DIAGNOSIS — I20.89 STABLE ANGINA (H): Primary | ICD-10-CM

## 2021-09-09 DIAGNOSIS — E06.3 HYPOTHYROIDISM DUE TO HASHIMOTO'S THYROIDITIS: ICD-10-CM

## 2021-09-09 DIAGNOSIS — I10 ESSENTIAL HYPERTENSION: ICD-10-CM

## 2021-09-09 PROCEDURE — G0463 HOSPITAL OUTPT CLINIC VISIT: HCPCS

## 2021-09-09 PROCEDURE — 93010 ELECTROCARDIOGRAM REPORT: CPT | Performed by: INTERNAL MEDICINE

## 2021-09-09 PROCEDURE — 99215 OFFICE O/P EST HI 40 MIN: CPT | Performed by: INTERNAL MEDICINE

## 2021-09-09 RX ORDER — LEVOTHYROXINE SODIUM 175 UG/1
TABLET ORAL
Qty: 60 TABLET | Refills: 3 | Status: SHIPPED | OUTPATIENT
Start: 2021-09-09 | End: 2021-12-06

## 2021-09-09 RX ORDER — LEVOTHYROXINE SODIUM 200 UG/1
TABLET ORAL
Qty: 30 TABLET | Refills: 3 | Status: SHIPPED | OUTPATIENT
Start: 2021-09-09 | End: 2021-12-06

## 2021-09-09 RX ORDER — LEVOTHYROXINE SODIUM 150 UG/1
TABLET ORAL
Qty: 45 TABLET | Refills: 3 | Status: CANCELLED | OUTPATIENT
Start: 2021-09-09

## 2021-09-09 RX ORDER — ROSUVASTATIN CALCIUM 40 MG/1
40 TABLET, COATED ORAL DAILY
Qty: 90 TABLET | Refills: 3 | Status: SHIPPED | OUTPATIENT
Start: 2021-09-09 | End: 2022-08-15

## 2021-09-09 ASSESSMENT — MIFFLIN-ST. JEOR: SCORE: 1879.48

## 2021-09-09 NOTE — TELEPHONE ENCOUNTER
Patient is on metoprolol succinate ER (TOPROL-XL) 50 MG 24 hr tablet.  I attempted to call patient three times and no answer and voicemail is not set up.  [employer] message sent to patient to hold 24 hours prior to stress test.     Jeannie MENDOZA, RN    [facility]  [facility]  Office: [phone]  Fax: [phone]

## 2021-09-09 NOTE — TELEPHONE ENCOUNTER
"Patient scheduled     9/16/2021 ECHO  9/20/2021 NM [place]  9/24/2021 60min Appt with Dr. Davis       Per teams message with [facility] Scheduler , she stated \"he told me he is not taking Beta Blocker but if he is and if Dr. Davis wants him to hold it, someone will have to tell the pt to hold 24hrs prior to NM.\"    Sending to nurse to review and advise.       Cat COLLINS   "

## 2021-09-09 NOTE — PROGRESS NOTES
Ramiro Jeffers is a 61 year old male who is presenting at the current time to discuss his diagnosi(es) of        Essential hypertension  Hyperlipidemia LDL goal <70  Hypothyroidism due to Hashimoto's thyroiditis  Stable angina (H)      HPI:Mr. Jeffers is a delightful, 60-year-old gentleman who had seen cardiology in 11/2018 when he complained of crescendo angina.  A coronary CTA had showed a significant amount of calcium, and then the coronary angiography showed a tight lesion in the proximal LAD that was stented.  EF was mildly reduced at 40%-45%, which subsequently improved to normal. He does not have any valve disease.  No clinical heart failure.  He does have CCS class II stable angina.  Overall, he currently denies any cardiovascular symptoms.  He was last seen by myself in person 09/2019, and last had a video visit on 12/02/2020.  Of note, a few months after his stent that was placed in 02/2019, he had another angiogram for ongoing chest discomfort that showed patent stents.  There is a possibility he does have microvascular disease.      Overall, Mr. Jeffers says he is doing well.  He denies any significant cardiovascular symptoms.  He denies any major cardiovascular issues.      His labs when last seen revealed he was overdosed on his Synthroid 175 mcg daily for his Hashimoto's. I reduced it to 150 mcg daily and labs now reveal he is underdosed.       When last seen in 2/2021, his TFTs were abnormal, and he needed his Synthroid dose increased to alternating between 150 and 175 daily. He presents currently to recheck labs.    He notes in the last six weeks, while mowing his grass, he notes SOB and left sided chest pressure with left arm numbness. The above sxs go away within five minutes or if he takes SL NTG. He has no sxs presently.  He did not seek medical attention due to the fact he is the primary caregiver for his incapacitated dying mother.     .  Review Of Systems  Skin: negative  Eyes:  negative  Ears/Nose/Throat: negative  Respiratory: No shortness of breath, dyspnea on exertion, cough, or hemoptysis  Cardiovascular: negative  Gastrointestinal: negative  Genitourinary: negative  Musculoskeletal: negative  Neurologic: negative  Psychiatric: negative  Hematologic/Lymphatic/Immunologic: negative  Endocrine: negative    PAST MEDICAL HISTORY:                  Past Medical History:   Diagnosis Date     ACQUIRED HYPOTHYROIDISM       Blood clot in the legs      CAD (coronary artery disease)     11/15/18 Cath: PCI with 3.5-16mm DORY to proximal LAD     Calculus of gallbladder without mention of cholecystitis or obstruction      Cataract      Chest pressure 11/5/2018     Hyperlipidemia      Hypertension      Ischemic cardiomyopathy     EF 40-45% on 11/2018 Echo     Obesity, unspecified     significant weight loss w/diet alone, on 10-15-10, BMI was 56.4     pulmonary emboli 6-2010    Unprovoked large bilateral pulmonary emboli without source identified on lower extremity dopplers, pt had a transient moderate lupus inhibitor upon initial presentation in June , 2010 which was gone in September, 2010;  all other thrombophilic workup is normal     Pulmonary embolus, bilateral      PVC's (premature ventricular contractions)     4% burden on 11/2018 Holter     Shortness of breath     on exertion     Venous insufficiency      Viral pneumonia, unspecified      Vitamin D deficiency        PAST SURGICAL HISTORY:                  Past Surgical History:   Procedure Laterality Date     COLONOSCOPY  11/17/2011    Procedure:COLONOSCOPY; colonoscopy; Surgeon:ELENA OROURKE; Location: GI     CV HEART CATHETERIZATION WITH POSSIBLE INTERVENTION Left 2/15/2019    Procedure: Coronary Angiogram;  Surgeon: Tulio Ortiz MD;  Location:  HEART CARDIAC CATH LAB     LAPAROSCOPIC CHOLECYSTECTOMY  9/28/2012    Procedure: LAPAROSCOPIC CHOLECYSTECTOMY;  LAPAROSCOPIC CHOLECYSTECTOMY, LYSIS OF ADHESIONS;  Surgeon: Dutch Matta  MD SHELLY;  Location:  OR     LAPAROSCOPIC LYSIS ADHESIONS  9/28/2012    Procedure: LAPAROSCOPIC LYSIS ADHESIONS;;  Surgeon: Dutch Matta MD;  Location:  OR     LAPAROSCOPY, SURGICAL; REPAIR INCISIONAL OR VENTRAL HERNIA      repair of ventral strangulated surgery     PHACOEMULSIFICATION CLEAR CORNEA WITH STANDARD INTRAOCULAR LENS IMPLANT  12/19/2011    Procedure:PHACOEMULSIFICATION CLEAR CORNEA WITH STANDARD INTRAOCULAR LENS IMPLANT; RIGHT PHACOEMULSIFICATION CLEAR CORNEA WITH STANDARD INTRAOCULAR LENS IMPLANT ; Surgeon:ALLISON ANTONIO; Location: EC     [facility] NONSPECIFIC PROCEDURE  10-    Laparoscopic gastric bypass,     [facility] NONSPECIFIC PROCEDURE  10-    1.  Attempted laparoscopic exploration with conversion to: .  Abdominal exploration. 3.  Reduction of incarcerated incisional hernia. 4.  Repair of incisional hernia.5.  Gastrostomy tube placement (#22 Guyanese) into defunctionalized stomach.6.  Enterotomy with bowel decompression and primary repair. 7.  Abdominal lavage.        CURRENT MEDICATIONS:                  Current Outpatient Medications   Medication Sig Dispense Refill     aspirin 81 MG tablet Take 1 tablet (81 mg) by mouth daily Start tomorrow morning.       atorvastatin (LIPITOR) 40 MG tablet Take 1 tablet (40 mg) by mouth daily 90 tablet 3     Cholecalciferol (VITAMIN D) 2000 units tablet Take 1 tablet by mouth daily 100 tablet 12     hydrochlorothiazide (HYDRODIURIL) 25 MG tablet Take 1 tablet (25 mg) by mouth every morning 90 tablet 3     isosorbide mononitrate (IMDUR) 30 MG 24 hr tablet Take 1 tablet (30 mg) by mouth daily 90 tablet 3     levothyroxine (SYNTHROID/LEVOTHROID) 150 MCG tablet Take 150 mcg PO every 48 hours alternating with 175 mcg daily every 48 hours 45 tablet 3     levothyroxine (SYNTHROID/LEVOTHROID) 175 MCG tablet Take 150 mcg PO every 48 hours alternating with 175 mcg daily every 48 hours 45 tablet 3     metoprolol succinate ER (TOPROL-XL) 50 MG 24 hr tablet Take  1.5 tablets (75 mg) by mouth daily 135 tablet 3     Multiple Vitamin (MULTI-VITAMIN) per tablet Take 1 tablet by mouth 2 times daily        nitroGLYcerin (NITROSTAT) 0.4 MG sublingual tablet For chest pain place 1 tablet under the tongue every 5 minutes for 3 doses. If symptoms persist 5 minutes after 1st dose call 911. 26 tablet 3       ALLERGIES:                  Allergies   Allergen Reactions     No Known Allergies        SOCIAL HISTORY:                  Social History     Socioeconomic History     Marital status: Single     Spouse name: Not on file     Number of children: Not on file     Years of education: Not on file     Highest education level: Not on file   Occupational History     Occupation:      Employer: SELF   Tobacco Use     Smoking status: Never Smoker     Smokeless tobacco: Never Used   Substance and Sexual Activity     Alcohol use: No     Alcohol/week: 0.0 standard drinks     Drug use: No     Sexual activity: Not on file   Other Topics Concern     Parent/sibling w/ CABG, MI or angioplasty before 65F 55M? Not Asked   Social History Narrative     Not on file     Social Determinants of Health     Financial Resource Strain:      Difficulty of Paying Living Expenses:    Food Insecurity:      Worried About Running Out of Food in the Last Year:      Ran Out of Food in the Last Year:    Transportation Needs:      Lack of Transportation (Medical):      Lack of Transportation (Non-Medical):    Physical Activity:      Days of Exercise per Week:      Minutes of Exercise per Session:    Stress:      Feeling of Stress :    Social Connections:      Frequency of Communication with Friends and Family:      Frequency of Social Gatherings with Friends and Family:      Attends Christian Services:      Active Member of Clubs or Organizations:      Attends Club or Organization Meetings:      Marital Status:    Intimate Partner Violence:      Fear of Current or Ex-Partner:      Emotionally Abused:       Physically Abused:      Sexually Abused:        FAMILY HISTORY:                   Family History   Problem Relation Age of Onset     Osteoporosis Mother      Thyroid Disease Mother         no thyroid     Cancer Maternal Grandmother         ? lung cancer     Cancer Father         melanoma     Coronary Artery Disease Brother      Coronary Artery Disease Brother          Physical exam Reveals:    O/P: WNL  HEENT: WNL  NECK: No JVD, thyromegaly, or lymphadenopathy  HEART: RRR, no murmurs, gallops, or rubs  LUNGS: CTA bilaterally without rales, wheezes, or rhonchi  GI: NABS, nondistended, nontender, soft  EXT:without cyanosis, clubbing, or edema  NEURO: nonfocal  : no flank tenderness      Component      Latest Ref Rng & Units 11/11/2020 2/2/2021 8/23/2021   WBC      4.0 - 11.0 10e3/uL 5.9  5.5   RBC Count      4.40 - 5.90 10e6/uL 4.28 (L)  4.40   Hemoglobin      13.3 - 17.7 g/dL 12.8 (L)  13.4   Hematocrit      40.0 - 53.0 % 38.9 (L)  39.8 (L)   MCV      78 - 100 fL 91  91   MCH      26.5 - 33.0 pg 29.9  30.5   MCHC      31.5 - 36.5 g/dL 32.9  33.7   RDW      10.0 - 15.0 % 13.4  14.0   Platelet Count      150 - 450 10e3/uL 147 (L)  157   % Neutrophils      % 64.9  55   % Lymphocytes      % 23.9  32   % Monocytes      % 8.5  9   % Eosinophils      % 2.5  3   % Basophils      % 0.2  0   Absolute Neutrophil      1.6 - 8.3 10e9/L 3.8     Absolute Lymphocytes      0.8 - 5.3 10e9/L 1.4     Absolute Monocytes      0.0 - 1.3 10e9/L 0.5     Absolute Eosinophils      0.0 - 0.7 10e9/L 0.2     Absolute Basophils      0.0 - 0.2 10e9/L 0.0     Diff Method       Automated Method     Absolute Neutrophils      1.6 - 8.3 10e3/uL   3.0   Absolute Lymphocytes      0.8 - 5.3 10e3/uL   1.8   Absolute Monocytes      0.0 - 1.3 10e3/uL   0.5   Absolute Eosinophils      0.0 - 0.7 10e3/uL   0.2   Absolute Basophils      0.0 - 0.2 10e3/uL   0.0   Total Cholesterol      <=199 mg/dL 118     Triglycerides      <150 mg/dL 48  73   HDL Cholesterol       40 - 59 mg/dL 50     LDL Cholesterol      <=129 mg/dL 58     HDL Size      >=8.9 nm 9.1     VLDL Size      <=46.7 nm 48.4 (H)     LDL Particle Size      >=20.7 nm 21.1     Lge HDL Particle number      >=4.2 umol/L 5.8     HDL Particle Number NMR      >=33.0 umol/L 29.3 (L)     Lge VLDL Part number      <=2.7 nmol/L <1.5     Small LDL Particle number      <=634 nmol/L 267     LDL Particle Number      <=1,135 nmol/L 627     EER LipoFit by NMR       SEE NOTE     Sodium      133 - 144 mmol/L 139  139   Potassium      3.4 - 5.3 mmol/L 3.5  3.7   Chloride      94 - 109 mmol/L 105  106   Carbon Dioxide      20 - 32 mmol/L 31  32   Anion Gap      3 - 14 mmol/L 3  1 (L)   Glucose      70 - 99 mg/dL 99  98   Urea Nitrogen      7 - 30 mg/dL 18  21   Creatinine      0.66 - 1.25 mg/dL 0.77  0.91   GFR Estimate      >60 mL/min/1.73m2 >90  >90   GFR Estimate If Black      >60 mL/min/1.73:m2 >90     Calcium      8.5 - 10.1 mg/dL 8.6  8.9   Bilirubin Direct      0.0 - 0.2 mg/dL 0.2  0.2   Bilirubin Total      0.2 - 1.3 mg/dL 0.6  0.6   Albumin      3.4 - 5.0 g/dL 3.4  3.5   Protein Total      6.8 - 8.8 g/dL 6.8  7.1   Alkaline Phosphatase      40 - 150 U/L 84     ALT      0 - 70 U/L 20  23   AST      0 - 45 U/L 18  23   Alkaline Phosphatase      40 - 150 U/L   72   Cholesterol      <200 mg/dL   132   HDL Cholesterol      >=40 mg/dL   46   LDL Cholesterol Calculated      <=100 mg/dL   71   Non HDL Cholesterol      <130 mg/dL   86   Patient Fasting > 8hrs?         Yes   Creatinine Urine      mg/dL   208   Albumin Urine mg/L      mg/L   28   Albumin Urine mg/g Cr      0.00 - 17.00 mg/g Cr   13.46   CRP Cardiac Risk      mg/L <0.2     Lipoprotein (a)      <=29 mg/dL 9     TSH      0.40 - 4.00 mU/L 0.03 (L) 28.97 (H) 9.57 (H)   T4 Free      0.76 - 1.46 ng/dL 1.70 (H) 0.51 (L) 0.93   Free T3      2.3 - 4.2 pg/mL 3.0 1.3 (L) 1.9 (L)   Hemoglobin A1C      0.0 - 5.6 % 5.7 (H)  5.6   PSA      0.00 - 4.00 ug/L <0.01  <0.01   Vitamin B12       193 - 986 pg/mL   374                   (E78.5) Hyperlipidemia LDL goal <70  (primary encounter diagnosis)  Comment:not at at goal, LDL is 71  Plan: change atorvastatin (LIPITOR) 40 MG tablet to Crestor 40 mg daily, check labs in three months            (I25.118) Coronary artery disease of native artery of native heart with stable angina pectoris (H)  Comment: asx presently  Plan: metoprolol succinate ER (TOPROL-XL) 50 MG 24 hr        tablet, isosorbide mononitrate (IMDUR) 30 MG 24        hr tablet            (I10) Essential hypertension  Comment: at goal  Plan: isosorbide mononitrate (IMDUR) 30 MG 24 hr         tablet, hydrochlorothiazide (HYDRODIURIL) 25 MG        tablet, check labs in three months            (E03.8,  E06.3) Hypothyroidism due to Hashimoto's thyroiditis  Comment: needs dose increased to alternating between 200/175/175 daily  Plan: check labs in three months    (I20.8) Stable angina (H)  (primary encounter diagnosis)  Comment: EKG today reveals no acute ST changes. Will check nuc med stress test. Told to continue all meds as prescribed. Told to go to ED if recurs prior to nuc med stress test.   Plan: EKG 12-lead complete w/read - Clinics, [place]         Lexiscan stress test          45 minutes total medical care on today's date due to complaints of stable angina which is new for him.

## 2021-09-09 NOTE — PROGRESS NOTES
"[facility]        Patient is here for a follow up  to discuss about lab results      /76 (BP Location: Right arm, Patient Position: Chair, Cuff Size: Adult Regular)   Pulse 62   Resp 16   Ht 5' 9\" (1.753 m)   Wt 239 lb (108.4 kg)   SpO2 94%   BMI 35.29 kg/m      The provider has been notified that the patient has concerns of about having chest pains/shortness of breath.     Questions patient would like addressed today are: N/A.    Refills are needed: No    Has homecare services and agency name:  Jessy Vaughan CMA      "

## 2021-09-09 NOTE — TELEPHONE ENCOUNTER
Patient needs to be scheduled for STAT echocardiogram, NM stress test and in-person 60 minute follow up with Dr. Davis on 9/24/21 10:10 am. Patient is having angina and this is the reason for STAT orders.    Jeannie MENDOZA, RN    [facility]  [facility]  Office: [phone]  Fax: [phone]

## 2021-09-10 NOTE — TELEPHONE ENCOUNTER
Patient called back, is aware of metoprolol hold prior to test.    Cristal Sotomayor RN BSN  [facility]  [phone]

## 2021-09-16 ENCOUNTER — HOSPITAL ENCOUNTER (OUTPATIENT)
Dept: CARDIOLOGY | Facility: CLINIC | Age: 61
Discharge: HOME | End: 2021-09-16
Attending: INTERNAL MEDICINE | Admitting: INTERNAL MEDICINE
Payer: COMMERCIAL

## 2021-09-16 DIAGNOSIS — I20.89 STABLE ANGINA (H): ICD-10-CM

## 2021-09-16 LAB
BI-PLANE LVEF ECHO: NORMAL
LVEF ECHO: NORMAL

## 2021-09-16 PROCEDURE — 93306 TTE W/DOPPLER COMPLETE: CPT

## 2021-09-16 PROCEDURE — 93306 TTE W/DOPPLER COMPLETE: CPT | Mod: 26 | Performed by: INTERNAL MEDICINE

## 2021-09-20 ENCOUNTER — TELEPHONE (OUTPATIENT)
Dept: OTHER | Facility: CLINIC | Age: 61
End: 2021-09-20

## 2021-09-20 ENCOUNTER — HOSPITAL ENCOUNTER (OUTPATIENT)
Dept: CARDIOLOGY | Facility: CLINIC | Age: 61
End: 2021-09-20
Attending: INTERNAL MEDICINE
Payer: COMMERCIAL

## 2021-09-20 VITALS
HEART RATE: 76 BPM | BODY MASS INDEX: 35.4 KG/M2 | HEIGHT: 69 IN | SYSTOLIC BLOOD PRESSURE: 118 MMHG | DIASTOLIC BLOOD PRESSURE: 60 MMHG | WEIGHT: 239 LBS | OXYGEN SATURATION: 95 %

## 2021-09-20 DIAGNOSIS — I20.89 STABLE ANGINA (H): ICD-10-CM

## 2021-09-20 DIAGNOSIS — I25.118 CORONARY ARTERY DISEASE OF NATIVE ARTERY OF NATIVE HEART WITH STABLE ANGINA PECTORIS (H): Primary | ICD-10-CM

## 2021-09-20 LAB
CV BLOOD PRESSURE: 53 MMHG
CV STRESS MAX HR HE: 112
NUC STRESS EJECTION FRACTION: 48 %
RATE PRESSURE PRODUCT: NORMAL
STRESS ECHO BASELINE DIASTOLIC HE: 72
STRESS ECHO BASELINE HR: 67
STRESS ECHO BASELINE SYSTOLIC BP: 120
STRESS ECHO CALCULATED PERCENT HR: 70 %
STRESS ECHO LAST STRESS DIASTOLIC BP: 70
STRESS ECHO LAST STRESS SYSTOLIC BP: 130
STRESS ECHO TARGET HR: 159

## 2021-09-20 PROCEDURE — 343N000001 HC RX 343: Performed by: INTERNAL MEDICINE

## 2021-09-20 PROCEDURE — 93016 CV STRESS TEST SUPVJ ONLY: CPT | Performed by: INTERNAL MEDICINE

## 2021-09-20 PROCEDURE — 250N000011 HC RX IP 250 OP 636: Performed by: INTERNAL MEDICINE

## 2021-09-20 PROCEDURE — A9502 TC99M TETROFOSMIN: HCPCS | Performed by: INTERNAL MEDICINE

## 2021-09-20 PROCEDURE — 78452 HT MUSCLE IMAGE SPECT MULT: CPT | Mod: 26 | Performed by: INTERNAL MEDICINE

## 2021-09-20 PROCEDURE — 93017 CV STRESS TEST TRACING ONLY: CPT

## 2021-09-20 PROCEDURE — 93016 CV STRESS TEST SUPVJ ONLY: CPT

## 2021-09-20 PROCEDURE — 93018 CV STRESS TEST I&R ONLY: CPT | Performed by: INTERNAL MEDICINE

## 2021-09-20 RX ORDER — REGADENOSON 0.08 MG/ML
0.4 INJECTION, SOLUTION INTRAVENOUS ONCE
Status: COMPLETED | OUTPATIENT
Start: 2021-09-20 | End: 2021-09-20

## 2021-09-20 RX ORDER — ACYCLOVIR 200 MG/1
0-1 CAPSULE ORAL
Status: DISCONTINUED | OUTPATIENT
Start: 2021-09-20 | End: 2021-09-21 | Stop reason: HOSPADM

## 2021-09-20 RX ORDER — ALBUTEROL SULFATE 90 UG/1
2 AEROSOL, METERED RESPIRATORY (INHALATION) EVERY 5 MIN PRN
Status: DISCONTINUED | OUTPATIENT
Start: 2021-09-20 | End: 2021-09-21 | Stop reason: HOSPADM

## 2021-09-20 RX ORDER — CAFFEINE CITRATE 20 MG/ML
60 SOLUTION INTRAVENOUS
Status: DISCONTINUED | OUTPATIENT
Start: 2021-09-20 | End: 2021-09-21 | Stop reason: HOSPADM

## 2021-09-20 RX ORDER — AMINOPHYLLINE 25 MG/ML
50-100 INJECTION, SOLUTION INTRAVENOUS
Status: DISCONTINUED | OUTPATIENT
Start: 2021-09-20 | End: 2021-09-21 | Stop reason: HOSPADM

## 2021-09-20 RX ADMIN — REGADENOSON 0.4 MG: 0.08 INJECTION, SOLUTION INTRAVENOUS at 11:27

## 2021-09-20 RX ADMIN — TETROFOSMIN 17.92 MCI.: 1.38 INJECTION, POWDER, LYOPHILIZED, FOR SOLUTION INTRAVENOUS at 11:31

## 2021-09-20 RX ADMIN — TETROFOSMIN 6.49 MCI.: 1.38 INJECTION, POWDER, LYOPHILIZED, FOR SOLUTION INTRAVENOUS at 09:37

## 2021-09-20 ASSESSMENT — MIFFLIN-ST. JEOR: SCORE: 1879.48

## 2021-09-20 NOTE — TELEPHONE ENCOUNTER
----- Message from Addison Davis MD sent at 9/20/2021  3:00 PM CDT -----  Please advised the patient he failed his Lexiscan. Please have him see the next available cardiologist. Please coordinate that to occur here at [facility]. Please inform him to go to ED if develops CP with exertion not responsive to NTG times three. Please push his appointment with back until after he has seen cardiology.

## 2021-09-20 NOTE — TELEPHONE ENCOUNTER
Called Ramiro and explained Dr. Davis's instructions.  Provided him with the phone number for Cardiology.  [phone].  Asked that he call right away to make an appointment.    Explained that he must go to the emergency room if he experiences chest pain with exertion not responsive to nitroglycerine three times, or if he has any other shortness of breath, ongoing chest tightness, or other concerning symptoms.    Cristal Sotomayor RN BSN  [facility]  [phone]

## 2021-09-24 ENCOUNTER — OFFICE VISIT (OUTPATIENT)
Dept: CARDIOLOGY | Facility: CLINIC | Age: 61
End: 2021-09-24
Payer: COMMERCIAL

## 2021-09-24 VITALS
DIASTOLIC BLOOD PRESSURE: 67 MMHG | HEART RATE: 45 BPM | SYSTOLIC BLOOD PRESSURE: 109 MMHG | HEIGHT: 69 IN | BODY MASS INDEX: 36.14 KG/M2 | WEIGHT: 244 LBS

## 2021-09-24 DIAGNOSIS — I25.118 CORONARY ARTERY DISEASE OF NATIVE ARTERY OF NATIVE HEART WITH STABLE ANGINA PECTORIS (H): ICD-10-CM

## 2021-09-24 DIAGNOSIS — R94.39 ABNORMAL STRESS TEST: Primary | ICD-10-CM

## 2021-09-24 DIAGNOSIS — Z11.59 ENCOUNTER FOR SCREENING FOR OTHER VIRAL DISEASES: ICD-10-CM

## 2021-09-24 DIAGNOSIS — I20.0 UNSTABLE ANGINA (H): ICD-10-CM

## 2021-09-24 DIAGNOSIS — E66.01 MORBID OBESITY (H): ICD-10-CM

## 2021-09-24 DIAGNOSIS — E78.5 HYPERLIPIDEMIA LDL GOAL <70: ICD-10-CM

## 2021-09-24 DIAGNOSIS — E06.3 HYPOTHYROIDISM DUE TO HASHIMOTO'S THYROIDITIS: ICD-10-CM

## 2021-09-24 DIAGNOSIS — I10 ESSENTIAL HYPERTENSION: ICD-10-CM

## 2021-09-24 PROCEDURE — 99214 OFFICE O/P EST MOD 30 MIN: CPT | Performed by: INTERNAL MEDICINE

## 2021-09-24 RX ORDER — ASPIRIN 325 MG
325 TABLET ORAL ONCE
Status: CANCELLED | OUTPATIENT
Start: 2021-09-24 | End: 2021-09-24

## 2021-09-24 RX ORDER — LIDOCAINE 40 MG/G
CREAM TOPICAL
Status: CANCELLED | OUTPATIENT
Start: 2021-09-24

## 2021-09-24 RX ORDER — ASPIRIN 81 MG/1
243 TABLET, CHEWABLE ORAL ONCE
Status: CANCELLED | OUTPATIENT
Start: 2021-09-24

## 2021-09-24 RX ORDER — SODIUM CHLORIDE 9 MG/ML
INJECTION, SOLUTION INTRAVENOUS CONTINUOUS
Status: CANCELLED | OUTPATIENT
Start: 2021-09-24

## 2021-09-24 ASSESSMENT — MIFFLIN-ST. JEOR: SCORE: 1902.41

## 2021-09-24 NOTE — PROGRESS NOTES
HPI and Plan:     Mr Ramiro Jeffers is a please 60 y/o male followed by Dr. Key with history of CAD, previous stenting to LAD, htn, morbid obesity who over the past 2 months has noted increased SOB , ALAS, and chest discomfort.  An echocardiogram was thus ordered showing new decrease in LVEF from 60-65% to 49%.  A subsequent nuclear stress test showed a new area of apical infarction.  Patient continues to have ongoing symptoms.   He has been compliant with his medications.  Has had some significant weight gain.  No contrast allergies.  ROS otherwise negative.  We personally reviewed both the echocardiogram images and nuclear images.     Recommend cardiac catheterization.  Risks and benefits explained.  Subsequent follow up with Dr. Davis.       Orders Placed This Encounter   Procedures     Follow-Up with Cardiologist     No orders of the defined types were placed in this encounter.    There are no discontinued medications.      Encounter Diagnoses   Name Primary?     Essential hypertension      Hyperlipidemia LDL goal <70      Hypothyroidism due to Hashimoto's thyroiditis      Coronary artery disease of native artery of native heart with stable angina pectoris (H)      Abnormal stress test Yes     Morbid obesity (H)      Unstable angina (H)        CURRENT MEDICATIONS:  Current Outpatient Medications   Medication Sig Dispense Refill     aspirin 81 MG tablet Take 1 tablet (81 mg) by mouth daily Start tomorrow morning.       Cholecalciferol (VITAMIN D) 2000 units tablet Take 1 tablet by mouth daily 100 tablet 12     hydrochlorothiazide (HYDRODIURIL) 25 MG tablet Take 1 tablet (25 mg) by mouth every morning 90 tablet 3     isosorbide mononitrate (IMDUR) 30 MG 24 hr tablet Take 1 tablet (30 mg) by mouth daily 90 tablet 3     levothyroxine (SYNTHROID/LEVOTHROID) 175 MCG tablet Take 200 mcg PO every 72 hours alternating with 175 mcg daily for two days in a row. 60 tablet 3     levothyroxine (SYNTHROID/LEVOTHROID) 200 MCG  tablet Take 200 mcg PO every 72 hours alternating with 175 mcg daily for two days in a row. 30 tablet 3     metoprolol succinate ER (TOPROL-XL) 50 MG 24 hr tablet Take 1.5 tablets (75 mg) by mouth daily 135 tablet 3     Multiple Vitamin (MULTI-VITAMIN) per tablet Take 1 tablet by mouth 2 times daily        nitroGLYcerin (NITROSTAT) 0.4 MG sublingual tablet For chest pain place 1 tablet under the tongue every 5 minutes for 3 doses. If symptoms persist 5 minutes after 1st dose call 911. 25 tablet 3     rosuvastatin (CRESTOR) 40 MG tablet Take 1 tablet (40 mg) by mouth daily 90 tablet 3       ALLERGIES     Allergies   Allergen Reactions     No Known Allergies        PAST MEDICAL HISTORY:  Past Medical History:   Diagnosis Date     ACQUIRED HYPOTHYROIDISM       Blood clot in the legs      CAD (coronary artery disease)     11/15/18 Cath: PCI with 3.5-16mm DORY to proximal LAD     Calculus of gallbladder without mention of cholecystitis or obstruction      Cataract      Chest pressure 11/5/2018     Hyperlipidemia      Hypertension      Ischemic cardiomyopathy     EF 40-45% on 11/2018 Echo     Obesity, unspecified     significant weight loss w/diet alone, on 10-15-10, BMI was 56.4     pulmonary emboli 6-2010    Unprovoked large bilateral pulmonary emboli without source identified on lower extremity dopplers, pt had a transient moderate lupus inhibitor upon initial presentation in June , 2010 which was gone in September, 2010;  all other thrombophilic workup is normal     Pulmonary embolus, bilateral      PVC's (premature ventricular contractions)     4% burden on 11/2018 Holter     Shortness of breath     on exertion     Venous insufficiency      Viral pneumonia, unspecified      Vitamin D deficiency        PAST SURGICAL HISTORY:  Past Surgical History:   Procedure Laterality Date     COLONOSCOPY  11/17/2011    Procedure:COLONOSCOPY; colonoscopy; Surgeon:ELENA OROURKE; Location: GI     CV HEART CATHETERIZATION WITH  POSSIBLE INTERVENTION Left 2/15/2019    Procedure: Coronary Angiogram;  Surgeon: Tulio Ortiz MD;  Location:  HEART CARDIAC CATH LAB     LAPAROSCOPIC CHOLECYSTECTOMY  9/28/2012    Procedure: LAPAROSCOPIC CHOLECYSTECTOMY;  LAPAROSCOPIC CHOLECYSTECTOMY, LYSIS OF ADHESIONS;  Surgeon: Dutch Matta MD;  Location:  OR     LAPAROSCOPIC LYSIS ADHESIONS  9/28/2012    Procedure: LAPAROSCOPIC LYSIS ADHESIONS;;  Surgeon: Dutch Matta MD;  Location:  OR     LAPAROSCOPY, SURGICAL; REPAIR INCISIONAL OR VENTRAL HERNIA      repair of ventral strangulated surgery     PHACOEMULSIFICATION CLEAR CORNEA WITH STANDARD INTRAOCULAR LENS IMPLANT  12/19/2011    Procedure:PHACOEMULSIFICATION CLEAR CORNEA WITH STANDARD INTRAOCULAR LENS IMPLANT; RIGHT PHACOEMULSIFICATION CLEAR CORNEA WITH STANDARD INTRAOCULAR LENS IMPLANT ; Surgeon:ALLISON ANTONIO; Location:[facility] NONSPECIFIC PROCEDURE  10-    Laparoscopic gastric bypass,     [facility] NONSPECIFIC PROCEDURE  10-    1.  Attempted laparoscopic exploration with conversion to: .  Abdominal exploration. 3.  Reduction of incarcerated incisional hernia. 4.  Repair of incisional hernia.5.  Gastrostomy tube placement (#22 Bulgarian) into defunctionalized stomach.6.  Enterotomy with bowel decompression and primary repair. 7.  Abdominal lavage.        FAMILY HISTORY:  Family History   Problem Relation Age of Onset     Osteoporosis Mother      Thyroid Disease Mother         no thyroid     Cancer Maternal Grandmother         ? lung cancer     Cancer Father         melanoma     Coronary Artery Disease Brother      Coronary Artery Disease Brother        SOCIAL HISTORY:  Social History     Socioeconomic History     Marital status: Single     Spouse name: None     Number of children: None     Years of education: None     Highest education level: None   Occupational History     Occupation:      Employer: SELF   Tobacco Use     Smoking status: Never Smoker      "Smokeless tobacco: Never Used   Substance and Sexual Activity     Alcohol use: No     Alcohol/week: 0.0 standard drinks     Drug use: No     Sexual activity: None   Other Topics Concern     Parent/sibling w/ CABG, MI or angioplasty before 65F 55M? Not Asked   Social History Narrative     None     Social Determinants of Health     Financial Resource Strain:      Difficulty of Paying Living Expenses:    Food Insecurity:      Worried About Running Out of Food in the Last Year:      Ran Out of Food in the Last Year:    Transportation Needs:      Lack of Transportation (Medical):      Lack of Transportation (Non-Medical):    Physical Activity:      Days of Exercise per Week:      Minutes of Exercise per Session:    Stress:      Feeling of Stress :    Social Connections:      Frequency of Communication with Friends and Family:      Frequency of Social Gatherings with Friends and Family:      Attends Caodaism Services:      Active Member of Clubs or Organizations:      Attends Club or Organization Meetings:      Marital Status:    Intimate Partner Violence:      Fear of Current or Ex-Partner:      Emotionally Abused:      Physically Abused:      Sexually Abused:        Review of Systems:  Skin:  not assessed nail changes   Eyes:  Positive for glasses  ENT:  not assessed nasal congestion  Respiratory:  Positive for dyspnea on exertion;shortness of breath  Cardiovascular:    Positive for;chest pain;edema;lightheadedness;palpitations  Gastroenterology: Positive for heartburn  Genitourinary:  Negative urinary frequency;hesitancy  Musculoskeletal:  Positive for arthritis  Neurologic:  Positive for headaches;numbness or tingling of hands;numbness or tingling of feet  Psychiatric:  Positive for sleep disturbances  Heme/Lymph/Imm:  Negative    Endocrine:  Positive for thyroid disorder          Physical Exam:  Vitals: /67   Pulse (!) 45   Ht 1.753 m (5' 9.02\")   Wt 110.7 kg (244 lb)   BMI 36.02 kg/m    Constitutional: " cooperative, alert and oriented, well developed, well nourished, in no acute distress   Skin: warm and dry to the touch, no apparent skin lesions or masses noted   Head: normocephalic, no masses or lesions   Eyes: pupils equal and round, conjunctivae and lids unremarkable, sclera white, no xanthalasma, EOMS intact, no nystagmus   ENT: no pallor or cyanosis, dentition good   Neck: carotid pulses are full and equal bilaterally, JVP normal, no carotid bruit, no thyromegaly   Chest: normal breath sounds, clear to auscultation, normal A-P diameter, normal symmetry, normal respiratory excursion, no use of accessory muscles   Cardiac: regular rhythm, normal S1/S2, no S3 or S4, apical impulse not displaced, no murmurs, gallops or rubs no presence of murmur   Abdomen: abdomen soft, non-tender, BS normoactive, no mass, no HSM, no bruits   Vascular: pulses full and equal, no bruits auscultated   Extremities and Back: no deformities, clubbing, cyanosis, erythema observed   Neurological: affect appropriate, oriented to time, person and place     Recent Lab Results:  LIPID RESULTS:  Lab Results   Component Value Date    CHOL 132 08/23/2021    CHOL 137 08/29/2019    HDL 46 08/23/2021    HDL 52 08/29/2019    LDL 71 08/23/2021    LDL 75 08/29/2019    TRIG 73 08/23/2021    TRIG 51 08/29/2019    CHOLHDLRATIO 3.0 10/13/2014       LIVER ENZYME RESULTS:  Lab Results   Component Value Date    AST 23 08/23/2021    AST 18 11/11/2020    ALT 23 08/23/2021    ALT 20 11/11/2020       CBC RESULTS:  Lab Results   Component Value Date    WBC 5.5 08/23/2021    WBC 5.9 11/11/2020    RBC 4.40 08/23/2021    RBC 4.28 (L) 11/11/2020    HGB 13.4 08/23/2021    HGB 12.8 (L) 11/11/2020    HCT 39.8 (L) 08/23/2021    HCT 38.9 (L) 11/11/2020    MCV 91 08/23/2021    MCV 91 11/11/2020    MCH 30.5 08/23/2021    MCH 29.9 11/11/2020    MCHC 33.7 08/23/2021    MCHC 32.9 11/11/2020    RDW 14.0 08/23/2021    RDW 13.4 11/11/2020     08/23/2021     (L)  11/11/2020       BMP RESULTS:  Lab Results   Component Value Date     08/23/2021     11/11/2020    POTASSIUM 3.7 08/23/2021    POTASSIUM 3.5 11/11/2020    CHLORIDE 106 08/23/2021    CHLORIDE 105 11/11/2020    CO2 32 08/23/2021    CO2 31 11/11/2020    ANIONGAP 1 (L) 08/23/2021    ANIONGAP 3 11/11/2020    GLC 98 08/23/2021    GLC 99 11/11/2020    BUN 21 08/23/2021    BUN 18 11/11/2020    CR 0.91 08/23/2021    CR 0.77 11/11/2020    GFRESTIMATED >90 08/23/2021    GFRESTIMATED >90 11/11/2020    GFRESTBLACK >90 11/11/2020    GUERITA 8.9 08/23/2021    GUERITA 8.6 11/11/2020        A1C RESULTS:  Lab Results   Component Value Date    A1C 5.6 08/23/2021    A1C 5.7 (H) 11/11/2020       INR RESULTS:  Lab Results   Component Value Date    INR 1.08 02/15/2019    INR 1.04 02/12/2019           CC  Addison Davis MD  [address]  [place] [id number]

## 2021-09-24 NOTE — LETTER
9/24/2021    Addison Davis MD  [address]  [facility] [id number]    RE: Ramiro Jeffers       Dear Colleague,    I had the pleasure of seeing Ramiro Jeffers in the [facility].    HPI and Plan:     Mr Ramiro Jeffers is a please 62 y/o male followed by Dr. Key with history of CAD, previous stenting to LAD, htn, morbid obesity who over the past 2 months has noted increased SOB , ALAS, and chest discomfort.  An echocardiogram was thus ordered showing new decrease in LVEF from 60-65% to 49%.  A subsequent nuclear stress test showed a new area of apical infarction.  Patient continues to have ongoing symptoms.   He has been compliant with his medications.  Has had some significant weight gain.  No contrast allergies.  ROS otherwise negative.  We personally reviewed both the echocardiogram images and nuclear images.     Recommend cardiac catheterization.  Risks and benefits explained.  Subsequent follow up with Dr. Davis.       Orders Placed This Encounter   Procedures     Follow-Up with Cardiologist     No orders of the defined types were placed in this encounter.    There are no discontinued medications.      Encounter Diagnoses   Name Primary?     Essential hypertension      Hyperlipidemia LDL goal <70      Hypothyroidism due to Hashimoto's thyroiditis      Coronary artery disease of native artery of native heart with stable angina pectoris (H)      Abnormal stress test Yes     Morbid obesity (H)      Unstable angina (H)        CURRENT MEDICATIONS:  Current Outpatient Medications   Medication Sig Dispense Refill     aspirin 81 MG tablet Take 1 tablet (81 mg) by mouth daily Start tomorrow morning.       Cholecalciferol (VITAMIN D) 2000 units tablet Take 1 tablet by mouth daily 100 tablet 12     hydrochlorothiazide (HYDRODIURIL) 25 MG tablet Take 1 tablet (25 mg) by mouth every morning 90 tablet 3     isosorbide mononitrate (IMDUR) 30 MG 24 hr tablet Take 1  tablet (30 mg) by mouth daily 90 tablet 3     levothyroxine (SYNTHROID/LEVOTHROID) 175 MCG tablet Take 200 mcg PO every 72 hours alternating with 175 mcg daily for two days in a row. 60 tablet 3     levothyroxine (SYNTHROID/LEVOTHROID) 200 MCG tablet Take 200 mcg PO every 72 hours alternating with 175 mcg daily for two days in a row. 30 tablet 3     metoprolol succinate ER (TOPROL-XL) 50 MG 24 hr tablet Take 1.5 tablets (75 mg) by mouth daily 135 tablet 3     Multiple Vitamin (MULTI-VITAMIN) per tablet Take 1 tablet by mouth 2 times daily        nitroGLYcerin (NITROSTAT) 0.4 MG sublingual tablet For chest pain place 1 tablet under the tongue every 5 minutes for 3 doses. If symptoms persist 5 minutes after 1st dose call 911. 25 tablet 3     rosuvastatin (CRESTOR) 40 MG tablet Take 1 tablet (40 mg) by mouth daily 90 tablet 3       ALLERGIES     Allergies   Allergen Reactions     No Known Allergies        PAST MEDICAL HISTORY:  Past Medical History:   Diagnosis Date     ACQUIRED HYPOTHYROIDISM       Blood clot in the legs      CAD (coronary artery disease)     11/15/18 Cath: PCI with 3.5-16mm DORY to proximal LAD     Calculus of gallbladder without mention of cholecystitis or obstruction      Cataract      Chest pressure 11/5/2018     Hyperlipidemia      Hypertension      Ischemic cardiomyopathy     EF 40-45% on 11/2018 Echo     Obesity, unspecified     significant weight loss w/diet alone, on 10-15-10, BMI was 56.4     pulmonary emboli 6-2010    Unprovoked large bilateral pulmonary emboli without source identified on lower extremity dopplers, pt had a transient moderate lupus inhibitor upon initial presentation in June , 2010 which was gone in September, 2010;  all other thrombophilic workup is normal     Pulmonary embolus, bilateral      PVC's (premature ventricular contractions)     4% burden on 11/2018 Holter     Shortness of breath     on exertion     Venous insufficiency      Viral pneumonia, unspecified       Vitamin D deficiency        PAST SURGICAL HISTORY:  Past Surgical History:   Procedure Laterality Date     COLONOSCOPY  11/17/2011    Procedure:COLONOSCOPY; colonoscopy; Surgeon:ELENA OROURKE; Location: GI     CV HEART CATHETERIZATION WITH POSSIBLE INTERVENTION Left 2/15/2019    Procedure: Coronary Angiogram;  Surgeon: Tulio Ortiz MD;  Location:  HEART CARDIAC CATH LAB     LAPAROSCOPIC CHOLECYSTECTOMY  9/28/2012    Procedure: LAPAROSCOPIC CHOLECYSTECTOMY;  LAPAROSCOPIC CHOLECYSTECTOMY, LYSIS OF ADHESIONS;  Surgeon: Dutch Matta MD;  Location:  OR     LAPAROSCOPIC LYSIS ADHESIONS  9/28/2012    Procedure: LAPAROSCOPIC LYSIS ADHESIONS;;  Surgeon: Dutch Matta MD;  Location:  OR     LAPAROSCOPY, SURGICAL; REPAIR INCISIONAL OR VENTRAL HERNIA      repair of ventral strangulated surgery     PHACOEMULSIFICATION CLEAR CORNEA WITH STANDARD INTRAOCULAR LENS IMPLANT  12/19/2011    Procedure:PHACOEMULSIFICATION CLEAR CORNEA WITH STANDARD INTRAOCULAR LENS IMPLANT; RIGHT PHACOEMULSIFICATION CLEAR CORNEA WITH STANDARD INTRAOCULAR LENS IMPLANT ; Surgeon:ALLISON ANTONIO; Location:[facility] NONSPECIFIC PROCEDURE  10-    Laparoscopic gastric bypass,     [facility] NONSPECIFIC PROCEDURE  10-    1.  Attempted laparoscopic exploration with conversion to: .  Abdominal exploration. 3.  Reduction of incarcerated incisional hernia. 4.  Repair of incisional hernia.5.  Gastrostomy tube placement (#22 Salvadorean) into defunctionalized stomach.6.  Enterotomy with bowel decompression and primary repair. 7.  Abdominal lavage.        FAMILY HISTORY:  Family History   Problem Relation Age of Onset     Osteoporosis Mother      Thyroid Disease Mother         no thyroid     Cancer Maternal Grandmother         ? lung cancer     Cancer Father         melanoma     Coronary Artery Disease Brother      Coronary Artery Disease Brother        SOCIAL HISTORY:  Social History     Socioeconomic History     Marital status:  Single     Spouse name: None     Number of children: None     Years of education: None     Highest education level: None   Occupational History     Occupation:      Employer: SELF   Tobacco Use     Smoking status: Never Smoker     Smokeless tobacco: Never Used   Substance and Sexual Activity     Alcohol use: No     Alcohol/week: 0.0 standard drinks     Drug use: No     Sexual activity: None   Other Topics Concern     Parent/sibling w/ CABG, MI or angioplasty before 65F 55M? Not Asked   Social History Narrative     None     Social Determinants of Health     Financial Resource Strain:      Difficulty of Paying Living Expenses:    Food Insecurity:      Worried About Running Out of Food in the Last Year:      Ran Out of Food in the Last Year:    Transportation Needs:      Lack of Transportation (Medical):      Lack of Transportation (Non-Medical):    Physical Activity:      Days of Exercise per Week:      Minutes of Exercise per Session:    Stress:      Feeling of Stress :    Social Connections:      Frequency of Communication with Friends and Family:      Frequency of Social Gatherings with Friends and Family:      Attends Sabianist Services:      Active Member of Clubs or Organizations:      Attends Club or Organization Meetings:      Marital Status:    Intimate Partner Violence:      Fear of Current or Ex-Partner:      Emotionally Abused:      Physically Abused:      Sexually Abused:        Review of Systems:  Skin:  not assessed nail changes   Eyes:  Positive for glasses  ENT:  not assessed nasal congestion  Respiratory:  Positive for dyspnea on exertion;shortness of breath  Cardiovascular:    Positive for;chest pain;edema;lightheadedness;palpitations  Gastroenterology: Positive for heartburn  Genitourinary:  Negative urinary frequency;hesitancy  Musculoskeletal:  Positive for arthritis  Neurologic:  Positive for headaches;numbness or tingling of hands;numbness or tingling of feet  Psychiatric:   "Positive for sleep disturbances  Heme/Lymph/Imm:  Negative    Endocrine:  Positive for thyroid disorder          Physical Exam:  Vitals: /67   Pulse (!) 45   Ht 1.753 m (5' 9.02\")   Wt 110.7 kg (244 lb)   BMI 36.02 kg/m    Constitutional: cooperative, alert and oriented, well developed, well nourished, in no acute distress   Skin: warm and dry to the touch, no apparent skin lesions or masses noted   Head: normocephalic, no masses or lesions   Eyes: pupils equal and round, conjunctivae and lids unremarkable, sclera white, no xanthalasma, EOMS intact, no nystagmus   ENT: no pallor or cyanosis, dentition good   Neck: carotid pulses are full and equal bilaterally, JVP normal, no carotid bruit, no thyromegaly   Chest: normal breath sounds, clear to auscultation, normal A-P diameter, normal symmetry, normal respiratory excursion, no use of accessory muscles   Cardiac: regular rhythm, normal S1/S2, no S3 or S4, apical impulse not displaced, no murmurs, gallops or rubs no presence of murmur   Abdomen: abdomen soft, non-tender, BS normoactive, no mass, no HSM, no bruits   Vascular: pulses full and equal, no bruits auscultated   Extremities and Back: no deformities, clubbing, cyanosis, erythema observed   Neurological: affect appropriate, oriented to time, person and place     Recent Lab Results:  LIPID RESULTS:  Lab Results   Component Value Date    CHOL 132 08/23/2021    CHOL 137 08/29/2019    HDL 46 08/23/2021    HDL 52 08/29/2019    LDL 71 08/23/2021    LDL 75 08/29/2019    TRIG 73 08/23/2021    TRIG 51 08/29/2019    CHOLHDLRATIO 3.0 10/13/2014       LIVER ENZYME RESULTS:  Lab Results   Component Value Date    AST 23 08/23/2021    AST 18 11/11/2020    ALT 23 08/23/2021    ALT 20 11/11/2020       CBC RESULTS:  Lab Results   Component Value Date    WBC 5.5 08/23/2021    WBC 5.9 11/11/2020    RBC 4.40 08/23/2021    RBC 4.28 (L) 11/11/2020    HGB 13.4 08/23/2021    HGB 12.8 (L) 11/11/2020    HCT 39.8 (L) 08/23/2021 "    HCT 38.9 (L) 11/11/2020    MCV 91 08/23/2021    MCV 91 11/11/2020    MCH 30.5 08/23/2021    MCH 29.9 11/11/2020    MCHC 33.7 08/23/2021    MCHC 32.9 11/11/2020    RDW 14.0 08/23/2021    RDW 13.4 11/11/2020     08/23/2021     (L) 11/11/2020       BMP RESULTS:  Lab Results   Component Value Date     08/23/2021     11/11/2020    POTASSIUM 3.7 08/23/2021    POTASSIUM 3.5 11/11/2020    CHLORIDE 106 08/23/2021    CHLORIDE 105 11/11/2020    CO2 32 08/23/2021    CO2 31 11/11/2020    ANIONGAP 1 (L) 08/23/2021    ANIONGAP 3 11/11/2020    GLC 98 08/23/2021    GLC 99 11/11/2020    BUN 21 08/23/2021    BUN 18 11/11/2020    CR 0.91 08/23/2021    CR 0.77 11/11/2020    GFRESTIMATED >90 08/23/2021    GFRESTIMATED >90 11/11/2020    GFRESTBLACK >90 11/11/2020    GUERITA 8.9 08/23/2021    GUERITA 8.6 11/11/2020        A1C RESULTS:  Lab Results   Component Value Date    A1C 5.6 08/23/2021    A1C 5.7 (H) 11/11/2020       INR RESULTS:  Lab Results   Component Value Date    INR 1.08 02/15/2019    INR 1.04 02/12/2019           CC  Addison Davis MD  [address]  [place] [id number]                      Thank you for allowing me to participate in the care of your patient.      Sincerely,     MAYANK VALENCIA MD     [facility]  cc:   Addison Davis MD  [address]  [place] [address]

## 2021-09-30 ENCOUNTER — TELEPHONE (OUTPATIENT)
Dept: CARDIOLOGY | Facility: CLINIC | Age: 61
End: 2021-09-30

## 2021-09-30 RX ORDER — SODIUM CHLORIDE 9 MG/ML
INJECTION, SOLUTION INTRAVENOUS CONTINUOUS
Status: CANCELLED | OUTPATIENT
Start: 2021-09-30

## 2021-09-30 RX ORDER — POTASSIUM CHLORIDE 1500 MG/1
20 TABLET, EXTENDED RELEASE ORAL
Status: CANCELLED | OUTPATIENT
Start: 2021-09-30

## 2021-09-30 NOTE — TELEPHONE ENCOUNTER
[facility] HEART [facility] - ANGIOGRAM INSTRUCTIONS:  - Ramiro Jeffers is scheduled for a coronary angiogram at [facility] on 10/5/21. Check in time is at 0730 and procedure time is at 0930.  - Advised patient not eat or drink after midnight on 10/5/21.   - Patient advised to take morning medications with a sip of water on the day of the procedure, noting the followin. Aspirin: Patient is currently taking 81 mg of aspirin, patient advised to continue same dose.   2. Diabetic Medications: Patient does not take Metformin or other diabetic medications.  3. Anticoagulants: Patient does not take anticoagulatnts.  4. Diuretics: Will hold HCTZ the AM of procedure  5. All vitamins and supplements to be held the morning of the procedure.  - Verified patient does not have an allergy to contrast dye.  - COVID testing: Scheduled on 10/2/21    Called patient with the above instructions.    Angiogram orders have been signed & held.    Patricia Ernandez RN  [facility]

## 2021-10-02 ENCOUNTER — LAB (OUTPATIENT)
Dept: URGENT CARE | Facility: URGENT CARE | Age: 61
End: 2021-10-02
Payer: COMMERCIAL

## 2021-10-02 DIAGNOSIS — Z11.59 ENCOUNTER FOR SCREENING FOR OTHER VIRAL DISEASES: ICD-10-CM

## 2021-10-02 PROCEDURE — U0005 INFEC AGEN DETEC AMPLI PROBE: HCPCS

## 2021-10-02 PROCEDURE — U0003 INFECTIOUS AGENT DETECTION BY NUCLEIC ACID (DNA OR RNA); SEVERE ACUTE RESPIRATORY SYNDROME CORONAVIRUS 2 (SARS-COV-2) (CORONAVIRUS DISEASE [COVID-19]), AMPLIFIED PROBE TECHNIQUE, MAKING USE OF HIGH THROUGHPUT TECHNOLOGIES AS DESCRIBED BY CMS-2020-01-R: HCPCS

## 2021-10-03 LAB — SARS-COV-2 RNA RESP QL NAA+PROBE: NEGATIVE

## 2021-10-05 ENCOUNTER — HOSPITAL ENCOUNTER (OUTPATIENT)
Facility: CLINIC | Age: 61
Discharge: HOME | End: 2021-10-05
Admitting: INTERNAL MEDICINE
Payer: COMMERCIAL

## 2021-10-05 VITALS
WEIGHT: 237.9 LBS | RESPIRATION RATE: 18 BRPM | BODY MASS INDEX: 34.06 KG/M2 | TEMPERATURE: 98.1 F | HEART RATE: 55 BPM | OXYGEN SATURATION: 96 % | HEIGHT: 70 IN | DIASTOLIC BLOOD PRESSURE: 71 MMHG | SYSTOLIC BLOOD PRESSURE: 105 MMHG

## 2021-10-05 DIAGNOSIS — I20.0 UNSTABLE ANGINA (H): ICD-10-CM

## 2021-10-05 DIAGNOSIS — Z11.59 ENCOUNTER FOR SCREENING FOR OTHER VIRAL DISEASES: ICD-10-CM

## 2021-10-05 LAB
ANION GAP SERPL CALCULATED.3IONS-SCNC: 4 MMOL/L (ref 3–14)
APTT PPP: 28 SECONDS (ref 22–38)
BUN SERPL-MCNC: 19 MG/DL (ref 7–30)
CALCIUM SERPL-MCNC: 8.5 MG/DL (ref 8.5–10.1)
CHLORIDE BLD-SCNC: 105 MMOL/L (ref 94–109)
CO2 SERPL-SCNC: 30 MMOL/L (ref 20–32)
CREAT SERPL-MCNC: 0.95 MG/DL (ref 0.66–1.25)
ERYTHROCYTE [DISTWIDTH] IN BLOOD BY AUTOMATED COUNT: 13.3 % (ref 10–15)
GFR SERPL CREATININE-BSD FRML MDRD: 86 ML/MIN/1.73M2
GLUCOSE BLD-MCNC: 101 MG/DL (ref 70–99)
HCT VFR BLD AUTO: 40 % (ref 40–53)
HGB BLD-MCNC: 13.1 G/DL (ref 13.3–17.7)
INR PPP: 1.12 (ref 0.85–1.15)
MCH RBC QN AUTO: 29.3 PG (ref 26.5–33)
MCHC RBC AUTO-ENTMCNC: 32.8 G/DL (ref 31.5–36.5)
MCV RBC AUTO: 90 FL (ref 78–100)
PLATELET # BLD AUTO: 128 10E3/UL (ref 150–450)
POTASSIUM BLD-SCNC: 3.7 MMOL/L (ref 3.4–5.3)
RBC # BLD AUTO: 4.47 10E6/UL (ref 4.4–5.9)
SODIUM SERPL-SCNC: 139 MMOL/L (ref 133–144)
WBC # BLD AUTO: 4.4 10E3/UL (ref 4–11)

## 2021-10-05 PROCEDURE — 93005 ELECTROCARDIOGRAM TRACING: CPT

## 2021-10-05 PROCEDURE — 250N000013 HC RX MED GY IP 250 OP 250 PS 637: Performed by: INTERNAL MEDICINE

## 2021-10-05 PROCEDURE — 85027 COMPLETE CBC AUTOMATED: CPT | Performed by: INTERNAL MEDICINE

## 2021-10-05 PROCEDURE — 999N000054 HC STATISTIC EKG NON-CHARGEABLE

## 2021-10-05 PROCEDURE — 36415 COLL VENOUS BLD VENIPUNCTURE: CPT | Performed by: INTERNAL MEDICINE

## 2021-10-05 PROCEDURE — 85730 THROMBOPLASTIN TIME PARTIAL: CPT | Mod: GZ | Performed by: INTERNAL MEDICINE

## 2021-10-05 PROCEDURE — 80048 BASIC METABOLIC PNL TOTAL CA: CPT | Performed by: INTERNAL MEDICINE

## 2021-10-05 PROCEDURE — 93458 L HRT ARTERY/VENTRICLE ANGIO: CPT

## 2021-10-05 PROCEDURE — 250N000009 HC RX 250: Performed by: INTERNAL MEDICINE

## 2021-10-05 PROCEDURE — C1887 CATHETER, GUIDING: HCPCS | Performed by: INTERNAL MEDICINE

## 2021-10-05 PROCEDURE — 99153 MOD SED SAME PHYS/QHP EA: CPT | Performed by: INTERNAL MEDICINE

## 2021-10-05 PROCEDURE — 272N000001 HC OR GENERAL SUPPLY STERILE: Performed by: INTERNAL MEDICINE

## 2021-10-05 PROCEDURE — C1769 GUIDE WIRE: HCPCS | Performed by: INTERNAL MEDICINE

## 2021-10-05 PROCEDURE — 93458 L HRT ARTERY/VENTRICLE ANGIO: CPT | Mod: 26 | Performed by: INTERNAL MEDICINE

## 2021-10-05 PROCEDURE — C1894 INTRO/SHEATH, NON-LASER: HCPCS | Performed by: INTERNAL MEDICINE

## 2021-10-05 PROCEDURE — 99152 MOD SED SAME PHYS/QHP 5/>YRS: CPT | Performed by: INTERNAL MEDICINE

## 2021-10-05 PROCEDURE — 999N000184 HC STATISTIC TELEMETRY

## 2021-10-05 PROCEDURE — 999N000071 HC STATISTIC HEART CATH LAB OR EP LAB

## 2021-10-05 PROCEDURE — 250N000011 HC RX IP 250 OP 636: Performed by: INTERNAL MEDICINE

## 2021-10-05 PROCEDURE — 258N000003 HC RX IP 258 OP 636: Performed by: INTERNAL MEDICINE

## 2021-10-05 PROCEDURE — 85610 PROTHROMBIN TIME: CPT | Performed by: INTERNAL MEDICINE

## 2021-10-05 RX ORDER — POTASSIUM CHLORIDE 1500 MG/1
20 TABLET, EXTENDED RELEASE ORAL
Status: COMPLETED | OUTPATIENT
Start: 2021-10-05 | End: 2021-10-05

## 2021-10-05 RX ORDER — HEPARIN SODIUM 1000 [USP'U]/ML
INJECTION, SOLUTION INTRAVENOUS; SUBCUTANEOUS
Status: DISCONTINUED | OUTPATIENT
Start: 2021-10-05 | End: 2021-10-05 | Stop reason: HOSPADM

## 2021-10-05 RX ORDER — VERAPAMIL HYDROCHLORIDE 2.5 MG/ML
INJECTION, SOLUTION INTRAVENOUS
Status: DISCONTINUED | OUTPATIENT
Start: 2021-10-05 | End: 2021-10-05 | Stop reason: HOSPADM

## 2021-10-05 RX ORDER — NALOXONE HYDROCHLORIDE 0.4 MG/ML
0.2 INJECTION, SOLUTION INTRAMUSCULAR; INTRAVENOUS; SUBCUTANEOUS
Status: DISCONTINUED | OUTPATIENT
Start: 2021-10-05 | End: 2021-10-05 | Stop reason: HOSPADM

## 2021-10-05 RX ORDER — ATROPINE SULFATE 0.1 MG/ML
0.5 INJECTION INTRAVENOUS
Status: DISCONTINUED | OUTPATIENT
Start: 2021-10-05 | End: 2021-10-05 | Stop reason: HOSPADM

## 2021-10-05 RX ORDER — NITROGLYCERIN 5 MG/ML
VIAL (ML) INTRAVENOUS
Status: DISCONTINUED | OUTPATIENT
Start: 2021-10-05 | End: 2021-10-05 | Stop reason: HOSPADM

## 2021-10-05 RX ORDER — FLUMAZENIL 0.1 MG/ML
0.2 INJECTION, SOLUTION INTRAVENOUS
Status: DISCONTINUED | OUTPATIENT
Start: 2021-10-05 | End: 2021-10-05 | Stop reason: HOSPADM

## 2021-10-05 RX ORDER — ASPIRIN 325 MG
325 TABLET ORAL ONCE
Status: COMPLETED | OUTPATIENT
Start: 2021-10-05 | End: 2021-10-05

## 2021-10-05 RX ORDER — ACETAMINOPHEN 325 MG/1
650 TABLET ORAL EVERY 4 HOURS PRN
Status: DISCONTINUED | OUTPATIENT
Start: 2021-10-05 | End: 2021-10-05 | Stop reason: HOSPADM

## 2021-10-05 RX ORDER — NALOXONE HYDROCHLORIDE 0.4 MG/ML
0.4 INJECTION, SOLUTION INTRAMUSCULAR; INTRAVENOUS; SUBCUTANEOUS
Status: DISCONTINUED | OUTPATIENT
Start: 2021-10-05 | End: 2021-10-05 | Stop reason: HOSPADM

## 2021-10-05 RX ORDER — SODIUM CHLORIDE 9 MG/ML
INJECTION, SOLUTION INTRAVENOUS CONTINUOUS
Status: DISCONTINUED | OUTPATIENT
Start: 2021-10-05 | End: 2021-10-05 | Stop reason: HOSPADM

## 2021-10-05 RX ORDER — OXYCODONE HYDROCHLORIDE 5 MG/1
5 TABLET ORAL EVERY 4 HOURS PRN
Status: DISCONTINUED | OUTPATIENT
Start: 2021-10-05 | End: 2021-10-05 | Stop reason: HOSPADM

## 2021-10-05 RX ORDER — FENTANYL CITRATE 50 UG/ML
INJECTION, SOLUTION INTRAMUSCULAR; INTRAVENOUS
Status: DISCONTINUED | OUTPATIENT
Start: 2021-10-05 | End: 2021-10-05 | Stop reason: HOSPADM

## 2021-10-05 RX ORDER — LIDOCAINE 40 MG/G
CREAM TOPICAL
Status: DISCONTINUED | OUTPATIENT
Start: 2021-10-05 | End: 2021-10-05 | Stop reason: HOSPADM

## 2021-10-05 RX ORDER — OXYCODONE HYDROCHLORIDE 5 MG/1
10 TABLET ORAL EVERY 4 HOURS PRN
Status: DISCONTINUED | OUTPATIENT
Start: 2021-10-05 | End: 2021-10-05 | Stop reason: HOSPADM

## 2021-10-05 RX ORDER — ASPIRIN 81 MG/1
243 TABLET, CHEWABLE ORAL ONCE
Status: COMPLETED | OUTPATIENT
Start: 2021-10-05 | End: 2021-10-05

## 2021-10-05 RX ORDER — FENTANYL CITRATE 50 UG/ML
25 INJECTION, SOLUTION INTRAMUSCULAR; INTRAVENOUS
Status: DISCONTINUED | OUTPATIENT
Start: 2021-10-05 | End: 2021-10-05 | Stop reason: HOSPADM

## 2021-10-05 RX ADMIN — ASPIRIN 81 MG CHEWABLE TABLET 243 MG: 81 TABLET CHEWABLE at 08:44

## 2021-10-05 RX ADMIN — POTASSIUM CHLORIDE 20 MEQ: 1500 TABLET, EXTENDED RELEASE ORAL at 08:44

## 2021-10-05 RX ADMIN — SODIUM CHLORIDE: 9 INJECTION, SOLUTION INTRAVENOUS at 08:01

## 2021-10-05 ASSESSMENT — MIFFLIN-ST. JEOR: SCORE: 1890.36

## 2021-10-05 NOTE — PROGRESS NOTES
1105 Report received from Bobby Rothman RN.  1115 Pt returned from Cath Lab. A/O. TR band intact to right wrist.  No oozing or hematoma noted. Area soft & flat. Armboard intact. Right arm elevated on pillows. Pt denies pain. Pt instructed on activity restrictions with right wrist/arm. Verbal understanding received from pt. No family present at this time.  1205 Pt taking diet & flds well. No complaints.  1207 Report given to Bobby Rothman RN.

## 2021-10-05 NOTE — DISCHARGE INSTRUCTIONS
Cardiac Angiogram Discharge Instructions - Radial    After you go home:      Have an adult stay with you until tomorrow.    Drink extra fluids for 2 days.    You may resume your normal diet.    No smoking       For 24 hours - due to the sedation you received:    Relax and take it easy.    Do NOT make any important or legal decisions.    Do NOT drive or operate machines at home or at work.    Do NOT drink alcohol.    Care of Wrist Puncture Site:      For the first 24 hrs - check the puncture site every 1-2 hours while awake.    It is normal to have soreness at the puncture site and mild tingling in your hand for up to 3 days.    Remove the bandaid after 24 hours. If there is minor oozing, apply another bandaid and remove it after 12 hours.    You may shower tomorrow.  Do NOT take a bath, or use a hot tub or pool for at least 3 days. Do NOT scrub the site. Do not use lotion or powder near the puncture site.           Activity:        For 2 days:     do not use your hand or arm to support your weight (such as rising from a chair)     do not bend your wrist (such as lifting a garage door).    do not lift more than 5 pounds or exercise your arm (such as tennis, golf or bowling).    Do NOT do any heavy activity such as exercise, lifting, or straining.     Bleeding:      If you start bleeding from the site in your wrist, sit down and press firmly on/above the site for 10 minutes.     Once bleeding stops, keep arm still for 2 hours.     Call [email or web address] as soon as you can.       Call 911 right away if you have heavy bleeding or bleeding that does not stop.      Medicines:      Take your medications, including blood thinners, unless your provider tells you not to.      If you have stopped any medicines, check with your provider about when to restart them.    Follow Up Appointments:      Follow up with [email or web address] Nurse Practitioner at [email or web address] of patient preference in 7-10 days.    Call the clinic if:      You have a  large or growing hard lump around the site.    The site is red, swollen, hot or tender.    Blood or fluid is draining from the site.    You have chills or a fever greater than 101 F (38 C).    Your arm feels numb, cool or changes color.    You have hives, a rash or unusual itching.    Any questions or concerns.          [facility] [place]:    [phone] UMP (7 days a week)

## 2021-10-05 NOTE — PROGRESS NOTES
PATIENT/VISITOR WELLNESS SCREENING    Step 1 Patient Screening    1. In the last month, have you been in contact with someone who was confirmed or suspected to have Coronavirus/COVID-19? No    2. Do you have the following symptoms?  Fever/Chills? No   Cough? No   Shortness of breath? No   New loss of taste or smell? No  Sore throat? No  Muscle or body aches? No  Headaches? No  Fatigue? No  Vomiting or diarrhea? No    Step 2 Visitor Screening    1. Name of Visitor (1 visitor per patient):     2. In the last month, have you been in contact with someone who was confirmed or suspected to have Coronavirus/COVID-19?     3. Do you have the following symptoms?  Fever/Chills?    Cough?    Shortness of breath?    Skin rash?    Loss of taste or smell?   Sore throat?   Runny or stuffy nose?   Muscle or body aches?   Headaches?   Fatigue?   Vomiting or diarrhea?     If the visitor has positive symptoms, notify supervisor/manger  Per policy, the visitor will need to leave the facility     Step 3 Refer to logic grid below for actions    NO SYMPTOM(S)    ACTIONS:  1. Standard rooming process  2. Provider to assess per normal protocol  3. Implement precautions as needed and per guidelines     POSITIVE SYMPTOM(S)  If positive for ANY of the following symptoms: fever, cough, shortness of breath, rash    ACTION:  1. Continue to have the patient wear a mask   2. Room patient as soon as possible  3. Don appropriate PPE when entering room  4. Provider evaluation    Care Suites Admission Nursing Note    Patient Information  Name: Ramiro Jeffers  Age: 61 year old  Reason for admission: heart cath  Care Suites arrival time: 0700    Visitor Information  Name:   Informed of visitor restrictions: N/A  1 visitor allowed per patient   Visitor must screen negative for COVID symptoms   Visitor must wear a mask  Waiting rooms closed to visitors    Patient Admission/Assessment   Pre-procedure assessment complete: Yes  If abnormal assessment/labs,  provider notified: N/A  NPO: Yes  Medications held per instructions/orders: Yes  Consent: obtained  If applicable, pregnancy test status: deferred  Patient oriented to room: Yes  Education/questions answered: Yes  Plan/other: proceed as planned discharge to home, IV infiltrated after getting up to BR minimal swelling new IV obtained    Discharge Planning  Discharge name/phone number: Arleth [phone]  Overnight post sedation caregiver: Arleth  Discharge location: home    Bobby Rothman RN

## 2021-10-05 NOTE — PROGRESS NOTES
1320 removed TR band began to ooze slightly pea size hematoma pressure applied for 5 min. No further bleeding area soft bandaid CDI  1400 pt ambulated in halls w/o difficulty up to BR voided  Care Suites Discharge Nursing Note    Patient Information  Name: Ramiro Jeffers  Age: 61 year old    Discharge Education:  Discharge instructions reviewed: Yes  Additional education/resources provided: na  Patient/patient representative verbalizes understanding: Yes  Patient discharging on new medications: N/A  Medication education completed: N/A    Discharge Plans:   Discharge location: home  Discharge ride contacted: Yes  Approximate discharge time: 1430    Discharge Criteria:  Discharge criteria met and vital signs stable: Yes    Patient Belongs:  Patient belongings returned to patient: Yes    Bobby Rothman RN

## 2021-10-05 NOTE — PRE-PROCEDURE
GENERAL PRE-PROCEDURE:     Written consent obtained?: Yes    Risks and benefits: Risks, benefits and alternatives were discussed    DC Plan: Appropriate discharge home plan in place for patients who are going home after procedure   Consent given by:  Patient  Patient states understanding of procedure being performed: Yes    Patient's understanding of procedure matches consent: Yes    Procedure consent matches procedure scheduled: Yes    Expected level of sedation:  Moderate  Appropriately NPO:  Yes  ASA Class:  3  Mallampati  :  Grade 3- soft palate visible, posterior pharyngeal wall not visible  Lungs:  Lungs clear with good breath sounds bilaterally  Heart:  Normal heart sounds and rate  History & Physical reviewed:  History and physical reviewed and no updates needed  Statement of review:  I have reviewed the lab findings, diagnostic data, medications, and the plan for sedation

## 2021-10-06 ENCOUNTER — TELEPHONE (OUTPATIENT)
Dept: CARDIOLOGY | Facility: CLINIC | Age: 61
End: 2021-10-06

## 2021-10-06 NOTE — TELEPHONE ENCOUNTER
Cath results reviewed, no MELECIO follow-up noted.    Telephoned patient. Reports feeling well today, no concerns with radial access site. States he has been having chest heaviness, was hopeful a new stent would help symptoms. Scheduled post-angiogram MELECIO follow-up to discuss symptoms-date, time and location reviewed. Patient will monitor symptoms and blood pressure daily prior to appointment.    Svetlana Ochoa RN  [facility]     x2/day(s)

## 2021-10-07 LAB
ATRIAL RATE - MUSE: 58 BPM
DIASTOLIC BLOOD PRESSURE - MUSE: NORMAL MMHG
INTERPRETATION ECG - MUSE: NORMAL
P AXIS - MUSE: 62 DEGREES
PR INTERVAL - MUSE: 224 MS
QRS DURATION - MUSE: 122 MS
QT - MUSE: 438 MS
QTC - MUSE: 429 MS
R AXIS - MUSE: 5 DEGREES
SYSTOLIC BLOOD PRESSURE - MUSE: NORMAL MMHG
T AXIS - MUSE: -5 DEGREES
VENTRICULAR RATE- MUSE: 58 BPM

## 2021-10-22 ENCOUNTER — TELEPHONE (OUTPATIENT)
Dept: OTHER | Facility: CLINIC | Age: 61
End: 2021-10-22

## 2021-10-22 NOTE — TELEPHONE ENCOUNTER
Patient was exposed to more than one individual with covid-19.  Has been symptomatic the last few days.    Ramiro will attempt to book a COVID-19 test through [facility] or maybe go to the [facility] website to research free rapid testing.    Recommended he quarantine until he learns of his results and meet with the  virtually.    Cristal Sotomayor RN BSN  [facility]  [phone]

## 2021-10-26 ENCOUNTER — OFFICE VISIT (OUTPATIENT)
Dept: CARDIOLOGY | Facility: CLINIC | Age: 61
End: 2021-10-26
Payer: COMMERCIAL

## 2021-10-26 VITALS
BODY MASS INDEX: 35.25 KG/M2 | SYSTOLIC BLOOD PRESSURE: 112 MMHG | HEART RATE: 60 BPM | HEIGHT: 69 IN | DIASTOLIC BLOOD PRESSURE: 62 MMHG | WEIGHT: 238 LBS

## 2021-10-26 DIAGNOSIS — R07.89 OTHER CHEST PAIN: Primary | ICD-10-CM

## 2021-10-26 DIAGNOSIS — I10 ESSENTIAL HYPERTENSION: ICD-10-CM

## 2021-10-26 DIAGNOSIS — I25.118 CORONARY ARTERY DISEASE OF NATIVE ARTERY OF NATIVE HEART WITH STABLE ANGINA PECTORIS (H): ICD-10-CM

## 2021-10-26 PROCEDURE — 99214 OFFICE O/P EST MOD 30 MIN: CPT | Performed by: PHYSICIAN ASSISTANT

## 2021-10-26 RX ORDER — ISOSORBIDE MONONITRATE 30 MG/1
60 TABLET, EXTENDED RELEASE ORAL DAILY
Qty: 180 TABLET | Refills: 3 | Status: SHIPPED | OUTPATIENT
Start: 2021-10-26 | End: 2022-08-16

## 2021-10-26 ASSESSMENT — MIFFLIN-ST. JEOR: SCORE: 1874.94

## 2021-10-26 NOTE — PROGRESS NOTES
Cardiology Clinic Progress Note    Ramiro Jeffers MRN# [id number]   YOB: 1960 Age: 61 year old   Primary cardiologist: Dr. Key         Assessment and Plan:     In summary, Ramiro Jeffers presents today for post-angiogram follow up.    1. Exertional dyspnea and chest discomfort. Suspect related to deconditioning and substantial weight gain over the past 1.5 years. However he does gain relief from SL NTG, so small vessel disease may be contributing.  2. Nonobstructive CAD per 10/5/21 angiogram.  3. Hyperlipidemia, well-controlled on Crestor 40.  4. Borderline reduced EF on echocardiogram, with normal LVEF and LVEDP per ventriculogram.  5. Morbid obesity, weight gain, deconditioning.   6. Leg cramping, related to chronic venous insufficiency vs statin therapy.     Plan:  - Increase Imdur from 30 to 60 mg daily.   - Supplement CoQ 10. Continue Crestor.   - Increase cardiovascular exercise, weight loss advised.     Follow-up:  - With Dr. Key in 3 months.     It was a pleasure meeting Ramiro today! I encouraged him to call with any questions/concerns in the meantime.        History of Presenting Illness:      Ramiro Jeffers is a pleasant 61 year old patient who presents today for follow up after recent angiogram.    He has a history of CAD, with previous stenting to LAD, hypertension, significant sleep apnea and morbid obesity who was seen urgently recently by Dr. Ortiz last month he has noted increased ALAS, and chest discomfort.  He had gained about 15 pounds over the prior 6 months, and about 70 lbs over the past year and a half.  An echocardiogram was thus ordered showing new decrease in LVEF from 60-65% to 49%.  A subsequent nuclear stress test showed a new area of apical infarction.  Coronary angiogram was recommended. This took place on 10/5/21, and showed a widely patent proximal LAD stent, mild ostial D2 stenosis, and otherwise normal coronary arteries.  LVEF by ventriculogram was normal, as was  "LVEDP.     Today, Ramiro returns to clinic stating his symptoms are unchanged. Possibly a little worse lately, as his mother just passed away (and shortly after so did her dog) and he's planning the  and taking care of her things, and under a lot of emotional stress. Symptoms are relieved with SL NTG, which he uses rarely.  He's had palpitations in the past, but not any recently. Patient denies orthopnea, claudication, near syncope or syncope.  Stable peripheral edema by the end of the day, also notes leg cramping that was worse on Lipitor, but still mildly bothersome now on Crestor. Radial access site is healing normally. Lipid panel is well-controlled, as is blood pressure.          Review of Systems:     12-pt ROS is negative except for as noted in the HPI.          Physical Exam:     Vitals: /62   Pulse 60   Ht 1.753 m (5' 9\")   Wt 108 kg (238 lb)   BMI 35.15 kg/m    Wt Readings from Last 10 Encounters:   10/26/21 108 kg (238 lb)   10/05/21 107.9 kg (237 lb 14.4 oz)   21 110.7 kg (244 lb)   21 108.4 kg (239 lb)   21 108.4 kg (239 lb)   02/10/21 102.5 kg (226 lb)   20 96.2 kg (212 lb)   19 93 kg (205 lb)   19 93 kg (205 lb)   19 93.9 kg (207 lb)       Constitutional:  Patient is pleasant, alert, cooperative, and in NAD.  HEENT:  NCAT. PERRLA. EOM's intact.   Neck:  CVP appears normal. No carotid bruits.   Pulmonary: Normal respiratory effort. CTAB.   Cardiac: RRR, normal S1/S2, no S3/S4, grade 2/6 sm at the LSB.  Abdomen:  Non-tender abdomen, no hepatosplenomegaly appreciated.   Vascular: Pulses in the upper and lower extremities are 2+ and equal bilaterally.  Extremities: No edema, but bilateral pretibial hyperpigmentation noted.  Skin:  No rashes or lesions appreciated.   Neurological:  No gross motor or sensory deficits.   Psych: Appropriate affect.        Data:     Labs reviewed:  Recent Labs   Lab Test 21  0920 21  0830 20  1328 " 10/21/19  0727 08/29/19  0804 06/25/19  0712 06/25/19  0712 10/13/14  1510 04/09/14  1003   LDL 71  --   --   --  75  --  64   < > 98   HDL 46  --   --   --  52  --  60   < > 49   NHDL 86  --   --   --  85  --  74   < >  --    CHOL 132  --   --   --  137  --  134   < > 170   TRIG 73  --   --   --  51  --  51   < > 110   TSH 9.57* 28.97* 0.03*   < > 0.97   < > 5.56*   < > 4.70   IRON  --   --   --   --   --   --   --   --  45   FEB  --   --   --   --   --   --   --   --  278   IRONSAT  --   --   --   --   --   --   --   --  16   ELSA  --   --   --   --   --   --   --   --  66    < > = values in this interval not displayed.       Lab Results   Component Value Date    WBC 4.4 10/05/2021    WBC 5.9 11/11/2020    RBC 4.47 10/05/2021    RBC 4.28 (L) 11/11/2020    HGB 13.1 (L) 10/05/2021    HGB 12.8 (L) 11/11/2020    HCT 40.0 10/05/2021    HCT 38.9 (L) 11/11/2020    MCV 90 10/05/2021    MCV 91 11/11/2020    MCH 29.3 10/05/2021    MCH 29.9 11/11/2020    MCHC 32.8 10/05/2021    MCHC 32.9 11/11/2020    RDW 13.3 10/05/2021    RDW 13.4 11/11/2020     (L) 10/05/2021     (L) 11/11/2020       Lab Results   Component Value Date     10/05/2021     11/11/2020    POTASSIUM 3.7 10/05/2021    POTASSIUM 3.5 11/11/2020    CHLORIDE 105 10/05/2021    CHLORIDE 105 11/11/2020    CO2 30 10/05/2021    CO2 31 11/11/2020    ANIONGAP 4 10/05/2021    ANIONGAP 3 11/11/2020     (H) 10/05/2021    GLC 99 11/11/2020    BUN 19 10/05/2021    BUN 18 11/11/2020    CR 0.95 10/05/2021    CR 0.77 11/11/2020    GFRESTIMATED 86 10/05/2021    GFRESTIMATED >90 11/11/2020    GFRESTBLACK >90 11/11/2020    GUERITA 8.5 10/05/2021    GUERITA 8.6 11/11/2020      Lab Results   Component Value Date    AST 23 08/23/2021    AST 18 11/11/2020    ALT 23 08/23/2021    ALT 20 11/11/2020       Lab Results   Component Value Date    A1C 5.6 08/23/2021    A1C 5.7 (H) 11/11/2020       Lab Results   Component Value Date    INR 1.12 10/05/2021    INR 1.08  02/15/2019    INR 1.04 02/12/2019           Problem List:     Patient Active Problem List   Diagnosis     Acquired hypothyroidism     pulmonary emboli     Venous insufficiency     Vitamin D deficiency     Bariatric surgery status     Postsurgical nonabsorption     Bilateral knee pain     Anemia     Pneumothorax     Chest pressure     Hypertension     Hyperlipidemia     Status post coronary angiogram     Palpitations     PVC's (premature ventricular contractions)     Ischemic cardiomyopathy     CAD (coronary artery disease)     Unstable angina (H)     Coronary artery disease involving native heart with angina pectoris, unspecified vessel or lesion type (H)     Skin ulcer of abdomen with fat layer exposed (H)     Morbid obesity (H)           Medications:     Current Outpatient Medications   Medication Sig Dispense Refill     aspirin 81 MG tablet Take 1 tablet (81 mg) by mouth daily Start tomorrow morning.       Cholecalciferol (VITAMIN D) 2000 units tablet Take 1 tablet by mouth daily 100 tablet 12     hydrochlorothiazide (HYDRODIURIL) 25 MG tablet Take 1 tablet (25 mg) by mouth every morning 90 tablet 3     isosorbide mononitrate (IMDUR) 30 MG 24 hr tablet Take 1 tablet (30 mg) by mouth daily 90 tablet 3     levothyroxine (SYNTHROID/LEVOTHROID) 175 MCG tablet Take 200 mcg PO every 72 hours alternating with 175 mcg daily for two days in a row. 60 tablet 3     levothyroxine (SYNTHROID/LEVOTHROID) 200 MCG tablet Take 200 mcg PO every 72 hours alternating with 175 mcg daily for two days in a row. 30 tablet 3     metoprolol succinate ER (TOPROL-XL) 50 MG 24 hr tablet Take 1.5 tablets (75 mg) by mouth daily 135 tablet 3     Multiple Vitamin (MULTI-VITAMIN) per tablet Take 1 tablet by mouth 2 times daily        nitroGLYcerin (NITROSTAT) 0.4 MG sublingual tablet For chest pain place 1 tablet under the tongue every 5 minutes for 3 doses. If symptoms persist 5 minutes after 1st dose call 911. 25 tablet 3     rosuvastatin  (CRESTOR) 40 MG tablet Take 1 tablet (40 mg) by mouth daily 90 tablet 3           Past Medical History:     Past Medical History:   Diagnosis Date     ACQUIRED HYPOTHYROIDISM       Blood clot in the legs      CAD (coronary artery disease)     11/15/18 Cath: PCI with 3.5-16mm DORY to proximal LAD     Calculus of gallbladder without mention of cholecystitis or obstruction      Cataract      Chest pressure 11/5/2018     Hyperlipidemia      Hypertension      Ischemic cardiomyopathy     EF 40-45% on 11/2018 Echo     Obesity, unspecified     significant weight loss w/diet alone, on 10-15-10, BMI was 56.4     pulmonary emboli 6-2010    Unprovoked large bilateral pulmonary emboli without source identified on lower extremity dopplers, pt had a transient moderate lupus inhibitor upon initial presentation in June , 2010 which was gone in September, 2010;  all other thrombophilic workup is normal     Pulmonary embolus, bilateral      PVC's (premature ventricular contractions)     4% burden on 11/2018 Holter     Shortness of breath     on exertion     Venous insufficiency      Viral pneumonia, unspecified      Vitamin D deficiency      Past Surgical History:   Procedure Laterality Date     COLONOSCOPY  11/17/2011    Procedure:COLONOSCOPY; colonoscopy; Surgeon:ELENA OROURKE; Location: GI     CV HEART CATHETERIZATION WITH POSSIBLE INTERVENTION Left 2/15/2019    Procedure: Coronary Angiogram;  Surgeon: Tulio Ortiz MD;  Location:  HEART CARDIAC CATH LAB     CV HEART CATHETERIZATION WITH POSSIBLE INTERVENTION N/A 10/5/2021    Procedure: Heart Catheterization with Possible Intervention;  Surgeon: Jose Francisco Dave MD;  Location:  HEART CARDIAC CATH LAB     CV LEFT HEART CATH N/A 10/5/2021    Procedure: Left Heart Cath;  Surgeon: Jose Francisco Dave MD;  Location:  HEART CARDIAC CATH LAB     CV LEFT VENTRICULOGRAM N/A 10/5/2021    Procedure: Left Ventriculogram;  Surgeon: Jose Francisco Dave MD;   Location:  HEART CARDIAC CATH LAB     LAPAROSCOPIC CHOLECYSTECTOMY  9/28/2012    Procedure: LAPAROSCOPIC CHOLECYSTECTOMY;  LAPAROSCOPIC CHOLECYSTECTOMY, LYSIS OF ADHESIONS;  Surgeon: Dutch Matta MD;  Location:  OR     LAPAROSCOPIC LYSIS ADHESIONS  9/28/2012    Procedure: LAPAROSCOPIC LYSIS ADHESIONS;;  Surgeon: Dutch Matta MD;  Location:  OR     LAPAROSCOPY, SURGICAL; REPAIR INCISIONAL OR VENTRAL HERNIA      repair of ventral strangulated surgery     PHACOEMULSIFICATION CLEAR CORNEA WITH STANDARD INTRAOCULAR LENS IMPLANT  12/19/2011    Procedure:PHACOEMULSIFICATION CLEAR CORNEA WITH STANDARD INTRAOCULAR LENS IMPLANT; RIGHT PHACOEMULSIFICATION CLEAR CORNEA WITH STANDARD INTRAOCULAR LENS IMPLANT ; Surgeon:ALLISON ANTONIO; Location:[facility] NONSPECIFIC PROCEDURE  10-    Laparoscopic gastric bypass,     [facility] NONSPECIFIC PROCEDURE  10-    1.  Attempted laparoscopic exploration with conversion to: .  Abdominal exploration. 3.  Reduction of incarcerated incisional hernia. 4.  Repair of incisional hernia.5.  Gastrostomy tube placement (#22 [place]) into defunctionalized stomach.6.  Enterotomy with bowel decompression and primary repair. 7.  Abdominal lavage.      Family History   Problem Relation Age of Onset     Osteoporosis Mother      Thyroid Disease Mother         no thyroid     Cancer Maternal Grandmother         ? lung cancer     Cancer Father         melanoma     Coronary Artery Disease Brother      Coronary Artery Disease Brother      Social History     Socioeconomic History     Marital status: Single     Spouse name: Not on file     Number of children: Not on file     Years of education: Not on file     Highest education level: Not on file   Occupational History     Occupation:      Employer: SELF   Tobacco Use     Smoking status: Never Smoker     Smokeless tobacco: Never Used   Substance and Sexual Activity     Alcohol use: No     Alcohol/week: 0.0 standard drinks      Drug use: No     Sexual activity: Not on file   Other Topics Concern     Parent/sibling w/ CABG, MI or angioplasty before 65F 55M? Not Asked   Social History Narrative     Not on file     Social Determinants of Health     Financial Resource Strain:      Difficulty of Paying Living Expenses:    Food Insecurity:      Worried About Running Out of Food in the Last Year:      Ran Out of Food in the Last Year:    Transportation Needs:      Lack of Transportation (Medical):      Lack of Transportation (Non-Medical):    Physical Activity:      Days of Exercise per Week:      Minutes of Exercise per Session:    Stress:      Feeling of Stress :    Social Connections:      Frequency of Communication with Friends and Family:      Frequency of Social Gatherings with Friends and Family:      Attends Worship Services:      Active Member of Clubs or Organizations:      Attends Club or Organization Meetings:      Marital Status:    Intimate Partner Violence:      Fear of Current or Ex-Partner:      Emotionally Abused:      Physically Abused:      Sexually Abused:            Allergies:   No known allergies      ALEXY Selby [facility]  Pager: [phone]

## 2021-10-26 NOTE — PATIENT INSTRUCTIONS
Today's Plan:   - Please increase the Imdur (isosorbide) to 60 mg daily.   - Try supplementing the OTC medication CoQ 10 250 mg twice daily. Ask your pharmacist about this. It should help the leg cramping if it's from the statins.  - Slowly increase your activity level, try to walk for at least 60 minutes daily to get your heart rate up and improve your conditioning.   - Return to see Dr. Key in about 3 months.     If you have questions or concerns please call my nurse team at [phone].   Scheduling phone number: [phone]  For after hours urgent concerns call [phone] option 2.   Reminder: Please bring in all current medications, over the counter supplements and vitamin bottles to your next appointment.    It was a pleasure seeing you today!     Catarina Simeon PA-C

## 2021-10-26 NOTE — LETTER
10/26/2021    Addison Davis MD  [address]  [facility] [address]    RE: Ramiro Jeffers       Dear Colleague,    I had the pleasure of seeing Ramiro Jeffers in the [facility].      Cardiology Clinic Progress Note    Ramiro Jeffers MRN# [id number]   YOB: 1960 Age: 61 year old   Primary cardiologist: Dr. Key         Assessment and Plan:     In summary, Ramiro Jeffers presents today for post-angiogram follow up.    1. Exertional dyspnea and chest discomfort. Suspect related to deconditioning and substantial weight gain over the past 1.5 years. However he does gain relief from SL NTG, so small vessel disease may be contributing.  2. Nonobstructive CAD per 10/5/21 angiogram.  3. Hyperlipidemia, well-controlled on Crestor 40.  4. Borderline reduced EF on echocardiogram, with normal LVEF and LVEDP per ventriculogram.  5. Morbid obesity, weight gain, deconditioning.   6. Leg cramping, related to chronic venous insufficiency vs statin therapy.     Plan:  - Increase Imdur from 30 to 60 mg daily.   - Supplement CoQ 10. Continue Crestor.   - Increase cardiovascular exercise, weight loss advised.     Follow-up:  - With Dr. Key in 3 months.     It was a pleasure meeting Ramiro today! I encouraged him to call with any questions/concerns in the meantime.        History of Presenting Illness:      Ramiro Jeffers is a pleasant 61 year old patient who presents today for follow up after recent angiogram.    He has a history of CAD, with previous stenting to LAD, hypertension, significant sleep apnea and morbid obesity who was seen urgently recently by Dr. Ortiz last month he has noted increased ALAS, and chest discomfort.  He had gained about 15 pounds over the prior 6 months, and about 70 lbs over the past year and a half.  An echocardiogram was thus ordered showing new decrease in LVEF from 60-65% to 49%.  A subsequent nuclear stress test showed a new  "area of apical infarction.  Coronary angiogram was recommended. This took place on 10/5/21, and showed a widely patent proximal LAD stent, mild ostial D2 stenosis, and otherwise normal coronary arteries.  LVEF by ventriculogram was normal, as was LVEDP.     Today, Ramiro returns to clinic stating his symptoms are unchanged. Possibly a little worse lately, as his mother just passed away (and shortly after so did her dog) and he's planning the  and taking care of her things, and under a lot of emotional stress. Symptoms are relieved with SL NTG, which he uses rarely.  He's had palpitations in the past, but not any recently. Patient denies orthopnea, claudication, near syncope or syncope.  Stable peripheral edema by the end of the day, also notes leg cramping that was worse on Lipitor, but still mildly bothersome now on Crestor. Radial access site is healing normally. Lipid panel is well-controlled, as is blood pressure.          Review of Systems:     12-pt ROS is negative except for as noted in the HPI.          Physical Exam:     Vitals: /62   Pulse 60   Ht 1.753 m (5' 9\")   Wt 108 kg (238 lb)   BMI 35.15 kg/m    Wt Readings from Last 10 Encounters:   10/26/21 108 kg (238 lb)   10/05/21 107.9 kg (237 lb 14.4 oz)   21 110.7 kg (244 lb)   21 108.4 kg (239 lb)   21 108.4 kg (239 lb)   02/10/21 102.5 kg (226 lb)   20 96.2 kg (212 lb)   19 93 kg (205 lb)   19 93 kg (205 lb)   19 93.9 kg (207 lb)       Constitutional:  Patient is pleasant, alert, cooperative, and in NAD.  HEENT:  NCAT. PERRLA. EOM's intact.   Neck:  CVP appears normal. No carotid bruits.   Pulmonary: Normal respiratory effort. CTAB.   Cardiac: RRR, normal S1/S2, no S3/S4, grade 2/6 sm at the LSB.  Abdomen:  Non-tender abdomen, no hepatosplenomegaly appreciated.   Vascular: Pulses in the upper and lower extremities are 2+ and equal bilaterally.  Extremities: No edema, but bilateral pretibial " hyperpigmentation noted.  Skin:  No rashes or lesions appreciated.   Neurological:  No gross motor or sensory deficits.   Psych: Appropriate affect.        Data:     Labs reviewed:  Recent Labs   Lab Test 08/23/21  0920 02/02/21  0830 11/11/20  1328 10/21/19  0727 08/29/19  0804 06/25/19  0712 06/25/19  0712 10/13/14  1510 04/09/14  1003   LDL 71  --   --   --  75  --  64   < > 98   HDL 46  --   --   --  52  --  60   < > 49   NHDL 86  --   --   --  85  --  74   < >  --    CHOL 132  --   --   --  137  --  134   < > 170   TRIG 73  --   --   --  51  --  51   < > 110   TSH 9.57* 28.97* 0.03*   < > 0.97   < > 5.56*   < > 4.70   IRON  --   --   --   --   --   --   --   --  45   FEB  --   --   --   --   --   --   --   --  278   IRONSAT  --   --   --   --   --   --   --   --  16   ELSA  --   --   --   --   --   --   --   --  66    < > = values in this interval not displayed.       Lab Results   Component Value Date    WBC 4.4 10/05/2021    WBC 5.9 11/11/2020    RBC 4.47 10/05/2021    RBC 4.28 (L) 11/11/2020    HGB 13.1 (L) 10/05/2021    HGB 12.8 (L) 11/11/2020    HCT 40.0 10/05/2021    HCT 38.9 (L) 11/11/2020    MCV 90 10/05/2021    MCV 91 11/11/2020    MCH 29.3 10/05/2021    MCH 29.9 11/11/2020    MCHC 32.8 10/05/2021    MCHC 32.9 11/11/2020    RDW 13.3 10/05/2021    RDW 13.4 11/11/2020     (L) 10/05/2021     (L) 11/11/2020       Lab Results   Component Value Date     10/05/2021     11/11/2020    POTASSIUM 3.7 10/05/2021    POTASSIUM 3.5 11/11/2020    CHLORIDE 105 10/05/2021    CHLORIDE 105 11/11/2020    CO2 30 10/05/2021    CO2 31 11/11/2020    ANIONGAP 4 10/05/2021    ANIONGAP 3 11/11/2020     (H) 10/05/2021    GLC 99 11/11/2020    BUN 19 10/05/2021    BUN 18 11/11/2020    CR 0.95 10/05/2021    CR 0.77 11/11/2020    GFRESTIMATED 86 10/05/2021    GFRESTIMATED >90 11/11/2020    GFRESTBLACK >90 11/11/2020    GUERITA 8.5 10/05/2021    GUERITA 8.6 11/11/2020      Lab Results   Component Value Date     AST 23 08/23/2021    AST 18 11/11/2020    ALT 23 08/23/2021    ALT 20 11/11/2020       Lab Results   Component Value Date    A1C 5.6 08/23/2021    A1C 5.7 (H) 11/11/2020       Lab Results   Component Value Date    INR 1.12 10/05/2021    INR 1.08 02/15/2019    INR 1.04 02/12/2019           Problem List:     Patient Active Problem List   Diagnosis     Acquired hypothyroidism     pulmonary emboli     Venous insufficiency     Vitamin D deficiency     Bariatric surgery status     Postsurgical nonabsorption     Bilateral knee pain     Anemia     Pneumothorax     Chest pressure     Hypertension     Hyperlipidemia     Status post coronary angiogram     Palpitations     PVC's (premature ventricular contractions)     Ischemic cardiomyopathy     CAD (coronary artery disease)     Unstable angina (H)     Coronary artery disease involving native heart with angina pectoris, unspecified vessel or lesion type (H)     Skin ulcer of abdomen with fat layer exposed (H)     Morbid obesity (H)           Medications:     Current Outpatient Medications   Medication Sig Dispense Refill     aspirin 81 MG tablet Take 1 tablet (81 mg) by mouth daily Start tomorrow morning.       Cholecalciferol (VITAMIN D) 2000 units tablet Take 1 tablet by mouth daily 100 tablet 12     hydrochlorothiazide (HYDRODIURIL) 25 MG tablet Take 1 tablet (25 mg) by mouth every morning 90 tablet 3     isosorbide mononitrate (IMDUR) 30 MG 24 hr tablet Take 1 tablet (30 mg) by mouth daily 90 tablet 3     levothyroxine (SYNTHROID/LEVOTHROID) 175 MCG tablet Take 200 mcg PO every 72 hours alternating with 175 mcg daily for two days in a row. 60 tablet 3     levothyroxine (SYNTHROID/LEVOTHROID) 200 MCG tablet Take 200 mcg PO every 72 hours alternating with 175 mcg daily for two days in a row. 30 tablet 3     metoprolol succinate ER (TOPROL-XL) 50 MG 24 hr tablet Take 1.5 tablets (75 mg) by mouth daily 135 tablet 3     Multiple Vitamin (MULTI-VITAMIN) per tablet Take 1 tablet  by mouth 2 times daily        nitroGLYcerin (NITROSTAT) 0.4 MG sublingual tablet For chest pain place 1 tablet under the tongue every 5 minutes for 3 doses. If symptoms persist 5 minutes after 1st dose call 911. 25 tablet 3     rosuvastatin (CRESTOR) 40 MG tablet Take 1 tablet (40 mg) by mouth daily 90 tablet 3           Past Medical History:     Past Medical History:   Diagnosis Date     ACQUIRED HYPOTHYROIDISM       Blood clot in the legs      CAD (coronary artery disease)     11/15/18 Cath: PCI with 3.5-16mm DORY to proximal LAD     Calculus of gallbladder without mention of cholecystitis or obstruction      Cataract      Chest pressure 11/5/2018     Hyperlipidemia      Hypertension      Ischemic cardiomyopathy     EF 40-45% on 11/2018 Echo     Obesity, unspecified     significant weight loss w/diet alone, on 10-15-10, BMI was 56.4     pulmonary emboli 6-2010    Unprovoked large bilateral pulmonary emboli without source identified on lower extremity dopplers, pt had a transient moderate lupus inhibitor upon initial presentation in June , 2010 which was gone in September, 2010;  all other thrombophilic workup is normal     Pulmonary embolus, bilateral      PVC's (premature ventricular contractions)     4% burden on 11/2018 Holter     Shortness of breath     on exertion     Venous insufficiency      Viral pneumonia, unspecified      Vitamin D deficiency      Past Surgical History:   Procedure Laterality Date     COLONOSCOPY  11/17/2011    Procedure:COLONOSCOPY; colonoscopy; Surgeon:ELENA OROURKE; Location: GI     CV HEART CATHETERIZATION WITH POSSIBLE INTERVENTION Left 2/15/2019    Procedure: Coronary Angiogram;  Surgeon: Tulio Ortiz MD;  Location:  HEART CARDIAC CATH LAB     CV HEART CATHETERIZATION WITH POSSIBLE INTERVENTION N/A 10/5/2021    Procedure: Heart Catheterization with Possible Intervention;  Surgeon: Jose Francisco Dave MD;  Location:  HEART CARDIAC CATH LAB     CV LEFT HEART CATH  N/A 10/5/2021    Procedure: Left Heart Cath;  Surgeon: Jose Francisco Dave MD;  Location:  HEART CARDIAC CATH LAB     CV LEFT VENTRICULOGRAM N/A 10/5/2021    Procedure: Left Ventriculogram;  Surgeon: Jose Francisco Dave MD;  Location:  HEART CARDIAC CATH LAB     LAPAROSCOPIC CHOLECYSTECTOMY  9/28/2012    Procedure: LAPAROSCOPIC CHOLECYSTECTOMY;  LAPAROSCOPIC CHOLECYSTECTOMY, LYSIS OF ADHESIONS;  Surgeon: Dutch Matta MD;  Location:  OR     LAPAROSCOPIC LYSIS ADHESIONS  9/28/2012    Procedure: LAPAROSCOPIC LYSIS ADHESIONS;;  Surgeon: Dutch Matta MD;  Location:  OR     LAPAROSCOPY, SURGICAL; REPAIR INCISIONAL OR VENTRAL HERNIA      repair of ventral strangulated surgery     PHACOEMULSIFICATION CLEAR CORNEA WITH STANDARD INTRAOCULAR LENS IMPLANT  12/19/2011    Procedure:PHACOEMULSIFICATION CLEAR CORNEA WITH STANDARD INTRAOCULAR LENS IMPLANT; RIGHT PHACOEMULSIFICATION CLEAR CORNEA WITH STANDARD INTRAOCULAR LENS IMPLANT ; Surgeon:ALLISON ANTONIO; Location:[facility] NONSPECIFIC PROCEDURE  10-    Laparoscopic gastric bypass,     [facility] NONSPECIFIC PROCEDURE  10-    1.  Attempted laparoscopic exploration with conversion to: .  Abdominal exploration. 3.  Reduction of incarcerated incisional hernia. 4.  Repair of incisional hernia.5.  Gastrostomy tube placement (#22 [id number]) into defunctionalized stomach.6.  Enterotomy with bowel decompression and primary repair. 7.  Abdominal lavage.      Family History   Problem Relation Age of Onset     Osteoporosis Mother      Thyroid Disease Mother         no thyroid     Cancer Maternal Grandmother         ? lung cancer     Cancer Father         melanoma     Coronary Artery Disease Brother      Coronary Artery Disease Brother      Social History     Socioeconomic History     Marital status: Single     Spouse name: Not on file     Number of children: Not on file     Years of education: Not on file     Highest education level: Not on file    Occupational History     Occupation:      Employer: SELF   Tobacco Use     Smoking status: Never Smoker     Smokeless tobacco: Never Used   Substance and Sexual Activity     Alcohol use: No     Alcohol/week: 0.0 standard drinks     Drug use: No     Sexual activity: Not on file   Other Topics Concern     Parent/sibling w/ CABG, MI or angioplasty before 65F 55M? Not Asked   Social History Narrative     Not on file     Social Determinants of Health     Financial Resource Strain:      Difficulty of Paying Living Expenses:    Food Insecurity:      Worried About Running Out of Food in the Last Year:      Ran Out of Food in the Last Year:    Transportation Needs:      Lack of Transportation (Medical):      Lack of Transportation (Non-Medical):    Physical Activity:      Days of Exercise per Week:      Minutes of Exercise per Session:    Stress:      Feeling of Stress :    Social Connections:      Frequency of Communication with Friends and Family:      Frequency of Social Gatherings with Friends and Family:      Attends Yazidi Services:      Active Member of Clubs or Organizations:      Attends Club or Organization Meetings:      Marital Status:    Intimate Partner Violence:      Fear of Current or Ex-Partner:      Emotionally Abused:      Physically Abused:      Sexually Abused:            Allergies:   No known allergies      ALEXY Selby [facility]  Pager: [phone]        Thank you for allowing me to participate in the care of your patient.      Sincerely,     ALEXY Selby [facility]  cc:   Addison Davis MD  [address]  [place] [id number]

## 2021-11-12 ENCOUNTER — HOSPITAL ENCOUNTER (EMERGENCY)
Facility: CLINIC | Age: 61
Discharge: HOME | End: 2021-11-13
Attending: EMERGENCY MEDICINE | Admitting: EMERGENCY MEDICINE
Payer: COMMERCIAL

## 2021-11-12 DIAGNOSIS — N30.01 ACUTE CYSTITIS WITH HEMATURIA: ICD-10-CM

## 2021-11-12 LAB
ALBUMIN SERPL-MCNC: 3.5 G/DL (ref 3.4–5)
ALBUMIN UR-MCNC: 300 MG/DL
ALP SERPL-CCNC: 72 U/L (ref 40–150)
ALT SERPL W P-5'-P-CCNC: 24 U/L (ref 0–70)
ANION GAP SERPL CALCULATED.3IONS-SCNC: 6 MMOL/L (ref 3–14)
APPEARANCE UR: ABNORMAL
AST SERPL W P-5'-P-CCNC: 24 U/L (ref 0–45)
BASOPHILS # BLD AUTO: 0.1 10E3/UL (ref 0–0.2)
BASOPHILS NFR BLD AUTO: 1 %
BILIRUB SERPL-MCNC: 0.5 MG/DL (ref 0.2–1.3)
BILIRUB UR QL STRIP: NEGATIVE
BUN SERPL-MCNC: 22 MG/DL (ref 7–30)
CALCIUM SERPL-MCNC: 8.6 MG/DL (ref 8.5–10.1)
CHLORIDE BLD-SCNC: 104 MMOL/L (ref 94–109)
CO2 SERPL-SCNC: 25 MMOL/L (ref 20–32)
COLOR UR AUTO: ABNORMAL
CREAT SERPL-MCNC: 0.78 MG/DL (ref 0.66–1.25)
EOSINOPHIL # BLD AUTO: 0.2 10E3/UL (ref 0–0.7)
EOSINOPHIL NFR BLD AUTO: 2 %
ERYTHROCYTE [DISTWIDTH] IN BLOOD BY AUTOMATED COUNT: 13.5 % (ref 10–15)
GFR SERPL CREATININE-BSD FRML MDRD: >90 ML/MIN/1.73M2
GLUCOSE BLD-MCNC: 104 MG/DL (ref 70–99)
GLUCOSE UR STRIP-MCNC: NEGATIVE MG/DL
HCT VFR BLD AUTO: 39.9 % (ref 40–53)
HGB BLD-MCNC: 13 G/DL (ref 13.3–17.7)
HGB UR QL STRIP: ABNORMAL
IMM GRANULOCYTES # BLD: 0 10E3/UL
IMM GRANULOCYTES NFR BLD: 0 %
KETONES UR STRIP-MCNC: NEGATIVE MG/DL
LEUKOCYTE ESTERASE UR QL STRIP: ABNORMAL
LYMPHOCYTES # BLD AUTO: 2.3 10E3/UL (ref 0.8–5.3)
LYMPHOCYTES NFR BLD AUTO: 23 %
MCH RBC QN AUTO: 29 PG (ref 26.5–33)
MCHC RBC AUTO-ENTMCNC: 32.6 G/DL (ref 31.5–36.5)
MCV RBC AUTO: 89 FL (ref 78–100)
MONOCYTES # BLD AUTO: 0.8 10E3/UL (ref 0–1.3)
MONOCYTES NFR BLD AUTO: 7 %
MUCOUS THREADS #/AREA URNS LPF: PRESENT /LPF
NEUTROPHILS # BLD AUTO: 6.9 10E3/UL (ref 1.6–8.3)
NEUTROPHILS NFR BLD AUTO: 67 %
NITRATE UR QL: NEGATIVE
NRBC # BLD AUTO: 0 10E3/UL
NRBC BLD AUTO-RTO: 0 /100
PH UR STRIP: 6 [PH] (ref 5–7)
PLATELET # BLD AUTO: 161 10E3/UL (ref 150–450)
POTASSIUM BLD-SCNC: 3.6 MMOL/L (ref 3.4–5.3)
PROT SERPL-MCNC: 7.5 G/DL (ref 6.8–8.8)
RBC # BLD AUTO: 4.49 10E6/UL (ref 4.4–5.9)
RBC URINE: >182 /HPF
SODIUM SERPL-SCNC: 135 MMOL/L (ref 133–144)
SP GR UR STRIP: 1.02 (ref 1–1.03)
UROBILINOGEN UR STRIP-MCNC: NORMAL MG/DL
WBC # BLD AUTO: 10.1 10E3/UL (ref 4–11)
WBC URINE: >182 /HPF

## 2021-11-12 PROCEDURE — 87086 URINE CULTURE/COLONY COUNT: CPT | Performed by: EMERGENCY MEDICINE

## 2021-11-12 PROCEDURE — 96365 THER/PROPH/DIAG IV INF INIT: CPT

## 2021-11-12 PROCEDURE — 85025 COMPLETE CBC W/AUTO DIFF WBC: CPT | Performed by: EMERGENCY MEDICINE

## 2021-11-12 PROCEDURE — 81001 URINALYSIS AUTO W/SCOPE: CPT | Performed by: EMERGENCY MEDICINE

## 2021-11-12 PROCEDURE — 87186 SC STD MICRODIL/AGAR DIL: CPT | Performed by: EMERGENCY MEDICINE

## 2021-11-12 PROCEDURE — 99284 EMERGENCY DEPT VISIT MOD MDM: CPT | Mod: 25

## 2021-11-12 PROCEDURE — 36415 COLL VENOUS BLD VENIPUNCTURE: CPT | Performed by: EMERGENCY MEDICINE

## 2021-11-12 PROCEDURE — 80053 COMPREHEN METABOLIC PANEL: CPT | Performed by: EMERGENCY MEDICINE

## 2021-11-12 PROCEDURE — 250N000011 HC RX IP 250 OP 636: Performed by: EMERGENCY MEDICINE

## 2021-11-12 PROCEDURE — 258N000003 HC RX IP 258 OP 636: Performed by: EMERGENCY MEDICINE

## 2021-11-12 RX ORDER — CEFTRIAXONE 2 G/1
2 INJECTION, POWDER, FOR SOLUTION INTRAMUSCULAR; INTRAVENOUS ONCE
Status: COMPLETED | OUTPATIENT
Start: 2021-11-12 | End: 2021-11-12

## 2021-11-12 RX ORDER — CEPHALEXIN 500 MG/1
500 CAPSULE ORAL 3 TIMES DAILY
Qty: 21 CAPSULE | Refills: 0 | Status: SHIPPED | OUTPATIENT
Start: 2021-11-12 | End: 2021-11-13

## 2021-11-12 RX ADMIN — SODIUM CHLORIDE 1000 ML: 9 INJECTION, SOLUTION INTRAVENOUS at 23:10

## 2021-11-12 RX ADMIN — CEFTRIAXONE SODIUM 2 G: 2 INJECTION, POWDER, FOR SOLUTION INTRAMUSCULAR; INTRAVENOUS at 23:11

## 2021-11-12 ASSESSMENT — ENCOUNTER SYMPTOMS
FREQUENCY: 1
HEMATURIA: 1

## 2021-11-13 VITALS
HEART RATE: 76 BPM | TEMPERATURE: 98.4 F | OXYGEN SATURATION: 97 % | SYSTOLIC BLOOD PRESSURE: 105 MMHG | DIASTOLIC BLOOD PRESSURE: 67 MMHG | RESPIRATION RATE: 20 BRPM

## 2021-11-13 RX ORDER — CEPHALEXIN 500 MG/1
500 CAPSULE ORAL 3 TIMES DAILY
Qty: 21 CAPSULE | Refills: 0 | Status: SHIPPED | OUTPATIENT
Start: 2021-11-13 | End: 2022-08-15

## 2021-11-13 NOTE — ED PROVIDER NOTES
History   Chief Complaint:  Hematuria       The history is provided by the patient.      Ramiro Jeffers is a 61 year old male with history of pulmonary emboli, hypertension, CAD, and venous insufficiency who presents with hematuria. Patient states that he was with his brother when he experienced sudden hematuria around 1530 today. Since then he started having worsened symptoms including passing large blood clots, increased urinary pressure, and increased frequency. Patient is not on blood thinner other than baby aspirin. Denies back pain.    Review of Systems   Genitourinary: Positive for frequency and hematuria.   All other systems reviewed and are negative.      Allergies:  No Known Allergies    Medications:  Aspirin  81 MG tablet  Cholecalciferol   Hydrochlorothiazide   Isosorbide mononitrate   Levothyroxine   Metoprolol succinate ER   Multiple Vitamin   Nitroglycerin    Rosuvastatin     Past Medical History:     {Acquired hypothyroidism  Blood clots in the legs  CAD  Calculus of gallbladder  Cataract  Chest pressure  Hyperlipidemia  Hypertension  Ischemic cardiomyopathy  Pulmonary emboli  PVC's (premature ventricular contractions)   Shortness of breath   Venous insufficiency   Viral pneumonia, unspecified  Vitamin D deficiency   Pneumothorax  Chest pressure  Unstable angina  Skin ulcer of abdomen with fat layer  Morbid obesity      Past Surgical History:    Colonoscopy  Coronary angiogram  Left heart catheterization  Left ventriculogram  Laparoscopic cholecystectomy  Phacoemulsification  Clear cornea with standard intraocular lens implant  Laparoscopic gastric bypass  Abdominal exploration with gastrostomy tube placement    Family History:    Osteoporosis  Thyroid disease  Cancer  Melanoma  CAD    Social History:  Patient unaccompanied   PCP: Addison Davis    Physical Exam     Patient Vitals for the past 24 hrs:   BP Temp Temp src Pulse Resp SpO2   11/12/21 2234 -- -- -- -- -- 97 %   11/12/21 2232  (!) 155/83 -- -- 77 -- --   11/12/21 1850 126/70 98.4  F (36.9  C) Temporal 80 16 96 %       Physical Exam  Eyes:  The pupils are equal and round    Conjunctivae and sclerae are normal  ENT:    The nose is normal    Pinnae are normal  CV:  Regular rate and rhythm     No edema  Resp:  Lungs are clear    Non-labored    No rales    No wheezing   GI:  Abdomen is soft with mild suprapubic tenderness, there is no rigidity    No distension    No rebound tenderness   MS:  Normal muscular tone    No asymmetric leg swelling  Skin:  No rash or acute skin lesions noted  Neuro:   Awake, alert.      Speech is normal and fluent.    Face is symmetric.     Moves all extremities    Emergency Department Course   Laboratory:  Labs Ordered and Resulted from Time of ED Arrival to Time of ED Departure   UA MACROSCOPIC WITH REFLEX TO MICRO AND CULTURE - Abnormal       Result Value    Color Urine Orange (*)     Appearance Urine Slightly Cloudy (*)     Glucose Urine Negative      Bilirubin Urine Negative      Ketones Urine Negative      Specific Gravity Urine 1.024      Blood Urine Large (*)     pH Urine 6.0      Protein Albumin Urine 300  (*)     Urobilinogen Urine Normal      Nitrite Urine Negative      Leukocyte Esterase Urine Moderate (*)     Mucus Urine Present (*)     RBC Urine >182 (*)     WBC Urine >182 (*)    COMPREHENSIVE METABOLIC PANEL - Abnormal    Sodium 135      Potassium 3.6      Chloride 104      Carbon Dioxide (CO2) 25      Anion Gap 6      Urea Nitrogen 22      Creatinine 0.78      Calcium 8.6      Glucose 104 (*)     Alkaline Phosphatase 72      AST 24      ALT 24      Protein Total 7.5      Albumin 3.5      Bilirubin Total 0.5      GFR Estimate >90     CBC WITH PLATELETS AND DIFFERENTIAL - Abnormal    WBC Count 10.1      RBC Count 4.49      Hemoglobin 13.0 (*)     Hematocrit 39.9 (*)     MCV 89      MCH 29.0      MCHC 32.6      RDW 13.5      Platelet Count 161      % Neutrophils 67      % Lymphocytes 23      % Monocytes  7      % Eosinophils 2      % Basophils 1      % Immature Granulocytes 0      NRBCs per 100 WBC 0      Absolute Neutrophils 6.9      Absolute Lymphocytes 2.3      Absolute Monocytes 0.8      Absolute Eosinophils 0.2      Absolute Basophils 0.1      Absolute Immature Granulocytes 0.0      Absolute NRBCs 0.0     URINE CULTURE      Emergency Department Course:  Reviewed:  I reviewed nursing notes, vitals, past medical history and Care Everywhere    Assessments/Consults:  ED Course as of 11/12/21 2356   Fri Nov 12, 2021   2250 Obtained history and examined the patient as noted above     Interventions:  2310 0.9% sodium chloride BOLUS 1000 mL, IV  2311 Ceftriaxone 2 g vial to attach to  mL bag for adults, IV  2341 Ceftriaxone 2 g vial to attach to  mL bag for adults, IV    Disposition:  The patient was discharged to home.     Impression & Plan     CMS Diagnoses: None    Medical Decision Making:  Ramiro Jeffers is a 61 year old male with history of heart disease presenting to the emergency department with hematuria. He has had painful urination associated with hematuria that started this afternoon. He has been urinating some blood clots. Workup here reveals signs of UTI on his urinalysis. His white blood cell count her is normal. Overall he appears clinically well. He has been given fluids and he notes that his urine seems to be more clear than it was prior. He was also given a dose of IV antibiotics. Planned for a continued use of at-home oral antibiotics for one week. Follow-up with primary care provider as well with urology given the level of hematuria. Return if there is development of any new or worsening symptoms.     Diagnosis:    ICD-10-CM    1. Acute cystitis with hematuria  N30.01        Discharge Medications:  New Prescriptions    CEPHALEXIN (KEFLEX) 500 MG CAPSULE    Take 1 capsule (500 mg) by mouth 3 times daily for 7 days       Scribe Disclosure:  Jelani JACK, am serving as a scribe at  10:44 PM on 11/12/2021 to document services personally performed by Corby Gamino MD based on my observations and the provider's statements to me.            Corby Gamino MD  11/13/21 0104

## 2021-11-14 LAB — BACTERIA UR CULT: ABNORMAL

## 2021-11-15 NOTE — RESULT ENCOUNTER NOTE
Final Urine Culture Report on 11/14/21  [facility] Emergency Dept discharge antibiotic prescribed: Cephalexin (Keflex) 500 mg capsule, 1 capsule (500 mg) by mouth 3 times daily for 7 days.  #1. Bacteria, >100,000 colonies/mL Escherichia coli, is SUSCEPTIBLE to Antibiotic.    No change in treatment per [facility] ED lab result Urine Culture protocol.

## 2021-11-29 ENCOUNTER — TELEPHONE (OUTPATIENT)
Dept: OTHER | Facility: CLINIC | Age: 61
End: 2021-11-29
Payer: COMMERCIAL

## 2021-12-06 DIAGNOSIS — I25.118 CORONARY ARTERY DISEASE OF NATIVE ARTERY OF NATIVE HEART WITH STABLE ANGINA PECTORIS (H): ICD-10-CM

## 2021-12-06 DIAGNOSIS — I10 ESSENTIAL HYPERTENSION: ICD-10-CM

## 2021-12-06 DIAGNOSIS — E78.5 HYPERLIPIDEMIA LDL GOAL <70: ICD-10-CM

## 2021-12-06 DIAGNOSIS — E06.3 HYPOTHYROIDISM DUE TO HASHIMOTO'S THYROIDITIS: ICD-10-CM

## 2021-12-06 RX ORDER — LEVOTHYROXINE SODIUM 175 UG/1
TABLET ORAL
Qty: 60 TABLET | Refills: 3 | Status: SHIPPED | OUTPATIENT
Start: 2021-12-06 | End: 2022-06-23

## 2021-12-06 RX ORDER — LEVOTHYROXINE SODIUM 200 UG/1
TABLET ORAL
Qty: 30 TABLET | Refills: 3 | Status: SHIPPED | OUTPATIENT
Start: 2021-12-06 | End: 2022-06-23 | Stop reason: DRUGHIGH

## 2021-12-06 RX ORDER — METOPROLOL SUCCINATE 50 MG/1
TABLET, EXTENDED RELEASE ORAL
Qty: 135 TABLET | Refills: 3 | Status: SHIPPED | OUTPATIENT
Start: 2021-12-06 | End: 2022-11-14

## 2021-12-06 RX ORDER — ATORVASTATIN CALCIUM 40 MG/1
TABLET, FILM COATED ORAL
Qty: 90 TABLET | Refills: 3 | OUTPATIENT
Start: 2021-12-06

## 2021-12-06 RX ORDER — HYDROCHLOROTHIAZIDE 25 MG/1
TABLET ORAL
Qty: 90 TABLET | Refills: 3 | Status: SHIPPED | OUTPATIENT
Start: 2021-12-06 | End: 2022-08-15

## 2021-12-06 RX ORDER — LEVOTHYROXINE SODIUM 175 UG/1
TABLET ORAL
Qty: 45 TABLET | OUTPATIENT
Start: 2021-12-06

## 2021-12-06 NOTE — TELEPHONE ENCOUNTER
hydrochlorothiazide (HYDRODIURIL) 25 MG tablet  Last Written Prescription Date:  12/2/20  Last Fill Quantity: 90,  # refills: 3  Prescription approved per [facility] Refill Protocol.    levothyroxine (SYNTHROID/LEVOTHROID) 175 MCG tablet  Last Written Prescription Date:  9/9/21  Last Fill Quantity: 60,  # refills: 3  Unable to fill per [facility] protocol.  Medication and pharmacy loaded.    levothyroxine (SYNTHROID/LEVOTHROID) 200 MCG tablet  Last Written Prescription Date:  9/9/21  Last Fill Quantity: 30,  # refills: 3  Unable to fill per [facility] protocol.  Medication and pharmacy loaded.    metoprolol succinate ER (TOPROL-XL) 50 MG 24 hr tablet  Last Written Prescription Date:  12/2/20  Last Fill Quantity: 135,  # refills: 3   Prescription approved per [facility] Refill Protocol.    Atorvastatin - Rx request denied; patient now taking rosuvastatin 40 mg daily.    Last office visit: 9/9/2021   Follow up due:  December 2021

## 2021-12-08 ENCOUNTER — LAB (OUTPATIENT)
Dept: LAB | Facility: CLINIC | Age: 61
End: 2021-12-08
Payer: COMMERCIAL

## 2021-12-08 DIAGNOSIS — E06.3 HYPOTHYROIDISM DUE TO HASHIMOTO'S THYROIDITIS: ICD-10-CM

## 2021-12-08 DIAGNOSIS — E78.5 HYPERLIPIDEMIA LDL GOAL <70: ICD-10-CM

## 2021-12-08 DIAGNOSIS — R53.83 FATIGUE: ICD-10-CM

## 2021-12-08 DIAGNOSIS — I10 ESSENTIAL HYPERTENSION: ICD-10-CM

## 2021-12-08 DIAGNOSIS — R53.83 FATIGUE: Primary | ICD-10-CM

## 2021-12-08 DIAGNOSIS — E78.5 HYPERLIPEMIA: Primary | ICD-10-CM

## 2021-12-08 LAB
ALBUMIN SERPL-MCNC: 3.3 G/DL (ref 3.4–5)
ALP SERPL-CCNC: 71 U/L (ref 40–150)
ALT SERPL W P-5'-P-CCNC: 30 U/L (ref 0–70)
ANION GAP SERPL CALCULATED.3IONS-SCNC: 2 MMOL/L (ref 3–14)
AST SERPL W P-5'-P-CCNC: 28 U/L (ref 0–45)
BILIRUB DIRECT SERPL-MCNC: 0.1 MG/DL (ref 0–0.2)
BILIRUB SERPL-MCNC: 0.5 MG/DL (ref 0.2–1.3)
BUN SERPL-MCNC: 17 MG/DL (ref 7–30)
CALCIUM SERPL-MCNC: 9.1 MG/DL (ref 8.5–10.1)
CHLORIDE BLD-SCNC: 106 MMOL/L (ref 94–109)
CO2 SERPL-SCNC: 32 MMOL/L (ref 20–32)
CREAT SERPL-MCNC: 0.76 MG/DL (ref 0.66–1.25)
CRP SERPL HS-MCNC: <0.2 MG/L
GFR SERPL CREATININE-BSD FRML MDRD: >90 ML/MIN/1.73M2
GLUCOSE BLD-MCNC: 106 MG/DL (ref 70–99)
POTASSIUM BLD-SCNC: 3.7 MMOL/L (ref 3.4–5.3)
PROT SERPL-MCNC: 7.2 G/DL (ref 6.8–8.8)
SODIUM SERPL-SCNC: 140 MMOL/L (ref 133–144)
T3FREE SERPL-MCNC: 2.6 PG/ML (ref 2.3–4.2)
T4 FREE SERPL-MCNC: 1.35 NG/DL (ref 0.76–1.46)
TSH SERPL DL<=0.005 MIU/L-ACNC: 0.14 MU/L (ref 0.4–4)
VIT B12 SERPL-MCNC: 435 PG/ML (ref 193–986)

## 2021-12-08 PROCEDURE — 36415 COLL VENOUS BLD VENIPUNCTURE: CPT

## 2021-12-08 PROCEDURE — 80053 COMPREHEN METABOLIC PANEL: CPT

## 2021-12-08 PROCEDURE — 83695 ASSAY OF LIPOPROTEIN(A): CPT

## 2021-12-08 PROCEDURE — 84443 ASSAY THYROID STIM HORMONE: CPT

## 2021-12-08 PROCEDURE — 84439 ASSAY OF FREE THYROXINE: CPT

## 2021-12-08 PROCEDURE — 82248 BILIRUBIN DIRECT: CPT

## 2021-12-08 PROCEDURE — 80061 LIPID PANEL: CPT

## 2021-12-08 PROCEDURE — 86141 C-REACTIVE PROTEIN HS: CPT

## 2021-12-08 PROCEDURE — 84481 FREE ASSAY (FT-3): CPT

## 2021-12-08 PROCEDURE — 82607 VITAMIN B-12: CPT

## 2021-12-09 LAB — LPA SERPL-MCNC: 11 MG/DL

## 2021-12-22 ENCOUNTER — OFFICE VISIT (OUTPATIENT)
Dept: OTHER | Facility: CLINIC | Age: 61
End: 2021-12-22
Attending: INTERNAL MEDICINE
Payer: COMMERCIAL

## 2021-12-22 VITALS
WEIGHT: 234 LBS | OXYGEN SATURATION: 98 % | BODY MASS INDEX: 34.66 KG/M2 | RESPIRATION RATE: 16 BRPM | HEIGHT: 69 IN | DIASTOLIC BLOOD PRESSURE: 72 MMHG | HEART RATE: 69 BPM | SYSTOLIC BLOOD PRESSURE: 122 MMHG

## 2021-12-22 DIAGNOSIS — I10 ESSENTIAL HYPERTENSION: ICD-10-CM

## 2021-12-22 DIAGNOSIS — E06.3 HYPOTHYROIDISM DUE TO HASHIMOTO'S THYROIDITIS: ICD-10-CM

## 2021-12-22 DIAGNOSIS — I20.89 STABLE ANGINA (H): ICD-10-CM

## 2021-12-22 DIAGNOSIS — E78.5 HYPERLIPIDEMIA LDL GOAL <70: ICD-10-CM

## 2021-12-22 DIAGNOSIS — F43.21 GRIEF REACTION: Primary | ICD-10-CM

## 2021-12-22 PROCEDURE — G0463 HOSPITAL OUTPT CLINIC VISIT: HCPCS

## 2021-12-22 PROCEDURE — 99214 OFFICE O/P EST MOD 30 MIN: CPT | Performed by: INTERNAL MEDICINE

## 2021-12-22 ASSESSMENT — MIFFLIN-ST. JEOR: SCORE: 1856.8

## 2021-12-22 NOTE — PROGRESS NOTES
Ramiro Jeffers is a 61 year old male who is presenting at the current time to discuss his diagnosi(es) of        Essential hypertension  Hyperlipidemia LDL goal <70  Hypothyroidism due to Hashimoto's thyroiditis  Stable angina (H)  Grief reaction  .             HPI:Mr. Jeffers is a delightful, 60-year-old gentleman who had seen cardiology in 2018 when he complained of crescendo angina.  A coronary CTA had showed a significant amount of calcium, and then the coronary angiography showed a tight lesion in the proximal LAD that was stented.  EF was mildly reduced at 40%-45%, which subsequently improved to normal. He does not have any valve disease.  No clinical heart failure.  He does have CCS class II stable angina.  Overall, he currently denies any cardiovascular symptoms.  He was last seen by myself in person 2019, and last had a video visit on 2020.  Of note, a few months after his stent that was placed in 2019, he had another angiogram for ongoing chest discomfort that showed patent stents.  There is a possibility he does have microvascular disease.      Overall, Mr. Jeffers says he is doing well.  He denies any significant cardiovascular symptoms.  He denies any major cardiovascular issues.      His labs when last seen revealed he was overdosed on his Synthroid 175 mcg daily for his Hashimoto's. I reduced it to 150 mcg daily and labs now reveal he is underdosed.       When last seen in 2021, his TFTs were abnormal, and he needed his Synthroid dose increased to alternating between 150 and 175 daily. He presents currently to recheck labs.     His mother recently  and he is having a difficult time with her loss.      .        Review Of Systems  Skin: negative  Eyes: negative  Ears/Nose/Throat: negative  Respiratory: No shortness of breath, dyspnea on exertion, cough, or hemoptysis  Cardiovascular: negative  Gastrointestinal: negative  Genitourinary: negative  Musculoskeletal: negative  Neurologic:  negative  Psychiatric: negative  Hematologic/Lymphatic/Immunologic: negative  Endocrine: negative      PAST MEDICAL HISTORY:                  Past Medical History:   Diagnosis Date     ACQUIRED HYPOTHYROIDISM       Blood clot in the legs      CAD (coronary artery disease)     11/15/18 Cath: PCI with 3.5-16mm DORY to proximal LAD     Calculus of gallbladder without mention of cholecystitis or obstruction      Cataract      Chest pressure 11/5/2018     Hyperlipidemia      Hypertension      Ischemic cardiomyopathy     EF 40-45% on 11/2018 Echo     Obesity, unspecified     significant weight loss w/diet alone, on 10-15-10, BMI was 56.4     pulmonary emboli 6-2010    Unprovoked large bilateral pulmonary emboli without source identified on lower extremity dopplers, pt had a transient moderate lupus inhibitor upon initial presentation in June , 2010 which was gone in September, 2010;  all other thrombophilic workup is normal     Pulmonary embolus, bilateral      PVC's (premature ventricular contractions)     4% burden on 11/2018 Holter     Shortness of breath     on exertion     Venous insufficiency      Viral pneumonia, unspecified      Vitamin D deficiency        PAST SURGICAL HISTORY:                  Past Surgical History:   Procedure Laterality Date     COLONOSCOPY  11/17/2011    Procedure:COLONOSCOPY; colonoscopy; Surgeon:ELENA OROURKE; Location: GI     CV HEART CATHETERIZATION WITH POSSIBLE INTERVENTION Left 2/15/2019    Procedure: Coronary Angiogram;  Surgeon: Tulio Ortiz MD;  Location:  HEART CARDIAC CATH LAB     CV HEART CATHETERIZATION WITH POSSIBLE INTERVENTION N/A 10/5/2021    Procedure: Heart Catheterization with Possible Intervention;  Surgeon: Jose Francisco Dave MD;  Location:  HEART CARDIAC CATH LAB     CV LEFT HEART CATH N/A 10/5/2021    Procedure: Left Heart Cath;  Surgeon: Jose Francisco Dave MD;  Location:  HEART CARDIAC CATH LAB     CV LEFT VENTRICULOGRAM N/A 10/5/2021     Procedure: Left Ventriculogram;  Surgeon: Jose Francisco Dave MD;  Location:  HEART CARDIAC CATH LAB     LAPAROSCOPIC CHOLECYSTECTOMY  9/28/2012    Procedure: LAPAROSCOPIC CHOLECYSTECTOMY;  LAPAROSCOPIC CHOLECYSTECTOMY, LYSIS OF ADHESIONS;  Surgeon: Dutch Matta MD;  Location:  OR     LAPAROSCOPIC LYSIS ADHESIONS  9/28/2012    Procedure: LAPAROSCOPIC LYSIS ADHESIONS;;  Surgeon: Dutch Matta MD;  Location:  OR     LAPAROSCOPY, SURGICAL; REPAIR INCISIONAL OR VENTRAL HERNIA      repair of ventral strangulated surgery     PHACOEMULSIFICATION CLEAR CORNEA WITH STANDARD INTRAOCULAR LENS IMPLANT  12/19/2011    Procedure:PHACOEMULSIFICATION CLEAR CORNEA WITH STANDARD INTRAOCULAR LENS IMPLANT; RIGHT PHACOEMULSIFICATION CLEAR CORNEA WITH STANDARD INTRAOCULAR LENS IMPLANT ; Surgeon:ALLISON ANTONIO; Location:[facility] NONSPECIFIC PROCEDURE  10-    Laparoscopic gastric bypass,     [facility] NONSPECIFIC PROCEDURE  10-    1.  Attempted laparoscopic exploration with conversion to: .  Abdominal exploration. 3.  Reduction of incarcerated incisional hernia. 4.  Repair of incisional hernia.5.  Gastrostomy tube placement (#22 Arabic) into defunctionalized stomach.6.  Enterotomy with bowel decompression and primary repair. 7.  Abdominal lavage.        CURRENT MEDICATIONS:                  Current Outpatient Medications   Medication Sig Dispense Refill     aspirin 81 MG tablet Take 1 tablet (81 mg) by mouth daily Start tomorrow morning.       cephALEXin (KEFLEX) 500 MG capsule Take 1 capsule (500 mg) by mouth 3 times daily 21 capsule 0     Cholecalciferol (VITAMIN D) 2000 units tablet Take 1 tablet by mouth daily 100 tablet 12     hydrochlorothiazide (HYDRODIURIL) 25 MG tablet TAKE 1 TABLET(25 MG) BY MOUTH EVERY MORNING 90 tablet 3     isosorbide mononitrate (IMDUR) 30 MG 24 hr tablet Take 2 tablets (60 mg) by mouth daily 180 tablet 3     levothyroxine (SYNTHROID/LEVOTHROID) 175 MCG tablet Take 200 mcg PO  every 72 hours alternating with 175 mcg daily for two days in a row. 60 tablet 3     levothyroxine (SYNTHROID/LEVOTHROID) 200 MCG tablet Take 200 mcg PO every 72 hours alternating with 175 mcg daily for two days in a row. 30 tablet 3     metoprolol succinate ER (TOPROL-XL) 50 MG 24 hr tablet TAKE 1 AND 1/2 TABLETS(75 MG) BY MOUTH DAILY 135 tablet 3     Multiple Vitamin (MULTI-VITAMIN) per tablet Take 1 tablet by mouth 2 times daily        nitroGLYcerin (NITROSTAT) 0.4 MG sublingual tablet For chest pain place 1 tablet under the tongue every 5 minutes for 3 doses. If symptoms persist 5 minutes after 1st dose call 911. 25 tablet 3     rosuvastatin (CRESTOR) 40 MG tablet Take 1 tablet (40 mg) by mouth daily 90 tablet 3       ALLERGIES:                  Allergies   Allergen Reactions     No Known Allergies        SOCIAL HISTORY:                  Social History     Socioeconomic History     Marital status: Single     Spouse name: Not on file     Number of children: Not on file     Years of education: Not on file     Highest education level: Not on file   Occupational History     Occupation:      Employer: SELF   Tobacco Use     Smoking status: Never Smoker     Smokeless tobacco: Never Used   Substance and Sexual Activity     Alcohol use: No     Alcohol/week: 0.0 standard drinks     Drug use: No     Sexual activity: Not on file   Other Topics Concern     Parent/sibling w/ CABG, MI or angioplasty before 65F 55M? Not Asked   Social History Narrative     Not on file     Social Determinants of Health     Financial Resource Strain: Not on file   Food Insecurity: Not on file   Transportation Needs: Not on file   Physical Activity: Not on file   Stress: Not on file   Social Connections: Not on file   Intimate Partner Violence: Not on file   Housing Stability: Not on file       FAMILY HISTORY:                   Family History   Problem Relation Age of Onset     Osteoporosis Mother      Thyroid Disease Mother          no thyroid     Cancer Maternal Grandmother         ? lung cancer     Cancer Father         melanoma     Coronary Artery Disease Brother      Coronary Artery Disease Brother            Physical exam Reveals:    O/P: WNL  HEENT: WNL  NECK: No JVD, thyromegaly, or lymphadenopathy  HEART: RRR, no murmurs, gallops, or rubs  LUNGS: CTA bilaterally without rales, wheezes, or rhonchi  GI: NABS, nondistended, nontender, soft  EXT:without cyanosis, clubbing, or edema  NEURO: nonfocal  : no flank tenderness      Component      Latest Ref Rng & Units 11/11/2020 2/2/2021 8/23/2021   WBC      4.0 - 11.0 10e3/uL 5.9  5.5   RBC Count      4.40 - 5.90 10e6/uL 4.28 (L)  4.40   Hemoglobin      13.3 - 17.7 g/dL 12.8 (L)  13.4   Hematocrit      40.0 - 53.0 % 38.9 (L)  39.8 (L)   MCV      78 - 100 fL 91  91   MCH      26.5 - 33.0 pg 29.9  30.5   MCHC      31.5 - 36.5 g/dL 32.9  33.7   RDW      10.0 - 15.0 % 13.4  14.0   Platelet Count      150 - 450 10e3/uL 147 (L)  157   % Neutrophils      % 64.9  55   % Lymphocytes      % 23.9  32   % Monocytes      % 8.5  9   % Eosinophils      % 2.5  3   % Basophils      % 0.2  0   % Immature Granulocytes      %      NRBCs per 100 WBC      <1 /100      Absolute Neutrophils      1.6 - 8.3 10e3/uL   3.0   Absolute Lymphocytes      0.8 - 5.3 10e3/uL   1.8   Absolute Monocytes      0.0 - 1.3 10e3/uL   0.5   Absolute Eosinophils      0.0 - 0.7 10e3/uL   0.2   Absolute Basophils      0.0 - 0.2 10e3/uL   0.0   Absolute Immature Granulocytes      <=0.0 10e3/uL      Absolute NRBCs      10e3/uL      Absolute Neutrophil      1.6 - 8.3 10e9/L 3.8     Absolute Lymphocytes      0.8 - 5.3 10e9/L 1.4     Absolute Monocytes      0.0 - 1.3 10e9/L 0.5     Absolute Eosinophils      0.0 - 0.7 10e9/L 0.2     Absolute Basophils      0.0 - 0.2 10e9/L 0.0     Diff Method       Automated Method     Sodium      133 - 144 mmol/L 139  139   Potassium      3.4 - 5.3 mmol/L 3.5  3.7   Chloride      94 - 109 mmol/L 105  106    Carbon Dioxide      20 - 32 mmol/L 31  32   Anion Gap      3 - 14 mmol/L 3  1 (L)   Urea Nitrogen      7 - 30 mg/dL 18  21   Creatinine      0.66 - 1.25 mg/dL 0.77  0.91   Calcium      8.5 - 10.1 mg/dL 8.6  8.9   Glucose      70 - 99 mg/dL 99  98   Alkaline Phosphatase      40 - 150 U/L   72   AST      0 - 45 U/L 18  23   ALT      0 - 70 U/L 20  23   Protein Total      6.8 - 8.8 g/dL 6.8  7.1   Albumin      3.4 - 5.0 g/dL 3.4  3.5   Bilirubin Total      0.2 - 1.3 mg/dL 0.6  0.6   GFR Estimate      >60 mL/min/1.73m2 >90  >90   Color Urine      Colorless, Straw, Light Yellow, Yellow      Appearance Urine      Clear      Glucose Urine      Negative mg/dL      Bilirubin Urine      Negative      Ketones Urine      Negative mg/dL      Specific Gravity Urine      1.003 - 1.035      Blood Urine      Negative      pH Urine      5.0 - 7.0      Protein Albumin Urine      Negative mg/dL      Urobilinogen mg/dL      Normal, 2.0 mg/dL      Nitrite Urine      Negative      Leukocyte Esterase Urine      Negative      Mucus Urine      None Seen /LPF      RBC Urine      <=2 /HPF      WBC Urine      <=5 /HPF      Total Cholesterol      <=199 mg/dL 118     Triglycerides      <150 mg/dL 48  73   HDL Cholesterol      40 - 59 mg/dL 50     LDL Cholesterol      <=129 mg/dL 58     HDL Size      >=8.9 nm 9.1     VLDL Size      <=46.7 nm 48.4 (H)     LDL Particle Size      >=20.7 nm 21.1     Lge HDL Particle number      >=4.2 umol/L 5.8     HDL Particle Number NMR      >=33.0 umol/L 29.3 (L)     Lge VLDL Part number      <=2.7 nmol/L <1.5     Small LDL Particle number      <=634 nmol/L 267     LDL Particle Number      <=1,135 nmol/L 627     EER LipoFit by NMR       SEE NOTE     GFR Estimate If Black      >60 mL/min/1.73:m2 >90     Bilirubin Direct      0.0 - 0.2 mg/dL 0.2  0.2   Alkaline Phosphatase      40 - 150 U/L 84     Cholesterol      <200 mg/dL   132   HDL Cholesterol      >=40 mg/dL   46   LDL Cholesterol Calculated      <=100 mg/dL    71   Non HDL Cholesterol      <130 mg/dL   86   Patient Fasting > 8hrs?         Yes   Creatinine Urine      mg/dL   208   Albumin Urine mg/L      mg/L   28   Albumin Urine mg/g Cr      0.00 - 17.00 mg/g Cr   13.46   CRP Cardiac Risk      mg/L <0.2     Lipoprotein (a)      <=29 mg/dL 9     TSH      0.40 - 4.00 mU/L 0.03 (L) 28.97 (H) 9.57 (H)   T4 Free      0.76 - 1.46 ng/dL 1.70 (H) 0.51 (L) 0.93   Free T3      2.3 - 4.2 pg/mL 3.0 1.3 (L) 1.9 (L)   Hemoglobin A1C POCT      0 - 5.6 % 5.7 (H)     PSA      0.00 - 4.00 ug/L <0.01  <0.01   Hemoglobin A1C      0.0 - 5.6 %   5.6   Vitamin B12      193 - 986 pg/mL   374   SARS CoV2 PCR      Negative      INR      0.85 - 1.15      PTT      22 - 38 Seconds      C-Reactive Protein High Sensitivity      mg/L        Component      Latest Ref Rng & Units 10/2/2021 10/5/2021 11/12/2021   WBC      4.0 - 11.0 10e3/uL  4.4 10.1   RBC Count      4.40 - 5.90 10e6/uL  4.47 4.49   Hemoglobin      13.3 - 17.7 g/dL  13.1 (L) 13.0 (L)   Hematocrit      40.0 - 53.0 %  40.0 39.9 (L)   MCV      78 - 100 fL  90 89   MCH      26.5 - 33.0 pg  29.3 29.0   MCHC      31.5 - 36.5 g/dL  32.8 32.6   RDW      10.0 - 15.0 %  13.3 13.5   Platelet Count      150 - 450 10e3/uL  128 (L) 161   % Neutrophils      %   67   % Lymphocytes      %   23   % Monocytes      %   7   % Eosinophils      %   2   % Basophils      %   1   % Immature Granulocytes      %   0   NRBCs per 100 WBC      <1 /100   0   Absolute Neutrophils      1.6 - 8.3 10e3/uL   6.9   Absolute Lymphocytes      0.8 - 5.3 10e3/uL   2.3   Absolute Monocytes      0.0 - 1.3 10e3/uL   0.8   Absolute Eosinophils      0.0 - 0.7 10e3/uL   0.2   Absolute Basophils      0.0 - 0.2 10e3/uL   0.1   Absolute Immature Granulocytes      <=0.0 10e3/uL   0.0   Absolute NRBCs      10e3/uL   0.0   Absolute Neutrophil      1.6 - 8.3 10e9/L      Absolute Lymphocytes      0.8 - 5.3 10e9/L      Absolute Monocytes      0.0 - 1.3 10e9/L      Absolute Eosinophils      0.0  - 0.7 10e9/L      Absolute Basophils      0.0 - 0.2 10e9/L      Diff Method            Sodium      133 - 144 mmol/L  139 135   Potassium      3.4 - 5.3 mmol/L  3.7 3.6   Chloride      94 - 109 mmol/L  105 104   Carbon Dioxide      20 - 32 mmol/L  30 25   Anion Gap      3 - 14 mmol/L  4 6   Urea Nitrogen      7 - 30 mg/dL  19 22   Creatinine      0.66 - 1.25 mg/dL  0.95 0.78   Calcium      8.5 - 10.1 mg/dL  8.5 8.6   Glucose      70 - 99 mg/dL  101 (H) 104 (H)   Alkaline Phosphatase      40 - 150 U/L   72   AST      0 - 45 U/L   24   ALT      0 - 70 U/L   24   Protein Total      6.8 - 8.8 g/dL   7.5   Albumin      3.4 - 5.0 g/dL   3.5   Bilirubin Total      0.2 - 1.3 mg/dL   0.5   GFR Estimate      >60 mL/min/1.73m2  86 >90   Color Urine      Colorless, Straw, Light Yellow, Yellow   Orange (A)   Appearance Urine      Clear   Slightly Cloudy (A)   Glucose Urine      Negative mg/dL   Negative   Bilirubin Urine      Negative   Negative   Ketones Urine      Negative mg/dL   Negative   Specific Gravity Urine      1.003 - 1.035   1.024   Blood Urine      Negative   Large (A)   pH Urine      5.0 - 7.0   6.0   Protein Albumin Urine      Negative mg/dL   300 (A)   Urobilinogen mg/dL      Normal, 2.0 mg/dL   Normal   Nitrite Urine      Negative   Negative   Leukocyte Esterase Urine      Negative   Moderate (A)   Mucus Urine      None Seen /LPF   Present (A)   RBC Urine      <=2 /HPF   >182 (H)   WBC Urine      <=5 /HPF   >182 (H)   Total Cholesterol      <=199 mg/dL      Triglycerides      <150 mg/dL      HDL Cholesterol      40 - 59 mg/dL      LDL Cholesterol      <=129 mg/dL      HDL Size      >=8.9 nm      VLDL Size      <=46.7 nm      LDL Particle Size      >=20.7 nm      Lge HDL Particle number      >=4.2 umol/L      HDL Particle Number NMR      >=33.0 umol/L      Lge VLDL Part number      <=2.7 nmol/L      Small LDL Particle number      <=634 nmol/L      LDL Particle Number      <=1,135 nmol/L      EER LipoFit by NMR             GFR Estimate If Black      >60 mL/min/1.73:m2      Bilirubin Direct      0.0 - 0.2 mg/dL      Alkaline Phosphatase      40 - 150 U/L      Cholesterol      <200 mg/dL      HDL Cholesterol      >=40 mg/dL      LDL Cholesterol Calculated      <=100 mg/dL      Non HDL Cholesterol      <130 mg/dL      Patient Fasting > 8hrs?            Creatinine Urine      mg/dL      Albumin Urine mg/L      mg/L      Albumin Urine mg/g Cr      0.00 - 17.00 mg/g Cr      CRP Cardiac Risk      mg/L      Lipoprotein (a)      <=29 mg/dL      TSH      0.40 - 4.00 mU/L      T4 Free      0.76 - 1.46 ng/dL      Free T3      2.3 - 4.2 pg/mL      Hemoglobin A1C POCT      0 - 5.6 %      PSA      0.00 - 4.00 ug/L      Hemoglobin A1C      0.0 - 5.6 %      Vitamin B12      193 - 986 pg/mL      SARS CoV2 PCR      Negative Negative     INR      0.85 - 1.15  1.12    PTT      22 - 38 Seconds  28    C-Reactive Protein High Sensitivity      mg/L        Component      Latest Ref Rng & Units 12/8/2021   WBC      4.0 - 11.0 10e3/uL    RBC Count      4.40 - 5.90 10e6/uL    Hemoglobin      13.3 - 17.7 g/dL    Hematocrit      40.0 - 53.0 %    MCV      78 - 100 fL    MCH      26.5 - 33.0 pg    MCHC      31.5 - 36.5 g/dL    RDW      10.0 - 15.0 %    Platelet Count      150 - 450 10e3/uL    % Neutrophils      %    % Lymphocytes      %    % Monocytes      %    % Eosinophils      %    % Basophils      %    % Immature Granulocytes      %    NRBCs per 100 WBC      <1 /100    Absolute Neutrophils      1.6 - 8.3 10e3/uL    Absolute Lymphocytes      0.8 - 5.3 10e3/uL    Absolute Monocytes      0.0 - 1.3 10e3/uL    Absolute Eosinophils      0.0 - 0.7 10e3/uL    Absolute Basophils      0.0 - 0.2 10e3/uL    Absolute Immature Granulocytes      <=0.0 10e3/uL    Absolute NRBCs      10e3/uL    Absolute Neutrophil      1.6 - 8.3 10e9/L    Absolute Lymphocytes      0.8 - 5.3 10e9/L    Absolute Monocytes      0.0 - 1.3 10e9/L    Absolute Eosinophils      0.0 - 0.7 10e9/L     Absolute Basophils      0.0 - 0.2 10e9/L    Diff Method          Sodium      133 - 144 mmol/L 140   Potassium      3.4 - 5.3 mmol/L 3.7   Chloride      94 - 109 mmol/L 106   Carbon Dioxide      20 - 32 mmol/L 32   Anion Gap      3 - 14 mmol/L 2 (L)   Urea Nitrogen      7 - 30 mg/dL 17   Creatinine      0.66 - 1.25 mg/dL 0.76   Calcium      8.5 - 10.1 mg/dL 9.1   Glucose      70 - 99 mg/dL 106 (H)   Alkaline Phosphatase      40 - 150 U/L 71   AST      0 - 45 U/L 28   ALT      0 - 70 U/L 30   Protein Total      6.8 - 8.8 g/dL 7.2   Albumin      3.4 - 5.0 g/dL 3.3 (L)   Bilirubin Total      0.2 - 1.3 mg/dL 0.5   GFR Estimate      >60 mL/min/1.73m2 >90   Color Urine      Colorless, Straw, Light Yellow, Yellow    Appearance Urine      Clear    Glucose Urine      Negative mg/dL    Bilirubin Urine      Negative    Ketones Urine      Negative mg/dL    Specific Gravity Urine      1.003 - 1.035    Blood Urine      Negative    pH Urine      5.0 - 7.0    Protein Albumin Urine      Negative mg/dL    Urobilinogen mg/dL      Normal, 2.0 mg/dL    Nitrite Urine      Negative    Leukocyte Esterase Urine      Negative    Mucus Urine      None Seen /LPF    RBC Urine      <=2 /HPF    WBC Urine      <=5 /HPF    Total Cholesterol      <=199 mg/dL    Triglycerides      <150 mg/dL    HDL Cholesterol      40 - 59 mg/dL    LDL Cholesterol      <=129 mg/dL 75   HDL Size      >=8.9 nm    VLDL Size      <=46.7 nm    LDL Particle Size      >=20.7 nm    Lge HDL Particle number      >=4.2 umol/L    HDL Particle Number NMR      >=33.0 umol/L    Lge VLDL Part number      <=2.7 nmol/L    Small LDL Particle number      <=634 nmol/L    LDL Particle Number      <=1,135 nmol/L 870   EER LipoFit by NMR          GFR Estimate If Black      >60 mL/min/1.73:m2    Bilirubin Direct      0.0 - 0.2 mg/dL 0.1   Alkaline Phosphatase      40 - 150 U/L    Cholesterol      <200 mg/dL    HDL Cholesterol      >=40 mg/dL    LDL Cholesterol Calculated      <=100 mg/dL     Non HDL Cholesterol      <130 mg/dL    Patient Fasting > 8hrs?          Creatinine Urine      mg/dL    Albumin Urine mg/L      mg/L    Albumin Urine mg/g Cr      0.00 - 17.00 mg/g Cr    CRP Cardiac Risk      mg/L    Lipoprotein (a)      <=29 mg/dL 11   TSH      0.40 - 4.00 mU/L 0.14 (L)   T4 Free      0.76 - 1.46 ng/dL 1.35   Free T3      2.3 - 4.2 pg/mL 2.6   Hemoglobin A1C POCT      0 - 5.6 %    PSA      0.00 - 4.00 ug/L    Hemoglobin A1C      0.0 - 5.6 %    Vitamin B12      193 - 986 pg/mL 435   SARS CoV2 PCR      Negative    INR      0.85 - 1.15    PTT      22 - 38 Seconds    C-Reactive Protein High Sensitivity      mg/L <0.2       9-9-2021 nuc med stress test:          The nuclear stress test is abnormal.     There is a small area of a moderate degree of infarction in the apical segment(s) of the left ventricle. This appears to be in the left anterior descending distribution.     Left ventricular function is low normal.     The left ventricular ejection fraction at rest is 53%.  The left ventricular ejection fraction at stress is 48%.     A prior study was conducted on 10/16/2012. Images/report is not available for comparsion.     09- coronary angiography:    1.  Widely patent proximal LAD stent.  2.  Mild stenosis at the ostium of D2.  Normal coronary arteries elsewhere.  3.  Normal LVEF by ventriculogram.  No regional WMA noted.  4.  Normal LVEDP.               (E78.5) Hyperlipidemia LDL goal <70  (primary encounter diagnosis)  Comment:when last seen, not at at goal, LDL was 71 so we changed atorvastatin (LIPITOR) 40 MG tablet to Crestor 40 mg daily, and checked labs three months later. Those results reveal his LDL is 75, and his LDL particle number is < 1000.  Plan:  He is grieving over the loss of his mother and has not been eating well. No med changes, recheck labs in three months, concentrate on lifestyle months            (I25.118) Coronary artery disease of native artery of native heart with  stable angina pectoris (H)  Comment: asx presently  Plan: metoprolol succinate ER (TOPROL-XL) 50 MG 24 hr        tablet, isosorbide mononitrate (IMDUR) 30 MG 24        hr tablet            (I10) Essential hypertension  Comment:  at goal   Plan: isosorbide mononitrate (IMDUR) 30 MG 24 hr         tablet, hydrochlorothiazide (HYDRODIURIL) 25 MG        tablet, check labs in three months            (E03.8,  E06.3) Hypothyroidism due to Hashimoto's thyroiditis  Comment: needed to have dose increased to alternating between 200/175/175 daily when last seen. Labs now indicate TSH is very mildly suppressed but FT4 and FT3 are normal.   Plan: Keep on same dosing regimen, check labs in three months.          (I20.8) Stable angina (H)  (primary encounter diagnosis)  Comment: EKG when last seen revealed no acute ST changes. We checked a nuc med stress test. Those results revealed a small area of a moderate degree of infarction in the apical segment(s) of the left ventricle so he proceeded to coronary angiography on 09/24/2021 revealing  a widely patent proximal LAD stent, mild stenosis at the ostium of D2, and normal coronary arteries elsewhere.  Plan: Treat medically      (F43.21) Grief reaction  (primary encounter diagnosis)  Comment: he would like to see a grief counsellor  Plan: We will attempt to get him set up with one.

## 2021-12-22 NOTE — PROGRESS NOTES
"[facility]        Patient is here for a follow up  to discuss about lab results       BP (!) 149/69 (BP Location: Right arm, Patient Position: Chair, Cuff Size: Adult Large)   Pulse 69   Resp 16   Ht 5' 9\" (1.753 m)   Wt 234 lb (106.1 kg)   SpO2 98%   BMI 34.56 kg/m      The provider has been notified that the patient has no concerns.     Questions patient would like addressed today are: N/A.    Refills are needed: No    Has homecare services and agency name:  Jessy Vaughan [facility]      "

## 2021-12-24 ENCOUNTER — TELEPHONE (OUTPATIENT)
Dept: OTHER | Facility: CLINIC | Age: 61
End: 2021-12-24
Payer: COMMERCIAL

## 2021-12-24 DIAGNOSIS — F43.21 GRIEF: Primary | ICD-10-CM

## 2021-12-24 NOTE — TELEPHONE ENCOUNTER
Rx for referral for psychiatric evaluation - grief counseling loaded for review/signature.    Cristal Sotomayor RN BSN  [facility]  [phone]

## 2021-12-27 NOTE — TELEPHONE ENCOUNTER
I called Ramiro and discussed that unfortunately we are not able to get him in sooner with [facility] therapy.  However, we did discuss if he is in need of immediate help that the EMPATH unit here at [facility] is an option where he can come and speak with someone and they provide several resources to help guide the grieving process. I also suggested several online therapy web sites may have more access until he can get in with [place]. He verbalized understanding     Jeannie MENDOZA, RN    [facility]  [facility]  Office: [phone]  Fax: [phone]

## 2021-12-27 NOTE — TELEPHONE ENCOUNTER
Ramiro called back to say the grief counseling office called and said their next opening is in February.  Ramiro does not feel that he can wait until February and he feels he needs help sooner.  He is reaching out to see what other options may be available for him.

## 2022-01-12 ENCOUNTER — TELEPHONE (OUTPATIENT)
Dept: OTHER | Facility: CLINIC | Age: 62
End: 2022-01-12
Payer: COMMERCIAL

## 2022-01-12 NOTE — TELEPHONE ENCOUNTER
Please see encounter from 12/27/21.   I have discussed with Ramiro that a referral was placed and unfortunately we cannot help get him any sooner appointment as he reports that they have no appointments available until end of February. I again suggested to Ramiro to see if he can find online counseling or if he is feeling this is more immediate or is having any feelings of harming himself to please go to the EMPATH unit here at [facility]. He verbalized understanding.       Jenanie ZENGN, RN    [facility]  [facility]  Office: [phone]  Fax: [phone]

## 2022-01-12 NOTE — TELEPHONE ENCOUNTER
[facility]    Who is the name of the provider?:  Ryan      What is the location you see this provider at?: [place]    Reason for call:  Wants referral for grief therapist/counselor.      Can we leave a detailed message on this number?  YES

## 2022-04-16 ENCOUNTER — HEALTH MAINTENANCE LETTER (OUTPATIENT)
Age: 62
End: 2022-04-16

## 2022-04-28 ENCOUNTER — APPOINTMENT (OUTPATIENT)
Dept: GENERAL RADIOLOGY | Facility: CLINIC | Age: 62
End: 2022-04-28
Attending: EMERGENCY MEDICINE
Payer: COMMERCIAL

## 2022-04-28 ENCOUNTER — HOSPITAL ENCOUNTER (EMERGENCY)
Facility: CLINIC | Age: 62
Discharge: HOME | End: 2022-04-28
Attending: EMERGENCY MEDICINE | Admitting: EMERGENCY MEDICINE
Payer: COMMERCIAL

## 2022-04-28 VITALS
TEMPERATURE: 98.2 F | DIASTOLIC BLOOD PRESSURE: 67 MMHG | BODY MASS INDEX: 34.56 KG/M2 | OXYGEN SATURATION: 96 % | SYSTOLIC BLOOD PRESSURE: 128 MMHG | HEART RATE: 64 BPM | HEIGHT: 69 IN | RESPIRATION RATE: 18 BRPM

## 2022-04-28 DIAGNOSIS — S90.31XA CONTUSION OF RIGHT FOOT, INITIAL ENCOUNTER: ICD-10-CM

## 2022-04-28 PROCEDURE — 73630 X-RAY EXAM OF FOOT: CPT | Mod: RT

## 2022-04-28 PROCEDURE — 73610 X-RAY EXAM OF ANKLE: CPT | Mod: RT

## 2022-04-28 PROCEDURE — 99284 EMERGENCY DEPT VISIT MOD MDM: CPT

## 2022-04-28 ASSESSMENT — ENCOUNTER SYMPTOMS: JOINT SWELLING: 1

## 2022-04-28 NOTE — ED PROVIDER NOTES
"  History     Chief Complaint:  Foot Pain (rt)      KRISTA Jeffers is a 61 year old male who presents with right foot pain. The patient states that half a sheet of 3 quarter inch plywood fell from 8 feet and landed on his right ankle yesterday at 1100. He cannot put pressure on the foot. The area is painful to the touch and there is swelling to the area.      Review of Systems   Musculoskeletal: Positive for joint swelling.        Right ankle pain   All other systems reviewed and are negative.    Allergies:  No Known Allergies    Medications:    aspirin   cephalexin   hydrochlorothiazide   isosorbide mononitrate   levothyroxine   metoprolol succinate ER  nitroglycerin   rosuvastatin     Past Medical History:    Hypothyroidism  CAD  Calculus of gallbladder without mention of cholecystitis or obstruction  Cataract  Hyperlipidemia  Hypertension  Ischemic cardiomyopathy  PE  PVC's  Vitamin D deficiency  Morbid obesity  Pneumothorax    Past Surgical History:    colonoscopy  Heart catheterization  Left ventriculogram  cholecstectomy  Ventral hernia repair   Cataract surgery  Laparoscopic gastric bypass    Family History:    Mother: osteoporosis, thyroid disease  Maternal grandmother: cancer  Father: cancer  Brother: CAD    Social History:  Presents alone    Physical Exam     Patient Vitals for the past 24 hrs:   BP Temp Temp src Pulse Resp SpO2 Height   04/28/22 0842 128/67 98.2  F (36.8  C) Oral 64 18 96 % 1.753 m (5' 9\")     Physical Exam  General: Resting on the gurney  Head:  The scalp, face, and head appear normal  MS:  Right Leg:    Proximal fibula:   Normal and non-tender    Tibia:     Long shaft of the tibia is normal    Diamond-lateral malleolar ligaments: Swollen and tender    Deltoid ligament:   Normal    Lateral malleolus:   Mildly-tender and bruising is noted    Medial Malleolus:   Non-tender      Achilles tendon:   Normal, intact    5th MT base:    Tender to palpation    Foot bones:    Mild tenderness " dorsolaterally over the tarsals      Capillary Refill:   Normal.    Sensation:    Foot and ankle sensation are normal  Skin:  No rash or lesions noted, swelling is notes as above  Neuro: Speech is normal and fluent  Psych:  Awake. Alert.  Normal affect.  Appropriate interactions.    Emergency Department Course     Imaging:  Foot  XR, G/E 3 views, right   Preliminary Result   IMPRESSION:   1.  No fracture or joint malalignment.   2.  Tiny calcaneal enthesophytes.      XR Ankle Right G/E 3 Views   Preliminary Result   IMPRESSION:   1.  Normal joint spacing and alignment.   2.  No fracture.   3.  Mild soft tissue swelling about the ankle.   4.  Benign-appearing soft tissue calcification in the distal calf.        Report per radiology    Emergency Department Course:    Reviewed:  I reviewed nursing notes, vitals, past medical history, Care Everywhere and [facility]    Assessments:  0907 I obtained history and examined the patient as noted above.   0939 I rechecked the patient and explained findings.   1000 I rechecked the patient.    Disposition:  The patient was discharged to home.     Impression & Plan      Medical Decision Making:  This patient presents after a piece of plywood fell onto the lateral aspect of the distal right lower extremity near the lateral malleolus and dorsal lateral foot.  There is tenderness to the lateral malleolus and knee.  Lateral malleoli or ligaments, and in addition there is some mild tenderness in the area of the cuboid and lateral cuneiform.  The fifth metatarsal base region is mildly sore.  There may have been a mild inversion mechanism to the foot and ankle when the injury occurred.  This likely represents mainly contusion but also possibly mild sprain.  There is no evidence of acute fracture noted.  Given the patient has pain with weightbearing he will be placed in a hard soled shoe and crutches.  An ankle splint will be held at this time given that the patient has contusion and painful  swelling on the lateral aspect of the ankle which may preclude compliance of this.    Diagnosis:    ICD-10-CM    1. Contusion of right foot, initial encounter  S90.31XA          Scribe Disclosure:  Edwin JACK Hired, am serving as a scribe at 9:05 AM on 4/28/2022 to document services personally performed by Mathew Haile MD based on my observations and the provider's statements to me.      Mathew Haile MD  04/28/22 1032

## 2022-04-28 NOTE — ED TRIAGE NOTES
Pt reports piece of plywood fell from 8 feet and hit him in rt ankle. Pt states it was swollen last night but not today. Painful to touch.      Triage Assessment     Row Name 04/28/22 0843       Triage Assessment (Adult)    Airway WDL WDL       Respiratory WDL    Respiratory WDL WDL       Skin Circulation/Temperature WDL    Skin Circulation/Temperature WDL WDL       Cardiac WDL    Cardiac WDL WDL       Peripheral/Neurovascular WDL    Peripheral Neurovascular WDL WDL       Cognitive/Neuro/Behavioral WDL    Cognitive/Neuro/Behavioral WDL WDL

## 2022-04-28 NOTE — DISCHARGE INSTRUCTIONS
Ice to the tender area 3 times per day for 20 to 30 minutes, for the next few days.  Use the hard soled shoe for the next week as needed for comfort.  Started off by touching ear he will and then bear weight on the rest of the foot as time goes on.  You can use over-the-counter pain relievers such as Tylenol for pain  Use crutches for comfort for the next week to 10 days.

## 2022-06-01 ENCOUNTER — TELEPHONE (OUTPATIENT)
Dept: OTHER | Facility: CLINIC | Age: 62
End: 2022-06-01

## 2022-06-01 NOTE — TELEPHONE ENCOUNTER
Currently scheduled:    Future Appointments   Date Time Provider Department Center   6/9/2022  8:15 AM CS LAB [facility]   6/23/2022 11:40 AM Addison Davis MD [facility]     FYI: patient wanted to let the doctor know that he is experiencing fatigue and is still processing a recent loss of a loved one.

## 2022-06-09 ENCOUNTER — LAB (OUTPATIENT)
Dept: LAB | Facility: CLINIC | Age: 62
End: 2022-06-09
Payer: COMMERCIAL

## 2022-06-09 DIAGNOSIS — E06.3 HYPOTHYROIDISM DUE TO HASHIMOTO'S THYROIDITIS: ICD-10-CM

## 2022-06-09 DIAGNOSIS — E78.5 HYPERLIPIDEMIA LDL GOAL <70: ICD-10-CM

## 2022-06-09 DIAGNOSIS — I10 ESSENTIAL HYPERTENSION: ICD-10-CM

## 2022-06-09 LAB
ALBUMIN SERPL-MCNC: 3.6 G/DL (ref 3.4–5)
ALP SERPL-CCNC: 74 U/L (ref 40–150)
ALT SERPL W P-5'-P-CCNC: 17 U/L (ref 0–70)
ANION GAP SERPL CALCULATED.3IONS-SCNC: 5 MMOL/L (ref 3–14)
AST SERPL W P-5'-P-CCNC: 18 U/L (ref 0–45)
BASOPHILS # BLD AUTO: 0.1 10E3/UL (ref 0–0.2)
BASOPHILS NFR BLD AUTO: 1 %
BILIRUB SERPL-MCNC: 0.5 MG/DL (ref 0.2–1.3)
BUN SERPL-MCNC: 17 MG/DL (ref 7–30)
CALCIUM SERPL-MCNC: 8.9 MG/DL (ref 8.5–10.1)
CHLORIDE BLD-SCNC: 104 MMOL/L (ref 94–109)
CO2 SERPL-SCNC: 30 MMOL/L (ref 20–32)
CREAT SERPL-MCNC: 0.88 MG/DL (ref 0.66–1.25)
CRP SERPL HS-MCNC: 0.2 MG/L
EOSINOPHIL # BLD AUTO: 0.1 10E3/UL (ref 0–0.7)
EOSINOPHIL NFR BLD AUTO: 3 %
ERYTHROCYTE [DISTWIDTH] IN BLOOD BY AUTOMATED COUNT: 13.4 % (ref 10–15)
GFR SERPL CREATININE-BSD FRML MDRD: >90 ML/MIN/1.73M2
GLUCOSE BLD-MCNC: 105 MG/DL (ref 70–99)
HCT VFR BLD AUTO: 42.6 % (ref 40–53)
HGB BLD-MCNC: 13.6 G/DL (ref 13.3–17.7)
IMM GRANULOCYTES # BLD: 0 10E3/UL
IMM GRANULOCYTES NFR BLD: 0 %
LYMPHOCYTES # BLD AUTO: 1.7 10E3/UL (ref 0.8–5.3)
LYMPHOCYTES NFR BLD AUTO: 37 %
MCH RBC QN AUTO: 29.1 PG (ref 26.5–33)
MCHC RBC AUTO-ENTMCNC: 31.9 G/DL (ref 31.5–36.5)
MCV RBC AUTO: 91 FL (ref 78–100)
MONOCYTES # BLD AUTO: 0.5 10E3/UL (ref 0–1.3)
MONOCYTES NFR BLD AUTO: 12 %
NEUTROPHILS # BLD AUTO: 2.2 10E3/UL (ref 1.6–8.3)
NEUTROPHILS NFR BLD AUTO: 48 %
PLATELET # BLD AUTO: 139 10E3/UL (ref 150–450)
POTASSIUM BLD-SCNC: 3.9 MMOL/L (ref 3.4–5.3)
PROT SERPL-MCNC: 7.3 G/DL (ref 6.8–8.8)
RBC # BLD AUTO: 4.68 10E6/UL (ref 4.4–5.9)
SODIUM SERPL-SCNC: 139 MMOL/L (ref 133–144)
T3FREE SERPL-MCNC: 2.8 PG/ML (ref 2.3–4.2)
T4 FREE SERPL-MCNC: 1.47 NG/DL (ref 0.76–1.46)
TSH SERPL DL<=0.005 MIU/L-ACNC: 0.07 MU/L (ref 0.4–4)
WBC # BLD AUTO: 4.6 10E3/UL (ref 4–11)

## 2022-06-09 PROCEDURE — 84481 FREE ASSAY (FT-3): CPT

## 2022-06-09 PROCEDURE — 99000 SPECIMEN HANDLING OFFICE-LAB: CPT

## 2022-06-09 PROCEDURE — 83695 ASSAY OF LIPOPROTEIN(A): CPT | Mod: 90

## 2022-06-09 PROCEDURE — 84439 ASSAY OF FREE THYROXINE: CPT

## 2022-06-09 PROCEDURE — 80050 GENERAL HEALTH PANEL: CPT

## 2022-06-09 PROCEDURE — 80061 LIPID PANEL: CPT | Mod: 90

## 2022-06-09 PROCEDURE — 36415 COLL VENOUS BLD VENIPUNCTURE: CPT

## 2022-06-09 PROCEDURE — 86141 C-REACTIVE PROTEIN HS: CPT

## 2022-06-09 PROCEDURE — 83704 LIPOPROTEIN BLD QUAN PART: CPT | Mod: 90

## 2022-06-10 LAB — LPA SERPL-MCNC: 10 MG/DL

## 2022-06-12 LAB
CHOLEST SERPL-MCNC: 147 MG/DL
HDL SERPL QN: 8.8 NM
HDL SERPL-SCNC: 28.3 UMOL/L
HDLC SERPL-MCNC: 46 MG/DL
HLD.LARGE SERPL-SCNC: 3.7 UMOL/L
LDL SERPL QN: 20.7 NM
LDL SERPL-SCNC: 1152 NMOL/L
LDL SMALL SERPL-SCNC: 677 NMOL/L
LDLC SERPL CALC-MCNC: 85 MG/DL
PATHOLOGY STUDY: ABNORMAL
TRIGL SERPL-MCNC: 78 MG/DL
VLDL LARGE SERPL-SCNC: <1.5 NMOL/L
VLDL SERPL QN: 46.4 NM

## 2022-06-23 ENCOUNTER — LAB (OUTPATIENT)
Dept: LAB | Facility: CLINIC | Age: 62
End: 2022-06-23
Attending: INTERNAL MEDICINE
Payer: COMMERCIAL

## 2022-06-23 ENCOUNTER — OFFICE VISIT (OUTPATIENT)
Dept: OTHER | Facility: CLINIC | Age: 62
End: 2022-06-23
Attending: INTERNAL MEDICINE
Payer: COMMERCIAL

## 2022-06-23 VITALS
DIASTOLIC BLOOD PRESSURE: 61 MMHG | SYSTOLIC BLOOD PRESSURE: 116 MMHG | WEIGHT: 222 LBS | HEIGHT: 69 IN | OXYGEN SATURATION: 95 % | BODY MASS INDEX: 32.88 KG/M2 | HEART RATE: 69 BPM

## 2022-06-23 DIAGNOSIS — I10 ESSENTIAL HYPERTENSION: ICD-10-CM

## 2022-06-23 DIAGNOSIS — R06.09 DYSPNEA ON EXERTION: ICD-10-CM

## 2022-06-23 DIAGNOSIS — R20.0 THIGH NUMBNESS: ICD-10-CM

## 2022-06-23 DIAGNOSIS — R06.09 DYSPNEA ON EXERTION: Primary | ICD-10-CM

## 2022-06-23 DIAGNOSIS — E78.5 HYPERLIPIDEMIA LDL GOAL <70: ICD-10-CM

## 2022-06-23 DIAGNOSIS — I20.89 STABLE ANGINA (H): ICD-10-CM

## 2022-06-23 DIAGNOSIS — R73.01 IFG (IMPAIRED FASTING GLUCOSE): ICD-10-CM

## 2022-06-23 DIAGNOSIS — E06.3 HYPOTHYROIDISM DUE TO HASHIMOTO'S THYROIDITIS: ICD-10-CM

## 2022-06-23 DIAGNOSIS — F43.21 GRIEF REACTION: ICD-10-CM

## 2022-06-23 LAB — D DIMER PPP FEU-MCNC: 0.31 UG/ML FEU (ref 0–0.5)

## 2022-06-23 PROCEDURE — 99215 OFFICE O/P EST HI 40 MIN: CPT | Performed by: INTERNAL MEDICINE

## 2022-06-23 PROCEDURE — 85379 FIBRIN DEGRADATION QUANT: CPT

## 2022-06-23 PROCEDURE — G0463 HOSPITAL OUTPT CLINIC VISIT: HCPCS

## 2022-06-23 PROCEDURE — 36415 COLL VENOUS BLD VENIPUNCTURE: CPT

## 2022-06-23 RX ORDER — LEVOTHYROXINE SODIUM 175 UG/1
175 TABLET ORAL DAILY
Qty: 90 TABLET | Refills: 3 | Status: SHIPPED | OUTPATIENT
Start: 2022-06-23 | End: 2022-06-23 | Stop reason: DRUGHIGH

## 2022-06-23 RX ORDER — EZETIMIBE 10 MG/1
10 TABLET ORAL DAILY
Qty: 90 TABLET | Refills: 3 | Status: SHIPPED | OUTPATIENT
Start: 2022-06-23 | End: 2023-06-21

## 2022-06-23 NOTE — PROGRESS NOTES
"Patient is here to discuss follow up.    /61 (BP Location: Right arm, Patient Position: Chair, Cuff Size: Adult Large)   Pulse 69   Ht 5' 9\" (1.753 m)   Wt 222 lb (100.7 kg)   SpO2 95%   BMI 32.78 kg/m      Questions patient would like addressed today are: N/A.    Refills are needed: No    Has homecare services and agency name:  Jessy Villagran  "

## 2022-06-23 NOTE — PROGRESS NOTES
Ramiro Jeffers is a 61 year old male who is presenting at the current time to discuss his diagnosi(es) of .         Essential hypertension  Hyperlipidemia LDL goal <70  Hypothyroidism due to Hashimoto's thyroiditis  Stable angina (H)  Grief reaction  Dyspnea on exertion  IFG (impaired fasting glucose)  Thigh numbness          HPI:Mr. Jeffers is a 60-year-old gentleman who had seen cardiology in 2018 when he complained of crescendo angina.  A coronary CTA had showed a significant amount of calcium, and then the coronary angiography showed a tight lesion in the proximal LAD that was stented.  EF was mildly reduced at 40%-45%, which subsequently improved to normal. He does not have any valve disease.  No clinical heart failure.  He does have CCS class II stable angina.  Overall, he currently denies any cardiac symptoms, but does have ALAS after climbing a flight of stairs.  Of note, a few months after his stent that was placed in 2019, he had another angiogram for ongoing chest discomfort that showed patent stents.  There is a possibility he does have microvascular disease.      Overall, Mr. Jeffers says he is doing not well in genral.  He denies any significant cardiac symptoms. He does however have dyspnea on exertion which is increasing . He sleeps in a recliner because he gets SOB when lying flat.     His labs when previously seen revealed he was overdosed on his Synthroid 175 mcg daily for his Hashimoto's. I reduced it to 150 mcg daily and labs revealed he was underdosed. We changed to alternating dosing with 175/175/200 and TFTs now reveal he is still overdosed.     He presents currently to recheck labs.     His mother recently  and he is having a difficult time with her loss.     He also c/o intermittent left thigh numbness made worse or better by nothing in particular..    He also c/o grief issues.         Review Of Systems  Skin: negative  Eyes: negative  Ears/Nose/Throat: negative  Respiratory: Dyspnea on  exertion- after climbing a flight of stairs, positive PND; He states this feels completely different than when he had a PE.  Cardiovascular: negative  Gastrointestinal: negative  Genitourinary: negative  Musculoskeletal: three recent mechanical falls  Neurologic: positive for left thigh numbness with exacerbation when standing or climbing stairs; better with sitting.  Psychiatric: negative  Hematologic/Lymphatic/Immunologic: negative  Endocrine: negative        PAST MEDICAL HISTORY:                  Past Medical History:   Diagnosis Date     ACQUIRED HYPOTHYROIDISM       Blood clot in the legs      CAD (coronary artery disease)     11/15/18 Cath: PCI with 3.5-16mm DORY to proximal LAD     Calculus of gallbladder without mention of cholecystitis or obstruction      Cataract      Chest pressure 11/5/2018     Hyperlipidemia      Hypertension      Ischemic cardiomyopathy     EF 40-45% on 11/2018 Echo     Obesity, unspecified     significant weight loss w/diet alone, on 10-15-10, BMI was 56.4     pulmonary emboli 6-2010    Unprovoked large bilateral pulmonary emboli without source identified on lower extremity dopplers, pt had a transient moderate lupus inhibitor upon initial presentation in June , 2010 which was gone in September, 2010;  all other thrombophilic workup is normal     Pulmonary embolus, bilateral      PVC's (premature ventricular contractions)     4% burden on 11/2018 Holter     Shortness of breath     on exertion     Venous insufficiency      Viral pneumonia, unspecified      Vitamin D deficiency        PAST SURGICAL HISTORY:                  Past Surgical History:   Procedure Laterality Date     COLONOSCOPY  11/17/2011    Procedure:COLONOSCOPY; colonoscopy; Surgeon:ELENA OROURKE; Location: GI     CV HEART CATHETERIZATION WITH POSSIBLE INTERVENTION Left 2/15/2019    Procedure: Coronary Angiogram;  Surgeon: Tulio Ortiz MD;  Location:  HEART CARDIAC CATH LAB     CV HEART CATHETERIZATION WITH  POSSIBLE INTERVENTION N/A 10/5/2021    Procedure: Heart Catheterization with Possible Intervention;  Surgeon: Jose Francisco Dave MD;  Location:  HEART CARDIAC CATH LAB     CV LEFT HEART CATH N/A 10/5/2021    Procedure: Left Heart Cath;  Surgeon: Jose Francisco Dave MD;  Location:  HEART CARDIAC CATH LAB     CV LEFT VENTRICULOGRAM N/A 10/5/2021    Procedure: Left Ventriculogram;  Surgeon: Jose Francisco Dave MD;  Location:  HEART CARDIAC CATH LAB     LAPAROSCOPIC CHOLECYSTECTOMY  9/28/2012    Procedure: LAPAROSCOPIC CHOLECYSTECTOMY;  LAPAROSCOPIC CHOLECYSTECTOMY, LYSIS OF ADHESIONS;  Surgeon: Dutch Matta MD;  Location:  OR     LAPAROSCOPIC LYSIS ADHESIONS  9/28/2012    Procedure: LAPAROSCOPIC LYSIS ADHESIONS;;  Surgeon: Dutch Matta MD;  Location:  OR     LAPAROSCOPY, SURGICAL; REPAIR INCISIONAL OR VENTRAL HERNIA      repair of ventral strangulated surgery     PHACOEMULSIFICATION CLEAR CORNEA WITH STANDARD INTRAOCULAR LENS IMPLANT  12/19/2011    Procedure:PHACOEMULSIFICATION CLEAR CORNEA WITH STANDARD INTRAOCULAR LENS IMPLANT; RIGHT PHACOEMULSIFICATION CLEAR CORNEA WITH STANDARD INTRAOCULAR LENS IMPLANT ; Surgeon:ALLISON ANTONIO; Location:[facility] NONSPECIFIC PROCEDURE  10-    Laparoscopic gastric bypass,     [facility] NONSPECIFIC PROCEDURE  10-    1.  Attempted laparoscopic exploration with conversion to: .  Abdominal exploration. 3.  Reduction of incarcerated incisional hernia. 4.  Repair of incisional hernia.5.  Gastrostomy tube placement (#22 Qat[place]) into defunctionalized stomach.6.  Enterotomy with bowel decompression and primary repair. 7.  Abdominal lavage.        CURRENT MEDICATIONS:                  Current Outpatient Medications   Medication Sig Dispense Refill     aspirin 81 MG tablet Take 1 tablet (81 mg) by mouth daily Start tomorrow morning.       cephALEXin (KEFLEX) 500 MG capsule Take 1 capsule (500 mg) by mouth 3 times daily 21 capsule 0      Cholecalciferol (VITAMIN D) 2000 units tablet Take 1 tablet by mouth daily 100 tablet 12     hydrochlorothiazide (HYDRODIURIL) 25 MG tablet TAKE 1 TABLET(25 MG) BY MOUTH EVERY MORNING 90 tablet 3     isosorbide mononitrate (IMDUR) 30 MG 24 hr tablet Take 2 tablets (60 mg) by mouth daily 180 tablet 3     levothyroxine (SYNTHROID/LEVOTHROID) 175 MCG tablet Take 200 mcg PO every 72 hours alternating with 175 mcg daily for two days in a row. 60 tablet 3     levothyroxine (SYNTHROID/LEVOTHROID) 200 MCG tablet Take 200 mcg PO every 72 hours alternating with 175 mcg daily for two days in a row. 30 tablet 3     metoprolol succinate ER (TOPROL-XL) 50 MG 24 hr tablet TAKE 1 AND 1/2 TABLETS(75 MG) BY MOUTH DAILY 135 tablet 3     Multiple Vitamin (MULTI-VITAMIN) per tablet Take 1 tablet by mouth 2 times daily        nitroGLYcerin (NITROSTAT) 0.4 MG sublingual tablet For chest pain place 1 tablet under the tongue every 5 minutes for 3 doses. If symptoms persist 5 minutes after 1st dose call 911. 25 tablet 3     rosuvastatin (CRESTOR) 40 MG tablet Take 1 tablet (40 mg) by mouth daily 90 tablet 3       ALLERGIES:                  Allergies   Allergen Reactions     No Known Allergies        SOCIAL HISTORY:                  Social History     Socioeconomic History     Marital status: Single     Spouse name: Not on file     Number of children: Not on file     Years of education: Not on file     Highest education level: Not on file   Occupational History     Occupation:      Employer: SELF   Tobacco Use     Smoking status: Never Smoker     Smokeless tobacco: Never Used   Substance and Sexual Activity     Alcohol use: No     Alcohol/week: 0.0 standard drinks     Drug use: No     Sexual activity: Not on file   Other Topics Concern     Parent/sibling w/ CABG, MI or angioplasty before 65F 55M? Not Asked   Social History Narrative     Not on file     Social Determinants of Health     Financial Resource Strain: Not on file    Food Insecurity: Not on file   Transportation Needs: Not on file   Physical Activity: Not on file   Stress: Not on file   Social Connections: Not on file   Intimate Partner Violence: Not on file   Housing Stability: Not on file       FAMILY HISTORY:                   Family History   Problem Relation Age of Onset     Osteoporosis Mother      Thyroid Disease Mother         no thyroid     Cancer Maternal Grandmother         ? lung cancer     Cancer Father         melanoma     Coronary Artery Disease Brother      Coronary Artery Disease Brother              Physical exam Reveals:    O/P: WNL  HEENT: WNL  NECK: No JVD, thyromegaly, or lymphadenopathy  HEART: RRR, no murmurs, gallops, or rubs  LUNGS: CTA bilaterally without rales, wheezes, or rhonchi  GI: NABS, nondistended, nontender, soft  EXT:without cyanosis, clubbing, or edema  NEURO: nonfocal  : no flank tenderness      Component      Latest Ref Rng & Units 11/12/2021 12/8/2021 6/9/2022   WBC      4.0 - 11.0 10e3/uL 10.1  4.6   RBC Count      4.40 - 5.90 10e6/uL 4.49  4.68   Hemoglobin      13.3 - 17.7 g/dL 13.0 (L)  13.6   Hematocrit      40.0 - 53.0 % 39.9 (L)  42.6   MCV      78 - 100 fL 89  91   MCH      26.5 - 33.0 pg 29.0  29.1   MCHC      31.5 - 36.5 g/dL 32.6  31.9   RDW      10.0 - 15.0 % 13.5  13.4   Platelet Count      150 - 450 10e3/uL 161  139 (L)   % Neutrophils      % 67  48   % Lymphocytes      % 23  37   % Monocytes      % 7  12   % Eosinophils      % 2  3   % Basophils      % 1  1   % Immature Granulocytes      % 0  0   NRBCs per 100 WBC      <1 /100 0     Absolute Neutrophils      1.6 - 8.3 10e3/uL 6.9  2.2   Absolute Lymphocytes      0.8 - 5.3 10e3/uL 2.3  1.7   Absolute Monocytes      0.0 - 1.3 10e3/uL 0.8  0.5   Absolute Eosinophils      0.0 - 0.7 10e3/uL 0.2  0.1   Absolute Basophils      0.0 - 0.2 10e3/uL 0.1  0.1   Absolute Immature Granulocytes      <=0.4 10e3/uL 0.0  0.0   Absolute NRBCs      10e3/uL 0.0     Sodium      133 - 144  mmol/L 135 140 139   Potassium      3.4 - 5.3 mmol/L 3.6 3.7 3.9   Chloride      94 - 109 mmol/L 104 106 104   Carbon Dioxide      20 - 32 mmol/L 25 32 30   Anion Gap      3 - 14 mmol/L 6 2 (L) 5   Urea Nitrogen      7 - 30 mg/dL 22 17 17   Creatinine      0.66 - 1.25 mg/dL 0.78 0.76 0.88   Calcium      8.5 - 10.1 mg/dL 8.6 9.1 8.9   Glucose      70 - 99 mg/dL 104 (H) 106 (H) 105 (H)   Alkaline Phosphatase      40 - 150 U/L 72 71 74   AST      0 - 45 U/L 24 28 18   ALT      0 - 70 U/L 24 30 17   Protein Total      6.8 - 8.8 g/dL 7.5 7.2 7.3   Albumin      3.4 - 5.0 g/dL 3.5 3.3 (L) 3.6   Bilirubin Total      0.2 - 1.3 mg/dL 0.5 0.5 0.5   GFR Estimate      >60 mL/min/1.73m2 >90 >90 >90   Total Cholesterol      <=199 mg/dL   147   Triglycerides      30 - 149 mg/dL   78   HDL Cholesterol      40 - 59 mg/dL   46   LDL Cholesterol      <=129 mg/dL   85   HDL Size      >=8.9 nm   8.8 (L)   VLDL Size      <=46.7 nm   46.4   LDL Particle Size      >=20.7 nm   20.7   Lge HDL Particle number      >=4.2 umol/L   3.7 (L)   HDL Particle Number NMR      >=33.0 umol/L   28.3 (L)   Lge VLDL Part number      <=2.7 nmol/L   <1.5   Small LDL Particle number      <=634 nmol/L   677 (H)   LDL Particle Number      <=1135 nmol/L   1152 (H)   EER LipoFit by NMR         See Note   Bilirubin Direct      0.0 - 0.2 mg/dL  0.1    Lipoprotein (a)      <=29 mg/dL  11 10   TSH      0.40 - 4.00 mU/L  0.14 (L) 0.07 (L)   T4 Free      0.76 - 1.46 ng/dL  1.35 1.47 (H)   Free T3      2.3 - 4.2 pg/mL  2.6 2.8   C-Reactive Protein High Sensitivity      mg/L  <0.2 0.2   Vitamin B12      193 - 986 pg/mL  435          A/P:    (R06.00) Dyspnea on exertion  (primary encounter diagnosis)  Comment: he has a h/o CAD and of VTE. He is obese, he is not on AC. Check d diemr stat now. If elevated will need PE protocol CT chest and bialteral LE venous duplex stat now. Check TTE in next 1-3 weeks, RTC one week later  Plan: Echocardiogram Complete, D dimer  quantitative           (I10) Essential hypertension  Comment: at goal  Plan: Comprehensive metabolic panel           (E78.5) Hyperlipidemia LDL goal <70  Comment: not at goal, add zetia, repeat labs in three months  Plan: C-Reactive Protein, High Sensitivity, LipoFit         by NMR, Lipoprotein (a), ezetimibe (ZETIA) 10         MG tablet            (E03.8,  E06.3) Hypothyroidism due to Hashimoto's thyroiditis  Comment: Reduce dose to 175 daily  Plan: CBC with Platelets & Differential, T3 Free, T4         free, TSH, DISCONTINUED: levothyroxine         (SYNTHROID/LEVOTHROID) 175 MCG tablet             (F43.21) Grief reaction  Comment: my RN staff will get him set up for a referral with a psuychologist    (R73.01) IFG (impaired fasting glucose)  Comment: avoid CHO  Plan: Albumin Random Urine Quantitative with Creat         Ratio, Hemoglobin A1c        (R20.0) Bilateral lateral thigh numbness  Comment: check MR lumbar spine, suspect radiculopathy             47 minutes total care on today's date.

## 2022-06-24 ENCOUNTER — TELEPHONE (OUTPATIENT)
Dept: OTHER | Facility: CLINIC | Age: 62
End: 2022-06-24

## 2022-06-24 NOTE — TELEPHONE ENCOUNTER
Patient unable to complete in clinic scheduling.  Tests / visits ordered 6/23    D-Dimer lab completed 6/23    Future Appointments   Date Time Provider Department Center   7/3/2022  7:45 AM [email or web address]     Follow up on:    MRI and Davis to be scheduled for next available. Another lab and visit are needed in September.

## 2022-06-30 NOTE — TELEPHONE ENCOUNTER
Called patient to give central scheduling number for MRI. No option to leave voicemail available. Please provide patient wirh central scheduling phone.

## 2022-06-30 NOTE — TELEPHONE ENCOUNTER
Future Appointments   Date Time Provider Department Center   7/3/2022  7:45 AM [email or web address] [facility]   7/14/2022  7:30 AM [email or web address] LIZBETH Davalos [facility]   8/4/2022  8:40 AM Addison Davis MD [facility]

## 2022-07-03 ENCOUNTER — HOSPITAL ENCOUNTER (OUTPATIENT)
Dept: CARDIOLOGY | Facility: CLINIC | Age: 62
Discharge: HOME | End: 2022-07-03
Attending: INTERNAL MEDICINE | Admitting: INTERNAL MEDICINE
Payer: COMMERCIAL

## 2022-07-03 DIAGNOSIS — R06.09 DYSPNEA ON EXERTION: ICD-10-CM

## 2022-07-03 LAB — LVEF ECHO: NORMAL

## 2022-07-03 PROCEDURE — 93306 TTE W/DOPPLER COMPLETE: CPT | Mod: 26 | Performed by: INTERNAL MEDICINE

## 2022-07-03 PROCEDURE — 93306 TTE W/DOPPLER COMPLETE: CPT

## 2022-07-14 ENCOUNTER — ANCILLARY PROCEDURE (OUTPATIENT)
Dept: MRI IMAGING | Facility: CLINIC | Age: 62
End: 2022-07-14
Attending: INTERNAL MEDICINE
Payer: COMMERCIAL

## 2022-07-14 DIAGNOSIS — R20.0 THIGH NUMBNESS: ICD-10-CM

## 2022-07-14 PROCEDURE — 72148 MRI LUMBAR SPINE W/O DYE: CPT

## 2022-08-04 ENCOUNTER — OFFICE VISIT (OUTPATIENT)
Dept: OTHER | Facility: CLINIC | Age: 62
End: 2022-08-04
Attending: INTERNAL MEDICINE
Payer: COMMERCIAL

## 2022-08-04 VITALS
BODY MASS INDEX: 33.12 KG/M2 | SYSTOLIC BLOOD PRESSURE: 100 MMHG | HEART RATE: 56 BPM | OXYGEN SATURATION: 97 % | WEIGHT: 223.6 LBS | DIASTOLIC BLOOD PRESSURE: 57 MMHG | HEIGHT: 69 IN

## 2022-08-04 DIAGNOSIS — E06.3 HYPOTHYROIDISM DUE TO HASHIMOTO'S THYROIDITIS: ICD-10-CM

## 2022-08-04 DIAGNOSIS — F43.21 GRIEF REACTION: ICD-10-CM

## 2022-08-04 DIAGNOSIS — I51.7 LEFT ATRIAL DILATATION: Primary | ICD-10-CM

## 2022-08-04 DIAGNOSIS — R20.0 THIGH NUMBNESS: ICD-10-CM

## 2022-08-04 DIAGNOSIS — I10 ESSENTIAL HYPERTENSION: ICD-10-CM

## 2022-08-04 DIAGNOSIS — E78.5 HYPERLIPIDEMIA LDL GOAL <70: ICD-10-CM

## 2022-08-04 DIAGNOSIS — R06.09 DYSPNEA ON EXERTION: ICD-10-CM

## 2022-08-04 PROCEDURE — G0463 HOSPITAL OUTPT CLINIC VISIT: HCPCS

## 2022-08-04 PROCEDURE — 99215 OFFICE O/P EST HI 40 MIN: CPT | Performed by: INTERNAL MEDICINE

## 2022-08-04 RX ORDER — LEVOTHYROXINE SODIUM 150 UG/1
175 TABLET ORAL
COMMUNITY
Start: 2021-09-11 | End: 2022-10-04

## 2022-08-04 NOTE — PROGRESS NOTES
Ramiro Jeffers is a 61 year old male who is presenting at the current time to discuss his diagnosi(es) of .           Essential hypertension  Hyperlipidemia LDL goal <70  Hypothyroidism due to Hashimoto's thyroiditis  Stable angina (H)  Grief reaction  Dyspnea on exertion  IFG (impaired fasting glucose)  Thigh numbness              HPI:Mr. Jeffers is a 60-year-old gentleman who had seen cardiology in 2018 when he complained of crescendo angina.  A coronary CTA had showed a significant amount of calcium, and then the coronary angiography showed a tight lesion in the proximal LAD that was stented.  EF was mildly reduced at 40%-45%, which subsequently improved to normal. He does not have any valve disease.  No clinical heart failure.  He does have CCS class II stable angina.  Overall, he currently denies any cardiac symptoms, but does have ALAS after climbing a flight of stairs.  Of note, a few months after his stent that was placed in 2019, he had another angiogram for ongoing chest discomfort that showed patent stents.  There is a possibility he does have microvascular disease.      Overall, Mr. Jeffers says he is doing not well in genral.  He denies any significant cardiac symptoms. He does however have dyspnea on exertion which is increasing . He sleeps in a recliner because he gets SOB when lying flat.     His labs when previously seen revealed he was overdosed on his Synthroid 175 mcg daily for his Hashimoto's. I reduced it to 150 mcg daily and labs revealed he was underdosed. We changed to alternating dosing with 175/175/200 and TFTs now reveal he is still overdosed.     He presents currently to recheck labs.     His mother recently  and he is having a difficult time with her loss.      He also c/o intermittent left thigh numbness made worse or better by nothing in particular..     He also c/o grief issues.      When last seen on 2022, we were concerned given his h/o CAD and of VTE, not currently  on  AC, that his ALAS could be due to a PE. His d dimer, despite obesity was normal. His TTE reveals that his PA pressures are normal, his EF is now normal, but that his LA is dilated. He is not in AF presently.     He also due to c/o bilateral lateral thigh numbness  had a lumbar MR undertaken revealing no evidence for severe spinal canal compromise with mild spinal canal narrowing at L2-3 and L3-4. ANGELLA esparza did however have severe foraminal stenosis bilaterally L5-S1 right more the left suggestive of mixed unilateral or bilateral L5 radiculopathy due to contact of exiting L5 nerve roots at the right greater than left L5-S1 foraminal level. Mild bilateral foraminal   narrowing elsewhere in the lumbar spine. There was also bilateral L5 pars interarticularis defects with grade 2 anterolisthesis L5-S1 measuring 15 mm.        Due to a grief reaction with the death of his mother, my RN staff will got him set up for a referral with a psychologist     He presents for f/u of the above.                  Review Of Systems  Skin: negative  Eyes: negative  Ears/Nose/Throat: negative  Respiratory: Dyspnea on exertion- after climbing a flight of stairs, positive PND; He states this feels completely different than when he had a PE.  Cardiovascular: negative  Gastrointestinal: negative  Genitourinary: negative  Musculoskeletal: three recent mechanical falls  Neurologic: positive for left thigh numbness with exacerbation when standing or climbing stairs; better with sitting.  Psychiatric: negative  Hematologic/Lymphatic/Immunologic: negative  Endocrine: negative           PAST MEDICAL HISTORY:                  Past Medical History        Past Medical History:   Diagnosis Date     ACQUIRED HYPOTHYROIDISM        Blood clot in the legs       CAD (coronary artery disease)       11/15/18 Cath: PCI with 3.5-16mm DORY to proximal LAD     Calculus of gallbladder without mention of cholecystitis or obstruction       Cataract       Chest pressure  11/5/2018     Hyperlipidemia       Hypertension       Ischemic cardiomyopathy       EF 40-45% on 11/2018 Echo     Obesity, unspecified       significant weight loss w/diet alone, on 10-15-10, BMI was 56.4     pulmonary emboli 6-2010     Unprovoked large bilateral pulmonary emboli without source identified on lower extremity dopplers, pt had a transient moderate lupus inhibitor upon initial presentation in June , 2010 which was gone in September, 2010;  all other thrombophilic workup is normal     Pulmonary embolus, bilateral       PVC's (premature ventricular contractions)       4% burden on 11/2018 Holter     Shortness of breath       on exertion     Venous insufficiency       Viral pneumonia, unspecified       Vitamin D deficiency              PAST SURGICAL HISTORY:                  Past Surgical History         Past Surgical History:   Procedure Laterality Date     COLONOSCOPY   11/17/2011     Procedure:COLONOSCOPY; colonoscopy; Surgeon:ELENA OROURKE; Location: GI     CV HEART CATHETERIZATION WITH POSSIBLE INTERVENTION Left 2/15/2019     Procedure: Coronary Angiogram;  Surgeon: Tulio Ortiz MD;  Location:  HEART CARDIAC CATH LAB     CV HEART CATHETERIZATION WITH POSSIBLE INTERVENTION N/A 10/5/2021     Procedure: Heart Catheterization with Possible Intervention;  Surgeon: Jose Francisco Dave MD;  Location:  HEART CARDIAC CATH LAB     CV LEFT HEART CATH N/A 10/5/2021     Procedure: Left Heart Cath;  Surgeon: Jose Francisco Dave MD;  Location:  HEART CARDIAC CATH LAB     CV LEFT VENTRICULOGRAM N/A 10/5/2021     Procedure: Left Ventriculogram;  Surgeon: Jose Francisco Dave MD;  Location:  HEART CARDIAC CATH LAB     LAPAROSCOPIC CHOLECYSTECTOMY   9/28/2012     Procedure: LAPAROSCOPIC CHOLECYSTECTOMY;  LAPAROSCOPIC CHOLECYSTECTOMY, LYSIS OF ADHESIONS;  Surgeon: Dutch Matta MD;  Location:  OR     LAPAROSCOPIC LYSIS ADHESIONS   9/28/2012     Procedure: LAPAROSCOPIC LYSIS  ADHESIONS;;  Surgeon: Dutch Matta MD;  Location:  OR     LAPAROSCOPY, SURGICAL; REPAIR INCISIONAL OR VENTRAL HERNIA         repair of ventral strangulated surgery     PHACOEMULSIFICATION CLEAR CORNEA WITH STANDARD INTRAOCULAR LENS IMPLANT   12/19/2011     Procedure:PHACOEMULSIFICATION CLEAR CORNEA WITH STANDARD INTRAOCULAR LENS IMPLANT; RIGHT PHACOEMULSIFICATION CLEAR CORNEA WITH STANDARD INTRAOCULAR LENS IMPLANT ; Surgeon:ALLISON ANTONIO; Location: [facility]     [facility] NONSPECIFIC PROCEDURE   10-     Laparoscopic gastric bypass,     [facility] NONSPECIFIC PROCEDURE   10-     1.  Attempted laparoscopic exploration with conversion to: .  Abdominal exploration. 3.  Reduction of incarcerated incisional hernia. 4.  Repair of incisional hernia.5.  Gastrostomy tube placement (#22 [place]) into defunctionalized stomach.6.  Enterotomy with bowel decompression and primary repair. 7.  Abdominal lavage.             CURRENT MEDICATIONS:                  Current Outpatient Prescriptions          Current Outpatient Medications   Medication Sig Dispense Refill     aspirin 81 MG tablet Take 1 tablet (81 mg) by mouth daily Start tomorrow morning.         cephALEXin (KEFLEX) 500 MG capsule Take 1 capsule (500 mg) by mouth 3 times daily 21 capsule 0     Cholecalciferol (VITAMIN D) 2000 units tablet Take 1 tablet by mouth daily 100 tablet 12     hydrochlorothiazide (HYDRODIURIL) 25 MG tablet TAKE 1 TABLET(25 MG) BY MOUTH EVERY MORNING 90 tablet 3     isosorbide mononitrate (IMDUR) 30 MG 24 hr tablet Take 2 tablets (60 mg) by mouth daily 180 tablet 3     levothyroxine (SYNTHROID/LEVOTHROID) 175 MCG tablet Take 200 mcg PO every 72 hours alternating with 175 mcg daily for two days in a row. 60 tablet 3     levothyroxine (SYNTHROID/LEVOTHROID) 200 MCG tablet Take 200 mcg PO every 72 hours alternating with 175 mcg daily for two days in a row. 30 tablet 3     metoprolol succinate ER (TOPROL-XL) 50 MG 24 hr tablet TAKE 1 AND 1/2  TABLETS(75 MG) BY MOUTH DAILY 135 tablet 3     Multiple Vitamin (MULTI-VITAMIN) per tablet Take 1 tablet by mouth 2 times daily          nitroGLYcerin (NITROSTAT) 0.4 MG sublingual tablet For chest pain place 1 tablet under the tongue every 5 minutes for 3 doses. If symptoms persist 5 minutes after 1st dose call 911. 25 tablet 3     rosuvastatin (CRESTOR) 40 MG tablet Take 1 tablet (40 mg) by mouth daily 90 tablet 3            ALLERGIES:                       Allergies   Allergen Reactions     No Known Allergies           SOCIAL HISTORY:                  Social History   Social History            Socioeconomic History     Marital status: Single       Spouse name: Not on file     Number of children: Not on file     Years of education: Not on file     Highest education level: Not on file   Occupational History     Occupation:        Employer: SELF   Tobacco Use     Smoking status: Never Smoker     Smokeless tobacco: Never Used   Substance and Sexual Activity     Alcohol use: No       Alcohol/week: 0.0 standard drinks     Drug use: No     Sexual activity: Not on file   Other Topics Concern     Parent/sibling w/ CABG, MI or angioplasty before 65F 55M? Not Asked   Social History Narrative     Not on file      Social Determinants of Health      Financial Resource Strain: Not on file   Food Insecurity: Not on file   Transportation Needs: Not on file   Physical Activity: Not on file   Stress: Not on file   Social Connections: Not on file   Intimate Partner Violence: Not on file   Housing Stability: Not on file            FAMILY HISTORY:                   Family History         Family History   Problem Relation Age of Onset     Osteoporosis Mother       Thyroid Disease Mother           no thyroid     Cancer Maternal Grandmother           ? lung cancer     Cancer Father           melanoma     Coronary Artery Disease Brother       Coronary Artery Disease Brother                       Physical exam  Reveals:     O/P: WNL  HEENT: WNL  NECK: No JVD, thyromegaly, or lymphadenopathy  HEART: RRR, no murmurs, gallops, or rubs  LUNGS: CTA bilaterally without rales, wheezes, or rhonchi  GI: NABS, nondistended, nontender, soft  EXT:without cyanosis, clubbing, or edema  NEURO: nonfocal  : no flank tenderness        Component      Latest Ref Rng & Units 11/12/2021 12/8/2021 6/9/2022   WBC      4.0 - 11.0 10e3/uL 10.1   4.6   RBC Count      4.40 - 5.90 10e6/uL 4.49   4.68   Hemoglobin      13.3 - 17.7 g/dL 13.0 (L)   13.6   Hematocrit      40.0 - 53.0 % 39.9 (L)   42.6   MCV      78 - 100 fL 89   91   MCH      26.5 - 33.0 pg 29.0   29.1   MCHC      31.5 - 36.5 g/dL 32.6   31.9   RDW      10.0 - 15.0 % 13.5   13.4   Platelet Count      150 - 450 10e3/uL 161   139 (L)   % Neutrophils      % 67   48   % Lymphocytes      % 23   37   % Monocytes      % 7   12   % Eosinophils      % 2   3   % Basophils      % 1   1   % Immature Granulocytes      % 0   0   NRBCs per 100 WBC      <1 /100 0       Absolute Neutrophils      1.6 - 8.3 10e3/uL 6.9   2.2   Absolute Lymphocytes      0.8 - 5.3 10e3/uL 2.3   1.7   Absolute Monocytes      0.0 - 1.3 10e3/uL 0.8   0.5   Absolute Eosinophils      0.0 - 0.7 10e3/uL 0.2   0.1   Absolute Basophils      0.0 - 0.2 10e3/uL 0.1   0.1   Absolute Immature Granulocytes      <=0.4 10e3/uL 0.0   0.0   Absolute NRBCs      10e3/uL 0.0       Sodium      133 - 144 mmol/L 135 140 139   Potassium      3.4 - 5.3 mmol/L 3.6 3.7 3.9   Chloride      94 - 109 mmol/L 104 106 104   Carbon Dioxide      20 - 32 mmol/L 25 32 30   Anion Gap      3 - 14 mmol/L 6 2 (L) 5   Urea Nitrogen      7 - 30 mg/dL 22 17 17   Creatinine      0.66 - 1.25 mg/dL 0.78 0.76 0.88   Calcium      8.5 - 10.1 mg/dL 8.6 9.1 8.9   Glucose      70 - 99 mg/dL 104 (H) 106 (H) 105 (H)   Alkaline Phosphatase      40 - 150 U/L 72 71 74   AST      0 - 45 U/L 24 28 18   ALT      0 - 70 U/L 24 30 17   Protein Total      6.8 - 8.8 g/dL 7.5 7.2 7.3    Albumin      3.4 - 5.0 g/dL 3.5 3.3 (L) 3.6   Bilirubin Total      0.2 - 1.3 mg/dL 0.5 0.5 0.5   GFR Estimate      >60 mL/min/1.73m2 >90 >90 >90   Total Cholesterol      <=199 mg/dL     147   Triglycerides      30 - 149 mg/dL     78   HDL Cholesterol      40 - 59 mg/dL     46   LDL Cholesterol      <=129 mg/dL     85   HDL Size      >=8.9 nm     8.8 (L)   VLDL Size      <=46.7 nm     46.4   LDL Particle Size      >=20.7 nm     20.7   Lge HDL Particle number      >=4.2 umol/L     3.7 (L)   HDL Particle Number NMR      >=33.0 umol/L     28.3 (L)   Lge VLDL Part number      <=2.7 nmol/L     <1.5   Small LDL Particle number      <=634 nmol/L     677 (H)   LDL Particle Number      <=1135 nmol/L     1152 (H)   EER LipoFit by NMR           See Note   Bilirubin Direct      0.0 - 0.2 mg/dL   0.1     Lipoprotein (a)      <=29 mg/dL   11 10   TSH      0.40 - 4.00 mU/L   0.14 (L) 0.07 (L)   T4 Free      0.76 - 1.46 ng/dL   1.35 1.47 (H)   Free T3      2.3 - 4.2 pg/mL   2.6 2.8   C-Reactive Protein High Sensitivity      mg/L   <0.2 0.2   Vitamin B12      193 - 986 pg/mL   435           Component      Latest Ref Rng & Units 6/23/2022   D-Dimer Quantitative      0.00 - 0.50 ug/mL FEU 0.31            EXAM: MR LUMBAR SPINE W/O CONTRAST  LOCATION: [facility]  DATE/TIME: 7/14/2022 7:27 AM     INDICATION:  Thigh numbness.  COMPARISON: None.  TECHNIQUE: Routine Lumbar Spine MRI without IV contrast.     FINDINGS:   Nomenclature is based on 5 lumbar type vertebral bodies. Bilateral L5 pars interarticularis defects with adjacent osteophytes, with 15 mm anterolisthesis L5-S1. 2 mm retrolisthesis L4-5, 1 mm retrolisthesis L2-3 and L3-4 are degenerative. Hypertrophic   changes lower lumbar spine facet joints. Satisfactory sacral alignment. Rudimentary S1-2 interspace. Benign vertebral body hemangioma S2. No evidence for a marrow infiltrative process. Normal distal spinal cord and cauda equina with conus medullaris  at   L1. No cord expansive changes are noted. Nerve roots of the cauda equina are satisfactory without nodularity thickening. Mild rightward curve at the upper lumbar level. No retroperitoneal solid mass or adenopathy. Symmetric psoas appearance bilaterally.   Degenerative changes anterior SI joints. Visualized sacral neural foramina appear intact. Chronic Schmorl node endplate deformities lower thoracic and mid lumbar levels. Modic type I endplate signal change about the narrowed L3-L4 interspace can   represent independent source of pain generation. Modic type II endplate signal change about the L4-L5 and L5-S1 interspace levels. Nothing for sacral insufficiency or stress fracture. No presacral mass or fluid collections are present. Unremarkable   visualized bony pelvis.     T12-L1: Moderate loss of disc height with annular bulge. No disc herniation. No spinal stenosis or foraminal compromise. Facet joints are normal.      L1-L2: Moderate loss of disc height. Annular bulge asymmetric to the right without disc herniation. Mild facet joint spondylosis. No spinal stenosis or foraminal complex.     L2-L3: Marked loss of disc height. Generalized disc bulging. Mild spinal stenosis and mild bilateral foraminal narrowing. No disc herniation. Mild facet joint arthropathy left more than right.      L3-L4: Marked loss of disc height. Generalized disc bulge without disc herniation. Mild spinal stenosis. Mild bilateral foraminal narrowing left more than right. Mild left-sided facet arthropathy.     L4-L5: Mild loss of disc height. Low-grade retrolisthesis. Generalized disc bulge with superimposed broad-based central disc extrusion. No spinal stenosis. Mild narrowing of the L5 subarticular lateral recesses medially. Mild bilateral foraminal   narrowing. Mild hypertrophic changes involve the facet joints bilaterally.     L5-S1: Bilateral displaced L5 pars interarticularis defects with grade 2 anterolisthesis measuring 15 mm.  Marked loss of disc height. Uncovering of disc material superiorly. Facet joint arthropathy left more than right with hypertrophic changes about the   pars defects.. No disc herniation. Mild spinal stenosis. Severe foraminal narrowing right more than left. Correlate for mixed unilateral or bilateral L5 radiculopathy. There is also mild narrowing of the left S1 subarticular lateral recess.                                                                      IMPRESSION:  1.  Bilateral L5 pars interarticularis defects with grade 2 anterolisthesis L5-S1 measuring 15 mm.     2.  There is no evidence for severe spinal canal compromise with mild spinal canal narrowing at L2-3 and L3-4.     3.  Severe foraminal stenosis bilaterally L5-S1 right more the left correlate for mixed unilateral or bilateral L5 radiculopathy due to contact of exiting L5 nerve roots at the right greater than left L5-S1 foraminal level. Mild bilateral foraminal   narrowing elsewhere in the lumbar spine.     4.  See above for additional details and full description.          [id number]  [id number]  [id number]  [id number]^DI^MAGED^FLAQUITA     [facility]  Echocardiography Laboratory  [address] [place] [address]     Name: TASIA ANDREA  MRN: [id number]  : 1960  Study Date: 2022 07:55 AM  Age: 61 yrs  Gender: Male  Patient [facility]  Reason For Study: Dyspnea on exertion  Ordering Physician: MAGED SEVILLA  Referring Physician: MAGED SEVILLA  Performed By: Ritu Elizalde     BSA: 2.2 m2  Height: 69 in  Weight: 222 lb  HR: 53  BP: 116/61 mmHg  ______________________________________________________________________________  Procedure  Complete Echo Adult.  ______________________________________________________________________________  Interpretation Summary     Left ventricular systolic function is normal.  The visual ejection fraction is 55-60%.  Grade I diastolic function.  No  regional wall motion abnormalities noted.  The right ventricle is normal in size and function.  No significant valve disease.  Right ventricle systolic pressure estimate normal  The inferior vena cava was normal in size with preserved respiratory  variability.     Compared to the previous study dated 9/16/2021, LVEF has improved from 45-50%  to 55-60%.  ______________________________________________________________________________  Left Ventricle  The left ventricle is normal in size. There is normal left ventricular wall  thickness. Left ventricular systolic function is normal. The visual ejection  fraction is 55-60%. Grade I or early diastolic dysfunction. No regional wall  motion abnormalities noted.     Right Ventricle  The right ventricle is normal in size and function.     Atria  The left atrium is mildly dilated. Right atrial size is normal. There is no  color Doppler evidence of an atrial shunt.     Mitral Valve  The mitral valve leaflets are mildly thickened. There is mild mitral annular  calcification. There is trace mitral regurgitation.     Tricuspid Valve  Normal tricuspid valve. There is trace tricuspid regurgitation. The right  ventricular systolic pressure is approximated at 22.1 mmHg plus the right  atrial pressure. Right ventricle systolic pressure estimate normal.     Aortic Valve  The aortic valve is trileaflet with aortic valve sclerosis. There is trace  aortic regurgitation. No aortic stenosis is present.     Pulmonic Valve  The pulmonic valve is not well seen, but is grossly normal. There is trace  pulmonic valvular regurgitation.     Vessels  The aortic root is normal size. Normal size ascending aorta. The inferior vena  cava was normal in size with preserved respiratory variability.     Pericardium  There is no pericardial effusion.     Rhythm  Sinus rhythm was noted.  ______________________________________________________________________________  MMode/2D Measurements & Calculations  IVSd:  0.80 cm     LVIDd: 5.6 cm  LVIDs: 3.9 cm  LVPWd: 0.93 cm  FS: 30.0 %  LV mass(C)d: 183.5 grams  LV mass(C)dI: 85.0 grams/m2  Ao root diam: 3.2 cm  LA dimension: 4.5 cm  asc Aorta Diam: 3.3 cm  LA/Ao: 1.4  LVOT diam: 2.2 cm  LVOT area: 3.9 cm2  LA Volume (BP): 75.4 ml  LA Volume Index (BP): 34.9 ml/m2  RWT: 0.33     Doppler Measurements & Calculations  MV E max tara: 91.7 cm/sec  MV A max tara: 66.5 cm/sec  MV E/A: 1.4  MV dec slope: 703.7 cm/sec2  MV dec time: 0.13 sec  PA acc time: 0.19 sec  TR max tara: 234.8 cm/sec  TR max P.1 mmHg  E/E' av.7  Lateral E/e': 11.9  Medial E/e': 11.5     ______________________________________________________________________________  Report approved by: Dr Boogie Gregg 2022 09:41 AM                  A/P:     (R06.00) Dyspnea on exertion  (primary encounter diagnosis)  Comment: D dimer and TTE failed to reveal a source for ALAS.  Plan: Lose weight. He is likely simply deconditioned.            (I10) Essential hypertension  Comment: at goal  Plan: Comprehensive metabolic panel            (E78.5) Hyperlipidemia LDL goal <70  Comment: not at goal, added zetia, repeat labs in September  Plan: C-Reactive Protein, High Sensitivity, LipoFit         by NMR, Lipoprotein (a), ezetimibe (ZETIA) 10         MG tablet             (E03.8,  E06.3) Hypothyroidism due to Hashimoto's thyroiditis  Comment: Reduced dose to 175 daily, will check labs in September  Plan: CBC with Platelets & Differential, T3 Free, T4         free, TSH, DISCONTINUED: levothyroxine         (SYNTHROID/LEVOTHROID) 175 MCG tablet             (I51.7) Left atrial dilatation  (primary encounter diagnosis)  Comment: He has no h/o AF. He has no h/o ANTONIO. If he does have AF that we are unaware of this increases his risk of stroke. As such we will check an event monitor on him  Plan: Adult Cardiac Event Monitor        RTC 6 weeks after event monitor returned        (R20.0) Bilateral lateral thigh numbness  Comment: He has  an L5 radiculopathy w/o myelopathy due to nerve root impingement due to foraminal stenosis.   Plan: At present he does not wish to address this with PT, LESI's, or surgical intervention. Monitor sxs, RTC prn.                 47 minutes total care on today's date.

## 2022-08-04 NOTE — PROGRESS NOTES
"Patient is here to discuss follow up.    /57 (BP Location: Right arm, Patient Position: Chair, Cuff Size: Adult Regular)   Pulse 56   Ht 5' 9\" (1.753 m)   Wt 223 lb 9.6 oz (101.4 kg)   SpO2 97%   BMI 33.02 kg/m      Questions patient would like addressed today are: N/A.    Refills are needed: No    Has homecare services and agency name:  Jessy Villagran  "

## 2022-08-14 DIAGNOSIS — I10 ESSENTIAL HYPERTENSION: ICD-10-CM

## 2022-08-14 DIAGNOSIS — E78.5 HYPERLIPIDEMIA LDL GOAL <70: ICD-10-CM

## 2022-08-15 RX ORDER — ROSUVASTATIN CALCIUM 40 MG/1
TABLET, COATED ORAL
Qty: 90 TABLET | Refills: 3 | Status: SHIPPED | OUTPATIENT
Start: 2022-08-15 | End: 2023-07-24

## 2022-08-15 RX ORDER — NITROGLYCERIN 0.4 MG/1
TABLET SUBLINGUAL
COMMUNITY
Start: 2021-02-19 | End: 2022-08-15

## 2022-08-15 RX ORDER — ATORVASTATIN CALCIUM 40 MG/1
TABLET, FILM COATED ORAL
COMMUNITY
Start: 2021-09-11 | End: 2023-01-26 | Stop reason: ALTCHOICE

## 2022-08-15 RX ORDER — ASPIRIN 81 MG/1
TABLET, CHEWABLE ORAL EVERY 24 HOURS
COMMUNITY
Start: 2021-02-19 | End: 2022-08-15

## 2022-08-15 RX ORDER — HYDROCHLOROTHIAZIDE 25 MG/1
TABLET ORAL
Qty: 90 TABLET | Refills: 3 | Status: ON HOLD | OUTPATIENT
Start: 2022-08-15 | End: 2023-07-18

## 2022-08-15 NOTE — TELEPHONE ENCOUNTER
"hydrochlorothiazide (HYDRODIURIL) 25 MG tablet  Last Written Prescription Date:  12/6/21  Last Fill Quantity: 90,  # refills: 3    rosuvastatin (CRESTOR) 40 MG tablet  Last Written Prescription Date:  9/9/21  Last Fill Quantity: 90,  # refills: 3    Last office visit 8/4/22    Requested Prescriptions   Pending Prescriptions Disp Refills     rosuvastatin (CRESTOR) 40 MG tablet [Pharmacy Med Name: ROSUVASTATIN 40MG TABLETS] 90 tablet 3     Sig: TAKE 1 TABLET(40 MG) BY MOUTH DAILY       Statins Protocol Passed - 8/14/2022  7:28 AM        Passed - LDL on file in past 12 months     Recent Labs   Lab Test 08/23/21  0920   LDL 71             Passed - No abnormal creatine kinase in past 12 months     Recent Labs   Lab Test 10/13/14  1510                   Passed - Recent (12 mo) or future (30 days) visit within the authorizing provider's specialty     Patient has had an office visit with the authorizing provider or a provider within the authorizing providers department within the previous 12 mos or has a future within next 30 days. See \"Patient Info\" tab in inbasket, or \"Choose Columns\" in Meds & Orders section of the refill encounter.              Passed - Medication is active on med list        Passed - Patient is age 18 or older           hydrochlorothiazide (HYDRODIURIL) 25 MG tablet [Pharmacy Med Name: HYDROCHLOROTHIAZIDE 25MG TABLETS] 90 tablet 3     Sig: TAKE 1 TABLET(25 MG) BY MOUTH EVERY MORNING       Diuretics (Including Combos) Protocol Passed - 8/14/2022  7:28 AM        Passed - Blood pressure under 140/90 in past 12 months     BP Readings from Last 3 Encounters:   08/04/22 100/57   06/23/22 116/61   04/28/22 128/67                 Passed - Recent (12 mo) or future (30 days) visit within the authorizing provider's specialty     Patient has had an office visit with the authorizing provider or a provider within the authorizing providers department within the previous 12 mos or has a future within next 30 " "days. See \"Patient Info\" tab in inbasket, or \"Choose Columns\" in Meds & Orders section of the refill encounter.              Passed - Medication is active on med list        Passed - Patient is age 18 or older        Passed - Normal serum creatinine on file in past 12 months     Recent Labs   Lab Test 06/09/22  0753   CR 0.88              Passed - Normal serum potassium on file in past 12 months     Recent Labs   Lab Test 06/09/22  0753   POTASSIUM 3.9                    Passed - Normal serum sodium on file in past 12 months     Recent Labs   Lab Test 06/09/22  0753                  Signed Prescriptions Disp Refills    aspirin (ASA) 81 MG chewable tablet       Sig: Take by mouth every 24 hours       There is no refill protocol information for this order       atorvastatin (LIPITOR) 40 MG tablet         There is no refill protocol information for this order       Cholecalciferol 10 MCG (400 UNIT) CAPS         There is no refill protocol information for this order       nitroGLYcerin (NITROSTAT) 0.4 MG sublingual tablet         There is no refill protocol information for this order        Prescription approved per [facility] Refill Protocol.                   "

## 2022-08-16 DIAGNOSIS — I10 ESSENTIAL HYPERTENSION: ICD-10-CM

## 2022-08-16 DIAGNOSIS — I25.118 CORONARY ARTERY DISEASE OF NATIVE ARTERY OF NATIVE HEART WITH STABLE ANGINA PECTORIS (H): ICD-10-CM

## 2022-08-16 RX ORDER — ISOSORBIDE MONONITRATE 30 MG/1
60 TABLET, EXTENDED RELEASE ORAL DAILY
Qty: 180 TABLET | Refills: 0 | Status: SHIPPED | OUTPATIENT
Start: 2022-08-16 | End: 2022-11-14

## 2022-08-16 NOTE — TELEPHONE ENCOUNTER
Spoke to patient and reviewed need for visit with his cardiologist in order to receive medication refills beyond 90-days. Pt verbalized understanding, will call to make appointment. Scheduling phone number provided. Refill sent to pharmacy.

## 2022-08-16 NOTE — TELEPHONE ENCOUNTER
Received refill request for:  Isosorbide   Last OV was: 10/26/21 LISSETTE Larkin  Labs/EKG: N/a  F/U scheduled: No Cardiology follow up. Pt was to follow up in 3 months from last visit. 3 letters/attempts were made to schedule.   Future Appointments   Date Time Provider Department Center   8/23/2022  7:30 AM [facility] SHCVCV CVIMG   9/23/2022  7:30 AM CS LAB CSLABR CS   10/7/2022  8:40 AM Addison Davis MD [facility]     Pharmacy: [facility] Cardiology Refill Guideline reviewed.  Medication does not meet criteria for refill due to overdue follow up, 3 attempts made without success to schedule.  Messaged to providers team for further review.     Keri Barfield RN, BSN  08/16/22 at 9:02 AM

## 2022-08-23 ENCOUNTER — HOSPITAL ENCOUNTER (OUTPATIENT)
Dept: CARDIOLOGY | Facility: CLINIC | Age: 62
Discharge: HOME | End: 2022-08-23
Attending: INTERNAL MEDICINE | Admitting: INTERNAL MEDICINE
Payer: COMMERCIAL

## 2022-08-23 DIAGNOSIS — I51.7 LEFT ATRIAL DILATATION: ICD-10-CM

## 2022-08-23 PROCEDURE — 93270 REMOTE 30 DAY ECG REV/REPORT: CPT

## 2022-08-23 PROCEDURE — 93272 ECG/REVIEW INTERPRET ONLY: CPT | Performed by: INTERNAL MEDICINE

## 2022-09-23 ENCOUNTER — LAB (OUTPATIENT)
Dept: LAB | Facility: CLINIC | Age: 62
End: 2022-09-23
Payer: COMMERCIAL

## 2022-09-23 DIAGNOSIS — E06.3 HYPOTHYROIDISM DUE TO HASHIMOTO'S THYROIDITIS: ICD-10-CM

## 2022-09-23 DIAGNOSIS — R73.01 IFG (IMPAIRED FASTING GLUCOSE): ICD-10-CM

## 2022-09-23 DIAGNOSIS — E78.5 HYPERLIPIDEMIA LDL GOAL <70: ICD-10-CM

## 2022-09-23 DIAGNOSIS — I10 ESSENTIAL HYPERTENSION: ICD-10-CM

## 2022-09-23 LAB
ALBUMIN SERPL-MCNC: 3.6 G/DL (ref 3.4–5)
ALP SERPL-CCNC: 65 U/L (ref 40–150)
ALT SERPL W P-5'-P-CCNC: 25 U/L (ref 0–70)
ANION GAP SERPL CALCULATED.3IONS-SCNC: 6 MMOL/L (ref 3–14)
APO A-I SERPL-MCNC: 18 MG/DL
AST SERPL W P-5'-P-CCNC: 23 U/L (ref 0–45)
BASOPHILS # BLD AUTO: 0 10E3/UL (ref 0–0.2)
BASOPHILS NFR BLD AUTO: 1 %
BILIRUB SERPL-MCNC: 0.6 MG/DL (ref 0.2–1.3)
BUN SERPL-MCNC: 23 MG/DL (ref 7–30)
CALCIUM SERPL-MCNC: 9.1 MG/DL (ref 8.5–10.1)
CHLORIDE BLD-SCNC: 102 MMOL/L (ref 94–109)
CO2 SERPL-SCNC: 29 MMOL/L (ref 20–32)
CREAT SERPL-MCNC: 0.82 MG/DL (ref 0.66–1.25)
CREAT UR-MCNC: 102 MG/DL
CRP SERPL HS-MCNC: <0.15 MG/L
EOSINOPHIL # BLD AUTO: 0.2 10E3/UL (ref 0–0.7)
EOSINOPHIL NFR BLD AUTO: 4 %
ERYTHROCYTE [DISTWIDTH] IN BLOOD BY AUTOMATED COUNT: 13.1 % (ref 10–15)
GFR SERPL CREATININE-BSD FRML MDRD: >90 ML/MIN/1.73M2
GLUCOSE BLD-MCNC: 97 MG/DL (ref 70–99)
HBA1C MFR BLD: 5.8 % (ref 0–5.6)
HCT VFR BLD AUTO: 43 % (ref 40–53)
HGB BLD-MCNC: 14.1 G/DL (ref 13.3–17.7)
IMM GRANULOCYTES # BLD: 0 10E3/UL
IMM GRANULOCYTES NFR BLD: 0 %
LYMPHOCYTES # BLD AUTO: 1.5 10E3/UL (ref 0.8–5.3)
LYMPHOCYTES NFR BLD AUTO: 27 %
MCH RBC QN AUTO: 29.2 PG (ref 26.5–33)
MCHC RBC AUTO-ENTMCNC: 32.8 G/DL (ref 31.5–36.5)
MCV RBC AUTO: 89 FL (ref 78–100)
MICROALBUMIN UR-MCNC: 8 MG/L
MICROALBUMIN/CREAT UR: 7.84 MG/G CR (ref 0–17)
MONOCYTES # BLD AUTO: 0.5 10E3/UL (ref 0–1.3)
MONOCYTES NFR BLD AUTO: 9 %
NEUTROPHILS # BLD AUTO: 3.3 10E3/UL (ref 1.6–8.3)
NEUTROPHILS NFR BLD AUTO: 60 %
PLATELET # BLD AUTO: 133 10E3/UL (ref 150–450)
POTASSIUM BLD-SCNC: 3.9 MMOL/L (ref 3.4–5.3)
PROT SERPL-MCNC: 7 G/DL (ref 6.8–8.8)
RBC # BLD AUTO: 4.83 10E6/UL (ref 4.4–5.9)
SODIUM SERPL-SCNC: 137 MMOL/L (ref 133–144)
T3FREE SERPL-MCNC: 2.8 PG/ML (ref 2–4.4)
T4 FREE SERPL-MCNC: 1.82 NG/DL (ref 0.76–1.46)
TSH SERPL DL<=0.005 MIU/L-ACNC: 0.03 MU/L (ref 0.4–4)
WBC # BLD AUTO: 5.5 10E3/UL (ref 4–11)

## 2022-09-23 PROCEDURE — 84481 FREE ASSAY (FT-3): CPT

## 2022-09-23 PROCEDURE — 80061 LIPID PANEL: CPT | Mod: 90

## 2022-09-23 PROCEDURE — 36415 COLL VENOUS BLD VENIPUNCTURE: CPT

## 2022-09-23 PROCEDURE — 86141 C-REACTIVE PROTEIN HS: CPT

## 2022-09-23 PROCEDURE — 82043 UR ALBUMIN QUANTITATIVE: CPT

## 2022-09-23 PROCEDURE — 83704 LIPOPROTEIN BLD QUAN PART: CPT | Mod: 90

## 2022-09-23 PROCEDURE — 99000 SPECIMEN HANDLING OFFICE-LAB: CPT

## 2022-09-23 PROCEDURE — 83036 HEMOGLOBIN GLYCOSYLATED A1C: CPT

## 2022-09-23 PROCEDURE — 84439 ASSAY OF FREE THYROXINE: CPT

## 2022-09-23 PROCEDURE — 83695 ASSAY OF LIPOPROTEIN(A): CPT

## 2022-09-23 PROCEDURE — 80050 GENERAL HEALTH PANEL: CPT

## 2022-09-26 LAB
CHOLEST SERPL-MCNC: 110 MG/DL
HDL SERPL QN: 8.9 NM
HDL SERPL-SCNC: 26.6 UMOL/L
HDLC SERPL-MCNC: 40 MG/DL
HLD.LARGE SERPL-SCNC: 3.5 UMOL/L
LDL SERPL QN: 20.7 NM
LDL SERPL-SCNC: 731 NMOL/L
LDL SMALL SERPL-SCNC: 582 NMOL/L
LDLC SERPL CALC-MCNC: 58 MG/DL
PATHOLOGY STUDY: ABNORMAL
TRIGL SERPL-MCNC: 58 MG/DL
VLDL LARGE SERPL-SCNC: <1.5 NMOL/L
VLDL SERPL QN: 49.4 NM

## 2022-09-29 ENCOUNTER — TELEPHONE (OUTPATIENT)
Dept: OTHER | Facility: CLINIC | Age: 62
End: 2022-09-29

## 2022-09-29 DIAGNOSIS — I48.91 ATRIAL FIBRILLATION, UNSPECIFIED TYPE (H): Primary | ICD-10-CM

## 2022-09-29 NOTE — TELEPHONE ENCOUNTER
Attempted to call patient with no answer.   [place] message sent.  VORB Eliquis 5 mg BID sent to pharmacy.    Jeannie MENDOZA, RN    [facility]  Office: [phone]  Fax: [phone]

## 2022-09-29 NOTE — TELEPHONE ENCOUNTER
----- Message from Addison Davis MD sent at 9/29/2022  2:42 PM CDT -----  This sis the first time I am seeing this report. It indicates he went into AF on 8/27. He should be started on Eliquis 5 mg PO BID for dx  of AF. Please call this in on the patietn's behalf, and tell him it is for AF. Please move him up in my schedule to my next available green spot to discuss this in greater detail.

## 2022-09-30 NOTE — TELEPHONE ENCOUNTER
Future Appointments   Date Time Provider Department Center   10/4/2022  8:10 AM Addison Davis MD [facility]

## 2022-10-04 ENCOUNTER — OFFICE VISIT (OUTPATIENT)
Dept: OTHER | Facility: CLINIC | Age: 62
End: 2022-10-04
Attending: INTERNAL MEDICINE
Payer: COMMERCIAL

## 2022-10-04 VITALS
WEIGHT: 216.4 LBS | HEART RATE: 66 BPM | BODY MASS INDEX: 32.05 KG/M2 | OXYGEN SATURATION: 97 % | SYSTOLIC BLOOD PRESSURE: 111 MMHG | HEIGHT: 69 IN | DIASTOLIC BLOOD PRESSURE: 69 MMHG

## 2022-10-04 DIAGNOSIS — E78.5 HYPERLIPIDEMIA LDL GOAL <100: ICD-10-CM

## 2022-10-04 DIAGNOSIS — I48.91 ATRIAL FIBRILLATION, UNSPECIFIED TYPE (H): ICD-10-CM

## 2022-10-04 DIAGNOSIS — E06.3 HYPOTHYROIDISM DUE TO HASHIMOTO'S THYROIDITIS: ICD-10-CM

## 2022-10-04 DIAGNOSIS — R06.09 DOE (DYSPNEA ON EXERTION): Primary | ICD-10-CM

## 2022-10-04 DIAGNOSIS — I10 ESSENTIAL HYPERTENSION: ICD-10-CM

## 2022-10-04 DIAGNOSIS — R73.01 IFG (IMPAIRED FASTING GLUCOSE): ICD-10-CM

## 2022-10-04 PROCEDURE — 99214 OFFICE O/P EST MOD 30 MIN: CPT | Performed by: INTERNAL MEDICINE

## 2022-10-04 PROCEDURE — G0463 HOSPITAL OUTPT CLINIC VISIT: HCPCS

## 2022-10-04 RX ORDER — LEVOTHYROXINE SODIUM 150 UG/1
TABLET ORAL
Qty: 30 TABLET | Refills: 3 | Status: SHIPPED | OUTPATIENT
Start: 2022-10-04 | End: 2023-01-26

## 2022-10-04 RX ORDER — LEVOTHYROXINE SODIUM 175 UG/1
TABLET ORAL
Qty: 60 TABLET | Refills: 3 | Status: SHIPPED | OUTPATIENT
Start: 2022-10-04 | End: 2023-01-26 | Stop reason: DRUGHIGH

## 2022-10-04 NOTE — PROGRESS NOTES
"Patient is here to discuss follow up.    /69 (BP Location: Right arm, Patient Position: Chair, Cuff Size: Adult Large)   Pulse 66   Ht 5' 9\" (1.753 m)   Wt 216 lb 6.4 oz (98.2 kg)   SpO2 97%   BMI 31.96 kg/m      Questions patient would like addressed today are: N/A.    Refills are needed: No    Has homecare services and agency name:  Jessy Villagran  " No

## 2022-10-04 NOTE — PROGRESS NOTES
Ramiro Jeffers is a 62 year old male who is presenting at the current time to discuss his diagnosi(es) of          ALAS (dyspnea on exertion)  Atrial fibrillation, unspecified type (H)  Hypothyroidism due to Hashimoto's thyroiditis  Essential hypertension  Hyperlipidemia LDL goal <100  IFG (impaired fasting glucose)      HPI: Ramiro Jeffers is a 62 year old male who was recently noted on investigation of ALAS to have a dilated LA. We obtained a cardiac event monitor and he goes in and out of AF. He also is overdosed on his Synthroid as well.     Review Of Systems  Skin: negative  Eyes: negative  Ears/Nose/Throat: negative  Respiratory: No shortness of breath, dyspnea on exertion, cough, or hemoptysis  Cardiovascular: negative  Gastrointestinal: negative  Genitourinary: negative  Musculoskeletal: negative  Neurologic: negative  Psychiatric: negative  Hematologic/Lymphatic/Immunologic: negative  Endocrine: negative    PAST MEDICAL HISTORY:                  Past Medical History:   Diagnosis Date     ACQUIRED HYPOTHYROIDISM       Blood clot in the legs      CAD (coronary artery disease)     11/15/18 Cath: PCI with 3.5-16mm DORY to proximal LAD     Calculus of gallbladder without mention of cholecystitis or obstruction      Cataract      Chest pressure 11/5/2018     Hyperlipidemia      Hypertension      Ischemic cardiomyopathy     EF 40-45% on 11/2018 Echo     Obesity, unspecified     significant weight loss w/diet alone, on 10-15-10, BMI was 56.4     pulmonary emboli 6-2010    Unprovoked large bilateral pulmonary emboli without source identified on lower extremity dopplers, pt had a transient moderate lupus inhibitor upon initial presentation in June , 2010 which was gone in September, 2010;  all other thrombophilic workup is normal     Pulmonary embolus, bilateral      PVC's (premature ventricular contractions)     4% burden on 11/2018 Holter     Shortness of breath     on exertion     Venous insufficiency      Viral  pneumonia, unspecified      Vitamin D deficiency        PAST SURGICAL HISTORY:                  Past Surgical History:   Procedure Laterality Date     COLONOSCOPY  11/17/2011    Procedure:COLONOSCOPY; colonoscopy; Surgeon:ELENA OROURKE; Location: GI     CV HEART CATHETERIZATION WITH POSSIBLE INTERVENTION Left 2/15/2019    Procedure: Coronary Angiogram;  Surgeon: Tulio Ortiz MD;  Location:  HEART CARDIAC CATH LAB     CV HEART CATHETERIZATION WITH POSSIBLE INTERVENTION N/A 10/5/2021    Procedure: Heart Catheterization with Possible Intervention;  Surgeon: Jose Francisco Dave MD;  Location:  HEART CARDIAC CATH LAB     CV LEFT HEART CATH N/A 10/5/2021    Procedure: Left Heart Cath;  Surgeon: Jose Francisco Dave MD;  Location:  HEART CARDIAC CATH LAB     CV LEFT VENTRICULOGRAM N/A 10/5/2021    Procedure: Left Ventriculogram;  Surgeon: Jose Francisco Dave MD;  Location:  HEART CARDIAC CATH LAB     LAPAROSCOPIC CHOLECYSTECTOMY  9/28/2012    Procedure: LAPAROSCOPIC CHOLECYSTECTOMY;  LAPAROSCOPIC CHOLECYSTECTOMY, LYSIS OF ADHESIONS;  Surgeon: Dutch Matta MD;  Location:  OR     LAPAROSCOPIC LYSIS ADHESIONS  9/28/2012    Procedure: LAPAROSCOPIC LYSIS ADHESIONS;;  Surgeon: Dutch Matta MD;  Location:  OR     LAPAROSCOPY, SURGICAL; REPAIR INCISIONAL OR VENTRAL HERNIA      repair of ventral strangulated surgery     PHACOEMULSIFICATION CLEAR CORNEA WITH STANDARD INTRAOCULAR LENS IMPLANT  12/19/2011    Procedure:PHACOEMULSIFICATION CLEAR CORNEA WITH STANDARD INTRAOCULAR LENS IMPLANT; RIGHT PHACOEMULSIFICATION CLEAR CORNEA WITH STANDARD INTRAOCULAR LENS IMPLANT ; Surgeon:ALLISON ANTONIO; Location:[facility] NONSPECIFIC PROCEDURE  10-    Laparoscopic gastric bypass,     [facility] NONSPECIFIC PROCEDURE  10-    1.  Attempted laparoscopic exploration with conversion to: .  Abdominal exploration. 3.  Reduction of incarcerated incisional hernia. 4.  Repair of incisional hernia.5.   Gastrostomy tube placement (#22 Azerbaijani) into defunctionalized stomach.6.  Enterotomy with bowel decompression and primary repair. 7.  Abdominal lavage.        CURRENT MEDICATIONS:                  Current Outpatient Medications   Medication Sig Dispense Refill     apixaban ANTICOAGULANT (ELIQUIS ANTICOAGULANT) 5 MG tablet Take 1 tablet (5 mg) by mouth 2 times daily 60 tablet 11     aspirin 81 MG tablet Take 1 tablet (81 mg) by mouth daily Start tomorrow morning.       atorvastatin (LIPITOR) 40 MG tablet        Cholecalciferol (VITAMIN D) 2000 units tablet Take 1 tablet by mouth daily 100 tablet 12     ezetimibe (ZETIA) 10 MG tablet Take 1 tablet (10 mg) by mouth daily 90 tablet 3     hydrochlorothiazide (HYDRODIURIL) 25 MG tablet TAKE 1 TABLET(25 MG) BY MOUTH EVERY MORNING 90 tablet 3     isosorbide mononitrate (IMDUR) 30 MG 24 hr tablet Take 2 tablets (60 mg) by mouth daily 180 tablet 0     levothyroxine (SYNTHROID/LEVOTHROID) 150 MCG tablet Take 175 mcg on day 1, 175 mcg on day 2, followed by 150 mcg PO on day three, then repeat. 30 tablet 3     levothyroxine (SYNTHROID/LEVOTHROID) 175 MCG tablet Take 175 mcg on day 1, 175 mcg on day 2, followed by 150 mcg PO on day three, then repeat. 60 tablet 3     metoprolol succinate ER (TOPROL-XL) 50 MG 24 hr tablet TAKE 1 AND 1/2 TABLETS(75 MG) BY MOUTH DAILY 135 tablet 3     Multiple Vitamin (MULTI-VITAMIN) per tablet Take 1 tablet by mouth 2 times daily        nitroGLYcerin (NITROSTAT) 0.4 MG sublingual tablet For chest pain place 1 tablet under the tongue every 5 minutes for 3 doses. If symptoms persist 5 minutes after 1st dose call 911. 25 tablet 3     rosuvastatin (CRESTOR) 40 MG tablet TAKE 1 TABLET(40 MG) BY MOUTH DAILY 90 tablet 3       ALLERGIES:                  Allergies   Allergen Reactions     No Known Allergies        SOCIAL HISTORY:                  Social History     Socioeconomic History     Marital status: Single     Spouse name: Not on file     Number of  children: Not on file     Years of education: Not on file     Highest education level: Not on file   Occupational History     Occupation:      Employer: SELF   Tobacco Use     Smoking status: Never Smoker     Smokeless tobacco: Never Used   Substance and Sexual Activity     Alcohol use: No     Alcohol/week: 0.0 standard drinks     Drug use: No     Sexual activity: Not on file   Other Topics Concern     Parent/sibling w/ CABG, MI or angioplasty before 65F 55M? Not Asked   Social History Narrative     Not on file     Social Determinants of Health     Financial Resource Strain: Not on file   Food Insecurity: Not on file   Transportation Needs: Not on file   Physical Activity: Not on file   Stress: Not on file   Social Connections: Not on file   Intimate Partner Violence: Not on file   Housing Stability: Not on file       FAMILY HISTORY:                   Family History   Problem Relation Age of Onset     Osteoporosis Mother      Thyroid Disease Mother         no thyroid     Cancer Maternal Grandmother         ? lung cancer     Cancer Father         melanoma     Coronary Artery Disease Brother      Coronary Artery Disease Brother        Physical exam Reveals:    O/P: WNL  HEENT: WNL  NECK: No JVD, thyromegaly, or lymphadenopathy  HEART: RRR, no murmurs, gallops, or rubs  LUNGS: CTA bilaterally without rales, wheezes, or rhonchi  GI: NABS, nondistended, nontender, soft  EXT:without cyanosis, clubbing, or edema  NEURO: nonfocal  : no flank tenderness          A/P:    (R06.09) ALAS (dyspnea on exertion)  (primary encounter diagnosis)  Comment: CT chest shows no PE, TTE reveals normal EF. He is limited by his ALAS. Check the below, RTC two weeks later  Plan: General PFT Lab (Please always keep checked),         Pulmonary Function Test, 6 minute walk test           (I48.91) Atrial fibrillation, unspecified type (H)  Comment: rate is controlled. Rhythm is regular presently so he is likely in SR. He needs AC  due to [id number] score of 5  Plan: Continue ELiquis    (E03.8,  E06.3) Hypothyroidism due to Hashimoto's thyroiditis  Comment: take 175/175/150, repeat labs in three months  Plan: levothyroxine (SYNTHROID/LEVOTHROID) 150 MCG         tablet, levothyroxine (SYNTHROID/LEVOTHROID)         175 MCG tablet, CBC with Platelets &         Differential, T3 Free, T4 free, TSH           (I10) Essential hypertension  Comment: at goal  Plan: no BP changes    (E78.5) Hyperlipidemia LDL goal <100  Comment: at goal now that zetia has been added. Check labs in three months, RTC 2 weeks later  Plan: C-Reactive Protein, High Sensitivity, LipoFit         by NMR, Lipoprotein (a)            (R73.01) IFG (impaired fasting glucose)  Comment: avoid CHO  Plan: Albumin Random Urine Quantitative with Creat         Ratio, Comprehensive metabolic panel,         Hemoglobin A1c            32 minutes total medical care on today's date.

## 2022-10-16 ENCOUNTER — HEALTH MAINTENANCE LETTER (OUTPATIENT)
Age: 62
End: 2022-10-16

## 2022-10-21 ENCOUNTER — TELEPHONE (OUTPATIENT)
Dept: OTHER | Facility: CLINIC | Age: 62
End: 2022-10-21

## 2022-10-21 NOTE — TELEPHONE ENCOUNTER
Follow up to 10/4/22        Pulmonary function tests    6 minute walk test    Follow up TWO weeks later - In clinic.

## 2022-10-24 DIAGNOSIS — R06.09 DOE (DYSPNEA ON EXERTION): Primary | ICD-10-CM

## 2022-10-24 NOTE — TELEPHONE ENCOUNTER
Reached out to a  who schedules PFT'S.  The requested test may be scheduled at the following locations:  [place] is      I LM on scheduling number for [facility] asking that they contact patient to schedule and to call my direct line if they are unable to schedule in the next few weeks.

## 2022-10-24 NOTE — TELEPHONE ENCOUNTER
Spoke with patient and he stated that central scheduling stated they are out to January for appointments.  Patient was given [place] number and patient left a message and states he has not received a call yet.  Patient states he will be gone the beginning of January so would like this done before he is gone. I will reach out to see if I can find another location.

## 2022-10-27 NOTE — TELEPHONE ENCOUNTER
Spoke with employee who scheduled PFT who states that 6 minute walk test is scheduled through [facility]. She provided both the direct line to [facility] and [facility] scheduling.  I LM at [facility] location and asked that they call patient to schedule.  I notified patient that I LM and he states he left a message too and has not heard back.

## 2022-10-27 NOTE — TELEPHONE ENCOUNTER
Patient is trying to get out of town for the winter and requested an earlier appt.  I spoke with Dr. Davis and he states 4 days after PFT he could be seen.  Dr. Davis is out of this clinic on 11/11/22 so patient is scheduled 11/14/22.

## 2022-11-02 ENCOUNTER — HOSPITAL ENCOUNTER (OUTPATIENT)
Dept: CARDIAC REHAB | Facility: CLINIC | Age: 62
Discharge: HOME | End: 2022-11-02
Attending: INTERNAL MEDICINE
Payer: COMMERCIAL

## 2022-11-02 DIAGNOSIS — R06.09 DOE (DYSPNEA ON EXERTION): ICD-10-CM

## 2022-11-02 PROCEDURE — 94618 PULMONARY STRESS TESTING: CPT

## 2022-11-02 ASSESSMENT — 6 MINUTE WALK TEST (6MWT)
TOTAL DISTANCE WALKED (FT): 1160
TOTAL DISTANCE WALKED (METERS): 353.57
LOWEST SA02: 88
HEART RATE PEAK: 104

## 2022-11-07 ENCOUNTER — HOSPITAL ENCOUNTER (OUTPATIENT)
Dept: RESPIRATORY THERAPY | Facility: CLINIC | Age: 62
Discharge: HOME | End: 2022-11-07
Attending: INTERNAL MEDICINE | Admitting: INTERNAL MEDICINE
Payer: COMMERCIAL

## 2022-11-07 DIAGNOSIS — R06.09 DOE (DYSPNEA ON EXERTION): ICD-10-CM

## 2022-11-07 PROCEDURE — 94010 BREATHING CAPACITY TEST: CPT

## 2022-11-07 PROCEDURE — 94726 PLETHYSMOGRAPHY LUNG VOLUMES: CPT

## 2022-11-07 PROCEDURE — 94729 DIFFUSING CAPACITY: CPT

## 2022-11-07 NOTE — PROGRESS NOTES
Patient completed pulmonary function testing with spirometry, lung volumes and diffusion. Good patient effort and cooperation. The results of this test met the ATS standards for acceptability and repeatability, except for DLCO. DLCO did not meet ATS due to IV<90%VC. The average of two consistent results were recorded. Hgb result of 14.1 from 9/23/22 was used for DLCO.    Yes

## 2022-11-08 LAB
DLCOCOR-%PRED-PRE: 58 %
DLCOCOR-PRE: 14.7 ML/MIN/MMHG
DLCOUNC-%PRED-PRE: 57 %
DLCOUNC-PRE: 14.48 ML/MIN/MMHG
DLCOUNC-PRED: 25.03 ML/MIN/MMHG
ERV-%PRED-PRE: 110 %
ERV-PRE: 0.73 L
ERV-PRED: 0.66 L
EXPTIME-PRE: 7.45 SEC
FEF2575-%PRED-PRE: 92 %
FEF2575-PRE: 2.48 L/SEC
FEF2575-PRED: 2.69 L/SEC
FEFMAX-%PRED-PRE: 90 %
FEFMAX-PRE: 7.68 L/SEC
FEFMAX-PRED: 8.46 L/SEC
FEV1-%PRED-PRE: 91 %
FEV1-PRE: 2.93 L
FEV1FEV6-PRE: 78 %
FEV1FEV6-PRED: 79 %
FEV1FVC-PRE: 77 %
FEV1FVC-PRED: 78 %
FEV1SVC-PRE: 78 %
FEV1SVC-PRED: 72 %
FIFMAX-PRE: 5.11 L/SEC
FRCPLETH-%PRED-PRE: 98 %
FRCPLETH-PRE: 3.43 L
FRCPLETH-PRED: 3.47 L
FVC-%PRED-PRE: 91 %
FVC-PRE: 3.8 L
FVC-PRED: 4.14 L
GAW-%PRED-PRE: 75 %
GAW-PRE: 0.77 L/S/CMH2O
GAW-PRED: 1.03 L/S/CMH2O
IC-%PRED-PRE: 80 %
IC-PRE: 3.05 L
IC-PRED: 3.8 L
RVPLETH-%PRED-PRE: 113 %
RVPLETH-PRE: 2.7 L
RVPLETH-PRED: 2.37 L
SGAW-%PRED-PRE: 255 %
SGAW-PRE: 0.21 1/CMH2O*S
SGAW-PRED: 0.08 1/CMH2O*S
SRAW-%PRED-PRE: 98 %
SRAW-PRE: 4.71 CMH2O*S
SRAW-PRED: 4.76 CMH2O*S
TLCPLETH-%PRED-PRE: 98 %
TLCPLETH-PRE: 6.48 L
TLCPLETH-PRED: 6.57 L
VA-%PRED-PRE: 88 %
VA-PRE: 5.21 L
VC-%PRED-PRE: 84 %
VC-PRE: 3.78 L
VC-PRED: 4.46 L

## 2022-11-12 DIAGNOSIS — I25.118 CORONARY ARTERY DISEASE OF NATIVE ARTERY OF NATIVE HEART WITH STABLE ANGINA PECTORIS (H): ICD-10-CM

## 2022-11-14 ENCOUNTER — TELEPHONE (OUTPATIENT)
Dept: CARDIOLOGY | Facility: CLINIC | Age: 62
End: 2022-11-14

## 2022-11-14 ENCOUNTER — OFFICE VISIT (OUTPATIENT)
Dept: OTHER | Facility: CLINIC | Age: 62
End: 2022-11-14
Attending: INTERNAL MEDICINE
Payer: COMMERCIAL

## 2022-11-14 VITALS
WEIGHT: 212.2 LBS | BODY MASS INDEX: 31.43 KG/M2 | HEIGHT: 69 IN | OXYGEN SATURATION: 95 % | HEART RATE: 61 BPM | DIASTOLIC BLOOD PRESSURE: 71 MMHG | SYSTOLIC BLOOD PRESSURE: 122 MMHG

## 2022-11-14 DIAGNOSIS — I10 ESSENTIAL HYPERTENSION: ICD-10-CM

## 2022-11-14 DIAGNOSIS — I87.2 VENOUS (PERIPHERAL) INSUFFICIENCY: ICD-10-CM

## 2022-11-14 DIAGNOSIS — R06.09 DOE (DYSPNEA ON EXERTION): Primary | ICD-10-CM

## 2022-11-14 DIAGNOSIS — I25.118 CORONARY ARTERY DISEASE OF NATIVE ARTERY OF NATIVE HEART WITH STABLE ANGINA PECTORIS (H): ICD-10-CM

## 2022-11-14 PROCEDURE — 99213 OFFICE O/P EST LOW 20 MIN: CPT | Performed by: INTERNAL MEDICINE

## 2022-11-14 PROCEDURE — G0463 HOSPITAL OUTPT CLINIC VISIT: HCPCS

## 2022-11-14 RX ORDER — METOPROLOL SUCCINATE 50 MG/1
TABLET, EXTENDED RELEASE ORAL
Qty: 135 TABLET | Refills: 3 | Status: SHIPPED | OUTPATIENT
Start: 2022-11-14 | End: 2023-08-22

## 2022-11-14 RX ORDER — ISOSORBIDE MONONITRATE 30 MG/1
60 TABLET, EXTENDED RELEASE ORAL DAILY
Qty: 180 TABLET | Refills: 0 | Status: CANCELLED | OUTPATIENT
Start: 2022-11-14

## 2022-11-14 RX ORDER — ISOSORBIDE MONONITRATE 30 MG/1
60 TABLET, EXTENDED RELEASE ORAL DAILY
Qty: 60 TABLET | Refills: 1 | Status: SHIPPED | OUTPATIENT
Start: 2022-11-14 | End: 2023-01-27

## 2022-11-14 NOTE — PROGRESS NOTES
Ramiro Jeffers is a 62 year old male who is presenting at the current time to discuss his diagnosi(es) of            ALAS (dyspnea on exertion)  Atrial fibrillation, unspecified type (H)  Hypothyroidism due to Hashimoto's thyroiditis  Essential hypertension  Hyperlipidemia LDL goal <100  IFG (impaired fasting glucose)        HPI: Ramiro Jeffers is a 62 year old male who was recently noted on investigation of ALAS to have a dilated LA. We obtained a cardiac event monitor and he goes in and out of AF. He also is overdosed on his Synthroid as well.      Review Of Systems  Skin: negative  Eyes: negative  Ears/Nose/Throat: negative  Respiratory: No shortness of breath, dyspnea on exertion, cough, or hemoptysis  Cardiovascular: negative  Gastrointestinal: negative  Genitourinary: negative  Musculoskeletal: negative  Neurologic: negative  Psychiatric: negative  Hematologic/Lymphatic/Immunologic: negative  Endocrine: negative     PAST MEDICAL HISTORY:                  Past Medical History        Past Medical History:   Diagnosis Date     ACQUIRED HYPOTHYROIDISM        Blood clot in the legs       CAD (coronary artery disease)       11/15/18 Cath: PCI with 3.5-16mm DORY to proximal LAD     Calculus of gallbladder without mention of cholecystitis or obstruction       Cataract       Chest pressure 11/5/2018     Hyperlipidemia       Hypertension       Ischemic cardiomyopathy       EF 40-45% on 11/2018 Echo     Obesity, unspecified       significant weight loss w/diet alone, on 10-15-10, BMI was 56.4     pulmonary emboli 6-2010     Unprovoked large bilateral pulmonary emboli without source identified on lower extremity dopplers, pt had a transient moderate lupus inhibitor upon initial presentation in June , 2010 which was gone in September, 2010;  all other thrombophilic workup is normal     Pulmonary embolus, bilateral       PVC's (premature ventricular contractions)       4% burden on 11/2018 Holter     Shortness of breath       on  exertion     Venous insufficiency       Viral pneumonia, unspecified       Vitamin D deficiency              PAST SURGICAL HISTORY:                  Past Surgical History         Past Surgical History:   Procedure Laterality Date     COLONOSCOPY   11/17/2011     Procedure:COLONOSCOPY; colonoscopy; Surgeon:ELENA OROURKE; Location: GI     CV HEART CATHETERIZATION WITH POSSIBLE INTERVENTION Left 2/15/2019     Procedure: Coronary Angiogram;  Surgeon: Tulio Ortiz MD;  Location:  HEART CARDIAC CATH LAB     CV HEART CATHETERIZATION WITH POSSIBLE INTERVENTION N/A 10/5/2021     Procedure: Heart Catheterization with Possible Intervention;  Surgeon: Jose Francisco Dave MD;  Location:  HEART CARDIAC CATH LAB     CV LEFT HEART CATH N/A 10/5/2021     Procedure: Left Heart Cath;  Surgeon: Jose Francisco Dave MD;  Location:  HEART CARDIAC CATH LAB     CV LEFT VENTRICULOGRAM N/A 10/5/2021     Procedure: Left Ventriculogram;  Surgeon: Jose Francisco Dave MD;  Location:  HEART CARDIAC CATH LAB     LAPAROSCOPIC CHOLECYSTECTOMY   9/28/2012     Procedure: LAPAROSCOPIC CHOLECYSTECTOMY;  LAPAROSCOPIC CHOLECYSTECTOMY, LYSIS OF ADHESIONS;  Surgeon: Dutch Matta MD;  Location:  OR     LAPAROSCOPIC LYSIS ADHESIONS   9/28/2012     Procedure: LAPAROSCOPIC LYSIS ADHESIONS;;  Surgeon: Dutch Matta MD;  Location:  OR     LAPAROSCOPY, SURGICAL; REPAIR INCISIONAL OR VENTRAL HERNIA         repair of ventral strangulated surgery     PHACOEMULSIFICATION CLEAR CORNEA WITH STANDARD INTRAOCULAR LENS IMPLANT   12/19/2011     Procedure:PHACOEMULSIFICATION CLEAR CORNEA WITH STANDARD INTRAOCULAR LENS IMPLANT; RIGHT PHACOEMULSIFICATION CLEAR CORNEA WITH STANDARD INTRAOCULAR LENS IMPLANT ; Surgeon:ALLISON ANTONIO; Location: EC     [facility] NONSPECIFIC PROCEDURE   10-     Laparoscopic gastric bypass,     [facility] NONSPECIFIC PROCEDURE   10-     1.  Attempted laparoscopic exploration with conversion to: .   Abdominal exploration. 3.  Reduction of incarcerated incisional hernia. 4.  Repair of incisional hernia.5.  Gastrostomy tube placement (#22 Frisian) into defunctionalized stomach.6.  Enterotomy with bowel decompression and primary repair. 7.  Abdominal lavage.             CURRENT MEDICATIONS:                  Current Outpatient Prescriptions          Current Outpatient Medications   Medication Sig Dispense Refill     apixaban ANTICOAGULANT (ELIQUIS ANTICOAGULANT) 5 MG tablet Take 1 tablet (5 mg) by mouth 2 times daily 60 tablet 11     aspirin 81 MG tablet Take 1 tablet (81 mg) by mouth daily Start tomorrow morning.         atorvastatin (LIPITOR) 40 MG tablet           Cholecalciferol (VITAMIN D) 2000 units tablet Take 1 tablet by mouth daily 100 tablet 12     ezetimibe (ZETIA) 10 MG tablet Take 1 tablet (10 mg) by mouth daily 90 tablet 3     hydrochlorothiazide (HYDRODIURIL) 25 MG tablet TAKE 1 TABLET(25 MG) BY MOUTH EVERY MORNING 90 tablet 3     isosorbide mononitrate (IMDUR) 30 MG 24 hr tablet Take 2 tablets (60 mg) by mouth daily 180 tablet 0     levothyroxine (SYNTHROID/LEVOTHROID) 150 MCG tablet Take 175 mcg on day 1, 175 mcg on day 2, followed by 150 mcg PO on day three, then repeat. 30 tablet 3     levothyroxine (SYNTHROID/LEVOTHROID) 175 MCG tablet Take 175 mcg on day 1, 175 mcg on day 2, followed by 150 mcg PO on day three, then repeat. 60 tablet 3     metoprolol succinate ER (TOPROL-XL) 50 MG 24 hr tablet TAKE 1 AND 1/2 TABLETS(75 MG) BY MOUTH DAILY 135 tablet 3     Multiple Vitamin (MULTI-VITAMIN) per tablet Take 1 tablet by mouth 2 times daily          nitroGLYcerin (NITROSTAT) 0.4 MG sublingual tablet For chest pain place 1 tablet under the tongue every 5 minutes for 3 doses. If symptoms persist 5 minutes after 1st dose call 911. 25 tablet 3     rosuvastatin (CRESTOR) 40 MG tablet TAKE 1 TABLET(40 MG) BY MOUTH DAILY 90 tablet 3            ALLERGIES:                       Allergies   Allergen Reactions      No Known Allergies           SOCIAL HISTORY:                  Social History   Social History            Socioeconomic History     Marital status: Single       Spouse name: Not on file     Number of children: Not on file     Years of education: Not on file     Highest education level: Not on file   Occupational History     Occupation:        Employer: SELF   Tobacco Use     Smoking status: Never Smoker     Smokeless tobacco: Never Used   Substance and Sexual Activity     Alcohol use: No       Alcohol/week: 0.0 standard drinks     Drug use: No     Sexual activity: Not on file   Other Topics Concern     Parent/sibling w/ CABG, MI or angioplasty before 65F 55M? Not Asked   Social History Narrative     Not on file      Social Determinants of Health      Financial Resource Strain: Not on file   Food Insecurity: Not on file   Transportation Needs: Not on file   Physical Activity: Not on file   Stress: Not on file   Social Connections: Not on file   Intimate Partner Violence: Not on file   Housing Stability: Not on file            FAMILY HISTORY:                   Family History         Family History   Problem Relation Age of Onset     Osteoporosis Mother       Thyroid Disease Mother           no thyroid     Cancer Maternal Grandmother           ? lung cancer     Cancer Father           melanoma     Coronary Artery Disease Brother       Coronary Artery Disease Brother              Physical exam Reveals:     O/P: WNL  HEENT: WNL  NECK: No JVD, thyromegaly, or lymphadenopathy  HEART: RRR, no murmurs, gallops, or rubs  LUNGS: CTA bilaterally without rales, wheezes, or rhonchi  GI: NABS, nondistended, nontender, soft  EXT:without cyanosis, clubbing, or edema  NEURO: nonfocal  : no flank tenderness      General PFT Lab (Please always keep checked)  Order: [id number]   Collected 11/7/2022  7:25 AM      Status: Final result      1 Result Note   Component Ref Range & Units 7 d ago     FVC-Pred L 4.14      FVC-Pre L 3.80     FVC-%Pred-Pre % 91     FEV1-Pre L 2.93     FEV1-%Pred-Pre % 91     FEV1FVC-Pred % 78     FEV1FVC-Pre % 77     FEFMax-Pred L/sec 8.46     FEFMax-Pre L/sec 7.68     FEFMax-%Pred-Pre % 90     FEF2575-Pred L/sec 2.69     FEF2575-Pre L/sec 2.48     UPA8161-%Pred-Pre % 92     ExpTime-Pre sec 7.45     FIFMax-Pre L/sec 5.11     VC-Pred L 4.46     VC-Pre L 3.78     VC-%Pred-Pre % 84     IC-Pred L 3.80     IC-Pre L 3.05     IC-%Pred-Pre % 80     ERV-Pred L 0.66     ERV-Pre L 0.73     ERV-%Pred-Pre % 110     FEV1FEV6-Pred % 79     FEV1FEV6-Pre % 78     FRCPleth-Pred L 3.47     FRCPleth-Pre L 3.43     FRCPleth-%Pred-Pre % 98     RVPleth-Pred L 2.37     RVPleth-Pre L 2.70     RVPleth-%Pred-Pre % 113     TLCPleth-Pred L 6.57     TLCPleth-Pre L 6.48     TLCPleth-%Pred-Pre % 98     Gaw-Pred L/s/cmH2O 1.03     Gaw-Pre L/s/cmH2O 0.77     Gaw-%Pred-Pre % 75     sGaw-Pred 1/cmH2O*s 0.08     sGaw-Pre 1/cmH2O*s 0.21     sGaw-%Pred-Pre % 255     sRaw-Pred cmH2O*s 4.76     sRaw-Pre cmH2O*s 4.71     sRaw-%Pred-Pre % 98     DLCOunc-Pred ml/min/mmHg 25.03     DLCOunc-Pre ml/min/mmHg 14.48     DLCOunc-%Pred-Pre % 57     DLCOcor-Pre ml/min/mmHg 14.70     DLCOcor-%Pred-Pre % 58     VA-Pre L 5.21     VA-%Pred-Pre % 88     FEV1SVC-Pred % 72     FEV1SVC-Pre % 78    Resulting Agency  [facility]           Narrative  Performed by: [facility]  The FVC, FEV1, FEV1/FVC ratio and CFT04-12% are within normal limits.  The inspiratory flow rates are within normal limits.  Lung volumes are within normal limits.  The diffusing capacity is reduced after correction for hemoglobin.         IMPRESSION:     Normal spirometry.     Moderate diffusion defect.  However, this should be reviewed with caution as diffusion testing does not meet ATS criteria.     Normal lung volumes.           This interpretation has been electronically signed:  JOY HESS 11/08/2022  04:33:33 PM        Specimen Collected: 11/07/22  7:25 AM Last Resulted: 11/08/22  4:37 PM                    A/P:     (R06.09) ALAS (dyspnea on exertion)  (primary encounter diagnosis)  Comment: CT chest shows no PE, TTE reveals normal EF. He is limited by his ALAS. Check the below, RTC two weeks later. PFTs were normal other than a mild diffusion deficit. 6 minute walk test did reveal mild desaturation at 6 minutes to 88%.  Plan: He is advised he is out of shape. He is advised to walk more as mch as possible, RTC prn worsening.             (I48.91) Atrial fibrillation, unspecified type (H)  Comment: rate is controlled. Rhythm is regular presently so he is likely in SR. He needs AC due to [id number] score of 5  Plan: Continue ELiquis     (E03.8,  E06.3) Hypothyroidism due to Hashimoto's thyroiditis  Comment: take 175/175/150, repeat labs in three months  Plan: levothyroxine (SYNTHROID/LEVOTHROID) 150 MCG         tablet, levothyroxine (SYNTHROID/LEVOTHROID)         175 MCG tablet, CBC with Platelets &         Differential, T3 Free, T4 free, TSH            (I10) Essential hypertension  Comment: at goal  Plan: no BP changes     (E78.5) Hyperlipidemia LDL goal <100  Comment: at goal now that zetia has been added. Check labs in three months, RTC 2 weeks later  Plan: C-Reactive Protein, High Sensitivity, LipoFit         by NMR, Lipoprotein (a)             (R73.01) IFG (impaired fasting glucose)  Comment: avoid CHO  Plan: Albumin Random Urine Quantitative with Creat         Ratio, Comprehensive metabolic panel,         Hemoglobin A1c             25 minutes total medical care on today's date.

## 2022-11-14 NOTE — TELEPHONE ENCOUNTER
Received refill request for:  Isosorbide mononitrate  Last OV was: 10/26/21 LISSETTE Larkin  Labs/EKG: N/a  F/U scheduled: No future appointments. Pt was to follow up in 3 months from 10/2021 visit. More than 3 attempts made. Pt given 90 day ronald fill 8/2022. Still no Cardiology follow up made.  Pharmacy: [facility] Cardiology Refill Guideline reviewed.  Medication does not meet criteria for refill due to overdue for follow up.  Messaged to providers team for further review.     Keri Barfield, RN, BSN  11/14/22 at 11:28 AM

## 2022-11-14 NOTE — TELEPHONE ENCOUNTER
Spoke with Patient who confirms he is on Imdur 60 mg one tablet daily.  Will e scribed to pharmacy 30 day quantity and 1 refill.   Patient will call scheduling to make F/Up annual OV.  Patient agrees with plan..     Jose MILLER MELECIO OV 10-26-21 -   Increase Imdur from 30 to 60 mg daily.

## 2022-11-14 NOTE — PROGRESS NOTES
"Patient is here to discuss follow up.    /71 (BP Location: Right arm, Patient Position: Chair, Cuff Size: Adult Regular)   Pulse 61   Ht 5' 9\" (1.753 m)   Wt 212 lb 3.2 oz (96.3 kg)   SpO2 95%   BMI 31.34 kg/m      Questions patient would like addressed today are: Would any of the medications cause leg throbbing at night?    Refills are needed: Yes    Has homecare services and agency name:  Jessy Villagran  "

## 2022-11-14 NOTE — TELEPHONE ENCOUNTER
"metoprolol succinate ER (TOPROL-XL) 50 MG 24 hr tablet  Last Written Prescription Date:  12/6/21  Last Fill Quantity: 135,  # refills: 3    Office visit scheduled 11/14/22     Requested Prescriptions   Pending Prescriptions Disp Refills     metoprolol succinate ER (TOPROL XL) 50 MG 24 hr tablet [Pharmacy Med Name: METOPROLOL ER SUCCINATE 50MG TABS] 135 tablet 3     Sig: TAKE 1 AND 1/2 TABLETS(75 MG) BY MOUTH DAILY       Beta-Blockers Protocol Passed - 11/12/2022  5:44 AM        Passed - Blood pressure under 140/90 in past 12 months     BP Readings from Last 3 Encounters:   10/04/22 111/69   08/04/22 100/57   06/23/22 116/61                 Passed - Patient is age 6 or older        Passed - Recent (12 mo) or future (30 days) visit within the authorizing provider's specialty     Patient has had an office visit with the authorizing provider or a provider within the authorizing providers department within the previous 12 mos or has a future within next 30 days. See \"Patient Info\" tab in inbasket, or \"Choose Columns\" in Meds & Orders section of the refill encounter.              Passed - Medication is active on med list           Prescription approved per [employer] Refill Protocol.            "

## 2022-11-14 NOTE — TELEPHONE ENCOUNTER
OK to refill for 30 days with 1 refill, needs to be seen within that timeframe for future fills. Thanks.    Catarina Simeon PA-C  [facility]     What Type Of Note Output Would You Prefer (Optional)?: Standard Output Is The Patient Presenting As Previously Scheduled?: No, they are a work-in How Severe Is Your Rash?: moderate Is This A New Presentation, Or A Follow-Up?: Rash Additional History: Patient reports just moving here from [place]. Since she has moved down she has been experiencing a breakout of some sort above her buttocks and it just won’t go away. She said it may be a heat rash.

## 2023-01-09 ENCOUNTER — TELEPHONE (OUTPATIENT)
Dept: CARDIOLOGY | Facility: CLINIC | Age: 63
End: 2023-01-09

## 2023-01-09 NOTE — TELEPHONE ENCOUNTER
[facility]. On 11/14/22 nurse spoke with patient and told him she could fill his Isosorbide for 2 months but he had to be seen in that time frame for further refills. Patient has not been seen and is not scheduled, therefore, I am unable to refill his Isosorbide.

## 2023-01-10 ENCOUNTER — TELEPHONE (OUTPATIENT)
Dept: OTHER | Facility: CLINIC | Age: 63
End: 2023-01-10

## 2023-01-10 NOTE — TELEPHONE ENCOUNTER
[facility] VASCULAR [facility] CENTER    Who is the name of the provider?:  Ryan    What is the location you see this provider at/preferred location?: [place]  Person calling: Ramiro Jeffers  Phone number:  [phone]  Nurse call back needed:  NO     Reason for call:   LOV w/Ryan 11/14/22    Patient stated Dr Davis recommended a 6 month follow up, but FYI active requests. Please advise on next follow up.    Pharmacy location:  n/a  Outside Imaging: Not Applicable   Can we leave a detailed message on this number?  YES

## 2023-01-10 NOTE — TELEPHONE ENCOUNTER
LOV 11/14/22 with Dr. Davis. Dr. Davis wanted patient to complete labs in 3 months and follow up.    Patient needs to be scheduled for fasting labs and in person follow up with Dr Davis 2 weeks after.    Jeannie MENDOZA, RN    [facility]  Vascular [facility]  Office: [phone]  Fax: [phone]

## 2023-01-10 NOTE — TELEPHONE ENCOUNTER
[facility] Call Center    Phone Message    May a detailed message be left on voicemail: yes     Reason for Call: Other: Please call patient to discuss isosorbide and setting up an appointment.      Action Taken: Other: Cardiology    Travel Screening: Not Applicable

## 2023-01-13 NOTE — TELEPHONE ENCOUNTER
Future Appointments   Date Time Provider Department Center   1/14/2023  9:15 AM [facility]   1/26/2023  8:10 AM Addison Davis MD [facility]

## 2023-01-14 ENCOUNTER — LAB (OUTPATIENT)
Dept: LAB | Facility: CLINIC | Age: 63
End: 2023-01-14
Payer: COMMERCIAL

## 2023-01-14 DIAGNOSIS — R73.01 IFG (IMPAIRED FASTING GLUCOSE): ICD-10-CM

## 2023-01-14 DIAGNOSIS — E78.5 HYPERLIPIDEMIA LDL GOAL <100: ICD-10-CM

## 2023-01-14 DIAGNOSIS — E06.3 HYPOTHYROIDISM DUE TO HASHIMOTO'S THYROIDITIS: ICD-10-CM

## 2023-01-14 LAB
ALBUMIN SERPL BCG-MCNC: 4.3 G/DL (ref 3.5–5.2)
ALP SERPL-CCNC: 72 U/L (ref 40–129)
ALT SERPL W P-5'-P-CCNC: 20 U/L (ref 10–50)
ANION GAP SERPL CALCULATED.3IONS-SCNC: 14 MMOL/L (ref 7–15)
APO A-I SERPL-MCNC: 19 MG/DL
AST SERPL W P-5'-P-CCNC: 27 U/L (ref 10–50)
BASOPHILS # BLD AUTO: 0 10E3/UL (ref 0–0.2)
BASOPHILS NFR BLD AUTO: 1 %
BILIRUB SERPL-MCNC: 0.5 MG/DL
BUN SERPL-MCNC: 12.6 MG/DL (ref 8–23)
CALCIUM SERPL-MCNC: 10.1 MG/DL (ref 8.8–10.2)
CHLORIDE SERPL-SCNC: 100 MMOL/L (ref 98–107)
CREAT SERPL-MCNC: 0.77 MG/DL (ref 0.67–1.17)
CREAT UR-MCNC: 85.7 MG/DL
CRP SERPL HS-MCNC: 0.19 MG/L
DEPRECATED HCO3 PLAS-SCNC: 26 MMOL/L (ref 22–29)
EOSINOPHIL # BLD AUTO: 0.1 10E3/UL (ref 0–0.7)
EOSINOPHIL NFR BLD AUTO: 3 %
ERYTHROCYTE [DISTWIDTH] IN BLOOD BY AUTOMATED COUNT: 13.1 % (ref 10–15)
GFR SERPL CREATININE-BSD FRML MDRD: >90 ML/MIN/1.73M2
GLUCOSE SERPL-MCNC: 102 MG/DL (ref 70–99)
HBA1C MFR BLD: 5.6 % (ref 0–5.6)
HCT VFR BLD AUTO: 45.4 % (ref 40–53)
HGB BLD-MCNC: 14.6 G/DL (ref 13.3–17.7)
IMM GRANULOCYTES # BLD: 0 10E3/UL
IMM GRANULOCYTES NFR BLD: 0 %
LYMPHOCYTES # BLD AUTO: 1.7 10E3/UL (ref 0.8–5.3)
LYMPHOCYTES NFR BLD AUTO: 32 %
MCH RBC QN AUTO: 28.6 PG (ref 26.5–33)
MCHC RBC AUTO-ENTMCNC: 32.2 G/DL (ref 31.5–36.5)
MCV RBC AUTO: 89 FL (ref 78–100)
MICROALBUMIN UR-MCNC: <12 MG/L
MICROALBUMIN/CREAT UR: NORMAL MG/G{CREAT}
MONOCYTES # BLD AUTO: 0.4 10E3/UL (ref 0–1.3)
MONOCYTES NFR BLD AUTO: 8 %
NEUTROPHILS # BLD AUTO: 3 10E3/UL (ref 1.6–8.3)
NEUTROPHILS NFR BLD AUTO: 57 %
PLATELET # BLD AUTO: 151 10E3/UL (ref 150–450)
POTASSIUM SERPL-SCNC: 4.5 MMOL/L (ref 3.4–5.3)
PROT SERPL-MCNC: 7.6 G/DL (ref 6.4–8.3)
RBC # BLD AUTO: 5.11 10E6/UL (ref 4.4–5.9)
SODIUM SERPL-SCNC: 140 MMOL/L (ref 136–145)
T3FREE SERPL-MCNC: 3.2 PG/ML (ref 2–4.4)
T4 FREE SERPL-MCNC: 1.96 NG/DL (ref 0.9–1.7)
TSH SERPL DL<=0.005 MIU/L-ACNC: 0.01 UIU/ML (ref 0.3–4.2)
WBC # BLD AUTO: 5.3 10E3/UL (ref 4–11)

## 2023-01-14 PROCEDURE — 83704 LIPOPROTEIN BLD QUAN PART: CPT | Mod: 90

## 2023-01-14 PROCEDURE — 36415 COLL VENOUS BLD VENIPUNCTURE: CPT

## 2023-01-14 PROCEDURE — 84481 FREE ASSAY (FT-3): CPT

## 2023-01-14 PROCEDURE — 82043 UR ALBUMIN QUANTITATIVE: CPT

## 2023-01-14 PROCEDURE — 83695 ASSAY OF LIPOPROTEIN(A): CPT

## 2023-01-14 PROCEDURE — 83036 HEMOGLOBIN GLYCOSYLATED A1C: CPT

## 2023-01-14 PROCEDURE — 80050 GENERAL HEALTH PANEL: CPT

## 2023-01-14 PROCEDURE — 80061 LIPID PANEL: CPT | Mod: 90

## 2023-01-14 PROCEDURE — 86141 C-REACTIVE PROTEIN HS: CPT

## 2023-01-14 PROCEDURE — 82570 ASSAY OF URINE CREATININE: CPT

## 2023-01-14 PROCEDURE — 84439 ASSAY OF FREE THYROXINE: CPT

## 2023-01-14 PROCEDURE — 99000 SPECIMEN HANDLING OFFICE-LAB: CPT

## 2023-01-19 LAB
CHOLEST SERPL-MCNC: 116 MG/DL
HDL SERPL QN: 8.9 NM
HDL SERPL-SCNC: 32.6 UMOL/L
HDLC SERPL-MCNC: 47 MG/DL
HLD.LARGE SERPL-SCNC: 4.4 UMOL/L
LDL SERPL QN: 20.6 NM
LDL SERPL-SCNC: 866 NMOL/L
LDL SMALL SERPL-SCNC: 630 NMOL/L
LDLC SERPL CALC-MCNC: 57 MG/DL
PATHOLOGY STUDY: ABNORMAL
TRIGL SERPL-MCNC: 59 MG/DL
VLDL LARGE SERPL-SCNC: <1.5 NMOL/L
VLDL SERPL QN: 48.9 NM

## 2023-01-26 ENCOUNTER — OFFICE VISIT (OUTPATIENT)
Dept: OTHER | Facility: CLINIC | Age: 63
End: 2023-01-26
Attending: INTERNAL MEDICINE
Payer: COMMERCIAL

## 2023-01-26 VITALS
HEIGHT: 69 IN | SYSTOLIC BLOOD PRESSURE: 115 MMHG | WEIGHT: 194.8 LBS | OXYGEN SATURATION: 95 % | HEART RATE: 69 BPM | DIASTOLIC BLOOD PRESSURE: 68 MMHG | BODY MASS INDEX: 28.85 KG/M2

## 2023-01-26 DIAGNOSIS — E78.5 HYPERLIPIDEMIA LDL GOAL <100: ICD-10-CM

## 2023-01-26 DIAGNOSIS — I48.91 ATRIAL FIBRILLATION, UNSPECIFIED TYPE (H): ICD-10-CM

## 2023-01-26 DIAGNOSIS — R73.01 IFG (IMPAIRED FASTING GLUCOSE): ICD-10-CM

## 2023-01-26 DIAGNOSIS — I10 ESSENTIAL HYPERTENSION: ICD-10-CM

## 2023-01-26 DIAGNOSIS — R06.09 DOE (DYSPNEA ON EXERTION): Primary | ICD-10-CM

## 2023-01-26 DIAGNOSIS — E06.3 HYPOTHYROIDISM DUE TO HASHIMOTO'S THYROIDITIS: ICD-10-CM

## 2023-01-26 PROCEDURE — 99214 OFFICE O/P EST MOD 30 MIN: CPT | Performed by: INTERNAL MEDICINE

## 2023-01-26 PROCEDURE — G0463 HOSPITAL OUTPT CLINIC VISIT: HCPCS

## 2023-01-26 RX ORDER — LEVOTHYROXINE SODIUM 150 UG/1
150 TABLET ORAL DAILY
Qty: 90 TABLET | Refills: 3 | Status: SHIPPED | OUTPATIENT
Start: 2023-01-26 | End: 2023-06-23

## 2023-01-26 NOTE — PROGRESS NOTES
"Patient is here to discuss follow up    /68 (BP Location: Right arm, Patient Position: Chair, Cuff Size: Adult Regular)   Pulse 69   Ht 5' 9\" (1.753 m)   Wt 194 lb 12.8 oz (88.4 kg)   SpO2 95%   BMI 28.77 kg/m      Questions patient would like addressed today are: N/A.    Refills are needed: No    Has homecare services and agency name:  Jessy MCCLELLAN    "

## 2023-01-26 NOTE — PROGRESS NOTES
Ramiro Jeffers is a 62 year old male who is presenting at the current time to discuss his diagnosi(es) of            ALAS (dyspnea on exertion)  Atrial fibrillation, unspecified type (H)  Hypothyroidism due to Hashimoto's thyroiditis  Essential hypertension  Hyperlipidemia LDL goal <100  IFG (impaired fasting glucose)        HPI: Ramiro Jeffers is a 62 year old male who was recently noted on investigation of ALAS to have a dilated LA. We obtained a cardiac event monitor and he goes in and out of AF. He also is overdosed on his Synthroid as well.      Review Of Systems  Skin: negative  Eyes: negative  Ears/Nose/Throat: negative  Respiratory: No shortness of breath, dyspnea on exertion, cough, or hemoptysis  Cardiovascular: negative  Gastrointestinal: negative  Genitourinary: negative  Musculoskeletal: negative  Neurologic: negative  Psychiatric: negative  Hematologic/Lymphatic/Immunologic: negative  Endocrine: negative     PAST MEDICAL HISTORY:                  Past Medical History           Past Medical History:   Diagnosis Date     ACQUIRED HYPOTHYROIDISM        Blood clot in the legs       CAD (coronary artery disease)       11/15/18 Cath: PCI with 3.5-16mm DORY to proximal LAD     Calculus of gallbladder without mention of cholecystitis or obstruction       Cataract       Chest pressure 11/5/2018     Hyperlipidemia       Hypertension       Ischemic cardiomyopathy       EF 40-45% on 11/2018 Echo     Obesity, unspecified       significant weight loss w/diet alone, on 10-15-10, BMI was 56.4     pulmonary emboli 6-2010     Unprovoked large bilateral pulmonary emboli without source identified on lower extremity dopplers, pt had a transient moderate lupus inhibitor upon initial presentation in June , 2010 which was gone in September, 2010;  all other thrombophilic workup is normal     Pulmonary embolus, bilateral       PVC's (premature ventricular contractions)       4% burden on 11/2018 Holter     Shortness of breath        on exertion     Venous insufficiency       Viral pneumonia, unspecified       Vitamin D deficiency              PAST SURGICAL HISTORY:                  Past Surgical History             Past Surgical History:   Procedure Laterality Date     COLONOSCOPY   11/17/2011     Procedure:COLONOSCOPY; colonoscopy; Surgeon:ELENA OROURKE; Location: GI     CV HEART CATHETERIZATION WITH POSSIBLE INTERVENTION Left 2/15/2019     Procedure: Coronary Angiogram;  Surgeon: Tulio Ortiz MD;  Location:  HEART CARDIAC CATH LAB     CV HEART CATHETERIZATION WITH POSSIBLE INTERVENTION N/A 10/5/2021     Procedure: Heart Catheterization with Possible Intervention;  Surgeon: Jose Francisco Dave MD;  Location:  HEART CARDIAC CATH LAB     CV LEFT HEART CATH N/A 10/5/2021     Procedure: Left Heart Cath;  Surgeon: Jose Francisco Dave MD;  Location:  HEART CARDIAC CATH LAB     CV LEFT VENTRICULOGRAM N/A 10/5/2021     Procedure: Left Ventriculogram;  Surgeon: Jose Francisco Dave MD;  Location:  HEART CARDIAC CATH LAB     LAPAROSCOPIC CHOLECYSTECTOMY   9/28/2012     Procedure: LAPAROSCOPIC CHOLECYSTECTOMY;  LAPAROSCOPIC CHOLECYSTECTOMY, LYSIS OF ADHESIONS;  Surgeon: Dutch Matta MD;  Location:  OR     LAPAROSCOPIC LYSIS ADHESIONS   9/28/2012     Procedure: LAPAROSCOPIC LYSIS ADHESIONS;;  Surgeon: Dutch Matta MD;  Location:  OR     LAPAROSCOPY, SURGICAL; REPAIR INCISIONAL OR VENTRAL HERNIA         repair of ventral strangulated surgery     PHACOEMULSIFICATION CLEAR CORNEA WITH STANDARD INTRAOCULAR LENS IMPLANT   12/19/2011     Procedure:PHACOEMULSIFICATION CLEAR CORNEA WITH STANDARD INTRAOCULAR LENS IMPLANT; RIGHT PHACOEMULSIFICATION CLEAR CORNEA WITH STANDARD INTRAOCULAR LENS IMPLANT ; Surgeon:ALLISON ANTONIO; Location: EC     [facility] NONSPECIFIC PROCEDURE   10-     Laparoscopic gastric bypass,     [facility] NONSPECIFIC PROCEDURE   10-     1.  Attempted laparoscopic exploration with conversion to: .   Abdominal exploration. 3.  Reduction of incarcerated incisional hernia. 4.  Repair of incisional hernia.5.  Gastrostomy tube placement (#22 Kisw[place]ili) into defunctionalized stomach.6.  Enterotomy with bowel decompression and primary repair. 7.  Abdominal lavage.             CURRENT MEDICATIONS:                  Current Outpatient Prescriptions               Current Outpatient Medications   Medication Sig Dispense Refill     apixaban ANTICOAGULANT (ELIQUIS ANTICOAGULANT) 5 MG tablet Take 1 tablet (5 mg) by mouth 2 times daily 60 tablet 11     aspirin 81 MG tablet Take 1 tablet (81 mg) by mouth daily Start tomorrow morning.         atorvastatin (LIPITOR) 40 MG tablet           Cholecalciferol (VITAMIN D) 2000 units tablet Take 1 tablet by mouth daily 100 tablet 12     ezetimibe (ZETIA) 10 MG tablet Take 1 tablet (10 mg) by mouth daily 90 tablet 3     hydrochlorothiazide (HYDRODIURIL) 25 MG tablet TAKE 1 TABLET(25 MG) BY MOUTH EVERY MORNING 90 tablet 3     isosorbide mononitrate (IMDUR) 30 MG 24 hr tablet Take 2 tablets (60 mg) by mouth daily 180 tablet 0     levothyroxine (SYNTHROID/LEVOTHROID) 150 MCG tablet Take 175 mcg on day 1, 175 mcg on day 2, followed by 150 mcg PO on day three, then repeat. 30 tablet 3     levothyroxine (SYNTHROID/LEVOTHROID) 175 MCG tablet Take 175 mcg on day 1, 175 mcg on day 2, followed by 150 mcg PO on day three, then repeat. 60 tablet 3     metoprolol succinate ER (TOPROL-XL) 50 MG 24 hr tablet TAKE 1 AND 1/2 TABLETS(75 MG) BY MOUTH DAILY 135 tablet 3     Multiple Vitamin (MULTI-VITAMIN) per tablet Take 1 tablet by mouth 2 times daily          nitroGLYcerin (NITROSTAT) 0.4 MG sublingual tablet For chest pain place 1 tablet under the tongue every 5 minutes for 3 doses. If symptoms persist 5 minutes after 1st dose call 911. 25 tablet 3     rosuvastatin (CRESTOR) 40 MG tablet TAKE 1 TABLET(40 MG) BY MOUTH DAILY 90 tablet 3            ALLERGIES:                          Allergies   Allergen  Reactions     No Known Allergies           SOCIAL HISTORY:                  Social History   Social History                Socioeconomic History     Marital status: Single       Spouse name: Not on file     Number of children: Not on file     Years of education: Not on file     Highest education level: Not on file   Occupational History     Occupation:        Employer: SELF   Tobacco Use     Smoking status: Never Smoker     Smokeless tobacco: Never Used   Substance and Sexual Activity     Alcohol use: No       Alcohol/week: 0.0 standard drinks     Drug use: No     Sexual activity: Not on file   Other Topics Concern     Parent/sibling w/ CABG, MI or angioplasty before 65F 55M? Not Asked   Social History Narrative     Not on file      Social Determinants of Health      Financial Resource Strain: Not on file   Food Insecurity: Not on file   Transportation Needs: Not on file   Physical Activity: Not on file   Stress: Not on file   Social Connections: Not on file   Intimate Partner Violence: Not on file   Housing Stability: Not on file            FAMILY HISTORY:                   Family History             Family History   Problem Relation Age of Onset     Osteoporosis Mother       Thyroid Disease Mother           no thyroid     Cancer Maternal Grandmother           ? lung cancer     Cancer Father           melanoma     Coronary Artery Disease Brother       Coronary Artery Disease Brother              Physical exam Reveals:     O/P: WNL  HEENT: WNL  NECK: No JVD, thyromegaly, or lymphadenopathy  HEART: RRR, no murmurs, gallops, or rubs  LUNGS: CTA bilaterally without rales, wheezes, or rhonchi  GI: NABS, nondistended, nontender, soft  EXT:without cyanosis, clubbing, or edema  NEURO: nonfocal  : no flank tenderness      General PFT Lab (Please always keep checked)  Order: [id number]   Collected 11/7/2022  7:25 AM      Status: Final result      1 Result Note    Component Ref Range & Units 7 d ago      FVC-Pred L 4.14     FVC-Pre L 3.80     FVC-%Pred-Pre % 91     FEV1-Pre L 2.93     FEV1-%Pred-Pre % 91     FEV1FVC-Pred % 78     FEV1FVC-Pre % 77     FEFMax-Pred L/sec 8.46     FEFMax-Pre L/sec 7.68     FEFMax-%Pred-Pre % 90     FEF2575-Pred L/sec 2.69     FEF2575-Pre L/sec 2.48     ZAM7590-%Pred-Pre % 92     ExpTime-Pre sec 7.45     FIFMax-Pre L/sec 5.11     VC-Pred L 4.46     VC-Pre L 3.78     VC-%Pred-Pre % 84     IC-Pred L 3.80     IC-Pre L 3.05     IC-%Pred-Pre % 80     ERV-Pred L 0.66     ERV-Pre L 0.73     ERV-%Pred-Pre % 110     FEV1FEV6-Pred % 79     FEV1FEV6-Pre % 78     FRCPleth-Pred L 3.47     FRCPleth-Pre L 3.43     FRCPleth-%Pred-Pre % 98     RVPleth-Pred L 2.37     RVPleth-Pre L 2.70     RVPleth-%Pred-Pre % 113     TLCPleth-Pred L 6.57     TLCPleth-Pre L 6.48     TLCPleth-%Pred-Pre % 98     Gaw-Pred L/s/cmH2O 1.03     Gaw-Pre L/s/cmH2O 0.77     Gaw-%Pred-Pre % 75     sGaw-Pred 1/cmH2O*s 0.08     sGaw-Pre 1/cmH2O*s 0.21     sGaw-%Pred-Pre % 255     sRaw-Pred cmH2O*s 4.76     sRaw-Pre cmH2O*s 4.71     sRaw-%Pred-Pre % 98     DLCOunc-Pred ml/min/mmHg 25.03     DLCOunc-Pre ml/min/mmHg 14.48     DLCOunc-%Pred-Pre % 57     DLCOcor-Pre ml/min/mmHg 14.70     DLCOcor-%Pred-Pre % 58     VA-Pre L 5.21     VA-%Pred-Pre % 88     FEV1SVC-Pred % 72     FEV1SVC-Pre % 78    Resulting Agency   [facility]                 Narrative  Performed by: [facility]  The FVC, FEV1, FEV1/FVC ratio and HBV35-28% are within normal limits.  The inspiratory flow rates are within normal limits.  Lung volumes are within normal limits.  The diffusing capacity is reduced after correction for hemoglobin.         IMPRESSION:     Normal spirometry.     Moderate diffusion defect.  However, this should be reviewed with caution as diffusion testing does not meet ATS criteria.     Normal lung volumes.           This interpretation has been electronically signed:  JOY HESS 11/08/2022  04:33:33 PM        Specimen Collected: 11/07/22  7:25 AM Last  Resulted: 11/08/22  4:37 PM                    Component      Latest Ref Rng & Units 1/14/2023   WBC      4.0 - 11.0 10e3/uL 5.3   RBC Count      4.40 - 5.90 10e6/uL 5.11   Hemoglobin      13.3 - 17.7 g/dL 14.6   Hematocrit      40.0 - 53.0 % 45.4   MCV      78 - 100 fL 89   MCH      26.5 - 33.0 pg 28.6   MCHC      31.5 - 36.5 g/dL 32.2   RDW      10.0 - 15.0 % 13.1   Platelet Count      150 - 450 10e3/uL 151   % Neutrophils      % 57   % Lymphocytes      % 32   % Monocytes      % 8   % Eosinophils      % 3   % Basophils      % 1   % Immature Granulocytes      % 0   Absolute Neutrophils      1.6 - 8.3 10e3/uL 3.0   Absolute Lymphocytes      0.8 - 5.3 10e3/uL 1.7   Absolute Monocytes      0.0 - 1.3 10e3/uL 0.4   Absolute Eosinophils      0.0 - 0.7 10e3/uL 0.1   Absolute Basophils      0.0 - 0.2 10e3/uL 0.0   Absolute Immature Granulocytes      <=0.4 10e3/uL 0.0   Sodium      136 - 145 mmol/L 140   Potassium      3.4 - 5.3 mmol/L 4.5   Chloride      98 - 107 mmol/L 100   Carbon Dioxide (CO2)      22 - 29 mmol/L 26   Anion Gap      7 - 15 mmol/L 14   Urea Nitrogen      8.0 - 23.0 mg/dL 12.6   Creatinine      0.67 - 1.17 mg/dL 0.77   Calcium      8.8 - 10.2 mg/dL 10.1   Glucose      70 - 99 mg/dL 102 (H)   Alkaline Phosphatase      40 - 129 U/L 72   AST      10 - 50 U/L 27   ALT      10 - 50 U/L 20   Protein Total      6.4 - 8.3 g/dL 7.6   Albumin      3.5 - 5.2 g/dL 4.3   Bilirubin Total      <=1.2 mg/dL 0.5   GFR Estimate      >60 mL/min/1.73m2 >90   Total Cholesterol      <=199 mg/dL 116   Triglycerides      30 - 149 mg/dL 59   HDL Cholesterol      40 - 59 mg/dL 47   LDL Cholesterol      <=129 mg/dL 57   HDL Size      >=8.9 nm 8.9   VLDL Size      <=46.7 nm 48.9 (H)   LDL Particle Size      >=20.7 nm 20.6 (L)   Lge HDL Particle number      >=4.2 umol/L 4.4   HDL Particle Number NMR      >=33.0 umol/L 32.6 (L)   Lge VLDL Part number      <=2.7 nmol/L <1.5   Small LDL Particle number      <=634 nmol/L 630   LDL  Particle Number      <=1135 nmol/L 866   EER LipoFit by NMR       See Note   C-Reactive Protein High Sensitivity       0.19   Hemoglobin A1C      0.0 - 5.6 % 5.6   Lipoprotein (a)      <30 mg/dL 19   Free T3      2.0 - 4.4 pg/mL 3.2   T4 Free      0.90 - 1.70 ng/dL 1.96 (H)   TSH      0.30 - 4.20 uIU/mL 0.01 (L)           A/P:     (R06.09) ALAS (dyspnea on exertion)  (primary encounter diagnosis)  Comment: CT chest shows no PE, TTE reveals normal EF. He is limited by his ALAS. Check the below, RTC two weeks later. PFTs were normal other than a mild diffusion deficit. 6 minute walk test did reveal mild desaturation at 6 minutes to 88%.  Plan: He is advised he is out of shape. He is advised to walk more as mch as possible, RTC prn worsening.             (I48.91) Atrial fibrillation, unspecified type (H)  Comment: rate is controlled. Rhythm is regular presently so he is likely in SR. He needs AC due to [id number] score of 5  Plan: Continue ELiquis     (E03.8,  E06.3) Hypothyroidism due to Hashimoto's thyroiditis  Comment: take 150 daily instead of 175/175/150, repeat labs in three months  Plan: levothyroxine (SYNTHROID/LEVOTHROID) 150 MCG         tablet, levothyroxine (SYNTHROID/LEVOTHROID)         175 MCG tablet, CBC with Platelets &         Differential, T3 Free, T4 free, TSH            (I10) Essential hypertension  Comment: at goal  Plan: no BP changes     (E78.5) Hyperlipidemia LDL goal <100  Comment: at goal now that zetia has been added. Check labs in three months, RTC 2 weeks later  Plan: C-Reactive Protein, High Sensitivity, LipoFit         by NMR, Lipoprotein (a)             (R73.01) IFG (impaired fasting glucose)  Comment: avoid CHO  Plan: Albumin Random Urine Quantitative with Creat         Ratio, Comprehensive metabolic panel,         Hemoglobin A1c             35 minutes total medical care on today's date.

## 2023-01-27 ENCOUNTER — TELEPHONE (OUTPATIENT)
Dept: CARDIOLOGY | Facility: CLINIC | Age: 63
End: 2023-01-27

## 2023-01-27 DIAGNOSIS — I10 ESSENTIAL HYPERTENSION: ICD-10-CM

## 2023-01-27 DIAGNOSIS — I25.118 CORONARY ARTERY DISEASE OF NATIVE ARTERY OF NATIVE HEART WITH STABLE ANGINA PECTORIS (H): ICD-10-CM

## 2023-01-27 RX ORDER — ISOSORBIDE MONONITRATE 30 MG/1
60 TABLET, EXTENDED RELEASE ORAL DAILY
Qty: 180 TABLET | Refills: 0 | Status: SHIPPED | OUTPATIENT
Start: 2023-01-27 | End: 2023-04-27

## 2023-01-27 NOTE — TELEPHONE ENCOUNTER
[facility] Call Center    Phone Message    May a detailed message be left on voicemail: yes     Reason for Call: Medication Refill Request    Has the patient contacted the pharmacy for the refill? Yes   Name of medication being requested: isosorbide mononitrate (IMDUR) 30 MG 24 hr tablet  Provider who prescribed the medication: Catarina Simeon PA-C  Pharmacy: [id number] - [place], [place] - [address] AT [address] [place] [address]  Date medication is needed: ASAP     Action Taken: Other: cardio    Travel Screening: Not Applicable        Thank you!  [facility]

## 2023-02-06 ENCOUNTER — OFFICE VISIT (OUTPATIENT)
Dept: CARDIOLOGY | Facility: CLINIC | Age: 63
End: 2023-02-06
Payer: COMMERCIAL

## 2023-02-06 VITALS
WEIGHT: 193.9 LBS | BODY MASS INDEX: 28.72 KG/M2 | HEART RATE: 62 BPM | SYSTOLIC BLOOD PRESSURE: 106 MMHG | DIASTOLIC BLOOD PRESSURE: 62 MMHG | OXYGEN SATURATION: 94 % | HEIGHT: 69 IN

## 2023-02-06 DIAGNOSIS — I25.118 CORONARY ARTERY DISEASE OF NATIVE ARTERY OF NATIVE HEART WITH STABLE ANGINA PECTORIS (H): ICD-10-CM

## 2023-02-06 DIAGNOSIS — E78.5 HYPERLIPIDEMIA LDL GOAL <70: ICD-10-CM

## 2023-02-06 DIAGNOSIS — I10 ESSENTIAL HYPERTENSION: Primary | ICD-10-CM

## 2023-02-06 PROCEDURE — 99214 OFFICE O/P EST MOD 30 MIN: CPT | Performed by: INTERNAL MEDICINE

## 2023-02-06 NOTE — LETTER
2/6/2023    Addison Davis MD  [address]  [place] [address]    RE: Ramiro WILKERSON Chose       Dear Colleague,     I had the pleasure of seeing Ramiro Jeffers in the [facility].  CARDIOLOGY CLINIC CONSULTATION    REASON FOR CONSULT: Coronary artery disease    PRIMARY CARE PHYSICIAN:  Addison Davis    Today's clinic visit entailed:  Review of the result(s) of each unique test - Echo Labs cath cardiac monitoring  30 minutes spent on the date of the encounter doing chart review, history and exam, documentation and further activities per the note  Provider  Link to [facility] Help Grid     The level of medical decision making during this visit was of moderate complexity.      HISTORY OF PRESENT ILLNESS:  This is a very pleasant 62-year-old gentleman who has a history of coronary artery disease who is seen in follow-up.    1. Proximal LAD stenting in 2018 with 2 subsequent angiograms in 2019 and 2021 showing no new obstructive lesions and patent stent.  2. History of bilateral PE  3. History of atrial fibrillation diagnosed 2023  4. Chronic anticoagulation for above    Patient is here for routine follow-up.  Denies any new cardiovascular symptoms.  No angina.  He is functionally active.  Exercises on his exercise bike.    He said he was recently started on anticoagulation.  He finds it expensive.  However he cannot switch to warfarin due to his inability to check frequent INRs.  No syncope presyncope no heart failure symptoms.    PAST MEDICAL HISTORY:  Past Medical History:   Diagnosis Date     ACQUIRED HYPOTHYROIDISM       Blood clot in the legs      CAD (coronary artery disease)     11/15/18 Cath: PCI with 3.5-16mm DORY to proximal LAD     Calculus of gallbladder without mention of cholecystitis or obstruction      Cataract      Chest pressure 11/5/2018     Hyperlipidemia      Hypertension      Ischemic cardiomyopathy     EF 40-45% on 11/2018 Echo     Obesity, unspecified      significant weight loss w/diet alone, on 10-15-10, BMI was 56.4     pulmonary emboli 6-2010    Unprovoked large bilateral pulmonary emboli without source identified on lower extremity dopplers, pt had a transient moderate lupus inhibitor upon initial presentation in June , 2010 which was gone in September, 2010;  all other thrombophilic workup is normal     Pulmonary embolus, bilateral      PVC's (premature ventricular contractions)     4% burden on 11/2018 Holter     Shortness of breath     on exertion     Venous insufficiency      Viral pneumonia, unspecified      Vitamin D deficiency        MEDICATIONS:  Current Outpatient Medications   Medication     apixaban ANTICOAGULANT (ELIQUIS ANTICOAGULANT) 5 MG tablet     aspirin 81 MG tablet     Cholecalciferol (VITAMIN D) 2000 units tablet     ezetimibe (ZETIA) 10 MG tablet     hydrochlorothiazide (HYDRODIURIL) 25 MG tablet     isosorbide mononitrate (IMDUR) 30 MG 24 hr tablet     levothyroxine (SYNTHROID/LEVOTHROID) 150 MCG tablet     metoprolol succinate ER (TOPROL XL) 50 MG 24 hr tablet     Multiple Vitamin (MULTI-VITAMIN) per tablet     nitroGLYcerin (NITROSTAT) 0.4 MG sublingual tablet     rosuvastatin (CRESTOR) 40 MG tablet     No current facility-administered medications for this visit.       ALLERGIES:  Allergies   Allergen Reactions     No Known Allergies        SOCIAL HISTORY:  I have reviewed this patient's social history and updated it with pertinent information if needed. Ramiro WILKERSON Zeyad  reports that he has never smoked. He has never used smokeless tobacco. He reports that he does not drink alcohol and does not use drugs.    FAMILY HISTORY:  I have reviewed this patient's family history and updated it with pertinent information if needed.   Family History   Problem Relation Age of Onset     Osteoporosis Mother      Thyroid Disease Mother         no thyroid     Cancer Maternal Grandmother         ? lung cancer     Cancer Father         melanoma     Coronary  Artery Disease Brother      Coronary Artery Disease Brother        REVIEW OF SYSTEMS:  Skin:        Eyes:       ENT:       Respiratory:       Cardiovascular:       Gastroenterology:      Genitourinary:       Musculoskeletal:       Neurologic:       Psychiatric:       Heme/Lymph/Imm:       Endocrine:           PHYSICAL EXAM:      BP: 106/62 Pulse: 62     SpO2: 94 %      Vital Signs with Ranges  Pulse:  [62] 62  BP: (106)/(62) 106/62  SpO2:  [94 %] 94 %  193 lbs 14.4 oz    Constitutional: awake, alert, no distress  Respiratory: Clear to auscultation  Cardiovascular: Normal regular heart sounds no edema  GI: nondistended  Neuropsychiatric: appropriate affact    DATA:  Labs: Pertinent cardiac labs reviewed    ASSESSMENT:  History of coronary artery disease with LAD stenting  Paroxysmal atrial fibrillation    RECOMMENDATIONS:  1. Patient is doing well from a cardiac standpoint without any cardiac symptoms.  2. Continue aspirin and statin therapy.  3. He is on chronic anticoagulation for atrial fibrillation.  Denies any bleeding problems.  Rate control for paroxysmal A-fib with beta-blockers.  4. Hypertension is under good control.  Continue current regimen.  5. Last echo shows normal EF.    Return to clinic for routine follow-up in 1 year.  Sooner if anything changes clinically.    BE Yoo, [facility]  Cardiology - [facility]  February 6, 2023    Thank you for allowing me to participate in the care of your patient.      Sincerely,     Tereza Key MD     [facility]  cc:   No referring provider defined for this encounter.

## 2023-02-06 NOTE — PROGRESS NOTES
CARDIOLOGY CLINIC CONSULTATION    REASON FOR CONSULT: Coronary artery disease    PRIMARY CARE PHYSICIAN:  Addison Davis    Today's clinic visit entailed:  Review of the result(s) of each unique test - [facility] cath cardiac monitoring  30 minutes spent on the date of the encounter doing chart review, history and exam, documentation and further activities per the note  Provider  Link to [facility] Help Grid     The level of medical decision making during this visit was of moderate complexity.      HISTORY OF PRESENT ILLNESS:  This is a very pleasant 62-year-old gentleman who has a history of coronary artery disease who is seen in follow-up.    1. Proximal LAD stenting in 2018 with 2 subsequent angiograms in 2019 and 2021 showing no new obstructive lesions and patent stent.  2. History of bilateral PE  3. History of atrial fibrillation diagnosed 2023  4. Chronic anticoagulation for above    Patient is here for routine follow-up.  Denies any new cardiovascular symptoms.  No angina.  He is functionally active.  Exercises on his exercise bike.    He said he was recently started on anticoagulation.  He finds it expensive.  However he cannot switch to warfarin due to his inability to check frequent INRs.  No syncope presyncope no heart failure symptoms.    PAST MEDICAL HISTORY:  Past Medical History:   Diagnosis Date     ACQUIRED HYPOTHYROIDISM       Blood clot in the legs      CAD (coronary artery disease)     11/15/18 Cath: PCI with 3.5-16mm DORY to proximal LAD     Calculus of gallbladder without mention of cholecystitis or obstruction      Cataract      Chest pressure 11/5/2018     Hyperlipidemia      Hypertension      Ischemic cardiomyopathy     EF 40-45% on 11/2018 Echo     Obesity, unspecified     significant weight loss w/diet alone, on 10-15-10, BMI was 56.4     pulmonary emboli 6-2010    Unprovoked large bilateral pulmonary emboli without source identified on lower extremity dopplers, pt had a transient  moderate lupus inhibitor upon initial presentation in June , 2010 which was gone in September, 2010;  all other thrombophilic workup is normal     Pulmonary embolus, bilateral      PVC's (premature ventricular contractions)     4% burden on 11/2018 Holter     Shortness of breath     on exertion     Venous insufficiency      Viral pneumonia, unspecified      Vitamin D deficiency        MEDICATIONS:  Current Outpatient Medications   Medication     apixaban ANTICOAGULANT (ELIQUIS ANTICOAGULANT) 5 MG tablet     aspirin 81 MG tablet     Cholecalciferol (VITAMIN D) 2000 units tablet     ezetimibe (ZETIA) 10 MG tablet     hydrochlorothiazide (HYDRODIURIL) 25 MG tablet     isosorbide mononitrate (IMDUR) 30 MG 24 hr tablet     levothyroxine (SYNTHROID/LEVOTHROID) 150 MCG tablet     metoprolol succinate ER (TOPROL XL) 50 MG 24 hr tablet     Multiple Vitamin (MULTI-VITAMIN) per tablet     nitroGLYcerin (NITROSTAT) 0.4 MG sublingual tablet     rosuvastatin (CRESTOR) 40 MG tablet     No current facility-administered medications for this visit.       ALLERGIES:  Allergies   Allergen Reactions     No Known Allergies        SOCIAL HISTORY:  I have reviewed this patient's social history and updated it with pertinent information if needed. Ramiro WILKERSON Zeyad  reports that he has never smoked. He has never used smokeless tobacco. He reports that he does not drink alcohol and does not use drugs.    FAMILY HISTORY:  I have reviewed this patient's family history and updated it with pertinent information if needed.   Family History   Problem Relation Age of Onset     Osteoporosis Mother      Thyroid Disease Mother         no thyroid     Cancer Maternal Grandmother         ? lung cancer     Cancer Father         melanoma     Coronary Artery Disease Brother      Coronary Artery Disease Brother        REVIEW OF SYSTEMS:  Skin:        Eyes:       ENT:       Respiratory:       Cardiovascular:       Gastroenterology:      Genitourinary:        Musculoskeletal:       Neurologic:       Psychiatric:       Heme/Lymph/Imm:       Endocrine:           PHYSICAL EXAM:      BP: 106/62 Pulse: 62     SpO2: 94 %      Vital Signs with Ranges  Pulse:  [62] 62  BP: (106)/(62) 106/62  SpO2:  [94 %] 94 %  193 lbs 14.4 oz    Constitutional: awake, alert, no distress  Respiratory: Clear to auscultation  Cardiovascular: Normal regular heart sounds no edema  GI: nondistended  Neuropsychiatric: appropriate affact    DATA:  Labs: Pertinent cardiac labs reviewed    ASSESSMENT:  History of coronary artery disease with LAD stenting  Paroxysmal atrial fibrillation    RECOMMENDATIONS:  1. Patient is doing well from a cardiac standpoint without any cardiac symptoms.  2. Continue aspirin and statin therapy.  3. He is on chronic anticoagulation for atrial fibrillation.  Denies any bleeding problems.  Rate control for paroxysmal A-fib with beta-blockers.  4. Hypertension is under good control.  Continue current regimen.  5. Last echo shows normal EF.    Return to clinic for routine follow-up in 1 year.  Sooner if anything changes clinically.    BE Yoo, [facility]  Cardiology - [facility]  February 6, 2023

## 2023-04-26 ENCOUNTER — LAB (OUTPATIENT)
Dept: LAB | Facility: CLINIC | Age: 63
End: 2023-04-26
Payer: COMMERCIAL

## 2023-04-26 DIAGNOSIS — R73.01 IFG (IMPAIRED FASTING GLUCOSE): ICD-10-CM

## 2023-04-26 DIAGNOSIS — I48.91 ATRIAL FIBRILLATION, UNSPECIFIED TYPE (H): ICD-10-CM

## 2023-04-26 DIAGNOSIS — E06.3 HYPOTHYROIDISM DUE TO HASHIMOTO'S THYROIDITIS: ICD-10-CM

## 2023-04-26 DIAGNOSIS — E78.5 HYPERLIPIDEMIA LDL GOAL <100: ICD-10-CM

## 2023-04-26 DIAGNOSIS — I10 ESSENTIAL HYPERTENSION: ICD-10-CM

## 2023-04-26 LAB
ALBUMIN SERPL BCG-MCNC: 3.9 G/DL (ref 3.5–5.2)
ALP SERPL-CCNC: 66 U/L (ref 40–129)
ALT SERPL W P-5'-P-CCNC: 12 U/L (ref 10–50)
ANION GAP SERPL CALCULATED.3IONS-SCNC: 10 MMOL/L (ref 7–15)
APO A-I SERPL-MCNC: 15 MG/DL
AST SERPL W P-5'-P-CCNC: 21 U/L (ref 10–50)
BASOPHILS # BLD AUTO: 0 10E3/UL (ref 0–0.2)
BASOPHILS NFR BLD AUTO: 0 %
BILIRUB SERPL-MCNC: 0.4 MG/DL
BUN SERPL-MCNC: 19.6 MG/DL (ref 8–23)
CALCIUM SERPL-MCNC: 9.3 MG/DL (ref 8.8–10.2)
CHLORIDE SERPL-SCNC: 104 MMOL/L (ref 98–107)
CREAT SERPL-MCNC: 0.87 MG/DL (ref 0.67–1.17)
CREAT UR-MCNC: 66.6 MG/DL
CRP SERPL HS-MCNC: <0.15 MG/L
DEPRECATED HCO3 PLAS-SCNC: 27 MMOL/L (ref 22–29)
EOSINOPHIL # BLD AUTO: 0.1 10E3/UL (ref 0–0.7)
EOSINOPHIL NFR BLD AUTO: 2 %
ERYTHROCYTE [DISTWIDTH] IN BLOOD BY AUTOMATED COUNT: 14.4 % (ref 10–15)
GFR SERPL CREATININE-BSD FRML MDRD: >90 ML/MIN/1.73M2
GLUCOSE SERPL-MCNC: 100 MG/DL (ref 70–99)
HBA1C MFR BLD: 5.2 % (ref 0–5.6)
HCT VFR BLD AUTO: 37.9 % (ref 40–53)
HGB BLD-MCNC: 12.6 G/DL (ref 13.3–17.7)
IMM GRANULOCYTES # BLD: 0 10E3/UL
IMM GRANULOCYTES NFR BLD: 0 %
LYMPHOCYTES # BLD AUTO: 1.8 10E3/UL (ref 0.8–5.3)
LYMPHOCYTES NFR BLD AUTO: 35 %
MCH RBC QN AUTO: 30.8 PG (ref 26.5–33)
MCHC RBC AUTO-ENTMCNC: 33.2 G/DL (ref 31.5–36.5)
MCV RBC AUTO: 93 FL (ref 78–100)
MICROALBUMIN UR-MCNC: <12 MG/L
MICROALBUMIN/CREAT UR: NORMAL MG/G{CREAT}
MONOCYTES # BLD AUTO: 0.4 10E3/UL (ref 0–1.3)
MONOCYTES NFR BLD AUTO: 8 %
NEUTROPHILS # BLD AUTO: 2.8 10E3/UL (ref 1.6–8.3)
NEUTROPHILS NFR BLD AUTO: 54 %
PLATELET # BLD AUTO: 137 10E3/UL (ref 150–450)
POTASSIUM SERPL-SCNC: 4.3 MMOL/L (ref 3.4–5.3)
PROT SERPL-MCNC: 6.6 G/DL (ref 6.4–8.3)
RBC # BLD AUTO: 4.09 10E6/UL (ref 4.4–5.9)
SODIUM SERPL-SCNC: 141 MMOL/L (ref 136–145)
T3FREE SERPL-MCNC: 2.3 PG/ML (ref 2–4.4)
T4 FREE SERPL-MCNC: 1.31 NG/DL (ref 0.9–1.7)
TSH SERPL DL<=0.005 MIU/L-ACNC: 0.06 UIU/ML (ref 0.3–4.2)
WBC # BLD AUTO: 5.2 10E3/UL (ref 4–11)

## 2023-04-26 PROCEDURE — 80061 LIPID PANEL: CPT | Mod: 90

## 2023-04-26 PROCEDURE — 82570 ASSAY OF URINE CREATININE: CPT

## 2023-04-26 PROCEDURE — 36415 COLL VENOUS BLD VENIPUNCTURE: CPT

## 2023-04-26 PROCEDURE — 86141 C-REACTIVE PROTEIN HS: CPT

## 2023-04-26 PROCEDURE — 84439 ASSAY OF FREE THYROXINE: CPT

## 2023-04-26 PROCEDURE — 83695 ASSAY OF LIPOPROTEIN(A): CPT

## 2023-04-26 PROCEDURE — 99000 SPECIMEN HANDLING OFFICE-LAB: CPT

## 2023-04-26 PROCEDURE — 83036 HEMOGLOBIN GLYCOSYLATED A1C: CPT

## 2023-04-26 PROCEDURE — 82043 UR ALBUMIN QUANTITATIVE: CPT

## 2023-04-26 PROCEDURE — 83704 LIPOPROTEIN BLD QUAN PART: CPT | Mod: 90

## 2023-04-26 PROCEDURE — 84481 FREE ASSAY (FT-3): CPT

## 2023-04-26 PROCEDURE — 80050 GENERAL HEALTH PANEL: CPT

## 2023-04-27 DIAGNOSIS — I25.118 CORONARY ARTERY DISEASE OF NATIVE ARTERY OF NATIVE HEART WITH STABLE ANGINA PECTORIS (H): ICD-10-CM

## 2023-04-27 DIAGNOSIS — I10 ESSENTIAL HYPERTENSION: ICD-10-CM

## 2023-04-27 RX ORDER — ISOSORBIDE MONONITRATE 30 MG/1
60 TABLET, EXTENDED RELEASE ORAL DAILY
Qty: 180 TABLET | Refills: 3 | Status: SHIPPED | OUTPATIENT
Start: 2023-04-27 | End: 2023-11-09

## 2023-04-29 LAB
CHOLEST SERPL-MCNC: 129 MG/DL
HDL SERPL QN: 9.3 NM
HDL SERPL-SCNC: 26.3 UMOL/L
HDLC SERPL-MCNC: 48 MG/DL
HLD.LARGE SERPL-SCNC: 7.1 UMOL/L
LDL SERPL QN: 21 NM
LDL SERPL-SCNC: 872 NMOL/L
LDL SMALL SERPL-SCNC: 319 NMOL/L
LDLC SERPL CALC-MCNC: 68 MG/DL
PATHOLOGY STUDY: ABNORMAL
TRIGL SERPL-MCNC: 63 MG/DL
VLDL LARGE SERPL-SCNC: <1.5 NMOL/L
VLDL SERPL QN: 45.3 NM

## 2023-06-01 ENCOUNTER — HEALTH MAINTENANCE LETTER (OUTPATIENT)
Age: 63
End: 2023-06-01

## 2023-06-21 ENCOUNTER — MYC MEDICAL ADVICE (OUTPATIENT)
Dept: OTHER | Facility: CLINIC | Age: 63
End: 2023-06-21
Payer: COMMERCIAL

## 2023-06-21 DIAGNOSIS — R73.01 IFG (IMPAIRED FASTING GLUCOSE): ICD-10-CM

## 2023-06-21 DIAGNOSIS — I10 ESSENTIAL HYPERTENSION: ICD-10-CM

## 2023-06-21 DIAGNOSIS — E78.5 HYPERLIPIDEMIA LDL GOAL <100: ICD-10-CM

## 2023-06-21 DIAGNOSIS — E78.5 HYPERLIPIDEMIA LDL GOAL <70: ICD-10-CM

## 2023-06-21 DIAGNOSIS — I48.91 ATRIAL FIBRILLATION, UNSPECIFIED TYPE (H): ICD-10-CM

## 2023-06-21 DIAGNOSIS — E55.9 VITAMIN D DEFICIENCY: ICD-10-CM

## 2023-06-21 DIAGNOSIS — E06.3 HYPOTHYROIDISM DUE TO HASHIMOTO'S THYROIDITIS: Primary | ICD-10-CM

## 2023-06-21 DIAGNOSIS — I25.10 CORONARY ARTERY DISEASE INVOLVING NATIVE CORONARY ARTERY OF NATIVE HEART WITHOUT ANGINA PECTORIS: ICD-10-CM

## 2023-06-21 RX ORDER — EZETIMIBE 10 MG/1
TABLET ORAL
Qty: 30 TABLET | Refills: 0 | Status: SHIPPED | OUTPATIENT
Start: 2023-06-21 | End: 2023-06-21

## 2023-06-21 RX ORDER — EZETIMIBE 10 MG/1
TABLET ORAL
Qty: 90 TABLET | Refills: 0 | Status: SHIPPED | OUTPATIENT
Start: 2023-06-21 | End: 2023-09-21

## 2023-06-21 NOTE — TELEPHONE ENCOUNTER
ezetimibe (ZETIA) 10 MG tablet  Last Written Prescription Date:  6/23/22  Last Fill Quantity: 90,  # refills: 3    Last office visit:  1/26/23  Follow up recommended:  April 2023  (5/10/32 office visit cancelled - per note Patient Initiated (I am sorry but not able to make it to my Appt on Wednesday. I was counting on my appt with Dr Davis to discuss my lab results. Due to financial responsibilities and getting a new temp job I need to work out another day to discuss things.Thank you)    30 day refill loaded for review/signature.  Patient notified via [employer] that appointment is required for further refill.  Follow up orders updated in Epic.

## 2023-06-23 ENCOUNTER — OFFICE VISIT (OUTPATIENT)
Dept: OTHER | Facility: CLINIC | Age: 63
End: 2023-06-23
Attending: INTERNAL MEDICINE
Payer: COMMERCIAL

## 2023-06-23 VITALS
HEART RATE: 57 BPM | DIASTOLIC BLOOD PRESSURE: 63 MMHG | BODY MASS INDEX: 27.05 KG/M2 | WEIGHT: 183.2 LBS | OXYGEN SATURATION: 96 % | SYSTOLIC BLOOD PRESSURE: 123 MMHG

## 2023-06-23 DIAGNOSIS — R06.09 DYSPNEA ON EXERTION: ICD-10-CM

## 2023-06-23 DIAGNOSIS — D64.9 ANEMIA, UNSPECIFIED TYPE: ICD-10-CM

## 2023-06-23 DIAGNOSIS — I48.91 ATRIAL FIBRILLATION, UNSPECIFIED TYPE (H): ICD-10-CM

## 2023-06-23 DIAGNOSIS — R73.01 IFG (IMPAIRED FASTING GLUCOSE): ICD-10-CM

## 2023-06-23 DIAGNOSIS — E78.5 HYPERLIPIDEMIA LDL GOAL <70: Primary | ICD-10-CM

## 2023-06-23 DIAGNOSIS — I10 ESSENTIAL HYPERTENSION: ICD-10-CM

## 2023-06-23 DIAGNOSIS — E06.3 HYPOTHYROIDISM DUE TO HASHIMOTO'S THYROIDITIS: ICD-10-CM

## 2023-06-23 PROCEDURE — 99214 OFFICE O/P EST MOD 30 MIN: CPT | Performed by: INTERNAL MEDICINE

## 2023-06-23 PROCEDURE — G0463 HOSPITAL OUTPT CLINIC VISIT: HCPCS

## 2023-06-23 RX ORDER — LEVOTHYROXINE SODIUM 137 UG/1
TABLET ORAL
Qty: 45 TABLET | Refills: 3 | Status: SHIPPED | OUTPATIENT
Start: 2023-06-23 | End: 2023-11-09

## 2023-06-23 RX ORDER — LEVOTHYROXINE SODIUM 150 UG/1
TABLET ORAL
Qty: 45 TABLET | Refills: 3 | Status: SHIPPED | OUTPATIENT
Start: 2023-06-23 | End: 2023-11-09 | Stop reason: DRUGHIGH

## 2023-06-23 NOTE — PROGRESS NOTES
Ramiro Jeffers is a 62 year old male who is presenting at the current time to discuss his diagnosi(es) of         Hypothyroidism due to Hashimoto's thyroiditis  Hyperlipidemia LDL goal <70  IFG (impaired fasting glucose)  Essential hypertension  Atrial fibrillation, unspecified type (H)  Dyspnea on exertion  Anemia, unspecified type          HPI: Ramiro Jeffers is a 62 year old male who was recently noted on investigation of ALAS to have a dilated LA. We obtained a cardiac event monitor and he goes in and out of AF. He also is overdosed on his Synthroid as well.      Review Of Systems  Skin: negative  Eyes: negative  Ears/Nose/Throat: negative  Respiratory: No shortness of breath, dyspnea on exertion, cough, or hemoptysis  Cardiovascular: negative  Gastrointestinal: negative  Genitourinary: negative  Musculoskeletal: negative  Neurologic: negative  Psychiatric: negative  Hematologic/Lymphatic/Immunologic: negative  Endocrine: negative     PAST MEDICAL HISTORY:                  Past Medical History           Past Medical History:   Diagnosis Date     ACQUIRED HYPOTHYROIDISM        Blood clot in the legs       CAD (coronary artery disease)       11/15/18 Cath: PCI with 3.5-16mm DORY to proximal LAD     Calculus of gallbladder without mention of cholecystitis or obstruction       Cataract       Chest pressure 11/5/2018     Hyperlipidemia       Hypertension       Ischemic cardiomyopathy       EF 40-45% on 11/2018 Echo     Obesity, unspecified       significant weight loss w/diet alone, on 10-15-10, BMI was 56.4     pulmonary emboli 6-2010     Unprovoked large bilateral pulmonary emboli without source identified on lower extremity dopplers, pt had a transient moderate lupus inhibitor upon initial presentation in June , 2010 which was gone in September, 2010;  all other thrombophilic workup is normal     Pulmonary embolus, bilateral       PVC's (premature ventricular contractions)       4% burden on 11/2018 Holter     Shortness  of breath       on exertion     Venous insufficiency       Viral pneumonia, unspecified       Vitamin D deficiency              PAST SURGICAL HISTORY:                  Past Surgical History             Past Surgical History:   Procedure Laterality Date     COLONOSCOPY   11/17/2011     Procedure:COLONOSCOPY; colonoscopy; Surgeon:ELENA OROURKE; Location: GI     CV HEART CATHETERIZATION WITH POSSIBLE INTERVENTION Left 2/15/2019     Procedure: Coronary Angiogram;  Surgeon: Tulio Ortiz MD;  Location:  HEART CARDIAC CATH LAB     CV HEART CATHETERIZATION WITH POSSIBLE INTERVENTION N/A 10/5/2021     Procedure: Heart Catheterization with Possible Intervention;  Surgeon: Jose Francisco Dave MD;  Location:  HEART CARDIAC CATH LAB     CV LEFT HEART CATH N/A 10/5/2021     Procedure: Left Heart Cath;  Surgeon: Jose Francisco Dave MD;  Location:  HEART CARDIAC CATH LAB     CV LEFT VENTRICULOGRAM N/A 10/5/2021     Procedure: Left Ventriculogram;  Surgeon: Jose Francisco Dave MD;  Location:  HEART CARDIAC CATH LAB     LAPAROSCOPIC CHOLECYSTECTOMY   9/28/2012     Procedure: LAPAROSCOPIC CHOLECYSTECTOMY;  LAPAROSCOPIC CHOLECYSTECTOMY, LYSIS OF ADHESIONS;  Surgeon: Dutch Matta MD;  Location:  OR     LAPAROSCOPIC LYSIS ADHESIONS   9/28/2012     Procedure: LAPAROSCOPIC LYSIS ADHESIONS;;  Surgeon: Dutch Matta MD;  Location:  OR     LAPAROSCOPY, SURGICAL; REPAIR INCISIONAL OR VENTRAL HERNIA         repair of ventral strangulated surgery     PHACOEMULSIFICATION CLEAR CORNEA WITH STANDARD INTRAOCULAR LENS IMPLANT   12/19/2011     Procedure:PHACOEMULSIFICATION CLEAR CORNEA WITH STANDARD INTRAOCULAR LENS IMPLANT; RIGHT PHACOEMULSIFICATION CLEAR CORNEA WITH STANDARD INTRAOCULAR LENS IMPLANT ; Surgeon:ALLISON ANTONIO; Location: EC     [facility] NONSPECIFIC PROCEDURE   10-     Laparoscopic gastric bypass,     [facility] NONSPECIFIC PROCEDURE   10-     1.  Attempted laparoscopic exploration with  conversion to: .  Abdominal exploration. 3.  Reduction of incarcerated incisional hernia. 4.  Repair of incisional hernia.5.  Gastrostomy tube placement (#22 [place]) into defunctionalized stomach.6.  Enterotomy with bowel decompression and primary repair. 7.  Abdominal lavage.             CURRENT MEDICATIONS:                  Current Outpatient Prescriptions               Current Outpatient Medications   Medication Sig Dispense Refill     apixaban ANTICOAGULANT (ELIQUIS ANTICOAGULANT) 5 MG tablet Take 1 tablet (5 mg) by mouth 2 times daily 60 tablet 11     aspirin 81 MG tablet Take 1 tablet (81 mg) by mouth daily Start tomorrow morning.         atorvastatin (LIPITOR) 40 MG tablet           Cholecalciferol (VITAMIN D) 2000 units tablet Take 1 tablet by mouth daily 100 tablet 12     ezetimibe (ZETIA) 10 MG tablet Take 1 tablet (10 mg) by mouth daily 90 tablet 3     hydrochlorothiazide (HYDRODIURIL) 25 MG tablet TAKE 1 TABLET(25 MG) BY MOUTH EVERY MORNING 90 tablet 3     isosorbide mononitrate (IMDUR) 30 MG 24 hr tablet Take 2 tablets (60 mg) by mouth daily 180 tablet 0     levothyroxine (SYNTHROID/LEVOTHROID) 150 MCG tablet Take 175 mcg on day 1, 175 mcg on day 2, followed by 150 mcg PO on day three, then repeat. 30 tablet 3     levothyroxine (SYNTHROID/LEVOTHROID) 175 MCG tablet Take 175 mcg on day 1, 175 mcg on day 2, followed by 150 mcg PO on day three, then repeat. 60 tablet 3     metoprolol succinate ER (TOPROL-XL) 50 MG 24 hr tablet TAKE 1 AND 1/2 TABLETS(75 MG) BY MOUTH DAILY 135 tablet 3     Multiple Vitamin (MULTI-VITAMIN) per tablet Take 1 tablet by mouth 2 times daily          nitroGLYcerin (NITROSTAT) 0.4 MG sublingual tablet For chest pain place 1 tablet under the tongue every 5 minutes for 3 doses. If symptoms persist 5 minutes after 1st dose call 911. 25 tablet 3     rosuvastatin (CRESTOR) 40 MG tablet TAKE 1 TABLET(40 MG) BY MOUTH DAILY 90 tablet 3            ALLERGIES:                           Allergies   Allergen Reactions     No Known Allergies           SOCIAL HISTORY:                  Social History   Social History                Socioeconomic History     Marital status: Single       Spouse name: Not on file     Number of children: Not on file     Years of education: Not on file     Highest education level: Not on file   Occupational History     Occupation:        Employer: SELF   Tobacco Use     Smoking status: Never Smoker     Smokeless tobacco: Never Used   Substance and Sexual Activity     Alcohol use: No       Alcohol/week: 0.0 standard drinks     Drug use: No     Sexual activity: Not on file   Other Topics Concern     Parent/sibling w/ CABG, MI or angioplasty before 65F 55M? Not Asked   Social History Narrative     Not on file      Social Determinants of Health      Financial Resource Strain: Not on file   Food Insecurity: Not on file   Transportation Needs: Not on file   Physical Activity: Not on file   Stress: Not on file   Social Connections: Not on file   Intimate Partner Violence: Not on file   Housing Stability: Not on file            FAMILY HISTORY:                   Family History             Family History   Problem Relation Age of Onset     Osteoporosis Mother       Thyroid Disease Mother           no thyroid     Cancer Maternal Grandmother           ? lung cancer     Cancer Father           melanoma     Coronary Artery Disease Brother       Coronary Artery Disease Brother              Physical exam Reveals:     O/P: WNL  HEENT: WNL  NECK: No JVD, thyromegaly, or lymphadenopathy  HEART: RRR, no murmurs, gallops, or rubs  LUNGS: CTA bilaterally without rales, wheezes, or rhonchi  GI: NABS, nondistended, nontender, soft  EXT:without cyanosis, clubbing, or edema  NEURO: nonfocal  : no flank tenderness      General PFT Lab (Please always keep checked)  Order: [id number]   Collected 11/7/2022  7:25 AM      Status: Final result      1 Result Note    Component Ref Range &  Units 7 d ago     FVC-Pred L 4.14     FVC-Pre L 3.80     FVC-%Pred-Pre % 91     FEV1-Pre L 2.93     FEV1-%Pred-Pre % 91     FEV1FVC-Pred % 78     FEV1FVC-Pre % 77     FEFMax-Pred L/sec 8.46     FEFMax-Pre L/sec 7.68     FEFMax-%Pred-Pre % 90     FEF2575-Pred L/sec 2.69     FEF2575-Pre L/sec 2.48     HSF7803-%Pred-Pre % 92     ExpTime-Pre sec 7.45     FIFMax-Pre L/sec 5.11     VC-Pred L 4.46     VC-Pre L 3.78     VC-%Pred-Pre % 84     IC-Pred L 3.80     IC-Pre L 3.05     IC-%Pred-Pre % 80     ERV-Pred L 0.66     ERV-Pre L 0.73     ERV-%Pred-Pre % 110     FEV1FEV6-Pred % 79     FEV1FEV6-Pre % 78     FRCPleth-Pred L 3.47     FRCPleth-Pre L 3.43     FRCPleth-%Pred-Pre % 98     RVPleth-Pred L 2.37     RVPleth-Pre L 2.70     RVPleth-%Pred-Pre % 113     TLCPleth-Pred L 6.57     TLCPleth-Pre L 6.48     TLCPleth-%Pred-Pre % 98     Gaw-Pred L/s/cmH2O 1.03     Gaw-Pre L/s/cmH2O 0.77     Gaw-%Pred-Pre % 75     sGaw-Pred 1/cmH2O*s 0.08     sGaw-Pre 1/cmH2O*s 0.21     sGaw-%Pred-Pre % 255     sRaw-Pred cmH2O*s 4.76     sRaw-Pre cmH2O*s 4.71     sRaw-%Pred-Pre % 98     DLCOunc-Pred ml/min/mmHg 25.03     DLCOunc-Pre ml/min/mmHg 14.48     DLCOunc-%Pred-Pre % 57     DLCOcor-Pre ml/min/mmHg 14.70     DLCOcor-%Pred-Pre % 58     VA-Pre L 5.21     VA-%Pred-Pre % 88     FEV1SVC-Pred % 72     FEV1SVC-Pre % 78    Resulting Agency   [facility]                   Narrative  Performed by: [facility]  The FVC, FEV1, FEV1/FVC ratio and XKV27-95% are within normal limits.  The inspiratory flow rates are within normal limits.  Lung volumes are within normal limits.  The diffusing capacity is reduced after correction for hemoglobin.         IMPRESSION:     Normal spirometry.     Moderate diffusion defect.  However, this should be reviewed with caution as diffusion testing does not meet ATS criteria.     Normal lung volumes.           This interpretation has been electronically signed:  JOY HESS 11/08/2022  04:33:33 PM        Specimen Collected:  11/07/22  7:25 AM Last Resulted: 11/08/22  4:37 PM                          Component      Latest Ref Rng & Units 1/14/2023   WBC      4.0 - 11.0 10e3/uL 5.3   RBC Count      4.40 - 5.90 10e6/uL 5.11   Hemoglobin      13.3 - 17.7 g/dL 14.6   Hematocrit      40.0 - 53.0 % 45.4   MCV      78 - 100 fL 89   MCH      26.5 - 33.0 pg 28.6   MCHC      31.5 - 36.5 g/dL 32.2   RDW      10.0 - 15.0 % 13.1   Platelet Count      150 - 450 10e3/uL 151   % Neutrophils      % 57   % Lymphocytes      % 32   % Monocytes      % 8   % Eosinophils      % 3   % Basophils      % 1   % Immature Granulocytes      % 0   Absolute Neutrophils      1.6 - 8.3 10e3/uL 3.0   Absolute Lymphocytes      0.8 - 5.3 10e3/uL 1.7   Absolute Monocytes      0.0 - 1.3 10e3/uL 0.4   Absolute Eosinophils      0.0 - 0.7 10e3/uL 0.1   Absolute Basophils      0.0 - 0.2 10e3/uL 0.0   Absolute Immature Granulocytes      <=0.4 10e3/uL 0.0   Sodium      136 - 145 mmol/L 140   Potassium      3.4 - 5.3 mmol/L 4.5   Chloride      98 - 107 mmol/L 100   Carbon Dioxide (CO2)      22 - 29 mmol/L 26   Anion Gap      7 - 15 mmol/L 14   Urea Nitrogen      8.0 - 23.0 mg/dL 12.6   Creatinine      0.67 - 1.17 mg/dL 0.77   Calcium      8.8 - 10.2 mg/dL 10.1   Glucose      70 - 99 mg/dL 102 (H)   Alkaline Phosphatase      40 - 129 U/L 72   AST      10 - 50 U/L 27   ALT      10 - 50 U/L 20   Protein Total      6.4 - 8.3 g/dL 7.6   Albumin      3.5 - 5.2 g/dL 4.3   Bilirubin Total      <=1.2 mg/dL 0.5   GFR Estimate      >60 mL/min/1.73m2 >90   Total Cholesterol      <=199 mg/dL 116   Triglycerides      30 - 149 mg/dL 59   HDL Cholesterol      40 - 59 mg/dL 47   LDL Cholesterol      <=129 mg/dL 57   HDL Size      >=8.9 nm 8.9   VLDL Size      <=46.7 nm 48.9 (H)   LDL Particle Size      >=20.7 nm 20.6 (L)   Lge HDL Particle number      >=4.2 umol/L 4.4   HDL Particle Number NMR      >=33.0 umol/L 32.6 (L)   Lge VLDL Part number      <=2.7 nmol/L <1.5   Small LDL Particle number       <=634 nmol/L 630   LDL Particle Number      <=1135 nmol/L 866   EER LipoFit by NMR       See Note   C-Reactive Protein High Sensitivity       0.19   Hemoglobin A1C      0.0 - 5.6 % 5.6   Lipoprotein (a)      <30 mg/dL 19   Free T3      2.0 - 4.4 pg/mL 3.2   T4 Free      0.90 - 1.70 ng/dL 1.96 (H)   TSH      0.30 - 4.20 uIU/mL 0.01 (L)         Component      Latest Ref Rn 4/26/2023  7:33 AM   WBC      4.0 - 11.0 10e3/uL 5.2    RBC Count      4.40 - 5.90 10e6/uL 4.09 (L)    Hemoglobin      13.3 - 17.7 g/dL 12.6 (L)    Hematocrit      40.0 - 53.0 % 37.9 (L)    MCV      78 - 100 fL 93    MCH      26.5 - 33.0 pg 30.8    MCHC      31.5 - 36.5 g/dL 33.2    RDW      10.0 - 15.0 % 14.4    Platelet Count      150 - 450 10e3/uL 137 (L)    % Neutrophils      % 54    % Lymphocytes      % 35    % Monocytes      % 8    % Eosinophils      % 2    % Basophils      % 0    % Immature Granulocytes      % 0    Absolute Neutrophils      1.6 - 8.3 10e3/uL 2.8    Absolute Lymphocytes      0.8 - 5.3 10e3/uL 1.8    Absolute Monocytes      0.0 - 1.3 10e3/uL 0.4    Absolute Eosinophils      0.0 - 0.7 10e3/uL 0.1    Absolute Basophils      0.0 - 0.2 10e3/uL 0.0    Absolute Immature Granulocytes      <=0.4 10e3/uL 0.0    Sodium      136 - 145 mmol/L 141    Potassium      3.4 - 5.3 mmol/L 4.3    Chloride      98 - 107 mmol/L 104    Carbon Dioxide (CO2)      22 - 29 mmol/L 27    Anion Gap      7 - 15 mmol/L 10    Urea Nitrogen      8.0 - 23.0 mg/dL 19.6    Creatinine      0.67 - 1.17 mg/dL 0.87    Calcium      8.8 - 10.2 mg/dL 9.3    Glucose      70 - 99 mg/dL 100 (H)    Alkaline Phosphatase      40 - 129 U/L 66    AST      10 - 50 U/L 21    ALT      10 - 50 U/L 12    Protein Total      6.4 - 8.3 g/dL 6.6    Albumin      3.5 - 5.2 g/dL 3.9    Bilirubin Total      <=1.2 mg/dL 0.4    GFR Estimate      >60 mL/min/1.73m2 >90    Total Cholesterol      <=199 mg/dL 129    Triglycerides      30 - 149 mg/dL 63    HDL Cholesterol      40 - 59 mg/dL 48     LDL Cholesterol      <=129 mg/dL 68    HDL Size      >=8.9 nm 9.3    VLDL Size      <=46.7 nm 45.3    LDL Particle Size      >=20.7 nm 21.0    Lge HDL Particle number      >=4.2 umol/L 7.1    HDL Particle Number NMR      >=33.0 umol/L 26.3 (L)    Lge VLDL Part number      <=2.7 nmol/L <1.5    Small LDL Particle number      <=634 nmol/L 319    LDL Particle Number      <=1135 nmol/L 872    EER LipoFit by NMR See Note    Creatinine Urine      mg/dL 66.6    Albumin Urine mg/L      mg/L <12.0    Albumin Urine mg/g Cr --    C-Reactive Protein High Sensitivity <0.15    Hemoglobin A1C      0.0 - 5.6 % 5.2    Lipoprotein (a)      <30 mg/dL 15    Free T3      2.0 - 4.4 pg/mL 2.3    T4 Free      0.90 - 1.70 ng/dL 1.31    TSH      0.30 - 4.20 uIU/mL 0.06 (L)       Legend:  (L) Low  (H) High          A/P:     (R06.09) ALAS (dyspnea on exertion)  (primary encounter diagnosis)  Comment: CT chest shows no PE, TTE reveals normal EF. He is limited by his ALAS.PFTs were normal other than a mild diffusion deficit. 6 minute walk test did reveal mild desaturation at 6 minutes to 88%.  Plan: He is advised he is out of shape. He is advised to walk more as mch as possible, RTC prn worsening.             (I48.91) Atrial fibrillation, unspecified type (H)  Comment: rate is controlled. Rhythm is regular presently so he is likely in SR. He needs AC due to [id number] score of 5  Plan: Continue ELiquis     (E03.8,  E06.3) Hypothyroidism due to Hashimoto's thyroiditis  Comment: take 137/150 QOD instead of 150 Daily, repeat labs in three months  Plan: levothyroxine (SYNTHROID/LEVOTHROID) 150 MCG         tablet, levothyroxine (SYNTHROID/LEVOTHROID)         175 MCG tablet, CBC with Platelets &         Differential, T3 Free, T4 free, TSH            (I10) Essential hypertension  Comment: at goal  Plan: no BP changes     (E78.5) Hyperlipidemia LDL goal <100  Comment: at goal now that zetia has been added. Check labs in three months, RTC 2 weeks  later  Plan: C-Reactive Protein, High Sensitivity, LipoFit         by NMR, Lipoprotein (a)             (R73.01) IFG (impaired fasting glucose)  Comment: avoid CHO  Plan: Albumin Random Urine Quantitative with Creat         Ratio, Comprehensive metabolic panel,         Hemoglobin A1c    (D64.9) Anemia, unspecified type  Comment: check the below  Plan: Reticulocyte count, Iron & Iron Binding         Capacity, Transferrin, Ferritin, Vitamin B12,         Folate                         35 minutes total medical care on today's date.

## 2023-06-23 NOTE — PROGRESS NOTES
[facility]        Patient is here for a  follow up.     Pt is currently taking Statin and Eliquis.    /63 (BP Location: Right arm, Patient Position: Chair, Cuff Size: Adult Regular)   Pulse 57   Wt 183 lb 3.2 oz (83.1 kg)   SpO2 96%   BMI 27.05 kg/m      The provider has been notified that the patient has no concerns.     Questions patient would like addressed today are: N/A.    Refills are needed: N/A    Has homecare services and agency name:  Jesys Bonilla MA

## 2023-07-16 ENCOUNTER — APPOINTMENT (OUTPATIENT)
Dept: ULTRASOUND IMAGING | Facility: CLINIC | Age: 63
DRG: 292 | End: 2023-07-16
Attending: EMERGENCY MEDICINE
Payer: COMMERCIAL

## 2023-07-16 ENCOUNTER — APPOINTMENT (OUTPATIENT)
Dept: GENERAL RADIOLOGY | Facility: CLINIC | Age: 63
DRG: 292 | End: 2023-07-16
Attending: EMERGENCY MEDICINE
Payer: COMMERCIAL

## 2023-07-16 ENCOUNTER — HOSPITAL ENCOUNTER (INPATIENT)
Facility: CLINIC | Age: 63
LOS: 3 days | Discharge: HOME | DRG: 292 | End: 2023-07-19
Attending: EMERGENCY MEDICINE | Admitting: HOSPITALIST
Payer: COMMERCIAL

## 2023-07-16 DIAGNOSIS — I10 ESSENTIAL HYPERTENSION: ICD-10-CM

## 2023-07-16 DIAGNOSIS — R60.1 ANASARCA: ICD-10-CM

## 2023-07-16 DIAGNOSIS — I50.9 CONGESTIVE HEART FAILURE, UNSPECIFIED HF CHRONICITY, UNSPECIFIED HEART FAILURE TYPE (H): ICD-10-CM

## 2023-07-16 DIAGNOSIS — E87.6 HYPOKALEMIA: ICD-10-CM

## 2023-07-16 LAB
ALBUMIN SERPL BCG-MCNC: 3.8 G/DL (ref 3.5–5.2)
ALP SERPL-CCNC: 62 U/L (ref 40–129)
ALT SERPL W P-5'-P-CCNC: 29 U/L (ref 0–70)
ANION GAP SERPL CALCULATED.3IONS-SCNC: 13 MMOL/L (ref 7–15)
AST SERPL W P-5'-P-CCNC: 43 U/L (ref 0–45)
ATRIAL RATE - MUSE: 62 BPM
BASOPHILS # BLD AUTO: 0 10E3/UL (ref 0–0.2)
BASOPHILS NFR BLD AUTO: 0 %
BILIRUB SERPL-MCNC: 0.5 MG/DL
BUN SERPL-MCNC: 14.1 MG/DL (ref 8–23)
CALCIUM SERPL-MCNC: 8.8 MG/DL (ref 8.8–10.2)
CHLORIDE SERPL-SCNC: 101 MMOL/L (ref 98–107)
CREAT SERPL-MCNC: 0.89 MG/DL (ref 0.67–1.17)
DEPRECATED HCO3 PLAS-SCNC: 27 MMOL/L (ref 22–29)
DIASTOLIC BLOOD PRESSURE - MUSE: NORMAL MMHG
EOSINOPHIL # BLD AUTO: 0.3 10E3/UL (ref 0–0.7)
EOSINOPHIL NFR BLD AUTO: 3 %
ERYTHROCYTE [DISTWIDTH] IN BLOOD BY AUTOMATED COUNT: 14.3 % (ref 10–15)
GFR SERPL CREATININE-BSD FRML MDRD: >90 ML/MIN/1.73M2
GLUCOSE SERPL-MCNC: 98 MG/DL (ref 70–99)
HCT VFR BLD AUTO: 37.3 % (ref 40–53)
HGB BLD-MCNC: 11.9 G/DL (ref 13.3–17.7)
IMM GRANULOCYTES # BLD: 0 10E3/UL
IMM GRANULOCYTES NFR BLD: 0 %
INTERPRETATION ECG - MUSE: NORMAL
LYMPHOCYTES # BLD AUTO: 2.5 10E3/UL (ref 0.8–5.3)
LYMPHOCYTES NFR BLD AUTO: 27 %
MCH RBC QN AUTO: 29.4 PG (ref 26.5–33)
MCHC RBC AUTO-ENTMCNC: 31.9 G/DL (ref 31.5–36.5)
MCV RBC AUTO: 92 FL (ref 78–100)
MONOCYTES # BLD AUTO: 0.8 10E3/UL (ref 0–1.3)
MONOCYTES NFR BLD AUTO: 8 %
NEUTROPHILS # BLD AUTO: 5.6 10E3/UL (ref 1.6–8.3)
NEUTROPHILS NFR BLD AUTO: 62 %
NRBC # BLD AUTO: 0 10E3/UL
NRBC BLD AUTO-RTO: 0 /100
NT-PROBNP SERPL-MCNC: 1552 PG/ML (ref 0–900)
P AXIS - MUSE: 36 DEGREES
PLATELET # BLD AUTO: 146 10E3/UL (ref 150–450)
POTASSIUM SERPL-SCNC: 2.7 MMOL/L (ref 3.4–5.3)
PR INTERVAL - MUSE: 202 MS
PROT SERPL-MCNC: 6.5 G/DL (ref 6.4–8.3)
QRS DURATION - MUSE: 134 MS
QT - MUSE: 442 MS
QTC - MUSE: 448 MS
R AXIS - MUSE: -16 DEGREES
RBC # BLD AUTO: 4.05 10E6/UL (ref 4.4–5.9)
SODIUM SERPL-SCNC: 141 MMOL/L (ref 136–145)
SYSTOLIC BLOOD PRESSURE - MUSE: NORMAL MMHG
T AXIS - MUSE: 45 DEGREES
TROPONIN T SERPL HS-MCNC: 10 NG/L
VENTRICULAR RATE- MUSE: 62 BPM
WBC # BLD AUTO: 9.1 10E3/UL (ref 4–11)

## 2023-07-16 PROCEDURE — 85025 COMPLETE CBC W/AUTO DIFF WBC: CPT | Performed by: EMERGENCY MEDICINE

## 2023-07-16 PROCEDURE — 93970 EXTREMITY STUDY: CPT

## 2023-07-16 PROCEDURE — 93005 ELECTROCARDIOGRAM TRACING: CPT

## 2023-07-16 PROCEDURE — 250N000011 HC RX IP 250 OP 636: Mod: JZ | Performed by: EMERGENCY MEDICINE

## 2023-07-16 PROCEDURE — 85004 AUTOMATED DIFF WBC COUNT: CPT | Performed by: EMERGENCY MEDICINE

## 2023-07-16 PROCEDURE — 250N000013 HC RX MED GY IP 250 OP 250 PS 637: Performed by: EMERGENCY MEDICINE

## 2023-07-16 PROCEDURE — 80053 COMPREHEN METABOLIC PANEL: CPT | Performed by: EMERGENCY MEDICINE

## 2023-07-16 PROCEDURE — 36415 COLL VENOUS BLD VENIPUNCTURE: CPT | Performed by: EMERGENCY MEDICINE

## 2023-07-16 PROCEDURE — 99223 1ST HOSP IP/OBS HIGH 75: CPT | Performed by: HOSPITALIST

## 2023-07-16 PROCEDURE — 99418 PROLNG IP/OBS E/M EA 15 MIN: CPT | Performed by: HOSPITALIST

## 2023-07-16 PROCEDURE — 210N000002 HC R&B HEART CARE

## 2023-07-16 PROCEDURE — 84443 ASSAY THYROID STIM HORMONE: CPT | Performed by: EMERGENCY MEDICINE

## 2023-07-16 PROCEDURE — 83735 ASSAY OF MAGNESIUM: CPT | Performed by: HOSPITALIST

## 2023-07-16 PROCEDURE — 71046 X-RAY EXAM CHEST 2 VIEWS: CPT

## 2023-07-16 PROCEDURE — 84484 ASSAY OF TROPONIN QUANT: CPT | Performed by: EMERGENCY MEDICINE

## 2023-07-16 PROCEDURE — 96365 THER/PROPH/DIAG IV INF INIT: CPT

## 2023-07-16 PROCEDURE — 96375 TX/PRO/DX INJ NEW DRUG ADDON: CPT

## 2023-07-16 PROCEDURE — 99285 EMERGENCY DEPT VISIT HI MDM: CPT | Mod: 25

## 2023-07-16 PROCEDURE — 83880 ASSAY OF NATRIURETIC PEPTIDE: CPT | Performed by: EMERGENCY MEDICINE

## 2023-07-16 RX ORDER — POTASSIUM CHLORIDE 1.5 G/1.58G
40 POWDER, FOR SOLUTION ORAL ONCE
Status: COMPLETED | OUTPATIENT
Start: 2023-07-16 | End: 2023-07-16

## 2023-07-16 RX ORDER — FUROSEMIDE 10 MG/ML
60 INJECTION INTRAMUSCULAR; INTRAVENOUS ONCE
Status: COMPLETED | OUTPATIENT
Start: 2023-07-16 | End: 2023-07-16

## 2023-07-16 RX ORDER — POTASSIUM CHLORIDE 7.45 MG/ML
10 INJECTION INTRAVENOUS ONCE
Status: COMPLETED | OUTPATIENT
Start: 2023-07-16 | End: 2023-07-17

## 2023-07-16 RX ADMIN — POTASSIUM CHLORIDE 40 MEQ: 1.5 POWDER, FOR SOLUTION ORAL at 21:32

## 2023-07-16 RX ADMIN — FUROSEMIDE 60 MG: 10 INJECTION, SOLUTION INTRAMUSCULAR; INTRAVENOUS at 21:32

## 2023-07-16 RX ADMIN — POTASSIUM CHLORIDE 10 MEQ: 10 INJECTION, SOLUTION INTRAVENOUS at 21:35

## 2023-07-16 ASSESSMENT — ACTIVITIES OF DAILY LIVING (ADL)
ADLS_ACUITY_SCORE: 35
ADLS_ACUITY_SCORE: 33

## 2023-07-17 ENCOUNTER — APPOINTMENT (OUTPATIENT)
Dept: OCCUPATIONAL THERAPY | Facility: CLINIC | Age: 63
DRG: 292 | End: 2023-07-17
Attending: HOSPITALIST
Payer: COMMERCIAL

## 2023-07-17 LAB
ANION GAP SERPL CALCULATED.3IONS-SCNC: 14 MMOL/L (ref 7–15)
BASOPHILS # BLD AUTO: 0.1 10E3/UL (ref 0–0.2)
BASOPHILS NFR BLD AUTO: 1 %
BUN SERPL-MCNC: 13.1 MG/DL (ref 8–23)
CALCIUM SERPL-MCNC: 8.9 MG/DL (ref 8.8–10.2)
CHLORIDE SERPL-SCNC: 102 MMOL/L (ref 98–107)
CPB POCT: NO
CREAT SERPL-MCNC: 0.89 MG/DL (ref 0.67–1.17)
DEPRECATED HCO3 PLAS-SCNC: 28 MMOL/L (ref 22–29)
EOSINOPHIL # BLD AUTO: 0.2 10E3/UL (ref 0–0.7)
EOSINOPHIL NFR BLD AUTO: 3 %
ERYTHROCYTE [DISTWIDTH] IN BLOOD BY AUTOMATED COUNT: 14.3 % (ref 10–15)
GFR SERPL CREATININE-BSD FRML MDRD: >90 ML/MIN/1.73M2
GLUCOSE BLDC GLUCOMTR-MCNC: 90 MG/DL (ref 70–99)
GLUCOSE SERPL-MCNC: 103 MG/DL (ref 70–99)
HCO3 BLDV-SCNC: 36 MMOL/L (ref 21–28)
HCT VFR BLD AUTO: 39.2 % (ref 40–53)
HCT VFR BLD CALC: 41 % (ref 40–53)
HGB BLD-MCNC: 12.8 G/DL (ref 13.3–17.7)
HGB BLD-MCNC: 13.9 G/DL (ref 13.3–17.7)
IMM GRANULOCYTES # BLD: 0 10E3/UL
IMM GRANULOCYTES NFR BLD: 0 %
LYMPHOCYTES # BLD AUTO: 1.6 10E3/UL (ref 0.8–5.3)
LYMPHOCYTES NFR BLD AUTO: 24 %
MAGNESIUM SERPL-MCNC: 2.3 MG/DL (ref 1.7–2.3)
MAGNESIUM SERPL-MCNC: 2.8 MG/DL (ref 1.7–2.3)
MCH RBC QN AUTO: 30 PG (ref 26.5–33)
MCHC RBC AUTO-ENTMCNC: 32.7 G/DL (ref 31.5–36.5)
MCV RBC AUTO: 92 FL (ref 78–100)
MONOCYTES # BLD AUTO: 0.6 10E3/UL (ref 0–1.3)
MONOCYTES NFR BLD AUTO: 8 %
NEUTROPHILS # BLD AUTO: 4.3 10E3/UL (ref 1.6–8.3)
NEUTROPHILS NFR BLD AUTO: 64 %
NRBC # BLD AUTO: 0 10E3/UL
NRBC BLD AUTO-RTO: 0 /100
PATH REPORT.COMMENTS IMP SPEC: NORMAL
PATH REPORT.COMMENTS IMP SPEC: NORMAL
PATH REPORT.FINAL DX SPEC: NORMAL
PATH REPORT.MICROSCOPIC SPEC OTHER STN: NORMAL
PATH REPORT.MICROSCOPIC SPEC OTHER STN: NORMAL
PATH REPORT.RELEVANT HX SPEC: NORMAL
PCO2 BLDV: 51 MM HG (ref 40–50)
PH BLDV: 7.46 [PH] (ref 7.32–7.43)
PLATELET # BLD AUTO: 141 10E3/UL (ref 150–450)
PO2 BLDV: 26 MM HG (ref 25–47)
POTASSIUM BLD-SCNC: 2.7 MMOL/L (ref 3.4–5.3)
POTASSIUM SERPL-SCNC: 3.1 MMOL/L (ref 3.4–5.3)
POTASSIUM SERPL-SCNC: 3.3 MMOL/L (ref 3.4–5.3)
POTASSIUM SERPL-SCNC: 3.8 MMOL/L (ref 3.4–5.3)
RBC # BLD AUTO: 4.27 10E6/UL (ref 4.4–5.9)
RETICS # AUTO: 0.08 10E6/UL (ref 0.03–0.1)
RETICS/RBC NFR AUTO: 1.9 % (ref 0.5–2)
SAO2 % BLDV: 49 % (ref 94–100)
SODIUM BLD-SCNC: 144 MMOL/L (ref 133–144)
SODIUM SERPL-SCNC: 144 MMOL/L (ref 136–145)
TROPONIN T SERPL HS-MCNC: 10 NG/L
TSH SERPL DL<=0.005 MIU/L-ACNC: 0.94 UIU/ML (ref 0.3–4.2)
WBC # BLD AUTO: 6.8 10E3/UL (ref 4–11)

## 2023-07-17 PROCEDURE — 82803 BLOOD GASES ANY COMBINATION: CPT

## 2023-07-17 PROCEDURE — 250N000011 HC RX IP 250 OP 636: Mod: JZ | Performed by: EMERGENCY MEDICINE

## 2023-07-17 PROCEDURE — 97535 SELF CARE MNGMENT TRAINING: CPT | Mod: GO

## 2023-07-17 PROCEDURE — 97165 OT EVAL LOW COMPLEX 30 MIN: CPT | Mod: GO

## 2023-07-17 PROCEDURE — 250N000013 HC RX MED GY IP 250 OP 250 PS 637: Performed by: EMERGENCY MEDICINE

## 2023-07-17 PROCEDURE — 84132 ASSAY OF SERUM POTASSIUM: CPT | Performed by: INTERNAL MEDICINE

## 2023-07-17 PROCEDURE — 250N000011 HC RX IP 250 OP 636: Mod: JZ | Performed by: HOSPITALIST

## 2023-07-17 PROCEDURE — 250N000013 HC RX MED GY IP 250 OP 250 PS 637: Performed by: INTERNAL MEDICINE

## 2023-07-17 PROCEDURE — 99222 1ST HOSP IP/OBS MODERATE 55: CPT | Performed by: INTERNAL MEDICINE

## 2023-07-17 PROCEDURE — 36415 COLL VENOUS BLD VENIPUNCTURE: CPT | Performed by: HOSPITALIST

## 2023-07-17 PROCEDURE — 85060 BLOOD SMEAR INTERPRETATION: CPT | Performed by: PATHOLOGY

## 2023-07-17 PROCEDURE — 97530 THERAPEUTIC ACTIVITIES: CPT | Mod: GO

## 2023-07-17 PROCEDURE — 85045 AUTOMATED RETICULOCYTE COUNT: CPT | Performed by: HOSPITALIST

## 2023-07-17 PROCEDURE — 36415 COLL VENOUS BLD VENIPUNCTURE: CPT | Performed by: INTERNAL MEDICINE

## 2023-07-17 PROCEDURE — 250N000013 HC RX MED GY IP 250 OP 250 PS 637: Performed by: HOSPITALIST

## 2023-07-17 PROCEDURE — 83735 ASSAY OF MAGNESIUM: CPT | Performed by: HOSPITALIST

## 2023-07-17 PROCEDURE — 99232 SBSQ HOSP IP/OBS MODERATE 35: CPT | Performed by: INTERNAL MEDICINE

## 2023-07-17 PROCEDURE — 85025 COMPLETE CBC W/AUTO DIFF WBC: CPT | Performed by: HOSPITALIST

## 2023-07-17 PROCEDURE — 84484 ASSAY OF TROPONIN QUANT: CPT | Performed by: HOSPITALIST

## 2023-07-17 PROCEDURE — 80048 BASIC METABOLIC PNL TOTAL CA: CPT | Performed by: HOSPITALIST

## 2023-07-17 PROCEDURE — 210N000002 HC R&B HEART CARE

## 2023-07-17 PROCEDURE — 84295 ASSAY OF SERUM SODIUM: CPT

## 2023-07-17 RX ORDER — ONDANSETRON 2 MG/ML
4 INJECTION INTRAMUSCULAR; INTRAVENOUS EVERY 6 HOURS PRN
Status: DISCONTINUED | OUTPATIENT
Start: 2023-07-17 | End: 2023-07-19 | Stop reason: HOSPADM

## 2023-07-17 RX ORDER — ISOSORBIDE MONONITRATE 60 MG/1
60 TABLET, EXTENDED RELEASE ORAL DAILY
Status: DISCONTINUED | OUTPATIENT
Start: 2023-07-17 | End: 2023-07-19 | Stop reason: HOSPADM

## 2023-07-17 RX ORDER — POTASSIUM CHLORIDE 7.45 MG/ML
10 INJECTION INTRAVENOUS ONCE
Status: COMPLETED | OUTPATIENT
Start: 2023-07-17 | End: 2023-07-17

## 2023-07-17 RX ORDER — POTASSIUM CHLORIDE 1500 MG/1
20 TABLET, EXTENDED RELEASE ORAL ONCE
Status: COMPLETED | OUTPATIENT
Start: 2023-07-17 | End: 2023-07-17

## 2023-07-17 RX ORDER — FUROSEMIDE 10 MG/ML
40 INJECTION INTRAMUSCULAR; INTRAVENOUS EVERY 12 HOURS
Status: DISCONTINUED | OUTPATIENT
Start: 2023-07-17 | End: 2023-07-18

## 2023-07-17 RX ORDER — LIDOCAINE 40 MG/G
CREAM TOPICAL
Status: DISCONTINUED | OUTPATIENT
Start: 2023-07-17 | End: 2023-07-19 | Stop reason: HOSPADM

## 2023-07-17 RX ORDER — POTASSIUM CHLORIDE 1500 MG/1
40 TABLET, EXTENDED RELEASE ORAL ONCE
Status: COMPLETED | OUTPATIENT
Start: 2023-07-17 | End: 2023-07-17

## 2023-07-17 RX ORDER — AMOXICILLIN 250 MG
1 CAPSULE ORAL 2 TIMES DAILY PRN
Status: DISCONTINUED | OUTPATIENT
Start: 2023-07-17 | End: 2023-07-19 | Stop reason: HOSPADM

## 2023-07-17 RX ORDER — AMOXICILLIN 250 MG
2 CAPSULE ORAL 2 TIMES DAILY PRN
Status: DISCONTINUED | OUTPATIENT
Start: 2023-07-17 | End: 2023-07-19 | Stop reason: HOSPADM

## 2023-07-17 RX ORDER — MAGNESIUM SULFATE HEPTAHYDRATE 40 MG/ML
2 INJECTION, SOLUTION INTRAVENOUS ONCE
Status: COMPLETED | OUTPATIENT
Start: 2023-07-17 | End: 2023-07-17

## 2023-07-17 RX ORDER — LEVOTHYROXINE SODIUM 75 UG/1
150 TABLET ORAL EVERY OTHER DAY
Status: DISCONTINUED | OUTPATIENT
Start: 2023-07-18 | End: 2023-07-19 | Stop reason: HOSPADM

## 2023-07-17 RX ORDER — EZETIMIBE 10 MG/1
10 TABLET ORAL DAILY
Status: DISCONTINUED | OUTPATIENT
Start: 2023-07-17 | End: 2023-07-19 | Stop reason: HOSPADM

## 2023-07-17 RX ORDER — ROSUVASTATIN CALCIUM 20 MG/1
40 TABLET, COATED ORAL DAILY
Status: DISCONTINUED | OUTPATIENT
Start: 2023-07-17 | End: 2023-07-19 | Stop reason: HOSPADM

## 2023-07-17 RX ORDER — ONDANSETRON 4 MG/1
4 TABLET, ORALLY DISINTEGRATING ORAL EVERY 6 HOURS PRN
Status: DISCONTINUED | OUTPATIENT
Start: 2023-07-17 | End: 2023-07-19 | Stop reason: HOSPADM

## 2023-07-17 RX ORDER — ACETAMINOPHEN 325 MG/1
650 TABLET ORAL EVERY 6 HOURS PRN
Status: DISCONTINUED | OUTPATIENT
Start: 2023-07-17 | End: 2023-07-19 | Stop reason: HOSPADM

## 2023-07-17 RX ORDER — ACETAMINOPHEN 650 MG/1
650 SUPPOSITORY RECTAL EVERY 6 HOURS PRN
Status: DISCONTINUED | OUTPATIENT
Start: 2023-07-17 | End: 2023-07-19 | Stop reason: HOSPADM

## 2023-07-17 RX ORDER — POTASSIUM CHLORIDE 1.5 G/1.58G
40 POWDER, FOR SOLUTION ORAL ONCE
Status: COMPLETED | OUTPATIENT
Start: 2023-07-17 | End: 2023-07-17

## 2023-07-17 RX ADMIN — EZETIMIBE 10 MG: 10 TABLET ORAL at 11:19

## 2023-07-17 RX ADMIN — ACETAMINOPHEN 650 MG: 325 TABLET, FILM COATED ORAL at 09:34

## 2023-07-17 RX ADMIN — APIXABAN 5 MG: 5 TABLET, FILM COATED ORAL at 20:10

## 2023-07-17 RX ADMIN — POTASSIUM CHLORIDE 40 MEQ: 1500 TABLET, EXTENDED RELEASE ORAL at 09:33

## 2023-07-17 RX ADMIN — POTASSIUM CHLORIDE 10 MEQ: 10 INJECTION, SOLUTION INTRAVENOUS at 01:09

## 2023-07-17 RX ADMIN — METOPROLOL SUCCINATE 75 MG: 50 TABLET, EXTENDED RELEASE ORAL at 11:18

## 2023-07-17 RX ADMIN — APIXABAN 5 MG: 5 TABLET, FILM COATED ORAL at 11:19

## 2023-07-17 RX ADMIN — ROSUVASTATIN CALCIUM 40 MG: 20 TABLET, FILM COATED ORAL at 11:19

## 2023-07-17 RX ADMIN — POTASSIUM CHLORIDE 40 MEQ: 1500 TABLET, EXTENDED RELEASE ORAL at 14:19

## 2023-07-17 RX ADMIN — FUROSEMIDE 40 MG: 10 INJECTION, SOLUTION INTRAMUSCULAR; INTRAVENOUS at 20:10

## 2023-07-17 RX ADMIN — POTASSIUM CHLORIDE 20 MEQ: 1500 TABLET, EXTENDED RELEASE ORAL at 20:10

## 2023-07-17 RX ADMIN — LEVOTHYROXINE SODIUM 137 MCG: 112 TABLET ORAL at 11:19

## 2023-07-17 RX ADMIN — MAGNESIUM SULFATE HEPTAHYDRATE 2 G: 40 INJECTION, SOLUTION INTRAVENOUS at 01:13

## 2023-07-17 RX ADMIN — ISOSORBIDE MONONITRATE 60 MG: 60 TABLET, EXTENDED RELEASE ORAL at 11:18

## 2023-07-17 RX ADMIN — FUROSEMIDE 40 MG: 10 INJECTION, SOLUTION INTRAMUSCULAR; INTRAVENOUS at 09:33

## 2023-07-17 RX ADMIN — POTASSIUM CHLORIDE 40 MEQ: 1.5 POWDER, FOR SOLUTION ORAL at 01:06

## 2023-07-17 ASSESSMENT — ACTIVITIES OF DAILY LIVING (ADL)
ADLS_ACUITY_SCORE: 35
ADLS_ACUITY_SCORE: 37
ADLS_ACUITY_SCORE: 41
ADLS_ACUITY_SCORE: 37

## 2023-07-17 NOTE — ED NOTES
[facility]  ED Nurse Handoff Report    ED Chief complaint: Edema and Shortness of Breath      ED Diagnosis:   Final diagnoses:   Congestive heart failure, unspecified HF chronicity, unspecified heart failure type (H)   Hypokalemia   Anasarca       Code Status: to be addressed by admitting doctor    Allergies:   Allergies   Allergen Reactions    No Known Allergies        Patient Story: pt had 30lb weight gain over past several weeks with increased SOB and difficulty ambulating   Focused Assessment:  Pt voices needs, denies SOB at this time. 97% on RA, pt voided 2,300ml urine after lasix. Bilateral leg edema.     Treatments and/or interventions provided: PO and IV potassium replacement IV Lasix  Results for orders placed or performed during the hospital encounter of 07/16/23   XR Chest 2 Views     Status: None    Narrative    EXAM: XR CHEST 2 VIEWS  LOCATION: [facility]  DATE: 7/16/2023    INDICATION: dyspnea  COMPARISON: None.      Impression    IMPRESSION: Heart size and pulmonary vascularity normal. The lungs are clear.   US Lower Extremity Venous Duplex Bilateral     Status: None    Narrative    EXAM: US LOWER EXTREMITY VENOUS DUPLEX BILATERAL  LOCATION: [facility]  DATE: 7/16/2023    INDICATION: Bilateral leg swelling.  COMPARISON: None.  TECHNIQUE: Venous Duplex ultrasound of bilateral lower extremities with and without compression, augmentation and duplex. Color flow and spectral Doppler with waveform analysis performed.    FINDINGS: Exam includes the common femoral, femoral, popliteal veins as well as segmentally visualized deep calf veins and greater saphenous vein.     RIGHT: No deep vein thrombosis. No superficial thrombophlebitis. No popliteal cyst.    LEFT: No deep vein thrombosis. No superficial thrombophlebitis. No popliteal cyst.      Impression    IMPRESSION:  1.  No deep venous thrombosis in the bilateral lower extremities.    Comprehensive metabolic panel     Status: Abnormal   Result Value Ref Range    Sodium 141 136 - 145 mmol/L    Potassium 2.7 (L) 3.4 - 5.3 mmol/L    Chloride 101 98 - 107 mmol/L    Carbon Dioxide (CO2) 27 22 - 29 mmol/L    Anion Gap 13 7 - 15 mmol/L    Urea Nitrogen 14.1 8.0 - 23.0 mg/dL    Creatinine 0.89 0.67 - 1.17 mg/dL    Calcium 8.8 8.8 - 10.2 mg/dL    Glucose 98 70 - 99 mg/dL    Alkaline Phosphatase 62 40 - 129 U/L    AST 43 0 - 45 U/L    ALT 29 0 - 70 U/L    Protein Total 6.5 6.4 - 8.3 g/dL    Albumin 3.8 3.5 - 5.2 g/dL    Bilirubin Total 0.5 <=1.2 mg/dL    GFR Estimate >90 >60 mL/min/1.73m2   Troponin T, High Sensitivity     Status: Normal   Result Value Ref Range    Troponin T, High Sensitivity 10 <=22 ng/L   CBC with platelets and differential     Status: Abnormal   Result Value Ref Range    WBC Count 9.1 4.0 - 11.0 10e3/uL    RBC Count 4.05 (L) 4.40 - 5.90 10e6/uL    Hemoglobin 11.9 (L) 13.3 - 17.7 g/dL    Hematocrit 37.3 (L) 40.0 - 53.0 %    MCV 92 78 - 100 fL    MCH 29.4 26.5 - 33.0 pg    MCHC 31.9 31.5 - 36.5 g/dL    RDW 14.3 10.0 - 15.0 %    Platelet Count 146 (L) 150 - 450 10e3/uL    % Neutrophils 62 %    % Lymphocytes 27 %    % Monocytes 8 %    % Eosinophils 3 %    % Basophils 0 %    % Immature Granulocytes 0 %    NRBCs per 100 WBC 0 <1 /100    Absolute Neutrophils 5.6 1.6 - 8.3 10e3/uL    Absolute Lymphocytes 2.5 0.8 - 5.3 10e3/uL    Absolute Monocytes 0.8 0.0 - 1.3 10e3/uL    Absolute Eosinophils 0.3 0.0 - 0.7 10e3/uL    Absolute Basophils 0.0 0.0 - 0.2 10e3/uL    Absolute Immature Granulocytes 0.0 <=0.4 10e3/uL    Absolute NRBCs 0.0 10e3/uL   Nt probnp inpatient (BNP)     Status: Abnormal   Result Value Ref Range    N terminal Pro BNP Inpatient 1,552 (H) 0 - 900 pg/mL   TSH with free T4 reflex     Status: Normal   Result Value Ref Range    TSH 0.94 0.30 - 4.20 uIU/mL   Magnesium     Status: Normal   Result Value Ref Range    Magnesium 2.3 1.7 - 2.3 mg/dL   EKG 12-lead, tracing only     Status:  None   Result Value Ref Range    Systolic Blood Pressure  mmHg    Diastolic Blood Pressure  mmHg    Ventricular Rate 62 BPM    Atrial Rate 62 BPM    ID Interval 202 ms    QRS Duration 134 ms     ms    QTc 448 ms    P Axis 36 degrees    R AXIS -16 degrees    T Axis 45 degrees    Interpretation ECG       Sinus rhythm with occasional Premature ventricular complexes  Non-specific intra-ventricular conduction block  Cannot rule out Anterior infarct , age undetermined  Abnormal ECG  When compared with ECG of 05-OCT-2021 07:29,  Premature ventricular complexes are now Present  Minimal criteria for Anterior infarct are now Present  Nonspecific T wave abnormality no longer evident in Inferior leads  Confirmed by GENERATED REPORT, COMPUTER ([id number]),  Alessandro Olivares ([id number]) on 7/16/2023 8:05:49 PM     CBC with Platelets & Differential     Status: Abnormal    Narrative    The following orders were created for panel order CBC with Platelets & Differential.  Procedure                               Abnormality         Status                     ---------                               -----------         ------                     CBC with platelets and d...[[id number]]  Abnormal            Final result                 Please view results for these tests on the individual orders.     Patient's response to treatments and/or interventions: tolerated well    To be done/followed up on inpatient unit:  dr andrews    Does this patient have any cognitive concerns?:  none    Activity level - Baseline/Home:  Independent  Activity Level - Current:   Independent pt reports has been using a cane past several days r/t leg swelling    Patient's Preferred language: English   Needed?: No    Isolation: None  Infection: Not Applicable  Patient tested for COVID 19 prior to admission: NO  Bariatric?: No    Vital Signs:   Vitals:    07/16/23 1941 07/16/23 2309 07/17/23 0009   BP: (!) 152/73 (!) 154/108    Pulse: 67  56   Resp: 18  "29 14   Temp: 98  F (36.7  C)     TempSrc: Temporal     SpO2: 92% 96% 94%   Weight: 96.2 kg (212 lb)     Height: 1.753 m (5' 9\")         Cardiac Rhythm:     Was the PSS-3 completed:   Yes  What interventions are required if any?               Family Comments: none  OBS brochure/video discussed/provided to patient/family: N/A              Name of person given brochure if not patient: n/a              Relationship to patient: n/a    For the majority of the shift this patient's behavior was Green.   Behavioral interventions performed were none.    ED NURSE PHONE NUMBER: *[phone]       "

## 2023-07-17 NOTE — ED PROVIDER NOTES
History     Chief Complaint:  Edema and Shortness of Breath       The history is provided by the patient.      Ramiro Jeffers is a 62 year old male with history of paroxysmal atrial fibrillation blood clots, hypertension, pulmonary embolism who presents with SOB, chest congestion and chest heaviness. Laying down worsens his shortness of breath and he states he has been sleeping with 5 pillows under his back.  Over the last several weeks he has gained 30 pounds and has significant edema in the bilateral lower extremities making it difficult to ambulate.      Independent Historian:   None - Patient Only    Review of External Notes:   I reviewed the office visit note from June 23rd.  I also reviewed his echo from July 2022.      Medications:    apixaban ANTICOAGULANT (ELIQUIS ANTICOAGULANT) 5 MG tablet  aspirin 81 MG tablet  Cholecalciferol (VITAMIN D) 2000 units tablet  ezetimibe (ZETIA) 10 MG tablet  hydrochlorothiazide (HYDRODIURIL) 25 MG tablet  isosorbide mononitrate (IMDUR) 30 MG 24 hr tablet  levothyroxine (SYNTHROID/LEVOTHROID) 137 MCG tablet  levothyroxine (SYNTHROID/LEVOTHROID) 150 MCG tablet  metoprolol succinate ER (TOPROL XL) 50 MG 24 hr tablet  Multiple Vitamin (MULTI-VITAMIN) per tablet  nitroGLYcerin (NITROSTAT) 0.4 MG sublingual tablet  rosuvastatin (CRESTOR) 40 MG tablet        Past Medical History:    Past Medical History:   Diagnosis Date     ACQUIRED HYPOTHYROIDISM       Blood clot in the legs      CAD (coronary artery disease)      Calculus of gallbladder without mention of cholecystitis or obstruction      Cataract      Chest pressure 11/5/2018     Hyperlipidemia      Hypertension      Ischemic cardiomyopathy      Obesity, unspecified      pulmonary emboli 6-2010     Pulmonary embolus, bilateral      PVC's (premature ventricular contractions)      Shortness of breath      Venous insufficiency      Viral pneumonia, unspecified      Vitamin D deficiency        Past Surgical History:    Past  Surgical History:   Procedure Laterality Date     COLONOSCOPY  11/17/2011    Procedure:COLONOSCOPY; colonoscopy; Surgeon:ELENA OROURKE; Location: GI     CV HEART CATHETERIZATION WITH POSSIBLE INTERVENTION Left 2/15/2019    Procedure: Coronary Angiogram;  Surgeon: Tulio Ortiz MD;  Location:  HEART CARDIAC CATH LAB     CV HEART CATHETERIZATION WITH POSSIBLE INTERVENTION N/A 10/5/2021    Procedure: Heart Catheterization with Possible Intervention;  Surgeon: Jose Francisco Dave MD;  Location:  HEART CARDIAC CATH LAB     CV LEFT HEART CATH N/A 10/5/2021    Procedure: Left Heart Cath;  Surgeon: Jose Francisco Dave MD;  Location:  HEART CARDIAC CATH LAB     CV LEFT VENTRICULOGRAM N/A 10/5/2021    Procedure: Left Ventriculogram;  Surgeon: Jose Francisco Dave MD;  Location:  HEART CARDIAC CATH LAB     LAPAROSCOPIC CHOLECYSTECTOMY  9/28/2012    Procedure: LAPAROSCOPIC CHOLECYSTECTOMY;  LAPAROSCOPIC CHOLECYSTECTOMY, LYSIS OF ADHESIONS;  Surgeon: Dutch Matta MD;  Location:  OR     LAPAROSCOPIC LYSIS ADHESIONS  9/28/2012    Procedure: LAPAROSCOPIC LYSIS ADHESIONS;;  Surgeon: Dutch Matta MD;  Location:  OR     LAPAROSCOPY, SURGICAL; REPAIR INCISIONAL OR VENTRAL HERNIA      repair of ventral strangulated surgery     PHACOEMULSIFICATION CLEAR CORNEA WITH STANDARD INTRAOCULAR LENS IMPLANT  12/19/2011    Procedure:PHACOEMULSIFICATION CLEAR CORNEA WITH STANDARD INTRAOCULAR LENS IMPLANT; RIGHT PHACOEMULSIFICATION CLEAR CORNEA WITH STANDARD INTRAOCULAR LENS IMPLANT ; Surgeon:ALLISON ANTONIO; Location:[facility] NONSPECIFIC PROCEDURE  10-    Laparoscopic gastric bypass,     [facility] NONSPECIFIC PROCEDURE  10-    1.  Attempted laparoscopic exploration with conversion to: .  Abdominal exploration. 3.  Reduction of incarcerated incisional hernia. 4.  Repair of incisional hernia.5.  Gastrostomy tube placement (#22 [id number]) into defunctionalized stomach.6.  Enterotomy with bowel  "decompression and primary repair. 7.  Abdominal lavage.         Physical Exam     Patient Vitals for the past 24 hrs:   BP Temp Temp src Pulse Resp SpO2 Height Weight   07/16/23 1941 (!) 152/73 98  F (36.7  C) Temporal 67 18 92 % 1.753 m (5' 9\") 96.2 kg (212 lb)        Physical Exam  General: Alert, interactive in mild distress  Head:  Scalp is atraumatic  Eyes:  The pupils are equal, round, and reactive to light    EOM's intact    No scleral icterus  ENT:      Nose:  The external nose is normal  Ears:  External ears are normal  Mouth/Throat: The oropharynx is normal    Mucus membranes are moist       Neck:  Normal range of motion.      There is no rigidity.    Trachea is in the midline         CV:  Regular rate and rhythm    2/6 systolic murmur  Resp:  Crackles at the bases     Non-labored, no retractions or accessory muscle use      GI:  Abdomen is soft, no distension, no tenderness.       MS:  Normal strength in all 4 extremities. 2-3+pitting edema in the lower extremities  Skin:  Warm and dry, No rash or lesions noted.  Neuro: Strength 5/5 x4.  Sensation intact  In all 4 extremities.        Psych:  Awake. Alert.  Normal affect.      Appropriate interactions.    Emergency Department Course   ECG  ECG results from 07/16/23   EKG 12-lead, tracing only     Value    Systolic Blood Pressure     Diastolic Blood Pressure     Ventricular Rate 62    Atrial Rate 62    SC Interval 202    QRS Duration 134        QTc 448    P Axis 36    R AXIS -16    T Axis 45    Interpretation ECG      Sinus rhythm with occasional Premature ventricular complexes  Non-specific intra-ventricular conduction block  Cannot rule out Anterior infarct , age undetermined  Abnormal ECG  Interpreted by Genaro Shankar MD            EKG Interpretation:      Interpreted by Genaro Shankar MD  Time reviewed:0046   Symptoms at time of EKG: None   Rhythm: Normal sinus   Rate: Normal  Axis: Normal  Ectopy: None  Conduction: Nonspecific " interventricular conduction block  ST Segments/ T Waves: No ST-T wave changes and No acute ischemic changes  Q Waves: None  Comparison to prior: Unchanged    Clinical Impression: no acute changes      Imaging:  US Lower Extremity Venous Duplex Bilateral   Final Result   IMPRESSION:   1.  No deep venous thrombosis in the bilateral lower extremities.      XR Chest 2 Views   Final Result   IMPRESSION: Heart size and pulmonary vascularity normal. The lungs are clear.         Report per radiology    Laboratory:  Labs Ordered and Resulted from Time of ED Arrival to Time of ED Departure   COMPREHENSIVE METABOLIC PANEL - Abnormal       Result Value    Sodium 141      Potassium 2.7 (*)     Chloride 101      Carbon Dioxide (CO2) 27      Anion Gap 13      Urea Nitrogen 14.1      Creatinine 0.89      Calcium 8.8      Glucose 98      Alkaline Phosphatase 62      AST 43      ALT 29      Protein Total 6.5      Albumin 3.8      Bilirubin Total 0.5      GFR Estimate >90     CBC WITH PLATELETS AND DIFFERENTIAL - Abnormal    WBC Count 9.1      RBC Count 4.05 (*)     Hemoglobin 11.9 (*)     Hematocrit 37.3 (*)     MCV 92      MCH 29.4      MCHC 31.9      RDW 14.3      Platelet Count 146 (*)     % Neutrophils 62      % Lymphocytes 27      % Monocytes 8      % Eosinophils 3      % Basophils 0      % Immature Granulocytes 0      NRBCs per 100 WBC 0      Absolute Neutrophils 5.6      Absolute Lymphocytes 2.5      Absolute Monocytes 0.8      Absolute Eosinophils 0.3      Absolute Basophils 0.0      Absolute Immature Granulocytes 0.0      Absolute NRBCs 0.0     NT PROBNP INPATIENT - Abnormal    N terminal Pro BNP Inpatient 1,552 (*)    TROPONIN T, HIGH SENSITIVITY - Normal    Troponin T, High Sensitivity 10     TSH WITH FREE T4 REFLEX   MAGNESIUM        Emergency Department Course & Assessments:       Interventions:  Medications   potassium chloride (KLOR-CON) Packet 40 mEq (40 mEq Oral $Given 7/16/23 2132)   potassium chloride 10 mEq in 100  mL sterile water infusion (10 mEq Intravenous $New Bag 7/16/23 2135)   furosemide (LASIX) injection 60 mg (60 mg Intravenous $Given 7/16/23 2132)        Assessments:  2115 I examined the patient and obtained the history above    Independent Interpretation (X-rays, CTs, rhythm strip):  I reviewed the patient chest x-ray. There is no infiltrate, no obvious pulmonary edema    Consultations/Discussion of Management or Tests:  ED Course as of 07/16/23 2334   Sun Jul 16, 2023   2330 I spoke with Dr. Kowalski. The patient will be admitted       Social Determinants of Health affecting care:   None    Disposition:  The patient was admitted to the hospital under the care of Dr. Kowalski.     Impression & Plan      Medical Decision Making:  Following presentation history and physical examination were performed, the above work-up was undertaken.  Findings are consistent with fluid overload of uncertain etiology and signs of congestive heart failure.  He received furosemide and diuresed briskly in the emergency department.  There is no signs of an acute infectious etiology.  He has no true chest pain to suggest acute coronary syndrome.  Troponin is negative at this time.  Chest radiograph is reassuring.  He does have hypokalemia and is on hydrochlorothiazide as an outpatient, he received replacement in the emergency department.  I doubt pulmonary embolism given his use of apixaban.  Given the profound weight gain, orthopnea, and edema I will admit him to the hospital for diuresis and further evaluation possible echocardiogram.    Diagnosis:    ICD-10-CM    1. Congestive heart failure, unspecified HF chronicity, unspecified heart failure type (H)  I50.9       2. Hypokalemia  E87.6       3. Anasarca  R60.1            Scribe Disclosure:  JANET JACK, am serving as a scribe at 9:19 PM on 7/16/2023 to document services personally performed by Genaro Shankar MD based on my observations and the provider's statements to me.    7/16/2023   Trigger, Genaro Akers MD     Trigger, Genaro Akers MD  07/16/23 3513       Trigger, Genaro Akers MD  07/17/23 0054

## 2023-07-17 NOTE — PLAN OF CARE
Pleasant gentlemen. Pt aox4, SBA, RA and full code. Tele SR. Pt denies chest pain and SOB. Pt will be starting lasix 40mg BID. K and Mag protocol.   Plan: Continue to diurese. Echo and cards consult in the am.

## 2023-07-17 NOTE — PROGRESS NOTES
[facility]    Medicine Progress Note - Hospitalist Service       Date of Admission:  7/16/2023    Assessment & Plan   Ramiro Jeffers is a 62 year old male with hx of CAD s/p PCI, CHF, paroxysmal a-fib and prior PE on chronic AC with apixaban, and hypothyroidism who was admitted on 7/16/2023 for acute CHF exacerbation.    Of note, Mr. Jeffers is followed by [facility] for CAD, having had DORY to the proximal LAD in 2018. LVEF at that time reportedly was 40-45%. He has had subsequent angiogram testing in 2019 and again in 2019 that reportedly had shown no clinically significant progression of CAD. Most recent TTE in 7/2022 showed preserved LVEF. He also has chronic paroxysmal atrial fibrillation, seen on event monitor testing in 2022, and takes Eliquis for stroke prevention. Prior testing for exertional dyspnea has included PFT tests in 11/2022 which were normal     Suspected acute right sided CHF exacerbation  CAD s/p PCI  Essential hypertension  Paroxysmal atrial fibrillation  History of bilateral unprovoked PE (2010)  Hyperlipidemia  * Home meds: apixaban 5 mg BID, HCTZ 25 mg daily, Imdur 60 mg daily, metoprolol ER 75 mg daily, rosuvastatin 40 mg daily  * Presented with 2-week history of progressive anasarca, 30 lb weight gain, and dyspnea with orthopnea. In the ED, VSS on room air. NTpBNP elevated >1500. EKG showed sinus rhythm with PVCs. Troponin negative. TSH wnl  * Received Lasix 60 mg IV in the ED with significant response  - Already net neg 6L, >10 lb weight loss since admission  - Continues on Lasix 40 mg IV BID  - Holding PTA HCTZ while on IV Lasix  - Continues on other home meds  - Repeat TTE pending. If TTE unremarkable, will consider UA, Urine protein/creatinine ratio, repeat hepatic panel, possibly abdominal imaging     Hypokalemia  Due to diuresis  - On K and Mg replacement protocol     Chronic anemia and thrombocytopenia, mild  - Hgb stable ~12, platelets mid-140s. Will follow  monitor  - Peripheral smear pending    Hypothyroidism due to Hashimoto thyroiditis   Chronic and stable on levothyroxine. TSH this admission normal     Diet: 2 Gram Sodium Diet    DVT Prophylaxis: DOAC  Davis Catheter: Not present  Code Status: Full Code         Disposition: Expected discharge once euvolemic, ?Wed    The patient's care was discussed with the Bedside Nurse, Care Coordinator/ and Patient.    Kaylen Culver MD  Hospitalist Service  [facility]  Contact information available via [facility] Paging/Directory    ______________________________________________________________________    Interval History   Admitted last night. Has already diuresed >6 liters with >10 lb weight loss. Reports intermittent dizziness/lightheadedness and shortness of breath, but is happy with his progress. Feels much better, anasarca improving. Continues on IV Lasix. TTE today    Data reviewed today: I reviewed all medications, new labs and imaging results over the last 24 hours. I personally reviewed no images or EKG's today.    Physical Exam   Vital Signs: Temp: 97.8  F (36.6  C) Temp src: Oral BP: (!) 146/74 Pulse: 52   Resp: 16 SpO2: 97 % O2 Device: None (Room air)    Weight: 195 lbs 1.6 oz  Constitutional: Resting comfortably, NAD  HEENT: Sclera white, MMM  Respiratory: Breathing non-labored. Lungs CTAB - no wheezes, crackles, or rhonchi  Cardiovascular: Heart RRR, no m/r/g. 1+ BLE edema  GI: +BS, abd soft/NT  Skin/Integument: No rash  Musculoskeletal: Normal muscle bulk and tone  Neuro: Alert and appropriate, GARCIAS  Psych: Calm and cooperative    Data   Recent Labs   Lab 07/17/23  0235 07/17/23  0203 07/17/23  0053 07/16/23  1946   WBC 6.8  --   --  9.1   HGB 12.8*  --  13.9 11.9*   MCV 92  --   --  92   *  --   --  146*     --  144 141   POTASSIUM 3.3*  --  2.7* 2.7*   CHLORIDE 102  --   --  101   CO2 28  --   --  27   BUN 13.1  --   --  14.1   CR 0.89  --   --  0.89    ANIONGAP 14  --   --  13   GUERITA 8.9  --   --  8.8   * 90  --  98   ALBUMIN  --   --   --  3.8   PROTTOTAL  --   --   --  6.5   BILITOTAL  --   --   --  0.5   ALKPHOS  --   --   --  62   ALT  --   --   --  29   AST  --   --   --  43         Recent Results (from the past 24 hour(s))   XR Chest 2 Views    Narrative    EXAM: XR CHEST 2 VIEWS  LOCATION: [facility]  DATE: 7/16/2023    INDICATION: dyspnea  COMPARISON: None.      Impression    IMPRESSION: Heart size and pulmonary vascularity normal. The lungs are clear.   US Lower Extremity Venous Duplex Bilateral    Narrative    EXAM: US LOWER EXTREMITY VENOUS DUPLEX BILATERAL  LOCATION: [facility]  DATE: 7/16/2023    INDICATION: Bilateral leg swelling.  COMPARISON: None.  TECHNIQUE: Venous Duplex ultrasound of bilateral lower extremities with and without compression, augmentation and duplex. Color flow and spectral Doppler with waveform analysis performed.    FINDINGS: Exam includes the common femoral, femoral, popliteal veins as well as segmentally visualized deep calf veins and greater saphenous vein.     RIGHT: No deep vein thrombosis. No superficial thrombophlebitis. No popliteal cyst.    LEFT: No deep vein thrombosis. No superficial thrombophlebitis. No popliteal cyst.      Impression    IMPRESSION:  1.  No deep venous thrombosis in the bilateral lower extremities.       Medications       apixaban ANTICOAGULANT  5 mg Oral BID     ezetimibe  10 mg Oral Daily     furosemide  40 mg Intravenous Q12H     isosorbide mononitrate  60 mg Oral Daily     levothyroxine  137 mcg Oral Every Other Day     [START ON 7/18/2023] levothyroxine  150 mcg Oral Every Other Day     metoprolol succinate ER  75 mg Oral Daily     rosuvastatin  40 mg Oral Daily     sodium chloride (PF)  3 mL Intracatheter Q8H

## 2023-07-17 NOTE — ED NOTES
Bed: ED29  Expected date:   Expected time:   Means of arrival:   Comments:  EVS cleaned. Paged at 2015

## 2023-07-17 NOTE — CONSULTS
REASON FOR ASSESSMENT:  CHF Consult for 2 gm NA Diet Education    NUTRITION HISTORY:  Spoke with patient at bedside. He said ever since he got his gastric bypass surgery in 2010 he's been watching his sugar and salt intakes. If he adds salt to his foods it's very little. He lives by himself, grocery shops on his own, and meal preps his own food. He rarely eats out mostly due to the cost. He mentioned how he has a garden in which he grows tomatoes, cucumbers, and eggplants. He likes making smoothies before bed when he's craving something sweet, and he usually buys canned meat to have with his meals. Patient appreciated handouts and low Na education given by writer.    CURRENT DIET:  2 gm Na Diet    NUTRITION DIAGNOSIS:  Food- and nutrition-related knowledge deficit r/t no previous diet education as evidenced by pt note of no education on a low sodium diet and need for RD to educate this admission    INTERVENTIONS:  Nutrition Prescription:  Patient to follow a 2gm Na Diet    Implementation:    Assessed learning needs, learning preferences, and willingness to learn    Nutrition Education (Content):  a) Provided handouts:  1) Tips for Low Na Diet  2) Low Na Foods/Drinks  3) Seasoning Your Food Without Salt  4) Heart Failure Nutrition Therapy  b) Discussed rational for limiting Na for CHF and stressed importance of following 2 gm Na guidelines   c) Encouraged patient to keep a daily food record    Nutrition Education (Application):  a) Discussed current eating habits and recommended alternative food choices    Anticipated good compliance    Diet Education - refer to Education Flowsheet    Goals:    Patient verbalizes understanding of diet    All of the above goals met during the education session    Follow Up:    Provided RD contact information for future questions.    Recommend Out-Patient Nutrition Referral, if further diet instructions are needed.    Jackie Espinosa  Clinical Dietitian - [facility]  [facility]

## 2023-07-17 NOTE — PHARMACY-ADMISSION MEDICATION HISTORY
Pharmacy Intern Admission Medication History    Admission medication history is complete. The information provided in this note is only as accurate as the sources available at the time of the update.    Medication reconciliation/reorder completed by provider prior to medication history? No    Information Source(s): Patient and CareEverywhere/SureScripts via in-person    Pertinent Information:   --Pt reports trouble adhering to prescribed eliquis due to cost, rations 2nd daily dose throughout the week taking 1/2 tab. Last full tablet dose yesterday 7/16 PM  --Alternates taking levothyroxine 137mcg and 150mcg QOD, last dose taken yesterday was 150mcg    Changes made to PTA medication list:    Added: None    Deleted: ASA, vitamin D, multivitamin    Changed: None    Medication Affordability:  Not including over the counter (OTC) medications, was there a time in the past 3 months when you did not take your medications as prescribed because of cost?: Yes    Allergies reviewed with patient and updates made in EHR: yes    Medication History Completed By: CHEPE MEREDITH 7/17/2023 9:09 AM    Prior to Admission medications    Medication Sig Last Dose Taking? Auth Provider Long Term End Date   apixaban ANTICOAGULANT (ELIQUIS ANTICOAGULANT) 5 MG tablet Take 1 tablet (5 mg) by mouth 2 times daily 7/16/2023 at pm Yes Addison Davis MD     ezetimibe (ZETIA) 10 MG tablet TAKE 1 TABLET(10 MG) BY MOUTH DAILY 7/16/2023 at am Yes Addison Davis MD Yes    hydrochlorothiazide (HYDRODIURIL) 25 MG tablet TAKE 1 TABLET(25 MG) BY MOUTH EVERY MORNING 7/16/2023 at am Yes Addison Davis MD Yes    isosorbide mononitrate (IMDUR) 30 MG 24 hr tablet Take 2 tablets (60 mg) by mouth daily 7/16/2023 at am Yes Tereza Key MD Yes    levothyroxine (SYNTHROID/LEVOTHROID) 137 MCG tablet Take 137 mcg every other day alternating with 150 mcg PO alternating every other day. 7/15/2023 Yes Addison Davis MD Yes     levothyroxine (SYNTHROID/LEVOTHROID) 150 MCG tablet Take 137 mcg every other day alternating with 150 mcg PO alternating every other day. 7/16/2023 at am Yes Addison Davis MD Yes    metoprolol succinate ER (TOPROL XL) 50 MG 24 hr tablet TAKE 1 AND 1/2 TABLETS(75 MG) BY MOUTH DAILY 7/16/2023 at am Yes Addison Davis MD Yes    rosuvastatin (CRESTOR) 40 MG tablet TAKE 1 TABLET(40 MG) BY MOUTH DAILY 7/16/2023 at am Yes Addison Davis MD Yes    nitroGLYcerin (NITROSTAT) 0.4 MG sublingual tablet For chest pain place 1 tablet under the tongue every 5 minutes for 3 doses. If symptoms persist 5 minutes after 1st dose call 911.  at prn  Addison Davis MD Yes

## 2023-07-17 NOTE — PROGRESS NOTES
07/17/23 1435   Appointment Info   Signing Clinician's Name / Credentials (OT) Alma TEREZA Daniel   Living Environment   People in Home alone   Current Living Arrangements house   Home Accessibility stairs to enter home;stairs within home   Number of Stairs, Main Entrance 3   Number of Stairs, Within Home, Primary eight   Transportation Anticipated car, drives self   Living Environment Comments Pt reports all needs on main level expect laundry in basement. Grab bars in bathroom available.   Self-Care   Usual Activity Tolerance good   Current Activity Tolerance moderate   Equipment Currently Used at Home none   Fall history within last six months no   Activity/Exercise/Self-Care Comment Pt reports indep with functional mobility and self cares. States the past few weeks things have been getting harder and harder but still able to complete indep   General Information   Onset of Illness/Injury or Date of Surgery 07/16/23   Referring Physician Cody Kowalski MD   Additional Occupational Profile Info/Pertinent History of Current Problem Ramiro Jeffers is a 62 year old male with hx of CAD s/p PCI, CHF, paroxysmal a-fib and prior PE on chronic AC with apixaban, and hypothyroidism who was admitted on 7/16/2023 for acute CHF exacerbation.   Existing Precautions/Restrictions fall   Cognitive Status Examination   Orientation Status orientation to person, place and time   Visual Perception   Visual Impairment/Limitations corrective lenses for reading   Pain Assessment   Patient Currently in Pain Yes, see Vital Sign flowsheet   Posture   Posture protracted shoulders;forward head position   Range of Motion Comprehensive   General Range of Motion bilateral upper extremity ROM WFL   Strength Comprehensive (MMT)   General Manual Muscle Testing (MMT) Assessment upper extremity strength deficits identified   Comment, General Manual Muscle Testing (MMT) Assessment generalized weakness noted   Bed Mobility   Bed Mobility  supine-sit   Supine-Sit [place] (Bed Mobility) supervision   Comment (Bed Mobility) HOB elevated   Transfers   Transfers sit-stand transfer   Transfer Comments mod I   Activities of Daily Living   BADL Assessment/Intervention lower body dressing   Lower Body Dressing Assessment/Training   [place] Level (Lower Body Dressing) supervision;set up   Clinical Impression   Criteria for Skilled Therapeutic Interventions Met (OT) Yes, treatment indicated   OT Diagnosis impaired functional mobility and self cares   OT Problem List-Impairments impacting ADL problems related to;activity tolerance impaired;mobility;strength   Assessment of Occupational Performance 1-3 Performance Deficits   Identified Performance Deficits functional mobility, home management   Planned Therapy Interventions (OT) ADL retraining;strengthening;transfer training;progressive activity/exercise   Clinical Decision Making Complexity (OT) moderate complexity   Risk & Benefits of therapy have been explained evaluation/treatment results reviewed;care plan/treatment goals reviewed   OT Total Evaluation Time   OT Eval, Low Complexity Minutes (50284) 10   OT Goals   Therapy Frequency (OT) Daily   OT Predicted Duration/Target Date for Goal Attainment 07/21/23   OT Goals Hygiene/Grooming;Lower Body Dressing;Toilet Transfer/Toileting;Home Management;Cardiac Phase 1   OT: Hygiene/Grooming modified independent   OT: Lower Body Dressing Modified independent   OT: Toilet Transfer/Toileting Modified independent   OT: Home Management Modified independent;with light demand household tasks;ambulatory level   OT: Understanding of cardiac education to maximize quality of life, condition management, and health outcomes Patient   OT: Perform aerobic activity with stable cardiovascular response 10 minutes;continuous;ambulation   Interventions   Interventions Quick Adds Self-Care/Home Management;Therapeutic Activity   Self-Care/Home Management   Self-Care/Home  "Mgmt/ADL, Compensatory, Meal Prep Minutes (85668) 15   Treatment Detail/Skilled Intervention Reviewed all CHF education. Pt verbalizes understanding and reports he will review on own time.   Therapeutic Activities   Therapeutic Activity Minutes (62968) 10   Symptoms noted during/after treatment fatigue   Treatment Detail/Skilled Intervention Pt ambulates in hallway with no device for 6 minutes. Slow pace. Pt reports \"this feels drastically better than yesterday\". SBA needed for mobility.   OT Discharge Planning   OT Plan Progress ambulation to >6min, check to see if any other questions on CHF education   OT Discharge Recommendation (DC Rec) home   OT Rationale for DC Rec Anticipate with continued medical management and inpatient therapy that pt will be able to d/c home with no assist or no needed service.   OT Brief overview of current status SBA for ambulation in hallway with no device and SBA   Total Session Time   Timed Code Treatment Minutes 25   Total Session Time (sum of timed and untimed services) 35     "

## 2023-07-17 NOTE — H&P
[facility]    History and Physical  Hospitalist       Date of Admission:  7/16/2023  Date of Service (when I saw the patient): 07/16/23    ASSESSMENT  Ramiro Jeffers is a markedly pleasant 62 year old gentleman non-smoker with past medical history that is most notable for CAD, chronic paroxysmal atrial fibrillation, and prior PE, among others; who presents with anasarca and dyspnea, and is found to have suspected acute CHF exacerbation.    PLAN     Suspected acute right sided CHF exacerbation: Of note, Mr. Jeffers is followed by [facility] for CAD, having had DORY to the proximal LAD in 2018. LVEF at that time reportedly was 40-45%. He has had subsequent angiogram testing in 2019 and again in 2019 that reportedly had shown no clinically significant progression of CAD. Most recent TTE in 7/2022 showed preserved LVEF. He also has chronic paroxysmal atrial fibrillation, seen on event monitor testing in 2022, and takes Eliquis for stroke prevention. Prior testing for exertional dyspnea has included PFT tests in 11/2022 which were normal.    He presents now for acute anasarca, with 30 pound weight gain in the past 2 weeks, as well as new dyspnea with orthopnea. In the ED, he is afebrile, not hypoxic at rest. BP is elevated at 154/108. Troponin is negative and EKG shows NSR with PVC's. Pro BNP is elevated at 1552; it had been 307 in 2019. CXR shows no clear evidence of edema, effusion or infiltrate. Bilateral doppler US of the lower extremities are negative for DVT. Overall, his symptoms are consistent with acute exacerbation of as yet undiagnosed CHF, possibly systolic and/or diastolic. Accelerated hypertension or worsening tachycardia mediated cardiomyopathy are possible causes though currently he is in NSR. He could have progressive CAD. He could have other causes of CHF (noting that his home thyroid dose was recently adjusted last month by his PCP; he also has chronic anemia). Other causes of  anasarca could include renal or hepatic; these seem less likely at present. Thus far, after receiving 60 mg IV Lasix in the ED, he has produced 3 liters of UOP.      -- Inpatient. Telemetry, serial enzymes, TTE ordered. Cardiology consulted. Diurese with 40 mg IV Lasix BID. Strict I and O's, daily weights, and low sodium diet ordered.    -- TSH ordered.     -- If TTE unremarkable, would consider UA, Urine protein/creatinine ratio, repeat hepatic panel, possibly abdominal imaging    -- Resume home ASA 81 mg when verified    Hypokalemia: Likely due to home thiazide use.     -- Replacing aggressively. Check Magnesium and replace as well.    -- Hydrochlorothiazide held while on loop diuretic    Chronic anemia and thrombocytopenia:Causes unclear at present; he does have small ecchymotic blotches on his upper extremities. Monitor those while hospitalized.       -- Repeat CBC this AM; check a peripheral smear.    Recent Labs   Lab Test 07/16/23  1946 04/26/23  0733 01/14/23  0916   HGB 11.9* 12.6* 14.6     Platelet Count   Date Value Ref Range Status   07/16/2023 146 (L) 150 - 450 10e3/uL Final   11/11/2020 147 (L) 150 - 450 10e9/L Final     Hypertension, acclerated: Without other signs of acute crisis.    -- Resume home Imdur, Toprol when verified    Chronic PAF: Telemetry ordered. Resume home Eliquis when verified.    History of bilateral unprovoked PE: In 2010, reportedly with unremarkable hypercoagulability evaluation. His PCP his Dr. Davis.    -- Noted    Hyperlipidemia: Resume home Crestor, Zetia when verified    Hypothyroid due to Hashimoto Thyroiditis: Resume Synthroid when verified, pending TSH results today (TSH had been undetectable and his Synthroid adjusted 6/2023)    I have spent 100 minutes on the date of service doing chart review, history, examination, documentation, and further activities per the note.    Chief Complaint   Leg swelling    History is obtained from the patient and the ED physician whom I  have spoken with    History of Present Illness   Ramiro Jeffers is a markedly pleasant 62 year old gentleman who presents with acute onset of severe swelling, that began two weeks ago, initially in the arms, hands, and lower legs; the swelling in the arms and hands has resolved, but in the legs it has markedly worsened, and now extends into the abdomen and scrotum; with tingling and discomfort in both feet. He says at his PCP office in 6/2023 he weighed 183 pounds, and yesterday he weighed himself at home and the scale read 212 pounds. He has also noted a progressive intermittently productive cough, and in the past two days he has developed acute exertional dyspnea with chest heaviness and orthopnea. He came to the ED this evening for further evaluation. He adds that he has also noted intermittent small bluish black rash spots on his arms recently He otherwise denies fever, chills, sweats, nausea, diarrhea, or any other acute complaints.    In the ED,   07/16 1941 152/73  98  F (36.7  C) 67 18 92 %     CBC and CMP were notable for HGB 11.9, , K 2.7, otherwise were within the normal reference range. Pro BNP was 1552. Troponin T was 10. EKG showed NSR with PVC's.     He was given 60 mg IV Lasix as well as 50 meq KCl in the ED.    Recent Results (from the past 24 hour(s))   XR Chest 2 Views    Narrative    EXAM: XR CHEST 2 VIEWS  LOCATION: [facility]  DATE: 7/16/2023    INDICATION: dyspnea  COMPARISON: None.      Impression    IMPRESSION: Heart size and pulmonary vascularity normal. The lungs are clear.   US Lower Extremity Venous Duplex Bilateral    Narrative    EXAM: US LOWER EXTREMITY VENOUS DUPLEX BILATERAL  LOCATION: [facility]  DATE: 7/16/2023    INDICATION: Bilateral leg swelling.  COMPARISON: None.  TECHNIQUE: Venous Duplex ultrasound of bilateral lower extremities with and without compression, augmentation and duplex. Color flow and spectral Doppler with  "waveform analysis performed.    FINDINGS: Exam includes the common femoral, femoral, popliteal veins as well as segmentally visualized deep calf veins and greater saphenous vein.     RIGHT: No deep vein thrombosis. No superficial thrombophlebitis. No popliteal cyst.    LEFT: No deep vein thrombosis. No superficial thrombophlebitis. No popliteal cyst.      Impression    IMPRESSION:  1.  No deep venous thrombosis in the bilateral lower extremities.       PHYSICAL EXAM  Blood pressure (!) 152/73, pulse 67, temperature 98  F (36.7  C), temperature source Temporal, resp. rate 18, height 1.753 m (5' 9\"), weight 96.2 kg (212 lb), SpO2 92 %.  Constitutional: Alert and oriented to person, place and time; no apparent distress  HEENT: normocephalic moist mucus membranes  Respiratory: lungs clear to auscultation bilaterally  Cardiovascular: Irregular S1 S2  GI: abdomen soft non tender moderately distended bowel sounds positive  Skin: multiple faint ecchymoses over both upper extremities  Musculoskeletal: severe bilateral LE edema  Neurologic: extra-ocular muscles intact; moves all four extremities  Psychiatric: appropriate affect, insight and judgment     DVT Prophylaxis: DOAC  Code Status: Full Code    Disposition: Expected discharge in 2-4 days    Cody Kowalski MD, MD    Past Medical History    I have reviewed this patient's medical history and updated it with pertinent information if needed.   Past Medical History:   Diagnosis Date     ACQUIRED HYPOTHYROIDISM       Blood clot in the legs      CAD (coronary artery disease)     11/15/18 Cath: PCI with 3.5-16mm DORY to proximal LAD     Calculus of gallbladder without mention of cholecystitis or obstruction      Cataract      Chest pressure 11/5/2018     Hyperlipidemia      Hypertension      Ischemic cardiomyopathy     EF 40-45% on 11/2018 Echo     Obesity, unspecified     significant weight loss w/diet alone, on 10-15-10, BMI was 56.4     pulmonary emboli 6-2010    " Unprovoked large bilateral pulmonary emboli without source identified on lower extremity dopplers, pt had a transient moderate lupus inhibitor upon initial presentation in June , 2010 which was gone in September, 2010;  all other thrombophilic workup is normal     Pulmonary embolus, bilateral      PVC's (premature ventricular contractions)     4% burden on 11/2018 Holter     Shortness of breath     on exertion     Venous insufficiency      Viral pneumonia, unspecified      Vitamin D deficiency        Past Surgical History   I have reviewed this patient's surgical history and updated it with pertinent information if needed.  Past Surgical History:   Procedure Laterality Date     COLONOSCOPY  11/17/2011    Procedure:COLONOSCOPY; colonoscopy; Surgeon:ELENA OROURKE; Location: GI     CV HEART CATHETERIZATION WITH POSSIBLE INTERVENTION Left 2/15/2019    Procedure: Coronary Angiogram;  Surgeon: Tulio Ortiz MD;  Location:  HEART CARDIAC CATH LAB     CV HEART CATHETERIZATION WITH POSSIBLE INTERVENTION N/A 10/5/2021    Procedure: Heart Catheterization with Possible Intervention;  Surgeon: Jose Francisco Dave MD;  Location:  HEART CARDIAC CATH LAB     CV LEFT HEART CATH N/A 10/5/2021    Procedure: Left Heart Cath;  Surgeon: Jose Francisco Dave MD;  Location:  HEART CARDIAC CATH LAB     CV LEFT VENTRICULOGRAM N/A 10/5/2021    Procedure: Left Ventriculogram;  Surgeon: Jose Francisco Dave MD;  Location:  HEART CARDIAC CATH LAB     LAPAROSCOPIC CHOLECYSTECTOMY  9/28/2012    Procedure: LAPAROSCOPIC CHOLECYSTECTOMY;  LAPAROSCOPIC CHOLECYSTECTOMY, LYSIS OF ADHESIONS;  Surgeon: Dutch Matta MD;  Location:  OR     LAPAROSCOPIC LYSIS ADHESIONS  9/28/2012    Procedure: LAPAROSCOPIC LYSIS ADHESIONS;;  Surgeon: Dutch Matta MD;  Location:  OR     LAPAROSCOPY, SURGICAL; REPAIR INCISIONAL OR VENTRAL HERNIA      repair of ventral strangulated surgery     PHACOEMULSIFICATION CLEAR CORNEA WITH  STANDARD INTRAOCULAR LENS IMPLANT  12/19/2011    Procedure:PHACOEMULSIFICATION CLEAR CORNEA WITH STANDARD INTRAOCULAR LENS IMPLANT; RIGHT PHACOEMULSIFICATION CLEAR CORNEA WITH STANDARD INTRAOCULAR LENS IMPLANT ; Surgeon:ALLISON ANTONIO; Location:[facility] NONSPECIFIC PROCEDURE  10-    Laparoscopic gastric bypass,     [facility] NONSPECIFIC PROCEDURE  10-    1.  Attempted laparoscopic exploration with conversion to: .  Abdominal exploration. 3.  Reduction of incarcerated incisional hernia. 4.  Repair of incisional hernia.5.  Gastrostomy tube placement (#22 Irish) into defunctionalized stomach.6.  Enterotomy with bowel decompression and primary repair. 7.  Abdominal lavage.        Prior to Admission Medications   Prior to Admission Medications   Prescriptions Last Dose Informant Patient Reported? Taking?   Cholecalciferol (VITAMIN D) 2000 units tablet   No No   Sig: Take 1 tablet by mouth daily   Multiple Vitamin (MULTI-VITAMIN) per tablet  Self Yes No   Sig: Take 1 tablet by mouth 2 times daily    apixaban ANTICOAGULANT (ELIQUIS ANTICOAGULANT) 5 MG tablet   No No   Sig: Take 1 tablet (5 mg) by mouth 2 times daily   aspirin 81 MG tablet   No No   Sig: Take 1 tablet (81 mg) by mouth daily Start tomorrow morning.   ezetimibe (ZETIA) 10 MG tablet   No No   Sig: TAKE 1 TABLET(10 MG) BY MOUTH DAILY   hydrochlorothiazide (HYDRODIURIL) 25 MG tablet   No No   Sig: TAKE 1 TABLET(25 MG) BY MOUTH EVERY MORNING   isosorbide mononitrate (IMDUR) 30 MG 24 hr tablet   No No   Sig: Take 2 tablets (60 mg) by mouth daily   levothyroxine (SYNTHROID/LEVOTHROID) 137 MCG tablet   No No   Sig: Take 137 mcg every other day alternating with 150 mcg PO alternating every other day.   levothyroxine (SYNTHROID/LEVOTHROID) 150 MCG tablet   No No   Sig: Take 137 mcg every other day alternating with 150 mcg PO alternating every other day.   metoprolol succinate ER (TOPROL XL) 50 MG 24 hr tablet   No No   Sig: TAKE 1 AND 1/2 TABLETS(75  MG) BY MOUTH DAILY   nitroGLYcerin (NITROSTAT) 0.4 MG sublingual tablet   No No   Sig: For chest pain place 1 tablet under the tongue every 5 minutes for 3 doses. If symptoms persist 5 minutes after 1st dose call 911.   rosuvastatin (CRESTOR) 40 MG tablet   No No   Sig: TAKE 1 TABLET(40 MG) BY MOUTH DAILY      Facility-Administered Medications: None     Allergies   Allergies   Allergen Reactions     No Known Allergies        Social History   I have reviewed this patient's social history and updated it with pertinent information if needed. Ramiro Jeffers  reports that he has never smoked. He has never used smokeless tobacco. He reports that he does not drink alcohol and does not use drugs.    Family History   Family history assessed and, except as above, is non-contributory.    Family History   Problem Relation Age of Onset     Osteoporosis Mother      Thyroid Disease Mother         no thyroid     Cancer Maternal Grandmother         ? lung cancer     Cancer Father         melanoma     Coronary Artery Disease Brother      Coronary Artery Disease Brother        Review of Systems   The 10 point Review of Systems is negative other than noted in the HPI or here.     Primary Care Physician   Addison Davis    Data   Labs Ordered and Resulted from Time of ED Arrival to Time of ED Departure   COMPREHENSIVE METABOLIC PANEL - Abnormal       Result Value    Sodium 141      Potassium 2.7 (*)     Chloride 101      Carbon Dioxide (CO2) 27      Anion Gap 13      Urea Nitrogen 14.1      Creatinine 0.89      Calcium 8.8      Glucose 98      Alkaline Phosphatase 62      AST 43      ALT 29      Protein Total 6.5      Albumin 3.8      Bilirubin Total 0.5      GFR Estimate >90     CBC WITH PLATELETS AND DIFFERENTIAL - Abnormal    WBC Count 9.1      RBC Count 4.05 (*)     Hemoglobin 11.9 (*)     Hematocrit 37.3 (*)     MCV 92      MCH 29.4      MCHC 31.9      RDW 14.3      Platelet Count 146 (*)     % Neutrophils 62      %  Lymphocytes 27      % Monocytes 8      % Eosinophils 3      % Basophils 0      % Immature Granulocytes 0      NRBCs per 100 WBC 0      Absolute Neutrophils 5.6      Absolute Lymphocytes 2.5      Absolute Monocytes 0.8      Absolute Eosinophils 0.3      Absolute Basophils 0.0      Absolute Immature Granulocytes 0.0      Absolute NRBCs 0.0     NT PROBNP INPATIENT - Abnormal    N terminal Pro BNP Inpatient 1,552 (*)    TROPONIN T, HIGH SENSITIVITY - Normal    Troponin T, High Sensitivity 10     TSH WITH FREE T4 REFLEX   MAGNESIUM       Data reviewed today:  I personally reviewed the EKG tracing showing NSR with PVC's and the chest x-ray image(s) showing no acute pathology.

## 2023-07-17 NOTE — ED TRIAGE NOTES
Patient complaints of bilateral leg swelling for last the 12 days.  30 lb weight gain.  Now feeling SOB.  Has been having some chest heaviness also but  has not needed to use his nitroglycerin.      Triage Assessment       Row Name 07/16/23 1942       Triage Assessment (Adult)    Airway WDL WDL       Respiratory WDL    Respiratory WDL WDL       Skin Circulation/Temperature WDL    Skin Circulation/Temperature WDL all       Cardiac WDL    Cardiac WDL X;chest pain       Peripheral/Neurovascular WDL    Peripheral Neurovascular WDL X;all  Edema       Cognitive/Neuro/Behavioral WDL    Cognitive/Neuro/Behavioral WDL WDL

## 2023-07-17 NOTE — PROGRESS NOTES
RECEIVING UNIT ED HANDOFF REVIEW    ED Nurse Handoff Report was reviewed by: Darryl Masters RN on July 17, 2023 at 1:30 AM

## 2023-07-17 NOTE — CONSULTS
[facility]    Cardiology Consultation     Date of Admission:  7/16/2023    Assessment & Plan   Ramiro Jeffers is a 62 year old male who was admitted on 7/16/2023.  Acute congestive heart failure with unclear etiology, pending echocardiogram  History of coronary disease with prior LAD stent  Question of atrial fibrillation on event monitor in August 2022 but that is not conclusive based on the EKG tracings that have some baseline artifact as well as the episodes do not appear sustained    Plan  At this time, patient has responded to IV diuretics very well.  He has diuresed about 6 L over last 24 hours.  He has hypokalemia that needs to be replaced.  He is on potassium supplements which will be optimized further.  He is on metoprolol XL that can be continued.    Echo is pending.  Depending on echo, will need further evaluation.  Fortunately, he has no evidence of any ischemia based on EKG or troponins.  Troponins are normal.    Given that the event monitor in August 2022 did not show any conclusive sustained atrial fibrillation, I do not believe he needs Eliquis at this time.  I will confirm the event monitor tracings with the electrophysiologist to see his sign the report and if they are in agreement, we may consider stopping the Eliquis unless he needs anticoagulation for some other reason.    Today's medical decision making was of moderate complexity.  I reviewed the EKGs, event monitor tracings from August 2022, the labs since admission, the prior cardiology notes with Dr. Key as well as outpatient notes by Dr. Davis.  I also reviewed the case including tracings with Dr. Key today.      Moderate complexity     Azeem Mtz MD, MD    Primary Care Physician   Addison Davis    Reason for Consult   Reason for consult: I was asked by hospitalist to evaluate for heart failure.    History of Present Illness   Ramiro Jeffers is a markedly pleasant 62 year old gentleman who  presents with acute onset of severe swelling, that began two weeks ago, initially in the arms, hands, and lower legs;    The swelling worsened over the last 4 to 5 days especially in the lower extremities as if it is going to burst.  He also had tingling and numbness in his lower extremities extending into the scrotum.  The swelling in the arms and hands has resolved, but in the legs it has markedly worsened, and now extends into the abdomen and scrotum;     He says at his PCP office in 6/2023 he weighed 183 pounds, and yesterday he weighed himself at home and the scale read 212 pounds.     In addition, he has had some chest tightness in the retrosternal area and he took some some little nitroglycerin.  Since yesterday he has been started on IV diuretics and is feeling much better.  He also had some small black rash on his arms.    He was diagnosed with DVT and PE several years ago and took warfarin for few months.  He believes it was in 2010.  In addition, he had an event monitor last August which showed some tracings suspicious for short runs of A-fib although on my review there is a lot more baseline artifact to be sure about it.  In addition, the report did not mention any A-fib.  Nevertheless, his primary care provider started him on Eliquis.  Again, this is not convincing and in particular he the runs were very short.  His chads vascular score is 2.    He also has had previous LAD stent in 2018.  He had angiogram in 2019 and 2021 which revealed no obstructive disease.  His last echocardiogram revealed normal ejection fraction.      Past Medical History   Past Medical History:   Diagnosis Date    ACQUIRED HYPOTHYROIDISM      Blood clot in the legs     CAD (coronary artery disease)     11/15/18 Cath: PCI with 3.5-16mm DORY to proximal LAD    Calculus of gallbladder without mention of cholecystitis or obstruction     Cataract     Chest pressure 11/5/2018    Hyperlipidemia     Hypertension     Ischemic  cardiomyopathy     EF 40-45% on 11/2018 Echo    Obesity, unspecified     significant weight loss w/diet alone, on 10-15-10, BMI was 56.4    pulmonary emboli 6-2010    Unprovoked large bilateral pulmonary emboli without source identified on lower extremity dopplers, pt had a transient moderate lupus inhibitor upon initial presentation in June , 2010 which was gone in September, 2010;  all other thrombophilic workup is normal    Pulmonary embolus, bilateral     PVC's (premature ventricular contractions)     4% burden on 11/2018 Holter    Shortness of breath     on exertion    Venous insufficiency     Viral pneumonia, unspecified     Vitamin D deficiency          Past Surgical History   Past Surgical History:   Procedure Laterality Date    COLONOSCOPY  11/17/2011    Procedure:COLONOSCOPY; colonoscopy; Surgeon:ELENA OROURKE; Location: GI    CV HEART CATHETERIZATION WITH POSSIBLE INTERVENTION Left 2/15/2019    Procedure: Coronary Angiogram;  Surgeon: Tulio Ortiz MD;  Location:  HEART CARDIAC CATH LAB    CV HEART CATHETERIZATION WITH POSSIBLE INTERVENTION N/A 10/5/2021    Procedure: Heart Catheterization with Possible Intervention;  Surgeon: Jose Francisco Dave MD;  Location:  HEART CARDIAC CATH LAB    CV LEFT HEART CATH N/A 10/5/2021    Procedure: Left Heart Cath;  Surgeon: Jose Francisco Dave MD;  Location:  HEART CARDIAC CATH LAB    CV LEFT VENTRICULOGRAM N/A 10/5/2021    Procedure: Left Ventriculogram;  Surgeon: Jose Francisco Dave MD;  Location:  HEART CARDIAC CATH LAB    LAPAROSCOPIC CHOLECYSTECTOMY  9/28/2012    Procedure: LAPAROSCOPIC CHOLECYSTECTOMY;  LAPAROSCOPIC CHOLECYSTECTOMY, LYSIS OF ADHESIONS;  Surgeon: Dutch Matta MD;  Location:  OR    LAPAROSCOPIC LYSIS ADHESIONS  9/28/2012    Procedure: LAPAROSCOPIC LYSIS ADHESIONS;;  Surgeon: Dutch Matta MD;  Location:  OR    LAPAROSCOPY, SURGICAL; REPAIR INCISIONAL OR VENTRAL HERNIA      repair of ventral strangulated  surgery    PHACOEMULSIFICATION CLEAR CORNEA WITH STANDARD INTRAOCULAR LENS IMPLANT  12/19/2011    Procedure:PHACOEMULSIFICATION CLEAR CORNEA WITH STANDARD INTRAOCULAR LENS IMPLANT; RIGHT PHACOEMULSIFICATION CLEAR CORNEA WITH STANDARD INTRAOCULAR LENS IMPLANT ; Surgeon:ALLISON ANTONIO; Location:[facility] NONSPECIFIC PROCEDURE  10-    Laparoscopic gastric bypass,    [facility] NONSPECIFIC PROCEDURE  10-    1.  Attempted laparoscopic exploration with conversion to: .  Abdominal exploration. 3.  Reduction of incarcerated incisional hernia. 4.  Repair of incisional hernia.5.  Gastrostomy tube placement (#22 Polish) into defunctionalized stomach.6.  Enterotomy with bowel decompression and primary repair. 7.  Abdominal lavage.          Prior to Admission Medications   Prior to Admission Medications   Prescriptions Last Dose Informant Patient Reported? Taking?   Cholecalciferol (VITAMIN D) 2000 units tablet   No No   Sig: Take 1 tablet by mouth daily   Multiple Vitamin (MULTI-VITAMIN) per tablet  Self Yes No   Sig: Take 1 tablet by mouth 2 times daily    apixaban ANTICOAGULANT (ELIQUIS ANTICOAGULANT) 5 MG tablet   No No   Sig: Take 1 tablet (5 mg) by mouth 2 times daily   aspirin 81 MG tablet   No No   Sig: Take 1 tablet (81 mg) by mouth daily Start tomorrow morning.   ezetimibe (ZETIA) 10 MG tablet   No No   Sig: TAKE 1 TABLET(10 MG) BY MOUTH DAILY   hydrochlorothiazide (HYDRODIURIL) 25 MG tablet   No No   Sig: TAKE 1 TABLET(25 MG) BY MOUTH EVERY MORNING   isosorbide mononitrate (IMDUR) 30 MG 24 hr tablet   No No   Sig: Take 2 tablets (60 mg) by mouth daily   levothyroxine (SYNTHROID/LEVOTHROID) 137 MCG tablet   No No   Sig: Take 137 mcg every other day alternating with 150 mcg PO alternating every other day.   levothyroxine (SYNTHROID/LEVOTHROID) 150 MCG tablet   No No   Sig: Take 137 mcg every other day alternating with 150 mcg PO alternating every other day.   metoprolol succinate ER (TOPROL XL) 50 MG 24 hr  tablet   No No   Sig: TAKE 1 AND 1/2 TABLETS(75 MG) BY MOUTH DAILY   nitroGLYcerin (NITROSTAT) 0.4 MG sublingual tablet   No No   Sig: For chest pain place 1 tablet under the tongue every 5 minutes for 3 doses. If symptoms persist 5 minutes after 1st dose call 911.   rosuvastatin (CRESTOR) 40 MG tablet   No No   Sig: TAKE 1 TABLET(40 MG) BY MOUTH DAILY      Facility-Administered Medications: None     Current Facility-Administered Medications   Medication Dose Route Frequency    furosemide  40 mg Intravenous Q12H    potassium chloride  40 mEq Oral Once    sodium chloride (PF)  3 mL Intracatheter Q8H     Current Facility-Administered Medications   Medication Last Rate     Allergies   Allergies   Allergen Reactions    No Known Allergies        Social History    reports that he has never smoked. He has never used smokeless tobacco. He reports that he does not drink alcohol and does not use drugs.      Family History   I have reviewed this patient's family history and updated it with pertinent information if needed.  Family History   Problem Relation Age of Onset    Osteoporosis Mother     Thyroid Disease Mother         no thyroid    Cancer Maternal Grandmother         ? lung cancer    Cancer Father         melanoma    Coronary Artery Disease Brother     Coronary Artery Disease Brother           Review of Systems   A comprehensive review of system was performed and is negative other than that noted in the HPI or here.     Physical Exam   Vital Signs with Ranges  Temp:  [98  F (36.7  C)] 98  F (36.7  C)  Pulse:  [54-67] 57  Resp:  [11-29] 16  BP: (125-154)/() 133/68  SpO2:  [92 %-97 %] 94 %  Wt Readings from Last 4 Encounters:   07/17/23 88.5 kg (195 lb 1.6 oz)   06/23/23 83.1 kg (183 lb 3.2 oz)   02/06/23 88 kg (193 lb 14.4 oz)   01/26/23 88.4 kg (194 lb 12.8 oz)     I/O last 3 completed shifts:  In: -   Out: 5150 [Urine:5150]      Vitals: /68   Pulse 57   Temp 98  F (36.7  C) (Temporal)   Resp 16   Ht  "1.753 m (5' 9\")   Wt 88.5 kg (195 lb 1.6 oz)   SpO2 94%   BMI 28.81 kg/m      Physical Exam:   General - Alert and oriented to time place and person in no acute distress  Eyes - No scleral icterus  HEENT - Neck supple, moist mucous membranes  Cardiovascular -regular S1 and S2 with no murmurs.  Extremities - There is 1+ partially pitting edema, HJR positive  Respiratory - clear  Skin - No pallor or cyanosis  Gastrointestinal - Non tender and non distended without rebound or guarding  Psych - Appropriate affect   Neurological - No gross motor neurological focal deficits    No lab results found in last 7 days.    Invalid input(s): TROPONINIES    Recent Labs   Lab 07/17/23  0235 07/17/23 0203 07/17/23 0053 07/16/23 1946   WBC 6.8  --   --  9.1   HGB 12.8*  --  13.9 11.9*   MCV 92  --   --  92   *  --   --  146*     --  144 141   POTASSIUM 3.3*  --  2.7* 2.7*   CHLORIDE 102  --   --  101   CO2 28  --   --  27   BUN 13.1  --   --  14.1   CR 0.89  --   --  0.89   GFRESTIMATED >90  --   --  >90   ANIONGAP 14  --   --  13   GUERITA 8.9  --   --  8.8   * 90  --  98   ALBUMIN  --   --   --  3.8   PROTTOTAL  --   --   --  6.5   BILITOTAL  --   --   --  0.5   ALKPHOS  --   --   --  62   ALT  --   --   --  29   AST  --   --   --  43     Recent Labs   Lab Test 08/23/21  0920 08/29/19  0804   CHOL 132 137   HDL 46 52   LDL 71 75   TRIG 73 51     Recent Labs   Lab 07/17/23  0235 07/17/23 0053 07/16/23 1946   WBC 6.8  --  9.1   HGB 12.8* 13.9 11.9*   HCT 39.2* 41 37.3*   MCV 92  --  92   *  --  146*   RETICABSCT 0.082  --   --    RETP 1.9  --   --      Recent Labs   Lab 07/17/23  0053   PHV 7.46*   PO2V 26   PCO2V 51*   HCO3V 36*     Recent Labs   Lab 07/16/23 1946   NTBNPI 1,552*     No results for input(s): DD in the last 168 hours.  No results for input(s): SED, CRP in the last 168 hours.  Recent Labs   Lab 07/17/23  0235 07/16/23 1946   * 146*     Recent Labs   Lab 07/16/23 1946   TSH 0.94 " "    No results for input(s): COLOR, APPEARANCE, URINEGLC, URINEBILI, URINEKETONE, SG, UBLD, URINEPH, PROTEIN, UROBILINOGEN, NITRITE, LEUKEST, RBCU, WBCU in the last 168 hours.    Imaging:  Recent Results (from the past 48 hour(s))   XR Chest 2 Views    Narrative    EXAM: XR CHEST 2 VIEWS  LOCATION: [facility]  DATE: 7/16/2023    INDICATION: dyspnea  COMPARISON: None.      Impression    IMPRESSION: Heart size and pulmonary vascularity normal. The lungs are clear.   US Lower Extremity Venous Duplex Bilateral    Narrative    EXAM: US LOWER EXTREMITY VENOUS DUPLEX BILATERAL  LOCATION: [facility]  DATE: 7/16/2023    INDICATION: Bilateral leg swelling.  COMPARISON: None.  TECHNIQUE: Venous Duplex ultrasound of bilateral lower extremities with and without compression, augmentation and duplex. Color flow and spectral Doppler with waveform analysis performed.    FINDINGS: Exam includes the common femoral, femoral, popliteal veins as well as segmentally visualized deep calf veins and greater saphenous vein.     RIGHT: No deep vein thrombosis. No superficial thrombophlebitis. No popliteal cyst.    LEFT: No deep vein thrombosis. No superficial thrombophlebitis. No popliteal cyst.      Impression    IMPRESSION:  1.  No deep venous thrombosis in the bilateral lower extremities.       Echo:  No results found for this or any previous visit (from the past 4320 hour(s)).    Clinically Significant Risk Factors Present on Admission        # Hypokalemia: Lowest K = 2.7 mmol/L in last 2 days, will replace as needed        # Drug Induced Coagulation Defect: home medication list includes an anticoagulant medication  # Drug Induced Platelet Defect: home medication list includes an antiplatelet medication   # Hypertension: Noted on problem list      # Overweight: Estimated body mass index is 28.81 kg/m  as calculated from the following:    Height as of this encounter: 1.753 m (5' 9\").    " Weight as of this encounter: 88.5 kg (195 lb 1.6 oz).         Thrombocytopenia Including Purpuric, HIT, & Other Platelet Defects: Thrombocytopenia, unspecified    Recent PFTs revealed moderate diffusion defect but normal spirometry and lung volumes.

## 2023-07-17 NOTE — PLAN OF CARE
Goal Outcome Evaluation:      Plan of Care Reviewed With: patient  Patient alert, steady with ambulation. Denies any SOB. Feeling much improved since admission. Large volume output from IV Lasix. Sats mid 90's. Edema improved. Continue diuresis.

## 2023-07-18 ENCOUNTER — APPOINTMENT (OUTPATIENT)
Dept: OCCUPATIONAL THERAPY | Facility: CLINIC | Age: 63
DRG: 292 | End: 2023-07-18
Attending: INTERNAL MEDICINE
Payer: COMMERCIAL

## 2023-07-18 ENCOUNTER — APPOINTMENT (OUTPATIENT)
Dept: CARDIOLOGY | Facility: CLINIC | Age: 63
DRG: 292 | End: 2023-07-18
Attending: HOSPITALIST
Payer: COMMERCIAL

## 2023-07-18 LAB
ALBUMIN MFR UR ELPH: 5.9 MG/DL
ALBUMIN UR-MCNC: NEGATIVE MG/DL
ANION GAP SERPL CALCULATED.3IONS-SCNC: 11 MMOL/L (ref 7–15)
APPEARANCE UR: CLEAR
BI-PLANE LVEF ECHO: NORMAL
BILIRUB UR QL STRIP: NEGATIVE
BUN SERPL-MCNC: 17.9 MG/DL (ref 8–23)
CALCIUM SERPL-MCNC: 8.9 MG/DL (ref 8.8–10.2)
CHLORIDE SERPL-SCNC: 100 MMOL/L (ref 98–107)
COLOR UR AUTO: ABNORMAL
CREAT SERPL-MCNC: 0.82 MG/DL (ref 0.67–1.17)
CREAT UR-MCNC: 49.5 MG/DL
DEPRECATED HCO3 PLAS-SCNC: 30 MMOL/L (ref 22–29)
ERYTHROCYTE [DISTWIDTH] IN BLOOD BY AUTOMATED COUNT: 14.2 % (ref 10–15)
GFR SERPL CREATININE-BSD FRML MDRD: >90 ML/MIN/1.73M2
GLUCOSE SERPL-MCNC: 87 MG/DL (ref 70–99)
GLUCOSE UR STRIP-MCNC: NEGATIVE MG/DL
HCT VFR BLD AUTO: 39.3 % (ref 40–53)
HGB BLD-MCNC: 12.7 G/DL (ref 13.3–17.7)
HGB UR QL STRIP: NEGATIVE
KETONES UR STRIP-MCNC: ABNORMAL MG/DL
LEUKOCYTE ESTERASE UR QL STRIP: NEGATIVE
MAGNESIUM SERPL-MCNC: 2.3 MG/DL (ref 1.7–2.3)
MCH RBC QN AUTO: 29.9 PG (ref 26.5–33)
MCHC RBC AUTO-ENTMCNC: 32.3 G/DL (ref 31.5–36.5)
MCV RBC AUTO: 93 FL (ref 78–100)
NITRATE UR QL: NEGATIVE
PH UR STRIP: 6.5 [PH] (ref 5–7)
PLATELET # BLD AUTO: 136 10E3/UL (ref 150–450)
POTASSIUM SERPL-SCNC: 3.9 MMOL/L (ref 3.4–5.3)
PROT/CREAT 24H UR: 0.12 MG/MG CR (ref 0–0.2)
RBC # BLD AUTO: 4.25 10E6/UL (ref 4.4–5.9)
RBC URINE: <1 /HPF
SODIUM SERPL-SCNC: 141 MMOL/L (ref 136–145)
SP GR UR STRIP: 1.01 (ref 1–1.03)
SQUAMOUS EPITHELIAL: <1 /HPF
UROBILINOGEN UR STRIP-MCNC: NORMAL MG/DL
WBC # BLD AUTO: 5.8 10E3/UL (ref 4–11)
WBC URINE: <1 /HPF

## 2023-07-18 PROCEDURE — 80048 BASIC METABOLIC PNL TOTAL CA: CPT | Performed by: INTERNAL MEDICINE

## 2023-07-18 PROCEDURE — 36415 COLL VENOUS BLD VENIPUNCTURE: CPT | Performed by: INTERNAL MEDICINE

## 2023-07-18 PROCEDURE — 93306 TTE W/DOPPLER COMPLETE: CPT

## 2023-07-18 PROCEDURE — 83735 ASSAY OF MAGNESIUM: CPT | Performed by: INTERNAL MEDICINE

## 2023-07-18 PROCEDURE — 99233 SBSQ HOSP IP/OBS HIGH 50: CPT | Performed by: INTERNAL MEDICINE

## 2023-07-18 PROCEDURE — 210N000002 HC R&B HEART CARE

## 2023-07-18 PROCEDURE — 93306 TTE W/DOPPLER COMPLETE: CPT | Mod: 26 | Performed by: INTERNAL MEDICINE

## 2023-07-18 PROCEDURE — 81001 URINALYSIS AUTO W/SCOPE: CPT | Performed by: INTERNAL MEDICINE

## 2023-07-18 PROCEDURE — 85048 AUTOMATED LEUKOCYTE COUNT: CPT | Performed by: INTERNAL MEDICINE

## 2023-07-18 PROCEDURE — 97110 THERAPEUTIC EXERCISES: CPT | Mod: GO

## 2023-07-18 PROCEDURE — 84156 ASSAY OF PROTEIN URINE: CPT | Performed by: INTERNAL MEDICINE

## 2023-07-18 PROCEDURE — 250N000011 HC RX IP 250 OP 636: Mod: JZ | Performed by: HOSPITALIST

## 2023-07-18 PROCEDURE — 99233 SBSQ HOSP IP/OBS HIGH 50: CPT | Mod: 25 | Performed by: NURSE PRACTITIONER

## 2023-07-18 PROCEDURE — 250N000013 HC RX MED GY IP 250 OP 250 PS 637: Performed by: NURSE PRACTITIONER

## 2023-07-18 PROCEDURE — 250N000013 HC RX MED GY IP 250 OP 250 PS 637: Performed by: INTERNAL MEDICINE

## 2023-07-18 RX ORDER — ASPIRIN 81 MG/1
81 TABLET, CHEWABLE ORAL DAILY
Status: DISCONTINUED | OUTPATIENT
Start: 2023-07-19 | End: 2023-07-19 | Stop reason: HOSPADM

## 2023-07-18 RX ORDER — HYDROCHLOROTHIAZIDE 25 MG/1
12.5 TABLET ORAL EVERY MORNING
Qty: 90 TABLET | Refills: 3
Start: 2023-07-18 | End: 2023-11-09

## 2023-07-18 RX ORDER — TORSEMIDE 20 MG/1
20 TABLET ORAL DAILY
Status: DISCONTINUED | OUTPATIENT
Start: 2023-07-19 | End: 2023-07-19 | Stop reason: HOSPADM

## 2023-07-18 RX ORDER — TORSEMIDE 20 MG/1
20 TABLET ORAL
Status: DISCONTINUED | OUTPATIENT
Start: 2023-07-18 | End: 2023-07-18

## 2023-07-18 RX ORDER — LISINOPRIL 5 MG/1
5 TABLET ORAL DAILY
Status: DISCONTINUED | OUTPATIENT
Start: 2023-07-18 | End: 2023-07-19 | Stop reason: HOSPADM

## 2023-07-18 RX ADMIN — EZETIMIBE 10 MG: 10 TABLET ORAL at 09:35

## 2023-07-18 RX ADMIN — APIXABAN 5 MG: 5 TABLET, FILM COATED ORAL at 09:36

## 2023-07-18 RX ADMIN — LISINOPRIL 5 MG: 5 TABLET ORAL at 13:46

## 2023-07-18 RX ADMIN — ROSUVASTATIN CALCIUM 40 MG: 20 TABLET, FILM COATED ORAL at 09:35

## 2023-07-18 RX ADMIN — LEVOTHYROXINE SODIUM 150 MCG: 75 TABLET ORAL at 09:36

## 2023-07-18 RX ADMIN — FUROSEMIDE 40 MG: 10 INJECTION, SOLUTION INTRAMUSCULAR; INTRAVENOUS at 09:36

## 2023-07-18 RX ADMIN — METOPROLOL SUCCINATE 75 MG: 50 TABLET, EXTENDED RELEASE ORAL at 09:36

## 2023-07-18 RX ADMIN — ISOSORBIDE MONONITRATE 60 MG: 60 TABLET, EXTENDED RELEASE ORAL at 09:35

## 2023-07-18 ASSESSMENT — ACTIVITIES OF DAILY LIVING (ADL)
ADLS_ACUITY_SCORE: 37
ADLS_ACUITY_SCORE: 37
ADLS_ACUITY_SCORE: 41
ADLS_ACUITY_SCORE: 37
ADLS_ACUITY_SCORE: 41
ADLS_ACUITY_SCORE: 37
ADLS_ACUITY_SCORE: 41
ADLS_ACUITY_SCORE: 37
ADLS_ACUITY_SCORE: 41
ADLS_ACUITY_SCORE: 37
ADLS_ACUITY_SCORE: 41
DEPENDENT_IADLS:: INDEPENDENT
ADLS_ACUITY_SCORE: 41

## 2023-07-18 NOTE — PLAN OF CARE
Goal Outcome Evaluation:      Plan of Care Reviewed With: patient    Overall Patient Progress: improvingOverall Patient Progress: improving    Outcome Evaluation: anticipated discharge 7/19    Pt Admit Date: 7/16/2023 for Primary Diagnosis: Congestive heart failure, Hypokalemia, Anasarca.  Admit BNP 7/16 1,552; K+ replaced 7/17 and stable at 3.9.  EF 55%.    Pt alert, oriented x4.  VSS on RA, slightly hypotensive this noon (94/65) after AM meds and 40mg IV push lasix with great output, improved to 106/69 this afternoon.  PIV patent in RUE.  Tele sinus rhythm / sinus shilo 50's-60's.  Pt is up independently, steady. Pt asked about med costs and was given resources.  Anticipated D/C Date: 7/19/23 before 11am (provider agrees).      Nurse: Svetlana Mehta RN

## 2023-07-18 NOTE — PROGRESS NOTES
[facility]  Cardiology Progress Note  Date of Service: 07/18/2023  Date of Admission: 7/16/2023  Primary Cardiologist: Dr. Yesi Lawson    Ramiro Jeffers is a 62 year old male with hx of CAD s/p PCI, CHF, paroxysmal a-fib and prior PE on chronic AC with apixaban, and hypothyroidism who was admitted on 7/16/2023 for acute CHF exacerbation.    Mr. Jeffers is followed by [facility] for CAD, having had DORY to the proximal LAD in 2018. LVEF at that time reportedly was 40-45%. He has had subsequent angiogram testing in 2019 and again in 2019 that reportedly had shown no clinically significant progression of CAD. Most recent TTE in 7/2022 showed preserved LVEF. He also has chronic paroxysmal atrial fibrillation, seen on event monitor testing in 2022, and takes Eliquis for stroke prevention. Prior testing for exertional dyspnea has included PFT tests in 11/2022 which were normal    Interval July 18, 2023    Patient up and ambulating the hallway without any exacerbated symptoms.  Feeling well without any acute complaints.    Asssessment:    1. Acute congestive heart failure with unclear etiology, EF 50-55%  2. History of coronary disease with prior LAD stent  3. Question of atrial fibrillation on event monitor in August 2022 but that is not conclusive based on the EKG tracings that have some baseline artifact as well as the episodes do not appear sustained.  Review of the data does not support confirmed AF.  _____________________________________________________________    Plan:     Stop Eliquis --> start ASA 81 mg/d at discharge.    Start lisinopril 5 mg daily for LV support.    Reduce hydrochlorothiazide to 12.5 mg/d and continue torsemide 20 mg/d.    Outpatient follow up has been arranged.    Ok to discharge to home today from a cardiac standpoint.  _____________________________________________________________    Thank you for the opportunity to participate in the care of Mr. Ramiro Hall   Please feel free to reach out with any additional questions.    CHARO Celaya, CNP   Nurse Practitioner  [facility]  Pager: [phone]  Phone: [phone]  Text Page (8am - 5pm, M-F)    20 Minutes spent in coordination of care, and discussion with the patient and/or family regarding diagnostic results, prognosis, symptom management, risks and benefits of management options, and development of the plan of care of above.    Physical Exam   Temp: 98  F (36.7  C) Temp src: Oral BP: 137/83 Pulse: 77   Resp: 18 SpO2: 96 % O2 Device: None (Room air)      Vitals:    07/17/23 0200 07/17/23 0539 07/18/23 0642   Weight: 88.5 kg (195 lb) 88.5 kg (195 lb 1.6 oz) 82 kg (180 lb 12.8 oz)     I/O last 3 completed shifts:  In: 920 [P.O.:920]  Out: 4950 [Urine:4950]    Constitutional: Appears stated age, well nourished, NAD.  HEENT: Normocephalic, atraumatic.   Respiratory: Non-labored. Lungs CTAB.  Cardiovascular: RRR, normal S1 and S2. No M/G/R.  GI: Soft, non-distended.  Skin: Warm and dry.  Neurologic: No gross focal deficits. Alert, awake, and oriented to all spheres.    Data   Recent Labs   Lab 07/18/23  0539 07/17/23  1831 07/17/23  1227 07/17/23  0235 07/17/23  0203 07/17/23  0053 07/16/23  1946   WBC 5.8  --   --  6.8  --   --  9.1   HGB 12.7*  --   --  12.8*  --  13.9 11.9*   MCV 93  --   --  92  --   --  92   *  --   --  141*  --   --  146*     --   --  144  --  144 141   POTASSIUM 3.9 3.8 3.1* 3.3*  --  2.7* 2.7*   CHLORIDE 100  --   --  102  --   --  101   CO2 30*  --   --  28  --   --  27   BUN 17.9  --   --  13.1  --   --  14.1   CR 0.82  --   --  0.89  --   --  0.89   ANIONGAP 11  --   --  14  --   --  13   GUERITA 8.9  --   --  8.9  --   --  8.8   GLC 87  --   --  103* 90  --  98   ALBUMIN  --   --   --   --   --   --  3.8   PROTTOTAL  --   --   --   --   --   --  6.5   BILITOTAL  --   --   --   --   --   --  0.5   ALKPHOS  --   --   --   --   --   --  62   ALT  --   --   --    --   --   --  29   AST  --   --   --   --   --   --  43       Recent Labs   Lab 07/18/23  0539 07/17/23  0235 07/17/23 0053 07/16/23 1946   WBC 5.8 6.8  --  9.1   HGB 12.7* 12.8* 13.9 11.9*   HCT 39.3* 39.2* 41 37.3*   MCV 93 92  --  92   * 141*  --  146*     Recent Labs   Lab 07/18/23  0539 07/17/23  1831 07/17/23  1227 07/17/23  0235 07/17/23  0203 07/17/23 0053 07/16/23 1946     --   --  144  --  144 141   POTASSIUM 3.9 3.8 3.1* 3.3*  --  2.7* 2.7*   CHLORIDE 100  --   --  102  --   --  101   CO2 30*  --   --  28  --   --  27   ANIONGAP 11  --   --  14  --   --  13   GLC 87  --   --  103* 90  --  98   BUN 17.9  --   --  13.1  --   --  14.1   CR 0.82  --   --  0.89  --   --  0.89   GFRESTIMATED >90  --   --  >90  --   --  >90   GUERITA 8.9  --   --  8.9  --   --  8.8        Patient Active Problem List   Diagnosis     Acquired hypothyroidism     pulmonary emboli     Venous insufficiency     Vitamin D deficiency     Bariatric surgery status     Postsurgical nonabsorption     Bilateral knee pain     Anemia     Pneumothorax     Chest pressure     Hypertension     Hyperlipidemia     Status post coronary angiogram     Palpitations     PVC's (premature ventricular contractions)     Ischemic cardiomyopathy     CAD (coronary artery disease)     Unstable angina (H)     Coronary artery disease involving native heart with angina pectoris, unspecified vessel or lesion type (H)     Skin ulcer of abdomen with fat layer exposed (H)     Morbid obesity (H)     Hypokalemia     Anasarca     Congestive heart failure, unspecified HF chronicity, unspecified heart failure type (H)     Medications       ezetimibe  10 mg Oral Daily     isosorbide mononitrate  60 mg Oral Daily     levothyroxine  137 mcg Oral Every Other Day     levothyroxine  150 mcg Oral Every Other Day     metoprolol succinate ER  75 mg Oral Daily     rosuvastatin  40 mg Oral Daily     sodium chloride (PF)  3 mL Intracatheter Q8H     torsemide  20 mg Oral  BID       Data   Last 24 hours labs personally reviewed.  Echo: No results found for this or any previous visit (from the past 4320 hour(s)).

## 2023-07-18 NOTE — CONSULTS
[facility]- C.O.R.E. Clinic    Received CORE Clinic Consult from Dr. Kowalski.    Reviewed Ramiro's chart and note they are admitted for Anasarca, 30 lb weight gain. Echocardiogram on 7/18 shows LVEF 55%.  This is their 1st admission(s) in the past year for concerns of heart failure.    Given above information, Ramiro  meets criteria for general cardiology as patients EF is >40%.     Met with Ramiro at bedside to discuss CORE Clinic consult request.  Patient/family confirm they plan to follow up with general cardiology.    Patient's primary insurance:  [employer] Individual Family    Pt reports the following HF symptoms prior to admission:  Chest heaviness, SOB, Swelling in feet, ankles, legs and stomach    Living situation:  house    Med set up:  self    Scale available at home:   has    Barriers to HF follow-up:  none    Medication review: Patient is not on both Entresto and a SGLT2 Inhibitor (Jardiance, Farxiga, Invokana), Pharmacy Liaison Consult placed for coverage review.     CM/HF education topics we reviewed:  1. Low sodium  2. Fluid Restriction  3. Daily weights  4. Symptoms of HF to be reported to General Cardiology Team.    Education materials provided:    1. Low sodium food and drink handout  2. Low sodium food product examples  3. Already prepared low salt meal delivery services  4. HF stoplight tool  5. Guide to HF booklet      I have contacted scheduling to arrange general cardiology follow-up within 3-5 days of discharge. Encouraged him to arrange follow-up with Dr. Key before he heads to [place] this winter.     Instructed Ramiro to call General Cardiology at [phone] with questions/concerns prior to this visit.  Specifically instructed them to call RN with weight gain greater than 2 lbs overnight, and/or 5 lbs in a week, and/or worsening SOB/edema.     Future Appointments   Date Time Provider Department Center   7/19/2023  8:15 AM Zhane Perez OT [facility]   7/20/2023  9:10 AM Ryan  Addison Britton MD [facility]   8/4/2023  2:15 PM [facility]   8/24/2023 12:30 PM Ritu Altamirano APRN CNP [facility] Rubio voices understanding and denies further questions or concerns at this time.      Please call with any further questions.    Rae Alcocer RN, [facility]- [place], [place]  C.O.R.E. Clinic Care Coordinator  [phone]

## 2023-07-18 NOTE — CONSULTS
Patient has [employer] through the marketplace.    Xarelto/Eliquis:  $125/mo. Patient is eligible to download a copay savings card from [employer] or [employer]iqu[employer].com, respectively, to reduce this to $10/mo.    Monica Tam  Pharmacy Technician/Liaison, Discharge Pharmacy   [phone] (voice or text)  [email or web address]

## 2023-07-18 NOTE — CONSULTS
Care Management Initial Consult    General Information  Assessment completed with: gatito Ramirez  Type of CM/SW Visit: Initial Assessment  Primary Care Provider verified and updated as needed: Yes (Addison Davis [phone])   Readmission within the last 30 days: no previous admission in last 30 days      Reason for Consult: discharge planning  Advance Care Planning:    no ACP documents on file in [facility]     Communication Assessment  Patient's communication style: spoken language (English or Bilingual)      Cognitive  Cognitive/Neuro/Behavioral: WDL                      Living Environment:   People in home: alone     Current living Arrangements: house ([address] [place] [address])      Able to return to prior arrangements: yes    Family/Social Support:  Care provided by:  self  Provides care for:  No one  Marital Status: Single  Support system:  (close family)          Description of Support System: Supportive, Involved       Current Resources:   Patient receiving home care services: No  Community Resources: None  Equipment currently used at home: none  Supplies currently used at home: Other (blood ressure cuff, scale, pulse oximeter)    Employment/Financial:  Employment Status: employed full-time     Employment/ Comments: building maintenance  Financial Concerns:   None noted  Finance Comments: active UCARE/[employer] INDIVIDUAL FAMILY PLANS WITH [employer] insurance  Does the patient's insurance plan have a 3 day qualifying hospital stay waiver?  No    Lifestyle & Psychosocial Needs:  Social Determinants of Health     Tobacco Use: Low Risk  (6/23/2023)    Patient History      Smoking Tobacco Use: Never      Smokeless Tobacco Use: Never      Passive Exposure: Not on file   Alcohol Use: Not on file   Financial Resource Strain: Not on file   Food Insecurity: Not on file   Transportation Needs: Not on file   Physical Activity: Not on file   Stress: Not on file   Social Connections: Not on file  "  Intimate Partner Violence: Not on file   Depression: Not at risk (1/8/2019)    PHQ-2      PHQ-2 Score: 0   Housing Stability: Not on file     Functional Status:  Prior to admission patient needed assistance:   Dependent ADLs:: Independent  Dependent IADLs:: Independent     Mental Health Status:  Mental Health Status: No Current Concerns       Chemical Dependency Status:  Chemical Dependency Status: No Current Concerns           Values/Beliefs:  Spiritual, Cultural Beliefs, Orthodoxy Practices, Values that affect care: no             Additional Information:  This RN is bedside RN but also has second role as ; completed CM RN assessment due to available time.     Pt is alert, oriented x4, up independently as per baseline; he also drives and lives independently.  His PCP is Dr. Davis and he will call to get in to a Hospital followup appointment.  Pt states he normally fills his meds at [facility].  He asked about med cost of anticoagulant; added Pharm Liaison consult for this question who provided resource--also RN explained Good Rx resource for any/all meds potentially getting a discount.   At this time cardiology has signed off; Hospitalist had anticipated one more night for diuresing.  Pt OK with discharge today or tomorrow but if tomorrow is hopeful for a '\"before 11am\"\" discharge.    + Updated MD--plan will be for discharge before 11am on 7/19.      Pt has followup scheduled with PCP and cardiology:    Jul 20, 2023  9:10 AM   (Arrive by 8:55 AM)   Return Vascular Patient with Addison Davis MD   [facility] [place] ([facility] ) [address]   [place] [address] [phone]      Aug 04, 2023  2:15 PM   LAB with [facility]   [facility] ([facility] ) [address]   [place] [address] [phone]   Please do not eat " 10-12 hours before your appointment if you are coming in fasting for labs on lipids, cholesterol, or glucose (sugar). Does not apply to pregnant women. Water, tea and black coffee (with nothing added) is okay. Do not drink other fluids, diet soda or gum. If you have concerns about taking your medications, please send a message by clicking on Secure Messaging, Message Your Care Team.   Aug 24, 2023 12:30 PM   (Arrive by 12:20 PM)   Return Cardiology with CHARO Oliver CNP   [facility] [place] ([facility] ) [address] [place] [id number]   [phone]           Svetlana Mehta RN, BSN, PHN  [facility]  Inpatient Care Management - [facility]

## 2023-07-18 NOTE — DISCHARGE INSTRUCTIONS
Please call Heart Clinic general cardiology nurse with any concerns:  Monday-Friday 8:00 AM to 4:30 PM   General Line: [phone]   After hours:  [phone]    -- Please start weighing self daily and write in weight log chart; bring this to your cardiology clinic follow up appts.   -- Please bring in current medication bottles to cardiology appts for review.   -- Call RN with weight gain greater than 2 lbs overnight, and/or 5 lbs in a week, and/or worsening shortness of breath/edema.

## 2023-07-18 NOTE — PROGRESS NOTES
[facility]    Medicine Progress Note - Hospitalist Service       Date of Admission:  7/16/2023    Assessment & Plan   Ramiro Jeffers is a 62 year old male with hx of CAD s/p PCI, CHF, paroxysmal a-fib and prior PE on chronic AC with apixaban, and hypothyroidism who was admitted on 7/16/2023 for acute CHF exacerbation.    Of note, Mr. Jeffers is followed by [facility] for CAD, having had DORY to the proximal LAD in 2018. LVEF at that time reportedly was 40-45%. He has had subsequent angiogram testing in 2019 and again in 2019 that reportedly had shown no clinically significant progression of CAD. Most recent TTE in 7/2022 showed preserved LVEF. He also has chronic paroxysmal atrial fibrillation, seen on event monitor testing in 2022, and takes Eliquis for stroke prevention. Prior testing for exertional dyspnea has included PFT tests in 11/2022 which were normal     Suspected acute right sided CHF exacerbation  CAD s/p PCI  Essential hypertension  Paroxysmal atrial fibrillation  History of bilateral unprovoked PE (2010)  Hyperlipidemia  * Home meds: apixaban 5 mg BID, HCTZ 25 mg daily, Imdur 60 mg daily, metoprolol ER 75 mg daily, rosuvastatin 40 mg daily  * Presented with 2-week history of progressive anasarca, 30 lb weight gain, and dyspnea with orthopnea. In the ED, VSS on room air. NTpBNP elevated >1500. EKG showed sinus rhythm with PVCs. Troponin negative. TSH wnl  * Received Lasix 60 mg IV in the ED with significant response  - TTE pending. If TTE unremarkable, will consider UA, urine protein/creatinine ratio, repeat hepatic panel, possibly abdominal imaging  - Already net neg >9L, 25+ lb weight loss since admission  - Transition to torsemide 20 mg BID today, 7/18  - PTA HCTZ discontinued with initiation of torsemide  - Continues on home metoprolol, Imdur, rosuvastatin, and apixaban     Hypokalemia, Resolved  Due to diuresis  - On K and Mg replacement protocol     Chronic anemia and  thrombocytopenia, mild  Hgb mid 12 range this admission, platelets 130s-140s. Peripheral smear unrevealing    Hypothyroidism due to Hashimoto thyroiditis   Chronic and stable on levothyroxine. TSH this admission normal     Diet: 2 Gram Sodium Diet    DVT Prophylaxis: DOAC  Davis Catheter: Not present  Code Status: Full Code         Disposition: Expected discharge home tomorrow if he continues to diurese well    The patient's care was discussed with the Care Coordinator/ and Patient.    Kaylen Culver MD  Hospitalist Service  [facility]  Contact information available via [facility] Paging/Directory    ______________________________________________________________________    Interval History   No acute events overnight. Continues to diurese quite well. Edema improving, continues to feel better. Bumped bicarb today. IV Lasix given this morning, will continue with torsemide 20 mg BID and monitor    Data reviewed today: I reviewed all medications, new labs and imaging results over the last 24 hours. I personally reviewed no images or EKG's today.    Physical Exam   Vital Signs: Temp: 98  F (36.7  C) Temp src: Oral BP: 137/83 Pulse: 77   Resp: 18 SpO2: 96 % O2 Device: None (Room air)    Weight: 180 lbs 12.8 oz  Constitutional: Resting comfortably, NAD  HEENT: Sclera white, MMM  Respiratory: Breathing non-labored. Lungs CTAB - no wheezes, crackles, or rhonchi  Cardiovascular: Heart RRR, no m/r/g. 1+ BLE edema  GI: +BS, abd soft/NT  Skin/Integument: No rash  Musculoskeletal: Normal muscle bulk and tone  Neuro: Alert and appropriate, GARCIAS  Psych: Calm and cooperative    Data   Recent Labs   Lab 07/18/23  0539 07/17/23  1831 07/17/23  1227 07/17/23  0235 07/17/23  0203 07/17/23  0053 07/16/23  1946   WBC 5.8  --   --  6.8  --   --  9.1   HGB 12.7*  --   --  12.8*  --  13.9 11.9*   MCV 93  --   --  92  --   --  92   *  --   --  141*  --   --  146*     --   --  144  --  144  141   POTASSIUM 3.9 3.8 3.1* 3.3*  --  2.7* 2.7*   CHLORIDE 100  --   --  102  --   --  101   CO2 30*  --   --  28  --   --  27   BUN 17.9  --   --  13.1  --   --  14.1   CR 0.82  --   --  0.89  --   --  0.89   ANIONGAP 11  --   --  14  --   --  13   GUERITA 8.9  --   --  8.9  --   --  8.8   GLC 87  --   --  103* 90  --  98   ALBUMIN  --   --   --   --   --   --  3.8   PROTTOTAL  --   --   --   --   --   --  6.5   BILITOTAL  --   --   --   --   --   --  0.5   ALKPHOS  --   --   --   --   --   --  62   ALT  --   --   --   --   --   --  29   AST  --   --   --   --   --   --  43         No results found for this or any previous visit (from the past 24 hour(s)).    Medications       apixaban ANTICOAGULANT  5 mg Oral BID     ezetimibe  10 mg Oral Daily     furosemide  40 mg Intravenous Q12H     isosorbide mononitrate  60 mg Oral Daily     levothyroxine  137 mcg Oral Every Other Day     levothyroxine  150 mcg Oral Every Other Day     metoprolol succinate ER  75 mg Oral Daily     rosuvastatin  40 mg Oral Daily     sodium chloride (PF)  3 mL Intracatheter Q8H

## 2023-07-19 VITALS
TEMPERATURE: 97.5 F | SYSTOLIC BLOOD PRESSURE: 115 MMHG | OXYGEN SATURATION: 97 % | BODY MASS INDEX: 26.51 KG/M2 | RESPIRATION RATE: 18 BRPM | WEIGHT: 179 LBS | HEART RATE: 56 BPM | HEIGHT: 69 IN | DIASTOLIC BLOOD PRESSURE: 61 MMHG

## 2023-07-19 LAB
ANION GAP SERPL CALCULATED.3IONS-SCNC: 11 MMOL/L (ref 7–15)
BUN SERPL-MCNC: 20.7 MG/DL (ref 8–23)
CALCIUM SERPL-MCNC: 8.9 MG/DL (ref 8.8–10.2)
CHLORIDE SERPL-SCNC: 99 MMOL/L (ref 98–107)
CREAT SERPL-MCNC: 0.75 MG/DL (ref 0.67–1.17)
DEPRECATED HCO3 PLAS-SCNC: 29 MMOL/L (ref 22–29)
GFR SERPL CREATININE-BSD FRML MDRD: >90 ML/MIN/1.73M2
GLUCOSE SERPL-MCNC: 84 MG/DL (ref 70–99)
MAGNESIUM SERPL-MCNC: 2.3 MG/DL (ref 1.7–2.3)
POTASSIUM SERPL-SCNC: 3.9 MMOL/L (ref 3.4–5.3)
SODIUM SERPL-SCNC: 139 MMOL/L (ref 136–145)

## 2023-07-19 PROCEDURE — 250N000013 HC RX MED GY IP 250 OP 250 PS 637: Performed by: NURSE PRACTITIONER

## 2023-07-19 PROCEDURE — 36415 COLL VENOUS BLD VENIPUNCTURE: CPT | Performed by: INTERNAL MEDICINE

## 2023-07-19 PROCEDURE — 250N000013 HC RX MED GY IP 250 OP 250 PS 637: Performed by: INTERNAL MEDICINE

## 2023-07-19 PROCEDURE — 80048 BASIC METABOLIC PNL TOTAL CA: CPT | Performed by: INTERNAL MEDICINE

## 2023-07-19 PROCEDURE — 83735 ASSAY OF MAGNESIUM: CPT | Performed by: INTERNAL MEDICINE

## 2023-07-19 RX ORDER — ASPIRIN 81 MG/1
81 TABLET, CHEWABLE ORAL DAILY
Qty: 30 TABLET | Refills: 1 | Status: SHIPPED | OUTPATIENT
Start: 2023-07-20

## 2023-07-19 RX ORDER — TORSEMIDE 20 MG/1
20 TABLET ORAL DAILY
Qty: 30 TABLET | Refills: 1 | Status: SHIPPED | OUTPATIENT
Start: 2023-07-20 | End: 2023-07-20

## 2023-07-19 RX ADMIN — LISINOPRIL 5 MG: 5 TABLET ORAL at 08:04

## 2023-07-19 RX ADMIN — TORSEMIDE 20 MG: 20 TABLET ORAL at 08:05

## 2023-07-19 RX ADMIN — ASPIRIN 81 MG CHEWABLE TABLET 81 MG: 81 TABLET CHEWABLE at 08:04

## 2023-07-19 RX ADMIN — ISOSORBIDE MONONITRATE 60 MG: 60 TABLET, EXTENDED RELEASE ORAL at 08:05

## 2023-07-19 RX ADMIN — ROSUVASTATIN CALCIUM 40 MG: 20 TABLET, FILM COATED ORAL at 08:05

## 2023-07-19 RX ADMIN — EZETIMIBE 10 MG: 10 TABLET ORAL at 08:05

## 2023-07-19 RX ADMIN — LEVOTHYROXINE SODIUM 137 MCG: 112 TABLET ORAL at 06:41

## 2023-07-19 RX ADMIN — METOPROLOL SUCCINATE 75 MG: 50 TABLET, EXTENDED RELEASE ORAL at 08:04

## 2023-07-19 ASSESSMENT — ACTIVITIES OF DAILY LIVING (ADL)
CONCENTRATING,_REMEMBERING_OR_MAKING_DECISIONS_DIFFICULTY: NO
ADLS_ACUITY_SCORE: 37
CHANGE_IN_FUNCTIONAL_STATUS_SINCE_ONSET_OF_CURRENT_ILLNESS/INJURY: NO
WALKING_OR_CLIMBING_STAIRS_DIFFICULTY: NO
DOING_ERRANDS_INDEPENDENTLY_DIFFICULTY: NO
TOILETING_ISSUES: NO
ADLS_ACUITY_SCORE: 37
DIFFICULTY_EATING/SWALLOWING: NO
ADLS_ACUITY_SCORE: 37
WEAR_GLASSES_OR_BLIND: NO
ADLS_ACUITY_SCORE: 20
ADLS_ACUITY_SCORE: 37
DRESSING/BATHING_DIFFICULTY: NO

## 2023-07-19 NOTE — CONSULTS
Patient has [employer] through the marketplace.     Jardiance/Farxiga:  $25/mo. Patient is eligible to download a copay savings card from jardiance[employer]com or farxiga.com, respectively, to reduce this to $10/mo.    Entresto:  $125/mo. Patient is eligible to download a copay savings card from [employer] to reduce this to $10/mo.     Monica Tam  Pharmacy Technician/Liaison, Discharge Pharmacy   [phone] (voice or text)  [email or web address]

## 2023-07-19 NOTE — PLAN OF CARE
Goal Outcome Evaluation: Pt is A&Ox4, denies pain, VSS and on tele SR, on RA and LS clear, up independently. Plan for discharge home tomorrow before 11 am.      Plan of Care Reviewed With: patient    Overall Patient Progress: no changeOverall Patient Progress: no change

## 2023-07-19 NOTE — PLAN OF CARE
Occupational Therapy Discharge Summary    Reason for therapy discharge:    Discharged to home.    Progress towards therapy goal(s). See goals on Care Plan in [facility] electronic health record for goal details.  Goals partially met.  Barriers to achieving goals:   discharge from facility.    Therapy recommendation(s):    Continue home exercise program.

## 2023-07-19 NOTE — PLAN OF CARE
Goal Outcome Evaluation:    [id number]    Pt is A&OX4. VSS on RA. Tele: SR. He is up independently. Tolerating to  2g NA diet with 1800ml fluid restriction. Plan is for patient to discharge in the morning.

## 2023-07-19 NOTE — DISCHARGE SUMMARY
[facility]  Hospitalist Discharge Summary      Date of Admission:  7/16/2023  Date of Discharge:  7/19/2023  Discharging Provider: Kaylen Culver MD    Discharge Diagnoses   Acute on chronic HFpEF  CAD s/p PCI  Essential hypertension  Paroxysmal atrial fibrillation  History of bilateral unprovoked PE (2010)  Hyperlipidemia  Hypokalemia  Chronica anemia and thrombocytopenia, mild  Hypothyroidism due to Hashimoto thyroiditis     Follow-ups Needed After Discharge   Follow-up Appointments     Follow-up and recommended labs and tests       Follow up with Dr. Davis and Cardiology as scheduled          Discharge Disposition   Discharged to home  Condition at discharge: Stable    Hospital Course   Ramiro Jeffers is a 62 year old male with hx of CAD s/p PCI, CHF, paroxysmal a-fib and prior PE on chronic AC with apixaban, and hypothyroidism who was admitted on 7/16/2023 for acute CHF exacerbation.    Of note, Mr. Jeffers is followed by [facility]iology for CAD, having had DORY to the proximal LAD in 2018. LVEF at that time reportedly was 40-45%. He has had subsequent angiogram testing in 2019 and again in 2019 that reportedly had shown no clinically significant progression of CAD. Most recent TTE in 7/2022 showed preserved LVEF. He also has chronic paroxysmal atrial fibrillation, seen on event monitor testing in 2022, and takes Eliquis for stroke prevention. Prior testing for exertional dyspnea has included PFT tests in 11/2022 which were normal    Acute on chronic HFpEF  Question hx of paroxysmal atrial fibrillation  CAD s/p PCI  Essential hypertension  Hyperlipidemia  History of bilateral unprovoked PE (2010)  * Home meds: apixaban 5 mg BID, HCTZ 25 mg daily, Imdur 60 mg daily, metoprolol ER 75 mg daily, rosuvastatin 40 mg daily  * Presented with 2-week history of progressive anasarca, 30 lb weight gain, and dyspnea with orthopnea. In the ED, VSS on room air. NTpBNP elevated >1500. EKG showed sinus  rhythm with PVCs. Troponin negative. TSH wnl  * Received Lasix 60 mg IV in the ED with significant response. Was continued on Lasix 40 mg IV BID and is net neg 11L with close to 30 lb weight loss  * TTE this admission was largely unremarkable; preserved EF 55%, normal RV, no significant valvular disease. Diastolic function was indeterminate  * No proteinuria noted this admission. No evidence of decompensated liver disease  - He will be discharged on torsemide 20 mg daily and follow up with his PCP as well as Cardiology after discharge  - PTA apixaban discontinued per Cardiology this admission due to lack of a-fib seen on prior event monitor. Only PACs and atrial run noted. Prior PE was in 2010, >10 years ago. He will be discharged on ASA 81 mg daily     Hypokalemia, Resolved  Due to diuresis. K was replaced per protocol     Chronic anemia and thrombocytopenia, mild  Hgb mid 12 range this admission, platelets 130s-140s. Peripheral smear unrevealing    Hypothyroidism due to Hashimoto thyroiditis   Chronic and stable on levothyroxine. TSH this admission normal    Consultations This Hospital Stay    CARDIOLOGY IP CONSULT  CORE CLINIC EVALUATION IP CONSULT  OCCUPATIONAL THERAPY ADULT IP CONSULT  NUTRITION SERVICES ADULT IP CONSULT  CARE MANAGEMENT / SOCIAL WORK IP CONSULT    Code Status   Prior    Time Spent on this Encounter   I, Kaylen Culver MD, personally saw the patient today and spent 35 minutes discharging this patient.       Kaylen Culver MD  [facility] HEART [address], [address]  [facility] [address]  Phone: [phone]  ______________________________________________________________________    Physical Exam   Vital Signs: Temp: 97.5  F (36.4  C) Temp src: Oral BP: 115/61 Pulse: 56   Resp: 18 SpO2: 97 % O2 Device: None (Room air)    Weight: 179 lbs 0 oz    Constitutional: Resting comfortably, NAD  HEENT: Sclera white, conjunctiva clear, EOMI, MMM  Respiratory:  Breathing non-labored. Lungs CTAB - no wheezes/crackles/rhonchi  Cardiovascular: Heart RRR, no m/r/g. Minimal pedal edema.   GI: +BS. Abd soft/NT  Lymph/Hematologic: No cervical LAD  Genitourinary: Not examined  Skin: Warm and dry. No rash.  Musculoskeletal: Normal muscle bulk and tone  Neurologic: Alert and appropriate. GARCIAS  Psychiatric: Calm and cooperative    Primary Care Physician   Addison Davis    Discharge Orders      Basic metabolic panel     Follow-Up with Cardiology MELECIO      Reason for your hospital stay    Fluid retention due to exacerbation of congestive heart failure     Follow-up and recommended labs and tests     Follow up with Dr. Davis and Cardiology as scheduled     Activity    Your activity upon discharge: activity as tolerated     Diet    Follow this diet upon discharge: 2 gram sodium diet       Significant Results and Procedures   Most Recent 3 CBC's:Recent Labs   Lab Test 07/18/23  0539 07/17/23  0235 07/17/23  0053 07/16/23  1946   WBC 5.8 6.8  --  9.1   HGB 12.7* 12.8* 13.9 11.9*   MCV 93 92  --  92   * 141*  --  146*     Most Recent 3 BMP's:Recent Labs   Lab Test 07/19/23  0613 07/18/23  0539 07/17/23  1831 07/17/23  1227 07/17/23  0235    141  --   --  144   POTASSIUM 3.9 3.9 3.8   < > 3.3*   CHLORIDE 99 100  --   --  102   CO2 29 30*  --   --  28   BUN 20.7 17.9  --   --  13.1   CR 0.75 0.82  --   --  0.89   ANIONGAP 11 11  --   --  14   GUERITA 8.9 8.9  --   --  8.9   GLC 84 87  --   --  103*    < > = values in this interval not displayed.   ,   Results for orders placed or performed during the hospital encounter of 07/16/23   XR Chest 2 Views    Narrative    EXAM: XR CHEST 2 VIEWS  LOCATION: [facility]  DATE: 7/16/2023    INDICATION: dyspnea  COMPARISON: None.      Impression    IMPRESSION: Heart size and pulmonary vascularity normal. The lungs are clear.   US Lower Extremity Venous Duplex Bilateral    Narrative    EXAM: US LOWER EXTREMITY  VENOUS DUPLEX BILATERAL  LOCATION: [facility]  DATE: 2023    INDICATION: Bilateral leg swelling.  COMPARISON: None.  TECHNIQUE: Venous Duplex ultrasound of bilateral lower extremities with and without compression, augmentation and duplex. Color flow and spectral Doppler with waveform analysis performed.    FINDINGS: Exam includes the common femoral, femoral, popliteal veins as well as segmentally visualized deep calf veins and greater saphenous vein.     RIGHT: No deep vein thrombosis. No superficial thrombophlebitis. No popliteal cyst.    LEFT: No deep vein thrombosis. No superficial thrombophlebitis. No popliteal cyst.      Impression    IMPRESSION:  1.  No deep venous thrombosis in the bilateral lower extremities.   Echocardiogram Complete     Value    Biplane LVEF 55%    Narrative    [id number]  [id number]  [id number]  [id number]^DANII^JAYANT^LINNEA     [facility]  Echocardiography Laboratory  [address] [place] [address]     Name: TASIA ANDREA  MRN: [id number]  : 1960  Study Date: 2023 07:59 AM  Age: 62 yrs  Gender: Male  Patient Location: [facility]  Reason For Study: Heart Failure  Ordering Physician: JAYANT FOY  Referring Physician: MAGED SEVILLA  Performed By: Melinda Ashby     BSA: 2.1 m2  Height: 69 in  Weight: 212 lb  HR: 55  BP: 125/76 mmHg  ______________________________________________________________________________  Procedure  Complete Echo Adult.  ______________________________________________________________________________  Interpretation Summary     Left ventricular systolic function is normal.  Biplane LVEF is 55%.  No regional wall motion abnormalities noted.  No significant change compared to prior study from  The study was  technically adequate.  ______________________________________________________________________________  Left Ventricle  The left ventricle is normal in size. There is normal left  ventricular wall  thickness. Left ventricular systolic function is normal. Left ventricular  diastolic function is indeterminate. Biplane LVEF is 55%. No regional wall  motion abnormalities noted.     Right Ventricle  The right ventricle is normal size. The right ventricular systolic function is  normal.     Atria  Normal left atrial size. Right atrial size is normal.     Mitral Valve  There is trace mitral regurgitation.     Tricuspid Valve  There is trace tricuspid regurgitation. Right ventricular systolic pressure  could not be approximated due to inadequate tricuspid regurgitation. IVC  diameter >2.1 cm collapsing <50% with sniff suggests a high RA pressure  estimated at 15 mmHg or greater. IVC diameter and respiratory changes fall  into an intermediate range suggesting an RA pressure of 8 mmHg.     Aortic Valve  The aortic valve is trileaflet. No aortic regurgitation is present. No aortic  stenosis is present.     Pulmonic Valve  The pulmonic valve is not well visualized.     Vessels  The aortic root is normal size.     Pericardium  There is no pericardial effusion.     Rhythm  The rhythm was sinus bradycardia.  ______________________________________________________________________________  MMode/2D Measurements & Calculations  IVSd: 0.81 cm     LVIDd: 5.2 cm  LVIDs: 3.6 cm  LVPWd: 0.92 cm  FS: 32.1 %  LV mass(C)d: 162.5 grams  LV mass(C)dI: 76.7 grams/m2  Ao root diam: 2.9 cm  asc Aorta Diam: 3.4 cm  LVOT diam: 1.9 cm  LVOT area: 2.8 cm2  LA Volume (BP): 33.7 ml  LA Volume Index (BP): 15.9 ml/m2  RWT: 0.35     Doppler Measurements & Calculations  MV E max tara: 72.0 cm/sec  MV A max tara: 54.0 cm/sec  MV E/A: 1.3  MV dec time: 0.25 sec  Ao V2 max: 129.0 cm/sec  Ao max P.0 mmHg  Ao V2 mean: 80.7 cm/sec  Ao mean PG: 3.0 mmHg  Ao V2 VTI: 28.0 cm  FER(I,D): 2.5 cm2  FER(V,D): 2.4 cm2  LV V1 max P.6 mmHg  LV V1 max: 107.0 cm/sec  LV V1 VTI: 24.2 cm  SV(LVOT): 68.6 ml  SI(LVOT): 32.4 ml/m2  PA V2 max: 105.0  cm/sec  PA max P.4 mmHg  PA acc time: 0.12 sec  AV Nacho Ratio (DI): 0.83  FER Index (cm2/m2): 1.2  E/E' avg: 10.8  Lateral E/e': 10.5  Medial E/e': 11.1  RV S Nacho: 13.1 cm/sec     ______________________________________________________________________________  Report approved by: Morgan Winkler 2023 11:54 AM               Discharge Medications   Discharge Medication List as of 2023  8:37 AM      START taking these medications    Details   aspirin (ASA) 81 MG chewable tablet Take 1 tablet (81 mg) by mouth daily, Disp-30 tablet, R-1, E-Prescribe      torsemide (DEMADEX) 20 MG tablet Take 1 tablet (20 mg) by mouth daily, Disp-30 tablet, R-1, E-Prescribe         CONTINUE these medications which have CHANGED    Details   hydrochlorothiazide (HYDRODIURIL) 25 MG tablet Take 0.5 tablets (12.5 mg) by mouth every morning, Disp-90 tablet, R-3, No Print Out         CONTINUE these medications which have NOT CHANGED    Details   ezetimibe (ZETIA) 10 MG tablet TAKE 1 TABLET(10 MG) BY MOUTH DAILY, Disp-90 tablet, R-0, E-Prescribe**Patient requests 90 days supply**      isosorbide mononitrate (IMDUR) 30 MG 24 hr tablet Take 2 tablets (60 mg) by mouth daily, Disp-180 tablet, R-3, E-Prescribe      !! levothyroxine (SYNTHROID/LEVOTHROID) 137 MCG tablet Take 137 mcg every other day alternating with 150 mcg PO alternating every other day., Disp-45 tablet, R-3, E-Prescribe      !! levothyroxine (SYNTHROID/LEVOTHROID) 150 MCG tablet Take 137 mcg every other day alternating with 150 mcg PO alternating every other day., Disp-45 tablet, R-3, E-Prescribe      metoprolol succinate ER (TOPROL XL) 50 MG 24 hr tablet TAKE 1 AND 1/2 TABLETS(75 MG) BY MOUTH DAILY, Disp-135 tablet, R-3, E-Prescribe      nitroGLYcerin (NITROSTAT) 0.4 MG sublingual tablet For chest pain place 1 tablet under the tongue every 5 minutes for 3 doses. If symptoms persist 5 minutes after 1st dose call 911., Disp-25 tablet, R-3, E-PrescribeKeep on file until  pt calls for refills.      rosuvastatin (CRESTOR) 40 MG tablet TAKE 1 TABLET(40 MG) BY MOUTH DAILY, Disp-90 tablet, R-3, E-Prescribe       !! - Potential duplicate medications found. Please discuss with provider.        Allergies   Allergies   Allergen Reactions     No Known Allergies

## 2023-07-19 NOTE — PROGRESS NOTES
Care Management Discharge Note    Discharge Date: 07/19/2023       Discharge Disposition:      Discharge Services:      Discharge DME:      Discharge Transportation: car, drives self    Private pay costs discussed: Not applicable    Does the patient's insurance plan have a 3 day qualifying hospital stay waiver?  Yes   Will the waiver be used for post-acute placement? No    PAS Confirmation Code:    Patient/family educated on Medicare website which has current facility and service quality ratings:      Education Provided on the Discharge Plan:  Heart Failure  Persons Notified of Discharge Plans: bedside RN  Patient/Family in Agreement with the Plan:  yes    Handoff Referral Completed: No    Additional Information:  Pt to discharge home with close follow up with Dr. Davis and Rom Acevedo RN  Care Coordinator  [facility]  [phone] (text or call)

## 2023-07-19 NOTE — PLAN OF CARE
Discharge instructions & safety reviewed, all questions answered. Patient will  new medications at home pharmacy, reviewed med regimen. Patient discharged home with all belongings.

## 2023-07-20 ENCOUNTER — OFFICE VISIT (OUTPATIENT)
Dept: OTHER | Facility: CLINIC | Age: 63
DRG: 292 | End: 2023-07-20
Attending: INTERNAL MEDICINE
Payer: COMMERCIAL

## 2023-07-20 ENCOUNTER — TELEPHONE (OUTPATIENT)
Dept: CARDIOLOGY | Facility: CLINIC | Age: 63
End: 2023-07-20

## 2023-07-20 ENCOUNTER — PATIENT OUTREACH (OUTPATIENT)
Dept: CARE COORDINATION | Facility: CLINIC | Age: 63
End: 2023-07-20

## 2023-07-20 VITALS
OXYGEN SATURATION: 95 % | WEIGHT: 178.2 LBS | DIASTOLIC BLOOD PRESSURE: 60 MMHG | HEIGHT: 69 IN | HEART RATE: 69 BPM | SYSTOLIC BLOOD PRESSURE: 92 MMHG | BODY MASS INDEX: 26.39 KG/M2

## 2023-07-20 DIAGNOSIS — I50.9 CONGESTIVE HEART FAILURE, UNSPECIFIED HF CHRONICITY, UNSPECIFIED HEART FAILURE TYPE (H): ICD-10-CM

## 2023-07-20 DIAGNOSIS — I50.9 CHF (CONGESTIVE HEART FAILURE) (H): Primary | ICD-10-CM

## 2023-07-20 DIAGNOSIS — I50.30 HEART FAILURE WITH PRESERVED EJECTION FRACTION, NYHA CLASS I (H): Primary | ICD-10-CM

## 2023-07-20 PROCEDURE — 99214 OFFICE O/P EST MOD 30 MIN: CPT | Performed by: INTERNAL MEDICINE

## 2023-07-20 PROCEDURE — G0463 HOSPITAL OUTPT CLINIC VISIT: HCPCS

## 2023-07-20 RX ORDER — TORSEMIDE 20 MG/1
20 TABLET ORAL DAILY
Qty: 90 TABLET | Refills: 3 | Status: SHIPPED | OUTPATIENT
Start: 2023-07-20 | End: 2023-11-09

## 2023-07-20 NOTE — PROGRESS NOTES
"Patient is here to discuss follow up    BP 92/60 (BP Location: Right arm, Patient Position: Chair, Cuff Size: Adult Regular)   Pulse 69   Ht 5' 9\" (1.753 m)   Wt 178 lb 3.2 oz (80.8 kg)   SpO2 95%   BMI 26.32 kg/m      Questions patient would like addressed today are: N/A.    Refills are needed: No    Has homecare services and agency name:  Jessy MCCLELLAN    "

## 2023-07-20 NOTE — PROGRESS NOTES
[facility]    Background: Transitional Care Management program identified per system criteria and reviewed by [facility] team for possible outreach.    Assessment: Upon chart review, CCR Team member will not proceed with patient outreach related to this episode of Transitional Care Management program due to reason below:    Patient has active communication with a nurse, provider or care team for reason of post-hospital follow up plan.  Outreach call by CCR team not indicated to minimize duplicative efforts.     Plan: Transitional Care Management episode addressed appropriately per reason noted above.      Stephanie Ruiz RN  [facility] [place], [facility]    *Connected Care Resource Team does NOT follow patient ongoing. Referrals are identified based on internal discharge reports and the outreach is to ensure patient has an understanding of their discharge instructions.

## 2023-07-20 NOTE — TELEPHONE ENCOUNTER
Gabriela Redding, CHARO CNP     AR  Yes, please order Lisinopril 5 mg daily.   patient to check his BP at home.  Hold Lisinopril for systolic readings less than 100 and call clinic for further guidance.     Gabriela         18-Jul-2021 17:44

## 2023-07-20 NOTE — TELEPHONE ENCOUNTER
"Patient was admitted to [facility] on 7/16/23 with 2-week history of progressive anasarca, 30 lb weight gain, and dyspnea with orthopnea. Acute CHF exacerbation.    PMH: CAD s/p PCI, CHF, paroxysmal a-fib and prior PE on chronic AC with Apixaban, HLD, HTN, PVC's, hx of Rouen-Y gastric bypass in 2010 with subsequent sepsis and prolonged hospitalization requiring post discharge ARU, and hypothyroidism.     7/18/23: Echo showed EF of 55%. No regional wall motion abnormalities noted. Etiology of cardiomyopathy is unclear at this time it is unclear as EF is stable.    Per Dr. Mtz's IP note, \"Question of atrial fibrillation on event monitor from last August, I reviewed the strips and also reviewed with Dr. Avendaño over read the event monitor. According to his report, there was no A-fib and now he reviewed the tracings again and tells me that only PAC's and atrial run.\" Eliquis stopped and pt started on ASA.    IV Lasix diuresed 30 lbs.    Pt was started on ASA, Demadex and PTA hydrochlorothiazide dosage was decreased at time of discharge.     Per MELECIO Gabriela Redding's note, pt also to be started on Lisinopril 5 mg po daily for LV support-not ordered at time of discharge. Will route this note to her for further clarification. Will then call pt.    Pt is scheduled for labs on 8/4/23 at 1415, and an OV on 8/24/23 at 1220 with MELECIO Ritu Altamirano, both at our [facility]. ANGELA Steinberg RN.      "

## 2023-07-20 NOTE — PROGRESS NOTES
Ramiro Jeffers is a 62 year old male who is presenting at the current time to discuss his diagnosi(es) of       Heart failure with preserved ejection fraction, NYHA class I (H)    HPI: Ramiro Jeffers is a 62 year old formerly morbidly obese male (current BMI 26.32)  With h/o single lifetime VTE presenting s PE with mislabel of PAF on event monitor causding prolonged AC who recently stopped AC at cardiology's direction during his recent hospitalization for HFpEF. He presented with a 30# weight gain and ansarca. He wa diuresed off. This was not felt to be ischemic in nature. He had Demadex added. He was discharged 7/19/23. He is doing much better. He is not SOB. NO CP, no ALAS.      Review Of Systems  Skin: negative  Eyes: negative  Ears/Nose/Throat: negative  Respiratory: No shortness of breath, dyspnea on exertion, cough, or hemoptysis  Cardiovascular: negative  Gastrointestinal: negative  Genitourinary: negative  Musculoskeletal: negative  Neurologic: negative  Psychiatric: negative  Hematologic/Lymphatic/Immunologic: negative  Endocrine: negative    PAST MEDICAL HISTORY:                  Past Medical History:   Diagnosis Date     ACQUIRED HYPOTHYROIDISM       Blood clot in the legs      CAD (coronary artery disease)     11/15/18 Cath: PCI with 3.5-16mm DORY to proximal LAD     Calculus of gallbladder without mention of cholecystitis or obstruction      Cataract      Chest pressure 11/5/2018     Hyperlipidemia      Hypertension      Ischemic cardiomyopathy     EF 40-45% on 11/2018 Echo     Obesity, unspecified     significant weight loss w/diet alone, on 10-15-10, BMI was 56.4     pulmonary emboli 6-2010    Unprovoked large bilateral pulmonary emboli without source identified on lower extremity dopplers, pt had a transient moderate lupus inhibitor upon initial presentation in June , 2010 which was gone in September, 2010;  all other thrombophilic workup is normal     Pulmonary embolus, bilateral      PVC's (premature  ventricular contractions)     4% burden on 11/2018 Holter     Shortness of breath     on exertion     Venous insufficiency      Viral pneumonia, unspecified      Vitamin D deficiency        PAST SURGICAL HISTORY:                  Past Surgical History:   Procedure Laterality Date     COLONOSCOPY  11/17/2011    Procedure:COLONOSCOPY; colonoscopy; Surgeon:ELENA OROURKE; Location: GI     CV HEART CATHETERIZATION WITH POSSIBLE INTERVENTION Left 2/15/2019    Procedure: Coronary Angiogram;  Surgeon: Tulio Ortiz MD;  Location:  HEART CARDIAC CATH LAB     CV HEART CATHETERIZATION WITH POSSIBLE INTERVENTION N/A 10/5/2021    Procedure: Heart Catheterization with Possible Intervention;  Surgeon: Jose Francisco Dave MD;  Location:  HEART CARDIAC CATH LAB     CV LEFT HEART CATH N/A 10/5/2021    Procedure: Left Heart Cath;  Surgeon: Jose Francisco Dave MD;  Location:  HEART CARDIAC CATH LAB     CV LEFT VENTRICULOGRAM N/A 10/5/2021    Procedure: Left Ventriculogram;  Surgeon: Jose Francisco Dave MD;  Location:  HEART CARDIAC CATH LAB     LAPAROSCOPIC CHOLECYSTECTOMY  9/28/2012    Procedure: LAPAROSCOPIC CHOLECYSTECTOMY;  LAPAROSCOPIC CHOLECYSTECTOMY, LYSIS OF ADHESIONS;  Surgeon: Dutch Matta MD;  Location:  OR     LAPAROSCOPIC LYSIS ADHESIONS  9/28/2012    Procedure: LAPAROSCOPIC LYSIS ADHESIONS;;  Surgeon: Dutch Matta MD;  Location:  OR     LAPAROSCOPY, SURGICAL; REPAIR INCISIONAL OR VENTRAL HERNIA      repair of ventral strangulated surgery     PHACOEMULSIFICATION CLEAR CORNEA WITH STANDARD INTRAOCULAR LENS IMPLANT  12/19/2011    Procedure:PHACOEMULSIFICATION CLEAR CORNEA WITH STANDARD INTRAOCULAR LENS IMPLANT; RIGHT PHACOEMULSIFICATION CLEAR CORNEA WITH STANDARD INTRAOCULAR LENS IMPLANT ; Surgeon:ALLISON ANTONIO; Location:[facility] NONSPECIFIC PROCEDURE  10-    Laparoscopic gastric bypass,     [facility] NONSPECIFIC PROCEDURE  10-    1.  Attempted laparoscopic  exploration with conversion to: .  Abdominal exploration. 3.  Reduction of incarcerated incisional hernia. 4.  Repair of incisional hernia.5.  Gastrostomy tube placement (#22 Taj[place]) into defunctionalized stomach.6.  Enterotomy with bowel decompression and primary repair. 7.  Abdominal lavage.        CURRENT MEDICATIONS:                  Current Outpatient Medications   Medication Sig Dispense Refill     aspirin (ASA) 81 MG chewable tablet Take 1 tablet (81 mg) by mouth daily 30 tablet 1     ezetimibe (ZETIA) 10 MG tablet TAKE 1 TABLET(10 MG) BY MOUTH DAILY 90 tablet 0     hydrochlorothiazide (HYDRODIURIL) 25 MG tablet Take 0.5 tablets (12.5 mg) by mouth every morning 90 tablet 3     isosorbide mononitrate (IMDUR) 30 MG 24 hr tablet Take 2 tablets (60 mg) by mouth daily 180 tablet 3     levothyroxine (SYNTHROID/LEVOTHROID) 137 MCG tablet Take 137 mcg every other day alternating with 150 mcg PO alternating every other day. 45 tablet 3     levothyroxine (SYNTHROID/LEVOTHROID) 150 MCG tablet Take 137 mcg every other day alternating with 150 mcg PO alternating every other day. 45 tablet 3     metoprolol succinate ER (TOPROL XL) 50 MG 24 hr tablet TAKE 1 AND 1/2 TABLETS(75 MG) BY MOUTH DAILY 135 tablet 3     nitroGLYcerin (NITROSTAT) 0.4 MG sublingual tablet For chest pain place 1 tablet under the tongue every 5 minutes for 3 doses. If symptoms persist 5 minutes after 1st dose call 911. 25 tablet 3     rosuvastatin (CRESTOR) 40 MG tablet TAKE 1 TABLET(40 MG) BY MOUTH DAILY 90 tablet 3     torsemide (DEMADEX) 20 MG tablet Take 1 tablet (20 mg) by mouth daily 30 tablet 1       ALLERGIES:                  Allergies   Allergen Reactions     No Known Allergies        SOCIAL HISTORY:                  Social History     Socioeconomic History     Marital status: Single     Spouse name: Not on file     Number of children: Not on file     Years of education: Not on file     Highest education level: Not on file   Occupational  History     Occupation:      Employer: SELF   Tobacco Use     Smoking status: Never     Smokeless tobacco: Never   Substance and Sexual Activity     Alcohol use: No     Alcohol/week: 0.0 standard drinks of alcohol     Drug use: No     Sexual activity: Not on file   Other Topics Concern     Parent/sibling w/ CABG, MI or angioplasty before 65F 55M? Not Asked   Social History Narrative     Not on file     Social Determinants of Health     Financial Resource Strain: Not on file   Food Insecurity: Not on file   Transportation Needs: Not on file   Physical Activity: Not on file   Stress: Not on file   Social Connections: Not on file   Intimate Partner Violence: Not on file   Housing Stability: Not on file       FAMILY HISTORY:                   Family History   Problem Relation Age of Onset     Osteoporosis Mother      Thyroid Disease Mother         no thyroid     Cancer Maternal Grandmother         ? lung cancer     Cancer Father         melanoma     Coronary Artery Disease Brother      Coronary Artery Disease Brother          Physical exam Reveals:    O/P: WNL  HEENT: WNL  NECK: No JVD, thyromegaly, or lymphadenopathy  HEART: RRR, no murmurs, gallops, or rubs  LUNGS: CTA bilaterally without rales, wheezes, or rhonchi  GI: NABS, nondistended, nontender, soft  EXT:without cyanosis, clubbing, or edema  NEURO: nonfocal  : no flank tenderness            A/P:        (I50.9) Congestive heart failure, unspecified HF chronicity, unspecified heart failure type (H)  Comment: doing well. Weigh daily. Call id wt increases 2 # day to day or 5# over a week.  Plan: torsemide (DEMADEX) 20 MG tablet            RTC 9/23 for labs, see me two weeks later.     35 minutes total medical care on today's date.

## 2023-07-20 NOTE — TELEPHONE ENCOUNTER
Writer attempted to call pt for a cardiology post discharge phone call, but no answer. VM left to return my call. ANGELA Steinberg RN.

## 2023-07-21 RX ORDER — LISINOPRIL 5 MG/1
5 TABLET ORAL DAILY
Qty: 30 TABLET | Refills: 11 | Status: SHIPPED | OUTPATIENT
Start: 2023-07-21 | End: 2023-07-25

## 2023-07-21 NOTE — TELEPHONE ENCOUNTER
Second attempt at calling pt today.    Called patient to discuss any post hospital d/c questions, review discharge medication, and confirm f/u appts. Patient denied any questions regarding new or changes to PTA medications. Clarified with pt that he should be taking Hydrochlorothiazide 12.5 mg daily (one half of his 25 mg tablet). Pt was unaware and has been taking one tablet daily since discharge. States he will start taking one half table. Instructed pt that Lisinopril 5 mg po daily will be started as well. Instructed to keep BP diary, and to check BP prior to taking Lisinopril, and 2 hrs after taking morning medications. Instructed if SBP is less than 100 prior to taking Lisinopril, hold this dose and call the cardiology clinic for further guidance. Pt verbalized understanding and Rx escribed to his [facility].    Patient denied any SOB, chest pain, any increased edema, or light headedness. States he has been voiding in large amounts.    RN confirmed with patient that he is scheduled for labs on 8/4/23 at 1415, and an OV on 8/24/23 at 1220 with MELECIO Ritu Altamirano, both at our [facility].  Dr. Key's Team RN phone number provided.    Instructed patient to weigh self every AM, after waking and using the restroom, but before breakfast and medications. Call clinic for a weight gain of 2 lbs overnight or 5 lbs in a week. Low Na+ diet encouraged. Pt instructed to bring daily wt/BP diary and medications with to f/u OV.     Patient advised to call clinic with any cardiac related questions or concerns prior to his appt. Patient verbalized understanding and agreed with plan. ANGELA Steinberg RN.

## 2023-07-23 DIAGNOSIS — I10 ESSENTIAL HYPERTENSION: ICD-10-CM

## 2023-07-23 DIAGNOSIS — E78.5 HYPERLIPIDEMIA LDL GOAL <70: ICD-10-CM

## 2023-07-24 RX ORDER — HYDROCHLOROTHIAZIDE 25 MG/1
TABLET ORAL
Qty: 90 TABLET | Refills: 3 | OUTPATIENT
Start: 2023-07-24

## 2023-07-24 RX ORDER — ROSUVASTATIN CALCIUM 40 MG/1
TABLET, COATED ORAL
Qty: 90 TABLET | Refills: 3 | Status: SHIPPED | OUTPATIENT
Start: 2023-07-24 | End: 2023-11-09

## 2023-07-24 NOTE — TELEPHONE ENCOUNTER
hydrochlorothiazide (HYDRODIURIL) 25 MG tablet     Last Written Prescription Date:  7/18/23  Last Fill Quantity: 90,  # refills: 3    Ordered by Kaylen Culver MD.  (Cardiology)    Refill request denied. Defer to cardiology.      rosuvastatin (CRESTOR) 40 MG tablet     .Last Written Prescription Date:  8/15/22  Last Fill Quantity: 90,  # refills: 3    Last visit with provider:  7/20/23  Follow up recommended:  September 2023    Prescription approved per [facility] Refill Protocol.

## 2023-07-25 DIAGNOSIS — I50.9 CHF (CONGESTIVE HEART FAILURE) (H): ICD-10-CM

## 2023-07-25 RX ORDER — LISINOPRIL 5 MG/1
5 TABLET ORAL DAILY
Qty: 90 TABLET | Refills: 3 | Status: SHIPPED | OUTPATIENT
Start: 2023-07-25 | End: 2023-11-09

## 2023-08-05 ENCOUNTER — LAB (OUTPATIENT)
Dept: LAB | Facility: CLINIC | Age: 63
End: 2023-08-05
Payer: COMMERCIAL

## 2023-08-05 DIAGNOSIS — E87.6 HYPOKALEMIA: ICD-10-CM

## 2023-08-05 LAB
ANION GAP SERPL CALCULATED.3IONS-SCNC: 11 MMOL/L (ref 7–15)
BUN SERPL-MCNC: 16 MG/DL (ref 8–23)
CALCIUM SERPL-MCNC: 9.7 MG/DL (ref 8.8–10.2)
CHLORIDE SERPL-SCNC: 99 MMOL/L (ref 98–107)
CREAT SERPL-MCNC: 0.8 MG/DL (ref 0.67–1.17)
DEPRECATED HCO3 PLAS-SCNC: 31 MMOL/L (ref 22–29)
GFR SERPL CREATININE-BSD FRML MDRD: >90 ML/MIN/1.73M2
GLUCOSE SERPL-MCNC: 105 MG/DL (ref 70–99)
POTASSIUM SERPL-SCNC: 3.5 MMOL/L (ref 3.4–5.3)
SODIUM SERPL-SCNC: 141 MMOL/L (ref 136–145)

## 2023-08-05 PROCEDURE — 80048 BASIC METABOLIC PNL TOTAL CA: CPT

## 2023-08-05 PROCEDURE — 36415 COLL VENOUS BLD VENIPUNCTURE: CPT

## 2023-08-22 DIAGNOSIS — I25.118 CORONARY ARTERY DISEASE OF NATIVE ARTERY OF NATIVE HEART WITH STABLE ANGINA PECTORIS (H): ICD-10-CM

## 2023-08-22 RX ORDER — METOPROLOL SUCCINATE 50 MG/1
TABLET, EXTENDED RELEASE ORAL
Qty: 135 TABLET | Refills: 3 | Status: SHIPPED | OUTPATIENT
Start: 2023-08-22 | End: 2023-11-09

## 2023-08-22 NOTE — TELEPHONE ENCOUNTER
"metoprolol succinate ER (TOPROL XL) 50 MG 24 hr tablet   Last Written Prescription Date:  11/14/22  Last Fill Quantity: 135,  # refills: 3    Last visit with provider:  7/20/23  Follow up recommended:  9/2023    Requested Prescriptions   Pending Prescriptions Disp Refills    metoprolol succinate ER (TOPROL XL) 50 MG 24 hr tablet [Pharmacy Med Name: METOPROLOL ER SUCCINATE 50MG TABS] 135 tablet 3     Sig: TAKE 1 AND 1/2 TABLETS(75 MG) BY MOUTH DAILY       Beta-Blockers Protocol Passed - 8/22/2023  5:39 AM        Passed - Blood pressure under 140/90 in past 12 months     BP Readings from Last 3 Encounters:   07/20/23 92/60   07/19/23 115/61   06/23/23 123/63                 Passed - Patient is age 6 or older        Passed - Recent (12 mo) or future (30 days) visit within the authorizing provider's specialty     Patient has had an office visit with the authorizing provider or a provider within the authorizing providers department within the previous 12 mos or has a future within next 30 days. See \"Patient Info\" tab in inbasket, or \"Choose Columns\" in Meds & Orders section of the refill encounter.              Passed - Medication is active on med list           Prescription approved per [facility] Refill Protocol.           "

## 2023-08-24 ENCOUNTER — TELEPHONE (OUTPATIENT)
Dept: CARDIOLOGY | Facility: CLINIC | Age: 63
End: 2023-08-24

## 2023-08-24 NOTE — TELEPHONE ENCOUNTER
Spoke with Pt he had started lisinopril and BMP came back stable on recheck. Pt confirmed he canceled because of work schedule. Pt said he would reschedule when he finds out what his upcoming work schedule will be. Pt says he is doing fine presently. CHRISTEN La RN

## 2023-08-25 NOTE — TELEPHONE ENCOUNTER
BMP lab completed after starting lisinopril 5 mg post hospital discharge per [facility] Ochoa.  Pt canceled 8/24/23 post hospital MELECIO OV and has not r/s an appt yet. Attempt to call pt for update on BP after starting new med, or check on any other symptoms since discharge. No answer and VM full. Sent deeplocalhart. Monica Cross RN on 8/25/2023 at 3:14 PM

## 2023-09-21 DIAGNOSIS — E78.5 HYPERLIPIDEMIA LDL GOAL <70: ICD-10-CM

## 2023-09-21 RX ORDER — EZETIMIBE 10 MG/1
TABLET ORAL
Qty: 90 TABLET | Refills: 0 | Status: SHIPPED | OUTPATIENT
Start: 2023-09-21 | End: 2023-11-09

## 2023-09-21 NOTE — TELEPHONE ENCOUNTER
ezetimibe (ZETIA) 10 MG tablet     Last Written Prescription Date:  6/21/23  Last Fill Quantity: 90,  # refills: 0  Last visit with provider:  7/20/2023  Follow up recommended:  9/2023           Unable to fill per [facility] protocol.  Medication and pharmacy loaded.    Antihyperlipidemic agents [id number]/21/2023 05:55 AM   Protocol Details Lipid panel on file in past 12 mos

## 2023-10-10 ENCOUNTER — TELEPHONE (OUTPATIENT)
Dept: OTHER | Facility: CLINIC | Age: 63
End: 2023-10-10
Payer: COMMERCIAL

## 2023-10-10 DIAGNOSIS — R53.83 FATIGUE: Primary | ICD-10-CM

## 2023-10-10 NOTE — TELEPHONE ENCOUNTER
LOV 7/20/2023  Labs scheduled 10/27/23  Follow up appointment 11/9/23.    Potassium will be drawn as part of CMP.  Magnesium lab ordered per Dr. Davis.    Informed patient of the above.    Cristal Sotomayor RN BSN  [facility]  [phone]

## 2023-10-10 NOTE — TELEPHONE ENCOUNTER
[facility]    Who is the name of the provider?:  Dr Davis    What is the location you see this provider at/preferred location?: [place]  Person calling / Facility: Ramiro Jeffers  Phone number:  [phone]  Nurse call back needed:  yes?     Reason for call:  Patient called to schedule fasting labs and follow-up with Dr Davis.    Patient is requesting to have labs drawn for potassium and magnesium levels as he states concerns with those.    Routing to RN for patient request.    Pharmacy location:  n/a  Outside Imaging: n/a   Can we leave a detailed message on this number?  N/a

## 2023-10-27 ENCOUNTER — LAB (OUTPATIENT)
Dept: LAB | Facility: CLINIC | Age: 63
End: 2023-10-27
Payer: COMMERCIAL

## 2023-10-27 DIAGNOSIS — D64.9 ANEMIA, UNSPECIFIED TYPE: ICD-10-CM

## 2023-10-27 DIAGNOSIS — R73.01 IFG (IMPAIRED FASTING GLUCOSE): ICD-10-CM

## 2023-10-27 DIAGNOSIS — R53.83 FATIGUE: ICD-10-CM

## 2023-10-27 DIAGNOSIS — E78.5 HYPERLIPIDEMIA LDL GOAL <70: ICD-10-CM

## 2023-10-27 DIAGNOSIS — E06.3 HYPOTHYROIDISM DUE TO HASHIMOTO'S THYROIDITIS: ICD-10-CM

## 2023-10-27 LAB
ALBUMIN SERPL BCG-MCNC: 4.2 G/DL (ref 3.5–5.2)
ALP SERPL-CCNC: 58 U/L (ref 40–129)
ALT SERPL W P-5'-P-CCNC: 20 U/L (ref 0–70)
ANION GAP SERPL CALCULATED.3IONS-SCNC: 9 MMOL/L (ref 7–15)
APO A-I SERPL-MCNC: 10 MG/DL
AST SERPL W P-5'-P-CCNC: 33 U/L (ref 0–45)
BASOPHILS # BLD AUTO: 0.1 10E3/UL (ref 0–0.2)
BASOPHILS NFR BLD AUTO: 1 %
BILIRUB SERPL-MCNC: 0.6 MG/DL
BUN SERPL-MCNC: 25.2 MG/DL (ref 8–23)
CALCIUM SERPL-MCNC: 9.1 MG/DL (ref 8.8–10.2)
CHLORIDE SERPL-SCNC: 94 MMOL/L (ref 98–107)
CREAT SERPL-MCNC: 0.76 MG/DL (ref 0.67–1.17)
CRP SERPL HS-MCNC: <0.15 MG/L
DEPRECATED HCO3 PLAS-SCNC: 31 MMOL/L (ref 22–29)
EGFRCR SERPLBLD CKD-EPI 2021: >90 ML/MIN/1.73M2
EOSINOPHIL # BLD AUTO: 0.2 10E3/UL (ref 0–0.7)
EOSINOPHIL NFR BLD AUTO: 3 %
ERYTHROCYTE [DISTWIDTH] IN BLOOD BY AUTOMATED COUNT: 14.2 % (ref 10–15)
FERRITIN SERPL-MCNC: 110 NG/ML (ref 31–409)
FOLATE SERPL-MCNC: 14.9 NG/ML (ref 4.6–34.8)
GLUCOSE SERPL-MCNC: 91 MG/DL (ref 70–99)
HBA1C MFR BLD: 5.5 % (ref 0–5.6)
HCT VFR BLD AUTO: 39.7 % (ref 40–53)
HGB BLD-MCNC: 12.8 G/DL (ref 13.3–17.7)
IMM GRANULOCYTES # BLD: 0 10E3/UL
IMM GRANULOCYTES NFR BLD: 0 %
IRON BINDING CAPACITY (ROCHE): 296 UG/DL (ref 240–430)
IRON SATN MFR SERPL: 33 % (ref 15–46)
IRON SERPL-MCNC: 98 UG/DL (ref 61–157)
LYMPHOCYTES # BLD AUTO: 2.2 10E3/UL (ref 0.8–5.3)
LYMPHOCYTES NFR BLD AUTO: 34 %
MAGNESIUM SERPL-MCNC: 2.3 MG/DL (ref 1.7–2.3)
MCH RBC QN AUTO: 29.5 PG (ref 26.5–33)
MCHC RBC AUTO-ENTMCNC: 32.2 G/DL (ref 31.5–36.5)
MCV RBC AUTO: 92 FL (ref 78–100)
MONOCYTES # BLD AUTO: 0.6 10E3/UL (ref 0–1.3)
MONOCYTES NFR BLD AUTO: 9 %
NEUTROPHILS # BLD AUTO: 3.4 10E3/UL (ref 1.6–8.3)
NEUTROPHILS NFR BLD AUTO: 53 %
PLATELET # BLD AUTO: 155 10E3/UL (ref 150–450)
POTASSIUM SERPL-SCNC: 3.5 MMOL/L (ref 3.4–5.3)
PROT SERPL-MCNC: 6.9 G/DL (ref 6.4–8.3)
RBC # BLD AUTO: 4.34 10E6/UL (ref 4.4–5.9)
RETICS # AUTO: 0.04 10E6/UL (ref 0.03–0.1)
RETICS/RBC NFR AUTO: 0.8 % (ref 0.5–2)
SODIUM SERPL-SCNC: 134 MMOL/L (ref 135–145)
T3FREE SERPL-MCNC: 2.3 PG/ML (ref 2–4.4)
T4 FREE SERPL-MCNC: 1.81 NG/DL (ref 0.9–1.7)
TRANSFERRIN SERPL-MCNC: 243 MG/DL (ref 200–360)
TSH SERPL DL<=0.005 MIU/L-ACNC: 0.83 UIU/ML (ref 0.3–4.2)
VIT B12 SERPL-MCNC: 538 PG/ML (ref 232–1245)
WBC # BLD AUTO: 6.5 10E3/UL (ref 4–11)

## 2023-10-27 PROCEDURE — 83695 ASSAY OF LIPOPROTEIN(A): CPT

## 2023-10-27 PROCEDURE — 83704 LIPOPROTEIN BLD QUAN PART: CPT | Mod: 90

## 2023-10-27 PROCEDURE — 84443 ASSAY THYROID STIM HORMONE: CPT

## 2023-10-27 PROCEDURE — 84439 ASSAY OF FREE THYROXINE: CPT

## 2023-10-27 PROCEDURE — 83735 ASSAY OF MAGNESIUM: CPT

## 2023-10-27 PROCEDURE — 82746 ASSAY OF FOLIC ACID SERUM: CPT

## 2023-10-27 PROCEDURE — 85045 AUTOMATED RETICULOCYTE COUNT: CPT

## 2023-10-27 PROCEDURE — 36415 COLL VENOUS BLD VENIPUNCTURE: CPT

## 2023-10-27 PROCEDURE — 99000 SPECIMEN HANDLING OFFICE-LAB: CPT

## 2023-10-27 PROCEDURE — 86141 C-REACTIVE PROTEIN HS: CPT

## 2023-10-27 PROCEDURE — 83550 IRON BINDING TEST: CPT

## 2023-10-27 PROCEDURE — 84481 FREE ASSAY (FT-3): CPT

## 2023-10-27 PROCEDURE — 83036 HEMOGLOBIN GLYCOSYLATED A1C: CPT

## 2023-10-27 PROCEDURE — 82607 VITAMIN B-12: CPT

## 2023-10-27 PROCEDURE — 80053 COMPREHEN METABOLIC PANEL: CPT

## 2023-10-27 PROCEDURE — 82728 ASSAY OF FERRITIN: CPT

## 2023-10-27 PROCEDURE — 83540 ASSAY OF IRON: CPT

## 2023-10-27 PROCEDURE — 80061 LIPID PANEL: CPT | Mod: 90

## 2023-10-27 PROCEDURE — 85025 COMPLETE CBC W/AUTO DIFF WBC: CPT

## 2023-10-30 LAB
CHOLEST SERPL-MCNC: 127 MG/DL
HDL SERPL QN: 9.3 NM
HDL SERPL-SCNC: 32.6 UMOL/L
HDLC SERPL-MCNC: 50 MG/DL
HLD.LARGE SERPL-SCNC: 7.1 UMOL/L
LDL SERPL QN: 20.8 NM
LDL SERPL-SCNC: 829 NMOL/L
LDL SMALL SERPL-SCNC: 473 NMOL/L
LDLC SERPL CALC-MCNC: 67 MG/DL
PATHOLOGY STUDY: ABNORMAL
TRIGL SERPL-MCNC: 52 MG/DL
VLDL LARGE SERPL-SCNC: <1.5 NMOL/L
VLDL SERPL QN: 49.4 NM

## 2023-11-09 ENCOUNTER — OFFICE VISIT (OUTPATIENT)
Dept: OTHER | Facility: CLINIC | Age: 63
End: 2023-11-09
Attending: INTERNAL MEDICINE
Payer: COMMERCIAL

## 2023-11-09 VITALS — HEART RATE: 64 BPM | OXYGEN SATURATION: 95 % | DIASTOLIC BLOOD PRESSURE: 57 MMHG | SYSTOLIC BLOOD PRESSURE: 89 MMHG

## 2023-11-09 DIAGNOSIS — E06.3 HYPOTHYROIDISM DUE TO HASHIMOTO'S THYROIDITIS: ICD-10-CM

## 2023-11-09 DIAGNOSIS — E78.5 HYPERLIPIDEMIA LDL GOAL <70: ICD-10-CM

## 2023-11-09 DIAGNOSIS — I10 ESSENTIAL HYPERTENSION: ICD-10-CM

## 2023-11-09 DIAGNOSIS — I50.30 HEART FAILURE WITH PRESERVED EJECTION FRACTION, NYHA CLASS I (H): ICD-10-CM

## 2023-11-09 DIAGNOSIS — I50.9 CONGESTIVE HEART FAILURE, UNSPECIFIED HF CHRONICITY, UNSPECIFIED HEART FAILURE TYPE (H): ICD-10-CM

## 2023-11-09 DIAGNOSIS — I25.118 CORONARY ARTERY DISEASE OF NATIVE ARTERY OF NATIVE HEART WITH STABLE ANGINA PECTORIS (H): ICD-10-CM

## 2023-11-09 DIAGNOSIS — D64.9 ANEMIA, UNSPECIFIED TYPE: ICD-10-CM

## 2023-11-09 DIAGNOSIS — R73.01 IFG (IMPAIRED FASTING GLUCOSE): Primary | ICD-10-CM

## 2023-11-09 PROCEDURE — 99215 OFFICE O/P EST HI 40 MIN: CPT | Performed by: INTERNAL MEDICINE

## 2023-11-09 PROCEDURE — G0463 HOSPITAL OUTPT CLINIC VISIT: HCPCS | Performed by: INTERNAL MEDICINE

## 2023-11-09 RX ORDER — METOPROLOL SUCCINATE 50 MG/1
75 TABLET, EXTENDED RELEASE ORAL DAILY
Qty: 135 TABLET | Refills: 3 | Status: SHIPPED | OUTPATIENT
Start: 2023-11-09 | End: 2023-11-09

## 2023-11-09 RX ORDER — METOPROLOL SUCCINATE 50 MG/1
50 TABLET, EXTENDED RELEASE ORAL DAILY
Qty: 90 TABLET | Refills: 3 | Status: ON HOLD | OUTPATIENT
Start: 2023-11-09 | End: 2023-11-29

## 2023-11-09 RX ORDER — HYDROCHLOROTHIAZIDE 25 MG/1
12.5 TABLET ORAL EVERY MORNING
Qty: 90 TABLET | Refills: 3 | Status: ON HOLD | OUTPATIENT
Start: 2023-11-09 | End: 2023-11-29

## 2023-11-09 RX ORDER — ISOSORBIDE MONONITRATE 30 MG/1
60 TABLET, EXTENDED RELEASE ORAL DAILY
Qty: 180 TABLET | Refills: 3 | Status: SHIPPED | OUTPATIENT
Start: 2023-11-09

## 2023-11-09 RX ORDER — LISINOPRIL 5 MG/1
5 TABLET ORAL DAILY
Qty: 90 TABLET | Refills: 3 | Status: SHIPPED | OUTPATIENT
Start: 2023-11-09

## 2023-11-09 RX ORDER — TORSEMIDE 20 MG/1
20 TABLET ORAL DAILY
Qty: 90 TABLET | Refills: 3 | Status: SHIPPED | OUTPATIENT
Start: 2023-11-09

## 2023-11-09 RX ORDER — EZETIMIBE 10 MG/1
10 TABLET ORAL DAILY
Qty: 90 TABLET | Refills: 0 | Status: SHIPPED | OUTPATIENT
Start: 2023-11-09 | End: 2024-05-15

## 2023-11-09 RX ORDER — LEVOTHYROXINE SODIUM 137 UG/1
TABLET ORAL
Qty: 45 TABLET | Refills: 3 | Status: SHIPPED | OUTPATIENT
Start: 2023-11-09

## 2023-11-09 RX ORDER — ROSUVASTATIN CALCIUM 40 MG/1
40 TABLET, COATED ORAL DAILY
Qty: 90 TABLET | Refills: 3 | Status: SHIPPED | OUTPATIENT
Start: 2023-11-09

## 2023-11-09 NOTE — PROGRESS NOTES
Patient is here to discuss follow up    BP (!) 89/57 (BP Location: Right arm, Patient Position: Chair, Cuff Size: Adult Regular)   Pulse 64   SpO2 95%     Questions patient would like addressed today are: N/A.    Refills are needed: No    Has homecare services and agency name:  Jessy MCCLELLAN

## 2023-11-09 NOTE — PROGRESS NOTES
Ramiro Jeffers is a 62 year old male who is presenting at the current time to discuss his diagnosi(es) of         Heart failure with preserved ejection fraction, NYHA class I (H)     HPI: Ramiro Jeffers is a 62 year old formerly morbidly obese male (current BMI 26.32)  With h/o single lifetime VTE presenting s PE with mislabel of PAF on event monitor causding prolonged AC who recently stopped AC at cardiology's direction during his recent hospitalization for HFpEF. He presented with a 30# weight gain and ansarca. He wa diuresed off. This was not felt to be ischemic in nature. He had Demadex added. He was discharged 7/19/23. He is doing much better. He is not SOB. NO CP, no ALAS.       Review Of Systems  Skin: negative  Eyes: negative  Ears/Nose/Throat: negative  Respiratory: No shortness of breath, dyspnea on exertion, cough, or hemoptysis  Cardiovascular: negative  Gastrointestinal: negative  Genitourinary: negative  Musculoskeletal: negative  Neurologic: negative  Psychiatric: negative  Hematologic/Lymphatic/Immunologic: negative  Endocrine: negative     PAST MEDICAL HISTORY:                  Past Medical History        Past Medical History:   Diagnosis Date    ACQUIRED HYPOTHYROIDISM       Blood clot in the legs      CAD (coronary artery disease)       11/15/18 Cath: PCI with 3.5-16mm DORY to proximal LAD    Calculus of gallbladder without mention of cholecystitis or obstruction      Cataract      Chest pressure 11/5/2018    Hyperlipidemia      Hypertension      Ischemic cardiomyopathy       EF 40-45% on 11/2018 Echo    Obesity, unspecified       significant weight loss w/diet alone, on 10-15-10, BMI was 56.4    pulmonary emboli 6-2010     Unprovoked large bilateral pulmonary emboli without source identified on lower extremity dopplers, pt had a transient moderate lupus inhibitor upon initial presentation in June , 2010 which was gone in September, 2010;  all other thrombophilic workup is normal    Pulmonary embolus,  bilateral      PVC's (premature ventricular contractions)       4% burden on 11/2018 Holter    Shortness of breath       on exertion    Venous insufficiency      Viral pneumonia, unspecified      Vitamin D deficiency              PAST SURGICAL HISTORY:                  Past Surgical History         Past Surgical History:   Procedure Laterality Date    COLONOSCOPY   11/17/2011     Procedure:COLONOSCOPY; colonoscopy; Surgeon:ELENA OROURKE; Location: GI    CV HEART CATHETERIZATION WITH POSSIBLE INTERVENTION Left 2/15/2019     Procedure: Coronary Angiogram;  Surgeon: Tulio Ortiz MD;  Location:  HEART CARDIAC CATH LAB    CV HEART CATHETERIZATION WITH POSSIBLE INTERVENTION N/A 10/5/2021     Procedure: Heart Catheterization with Possible Intervention;  Surgeon: Jose Francisco Dave MD;  Location:  HEART CARDIAC CATH LAB    CV LEFT HEART CATH N/A 10/5/2021     Procedure: Left Heart Cath;  Surgeon: Jose Francisco Dave MD;  Location:  HEART CARDIAC CATH LAB    CV LEFT VENTRICULOGRAM N/A 10/5/2021     Procedure: Left Ventriculogram;  Surgeon: Jose Francisco Dave MD;  Location:  HEART CARDIAC CATH LAB    LAPAROSCOPIC CHOLECYSTECTOMY   9/28/2012     Procedure: LAPAROSCOPIC CHOLECYSTECTOMY;  LAPAROSCOPIC CHOLECYSTECTOMY, LYSIS OF ADHESIONS;  Surgeon: Dutch Matta MD;  Location:  OR    LAPAROSCOPIC LYSIS ADHESIONS   9/28/2012     Procedure: LAPAROSCOPIC LYSIS ADHESIONS;;  Surgeon: Dutch Matta MD;  Location:  OR    LAPAROSCOPY, SURGICAL; REPAIR INCISIONAL OR VENTRAL HERNIA         repair of ventral strangulated surgery    PHACOEMULSIFICATION CLEAR CORNEA WITH STANDARD INTRAOCULAR LENS IMPLANT   12/19/2011     Procedure:PHACOEMULSIFICATION CLEAR CORNEA WITH STANDARD INTRAOCULAR LENS IMPLANT; RIGHT PHACOEMULSIFICATION CLEAR CORNEA WITH STANDARD INTRAOCULAR LENS IMPLANT ; Surgeon:ALLISON ANTONIO; Location[facility]    ZZC NONSPECIFIC PROCEDURE   10-     Laparoscopic gastric bypass,     [facility] NONSPECIFIC PROCEDURE   10-     1.  Attempted laparoscopic exploration with conversion to: .  Abdominal exploration. 3.  Reduction of incarcerated incisional hernia. 4.  Repair of incisional hernia.5.  Gastrostomy tube placement (#22 Khmer) into defunctionalized stomach.6.  Enterotomy with bowel decompression and primary repair. 7.  Abdominal lavage.             CURRENT MEDICATIONS:                  Current Outpatient Prescriptions          Current Outpatient Medications   Medication Sig Dispense Refill    aspirin (ASA) 81 MG chewable tablet Take 1 tablet (81 mg) by mouth daily 30 tablet 1    ezetimibe (ZETIA) 10 MG tablet TAKE 1 TABLET(10 MG) BY MOUTH DAILY 90 tablet 0    hydrochlorothiazide (HYDRODIURIL) 25 MG tablet Take 0.5 tablets (12.5 mg) by mouth every morning 90 tablet 3    isosorbide mononitrate (IMDUR) 30 MG 24 hr tablet Take 2 tablets (60 mg) by mouth daily 180 tablet 3    levothyroxine (SYNTHROID/LEVOTHROID) 137 MCG tablet Take 137 mcg every other day alternating with 150 mcg PO alternating every other day. 45 tablet 3    levothyroxine (SYNTHROID/LEVOTHROID) 150 MCG tablet Take 137 mcg every other day alternating with 150 mcg PO alternating every other day. 45 tablet 3    metoprolol succinate ER (TOPROL XL) 50 MG 24 hr tablet TAKE 1 AND 1/2 TABLETS(75 MG) BY MOUTH DAILY 135 tablet 3    nitroGLYcerin (NITROSTAT) 0.4 MG sublingual tablet For chest pain place 1 tablet under the tongue every 5 minutes for 3 doses. If symptoms persist 5 minutes after 1st dose call 911. 25 tablet 3    rosuvastatin (CRESTOR) 40 MG tablet TAKE 1 TABLET(40 MG) BY MOUTH DAILY 90 tablet 3    torsemide (DEMADEX) 20 MG tablet Take 1 tablet (20 mg) by mouth daily 30 tablet 1            ALLERGIES:                       Allergies   Allergen Reactions    No Known Allergies           SOCIAL HISTORY:                  Social History   Social History            Socioeconomic History    Marital status: Single       Spouse name:  Not on file    Number of children: Not on file    Years of education: Not on file    Highest education level: Not on file   Occupational History    Occupation:        Employer: SELF   Tobacco Use    Smoking status: Never    Smokeless tobacco: Never   Substance and Sexual Activity    Alcohol use: No       Alcohol/week: 0.0 standard drinks of alcohol    Drug use: No    Sexual activity: Not on file   Other Topics Concern    Parent/sibling w/ CABG, MI or angioplasty before 65F 55M? Not Asked   Social History Narrative    Not on file      Social Determinants of Health      Financial Resource Strain: Not on file   Food Insecurity: Not on file   Transportation Needs: Not on file   Physical Activity: Not on file   Stress: Not on file   Social Connections: Not on file   Intimate Partner Violence: Not on file   Housing Stability: Not on file            FAMILY HISTORY:                   Family History         Family History   Problem Relation Age of Onset    Osteoporosis Mother      Thyroid Disease Mother           no thyroid    Cancer Maternal Grandmother           ? lung cancer    Cancer Father           melanoma    Coronary Artery Disease Brother      Coronary Artery Disease Brother                 Physical exam Reveals:     O/P: WNL  HEENT: WNL  NECK: No JVD, thyromegaly, or lymphadenopathy  HEART: RRR, no murmurs, gallops, or rubs  LUNGS: CTA bilaterally without rales, wheezes, or rhonchi  GI: NABS, nondistended, nontender, soft  EXT:without cyanosis, clubbing, or edema  NEURO: nonfocal  : no flank tenderness           Component      Latest Ref [facility] 10/27/2023  8:20 AM   WBC      4.0 - 11.0 10e3/uL 6.5    RBC Count      4.40 - 5.90 10e6/uL 4.34 (L)    Hemoglobin      13.3 - 17.7 g/dL 12.8 (L)    Hematocrit      40.0 - 53.0 % 39.7 (L)    MCV      78 - 100 fL 92    MCH      26.5 - 33.0 pg 29.5    MCHC      31.5 - 36.5 g/dL 32.2    RDW      10.0 - 15.0 % 14.2    Platelet Count      150 - 450 10e3/uL 155    %  Neutrophils      % 53    % Lymphocytes      % 34    % Monocytes      % 9    % Eosinophils      % 3    % Basophils      % 1    % Immature Granulocytes      % 0    Absolute Neutrophils      1.6 - 8.3 10e3/uL 3.4    Absolute Lymphocytes      0.8 - 5.3 10e3/uL 2.2    Absolute Monocytes      0.0 - 1.3 10e3/uL 0.6    Absolute Eosinophils      0.0 - 0.7 10e3/uL 0.2    Absolute Basophils      0.0 - 0.2 10e3/uL 0.1    Absolute Immature Granulocytes      <=0.4 10e3/uL 0.0    Sodium      135 - 145 mmol/L 134 (L)    Potassium      3.4 - 5.3 mmol/L 3.5    Carbon Dioxide (CO2)      22 - 29 mmol/L 31 (H)    Anion Gap      7 - 15 mmol/L 9    Urea Nitrogen      8.0 - 23.0 mg/dL 25.2 (H)    Creatinine      0.67 - 1.17 mg/dL 0.76    GFR Estimate      >60 mL/min/1.73m2 >90    Calcium      8.8 - 10.2 mg/dL 9.1    Chloride      98 - 107 mmol/L 94 (L)    Glucose      70 - 99 mg/dL 91    Alkaline Phosphatase      40 - 129 U/L 58    AST      0 - 45 U/L 33    ALT      0 - 70 U/L 20    Protein Total      6.4 - 8.3 g/dL 6.9    Albumin      3.5 - 5.2 g/dL 4.2    Bilirubin Total      <=1.2 mg/dL 0.6    Total Cholesterol      <=199 mg/dL 127    Triglycerides      30 - 149 mg/dL 52    HDL Cholesterol      40 - 59 mg/dL 50    LDL Cholesterol      <=129 mg/dL 67    HDL Size      >=8.9 nm 9.3    VLDL Size      <=46.7 nm 49.4 (H)    LDL Particle Size      >=20.7 nm 20.8    Lge HDL Particle number      >=4.2 umol/L 7.1    HDL Particle Number NMR      >=33.0 umol/L 32.6 (L)    Lge VLDL Part number      <=2.7 nmol/L <1.5    Small LDL Particle number      <=634 nmol/L 473    LDL Particle Number      <=1135 nmol/L 829    EER LipoFit by NMR See Note    Iron      61 - 157 ug/dL 98    Iron Binding Capacity      240 - 430 ug/dL 296    Iron Sat Index      15 - 46 % 33    % Retic      0.5 - 2.0 % 0.8    Absolute Retic      0.025 - 0.095 10e6/uL 0.036    C-Reactive Protein High Sensitivity <0.15    Hemoglobin A1C      0.0 - 5.6 % 5.5    Lipoprotein (a)      <30  mg/dL 10    Free T3      2.0 - 4.4 pg/mL 2.3    T4 Free      0.90 - 1.70 ng/dL 1.81 (H)    TSH      0.30 - 4.20 uIU/mL 0.83    Transferrin      200.0 - 360.0 mg/dL 243.0    Ferritin      31 - 409 ng/mL 110    Vitamin B12      232 - 1,245 pg/mL 538    Folate      4.6 - 34.8 ng/mL 14.9    Magnesium      1.7 - 2.3 mg/dL 2.3       Legend:  (L) Low  (H) High        A/P:           (I50.9) Congestive heart failure, unspecified HF chronicity, unspecified heart failure type (H)  Comment: doing well. Weigh daily. Call if wt increases 2 # day to day or 5# over a week.  Plan: torsemide (DEMADEX) 20 MG tablet            (R06.09) ALAS (dyspnea on exertion)  (primary encounter diagnosis)  Comment: CT chest shows no PE, TTE reveals normal EF. He is limited by his ALAS.PFTs were normal other than a mild diffusion deficit. 6 minute walk test did reveal mild desaturation at 6 minutes to 88%.  Plan: He is advised he is out of shape. He is advised to walk more as mch as possible, RTC prn worsening.             (I48.91) Atrial fibrillation, unspecified type (H)  Comment: rate is controlled. Rhythm is regular presently so he is likely in SR. He needs AC due to [id number] score of 5  Plan: Continue ELiquis     (E03.8,  E06.3) Hypothyroidism due to Hashimoto's thyroiditis  Comment:on 137/150 QOD instead of 150 Daily, TSH still suppressed, FT4 elevated. Change to 137 daily.   Plan: levothyroxine (SYNTHROID/LEVOTHROID)         175 MCG tablet, Check CBC with Platelets &         Differential, T3 Free, T4 free, TSH in six months, RTC tow weeks later            (I10) Essential hypertension  Comment:BP at goal but low  Plan: Decrease metoprolol form 75 to 50 mg PO daily.      (E78.5) Hyperlipidemia LDL goal <100  Comment: at goal now that zetia has been added. Check labs in six months, RTC 2 weeks later  Plan: C-Reactive Protein, High Sensitivity, LipoFit         by NMR, Lipoprotein (a)             (R73.01) IFG (impaired fasting glucose)  Comment:  avoid CHO  Plan: Check Albumin Random Urine Quantitative with Creat         Ratio, Comprehensive metabolic panel,         Hemoglobin A1c in six months, RTC 2 weeks later     (D64.9) Anemia, unspecified type  Comment: Likely ACDs. Monitor.  Plan: Check Reticulocyte count, Iron & Iron Binding         Capacity, Transferrin, Ferritin, Vitamin B12,         Folate in six months, RTC 2 weeks later             42 minutes total medical care on today's date.

## 2023-11-27 ENCOUNTER — TELEPHONE (OUTPATIENT)
Dept: OTHER | Facility: CLINIC | Age: 63
End: 2023-11-27

## 2023-11-27 ENCOUNTER — APPOINTMENT (OUTPATIENT)
Dept: CT IMAGING | Facility: CLINIC | Age: 63
End: 2023-11-27
Attending: EMERGENCY MEDICINE
Payer: COMMERCIAL

## 2023-11-27 ENCOUNTER — HOSPITAL ENCOUNTER (OUTPATIENT)
Facility: CLINIC | Age: 63
Setting detail: OBSERVATION
Discharge: HOME | End: 2023-11-29
Attending: EMERGENCY MEDICINE | Admitting: INTERNAL MEDICINE
Payer: COMMERCIAL

## 2023-11-27 ENCOUNTER — APPOINTMENT (OUTPATIENT)
Dept: CT IMAGING | Facility: CLINIC | Age: 63
End: 2023-11-27
Attending: PHYSICIAN ASSISTANT
Payer: COMMERCIAL

## 2023-11-27 ENCOUNTER — APPOINTMENT (OUTPATIENT)
Dept: ULTRASOUND IMAGING | Facility: CLINIC | Age: 63
End: 2023-11-27
Attending: EMERGENCY MEDICINE
Payer: COMMERCIAL

## 2023-11-27 DIAGNOSIS — S09.90XA TRAUMATIC INJURY OF HEAD, INITIAL ENCOUNTER: ICD-10-CM

## 2023-11-27 DIAGNOSIS — E86.0 DEHYDRATION: ICD-10-CM

## 2023-11-27 DIAGNOSIS — R55 SYNCOPE, UNSPECIFIED SYNCOPE TYPE: ICD-10-CM

## 2023-11-27 DIAGNOSIS — I25.118 CORONARY ARTERY DISEASE OF NATIVE ARTERY OF NATIVE HEART WITH STABLE ANGINA PECTORIS (H): ICD-10-CM

## 2023-11-27 LAB
ALBUMIN SERPL BCG-MCNC: 4 G/DL (ref 3.5–5.2)
ALP SERPL-CCNC: 54 U/L (ref 40–150)
ALT SERPL W P-5'-P-CCNC: 21 U/L (ref 0–70)
ANION GAP SERPL CALCULATED.3IONS-SCNC: 13 MMOL/L (ref 7–15)
AST SERPL W P-5'-P-CCNC: 31 U/L (ref 0–45)
ATRIAL RATE - MUSE: 68 BPM
BASOPHILS # BLD AUTO: 0 10E3/UL (ref 0–0.2)
BASOPHILS NFR BLD AUTO: 0 %
BILIRUB SERPL-MCNC: 0.6 MG/DL
BUN SERPL-MCNC: 30.1 MG/DL (ref 8–23)
CALCIUM SERPL-MCNC: 8.7 MG/DL (ref 8.8–10.2)
CHLORIDE SERPL-SCNC: 85 MMOL/L (ref 98–107)
CREAT SERPL-MCNC: 1.17 MG/DL (ref 0.67–1.17)
DEPRECATED HCO3 PLAS-SCNC: 33 MMOL/L (ref 22–29)
DIASTOLIC BLOOD PRESSURE - MUSE: NORMAL MMHG
EGFRCR SERPLBLD CKD-EPI 2021: 70 ML/MIN/1.73M2
EOSINOPHIL # BLD AUTO: 0 10E3/UL (ref 0–0.7)
EOSINOPHIL NFR BLD AUTO: 0 %
ERYTHROCYTE [DISTWIDTH] IN BLOOD BY AUTOMATED COUNT: 12.9 % (ref 10–15)
GLUCOSE SERPL-MCNC: 141 MG/DL (ref 70–99)
HCT VFR BLD AUTO: 40.1 % (ref 40–53)
HGB BLD-MCNC: 13.4 G/DL (ref 13.3–17.7)
IMM GRANULOCYTES # BLD: 0 10E3/UL
IMM GRANULOCYTES NFR BLD: 0 %
INTERPRETATION ECG - MUSE: NORMAL
LACTATE SERPL-SCNC: 1.1 MMOL/L (ref 0.7–2)
LIPASE SERPL-CCNC: 206 U/L (ref 13–60)
LYMPHOCYTES # BLD AUTO: 1.6 10E3/UL (ref 0.8–5.3)
LYMPHOCYTES NFR BLD AUTO: 30 %
MAGNESIUM SERPL-MCNC: 2.2 MG/DL (ref 1.7–2.3)
MCH RBC QN AUTO: 30.1 PG (ref 26.5–33)
MCHC RBC AUTO-ENTMCNC: 33.4 G/DL (ref 31.5–36.5)
MCV RBC AUTO: 90 FL (ref 78–100)
MONOCYTES # BLD AUTO: 0.5 10E3/UL (ref 0–1.3)
MONOCYTES NFR BLD AUTO: 9 %
NEUTROPHILS # BLD AUTO: 3.2 10E3/UL (ref 1.6–8.3)
NEUTROPHILS NFR BLD AUTO: 61 %
NRBC # BLD AUTO: 0 10E3/UL
NRBC BLD AUTO-RTO: 0 /100
NT-PROBNP SERPL-MCNC: 93 PG/ML (ref 0–900)
P AXIS - MUSE: 56 DEGREES
PLATELET # BLD AUTO: 163 10E3/UL (ref 150–450)
POTASSIUM SERPL-SCNC: 2.8 MMOL/L (ref 3.4–5.3)
POTASSIUM SERPL-SCNC: 3.1 MMOL/L (ref 3.4–5.3)
PR INTERVAL - MUSE: 206 MS
PROT SERPL-MCNC: 7.2 G/DL (ref 6.4–8.3)
QRS DURATION - MUSE: 108 MS
QT - MUSE: 408 MS
QTC - MUSE: 433 MS
R AXIS - MUSE: -44 DEGREES
RBC # BLD AUTO: 4.45 10E6/UL (ref 4.4–5.9)
SODIUM SERPL-SCNC: 131 MMOL/L (ref 135–145)
SYSTOLIC BLOOD PRESSURE - MUSE: NORMAL MMHG
T AXIS - MUSE: 40 DEGREES
TROPONIN T SERPL HS-MCNC: 10 NG/L
VENTRICULAR RATE- MUSE: 68 BPM
WBC # BLD AUTO: 5.4 10E3/UL (ref 4–11)

## 2023-11-27 PROCEDURE — 83880 ASSAY OF NATRIURETIC PEPTIDE: CPT | Performed by: EMERGENCY MEDICINE

## 2023-11-27 PROCEDURE — 250N000013 HC RX MED GY IP 250 OP 250 PS 637: Performed by: EMERGENCY MEDICINE

## 2023-11-27 PROCEDURE — 84132 ASSAY OF SERUM POTASSIUM: CPT | Mod: 91 | Performed by: PHYSICIAN ASSISTANT

## 2023-11-27 PROCEDURE — 250N000011 HC RX IP 250 OP 636: Performed by: EMERGENCY MEDICINE

## 2023-11-27 PROCEDURE — 83735 ASSAY OF MAGNESIUM: CPT | Performed by: PHYSICIAN ASSISTANT

## 2023-11-27 PROCEDURE — 96361 HYDRATE IV INFUSION ADD-ON: CPT

## 2023-11-27 PROCEDURE — 96366 THER/PROPH/DIAG IV INF ADDON: CPT

## 2023-11-27 PROCEDURE — 250N000011 HC RX IP 250 OP 636: Mod: JZ | Performed by: HOSPITALIST

## 2023-11-27 PROCEDURE — 250N000011 HC RX IP 250 OP 636: Mod: JZ | Performed by: EMERGENCY MEDICINE

## 2023-11-27 PROCEDURE — 84484 ASSAY OF TROPONIN QUANT: CPT | Performed by: EMERGENCY MEDICINE

## 2023-11-27 PROCEDURE — 96365 THER/PROPH/DIAG IV INF INIT: CPT

## 2023-11-27 PROCEDURE — 85025 COMPLETE CBC W/AUTO DIFF WBC: CPT | Performed by: EMERGENCY MEDICINE

## 2023-11-27 PROCEDURE — 99285 EMERGENCY DEPT VISIT HI MDM: CPT | Mod: 25

## 2023-11-27 PROCEDURE — 74177 CT ABD & PELVIS W/CONTRAST: CPT

## 2023-11-27 PROCEDURE — 36415 COLL VENOUS BLD VENIPUNCTURE: CPT | Performed by: PHYSICIAN ASSISTANT

## 2023-11-27 PROCEDURE — 83605 ASSAY OF LACTIC ACID: CPT | Performed by: EMERGENCY MEDICINE

## 2023-11-27 PROCEDURE — 76705 ECHO EXAM OF ABDOMEN: CPT

## 2023-11-27 PROCEDURE — 250N000009 HC RX 250: Performed by: EMERGENCY MEDICINE

## 2023-11-27 PROCEDURE — 99223 1ST HOSP IP/OBS HIGH 75: CPT | Performed by: PHYSICIAN ASSISTANT

## 2023-11-27 PROCEDURE — G0378 HOSPITAL OBSERVATION PER HR: HCPCS

## 2023-11-27 PROCEDURE — 258N000003 HC RX IP 258 OP 636: Performed by: EMERGENCY MEDICINE

## 2023-11-27 PROCEDURE — 258N000003 HC RX IP 258 OP 636: Performed by: PHYSICIAN ASSISTANT

## 2023-11-27 PROCEDURE — 80053 COMPREHEN METABOLIC PANEL: CPT | Performed by: EMERGENCY MEDICINE

## 2023-11-27 PROCEDURE — 36415 COLL VENOUS BLD VENIPUNCTURE: CPT | Performed by: EMERGENCY MEDICINE

## 2023-11-27 PROCEDURE — 83690 ASSAY OF LIPASE: CPT | Performed by: EMERGENCY MEDICINE

## 2023-11-27 PROCEDURE — 96367 TX/PROPH/DG ADDL SEQ IV INF: CPT

## 2023-11-27 PROCEDURE — 70450 CT HEAD/BRAIN W/O DYE: CPT

## 2023-11-27 RX ORDER — IOPAMIDOL 755 MG/ML
91 INJECTION, SOLUTION INTRAVASCULAR ONCE
Status: COMPLETED | OUTPATIENT
Start: 2023-11-27 | End: 2023-11-27

## 2023-11-27 RX ORDER — SODIUM CHLORIDE 9 MG/ML
INJECTION, SOLUTION INTRAVENOUS CONTINUOUS
Status: DISCONTINUED | OUTPATIENT
Start: 2023-11-27 | End: 2023-11-29

## 2023-11-27 RX ORDER — HYDROCHLOROTHIAZIDE 12.5 MG/1
12.5 TABLET ORAL EVERY MORNING
Status: DISCONTINUED | OUTPATIENT
Start: 2023-11-28 | End: 2023-11-28 | Stop reason: SINTOL

## 2023-11-27 RX ORDER — POTASSIUM CHLORIDE 1.5 G/1.58G
60 POWDER, FOR SOLUTION ORAL ONCE
Status: COMPLETED | OUTPATIENT
Start: 2023-11-27 | End: 2023-11-27

## 2023-11-27 RX ORDER — LISINOPRIL 5 MG/1
5 TABLET ORAL DAILY
Status: DISCONTINUED | OUTPATIENT
Start: 2023-11-27 | End: 2023-11-29 | Stop reason: HOSPADM

## 2023-11-27 RX ORDER — ACETAMINOPHEN 650 MG/1
650 SUPPOSITORY RECTAL EVERY 4 HOURS PRN
Status: DISCONTINUED | OUTPATIENT
Start: 2023-11-27 | End: 2023-11-29 | Stop reason: HOSPADM

## 2023-11-27 RX ORDER — POTASSIUM CHLORIDE 7.45 MG/ML
10 INJECTION INTRAVENOUS ONCE
Status: COMPLETED | OUTPATIENT
Start: 2023-11-27 | End: 2023-11-27

## 2023-11-27 RX ORDER — OXYCODONE HYDROCHLORIDE 5 MG/1
5 TABLET ORAL EVERY 4 HOURS PRN
Status: DISCONTINUED | OUTPATIENT
Start: 2023-11-27 | End: 2023-11-29 | Stop reason: HOSPADM

## 2023-11-27 RX ORDER — ASPIRIN 81 MG/1
81 TABLET, CHEWABLE ORAL DAILY
Status: DISCONTINUED | OUTPATIENT
Start: 2023-11-28 | End: 2023-11-29 | Stop reason: HOSPADM

## 2023-11-27 RX ORDER — POTASSIUM CHLORIDE 1500 MG/1
40 TABLET, EXTENDED RELEASE ORAL ONCE
Status: COMPLETED | OUTPATIENT
Start: 2023-11-28 | End: 2023-11-28

## 2023-11-27 RX ORDER — ONDANSETRON 2 MG/ML
4 INJECTION INTRAMUSCULAR; INTRAVENOUS EVERY 6 HOURS PRN
Status: DISCONTINUED | OUTPATIENT
Start: 2023-11-27 | End: 2023-11-29 | Stop reason: HOSPADM

## 2023-11-27 RX ORDER — LEVOTHYROXINE SODIUM 137 UG/1
137 TABLET ORAL EVERY OTHER DAY
Status: DISCONTINUED | OUTPATIENT
Start: 2023-11-29 | End: 2023-11-29 | Stop reason: HOSPADM

## 2023-11-27 RX ORDER — TORSEMIDE 10 MG/1
20 TABLET ORAL DAILY
Status: DISCONTINUED | OUTPATIENT
Start: 2023-11-27 | End: 2023-11-29 | Stop reason: HOSPADM

## 2023-11-27 RX ORDER — ISOSORBIDE MONONITRATE 60 MG/1
60 TABLET, EXTENDED RELEASE ORAL DAILY
Status: DISCONTINUED | OUTPATIENT
Start: 2023-11-27 | End: 2023-11-29 | Stop reason: HOSPADM

## 2023-11-27 RX ORDER — AMOXICILLIN 250 MG
1 CAPSULE ORAL 2 TIMES DAILY PRN
Status: DISCONTINUED | OUTPATIENT
Start: 2023-11-27 | End: 2023-11-29 | Stop reason: HOSPADM

## 2023-11-27 RX ORDER — METOPROLOL SUCCINATE 50 MG/1
50 TABLET, EXTENDED RELEASE ORAL DAILY
Status: DISCONTINUED | OUTPATIENT
Start: 2023-11-27 | End: 2023-11-29 | Stop reason: HOSPADM

## 2023-11-27 RX ORDER — AMOXICILLIN 250 MG
2 CAPSULE ORAL 2 TIMES DAILY PRN
Status: DISCONTINUED | OUTPATIENT
Start: 2023-11-27 | End: 2023-11-29 | Stop reason: HOSPADM

## 2023-11-27 RX ORDER — ACETAMINOPHEN 325 MG/1
650 TABLET ORAL EVERY 4 HOURS PRN
Status: DISCONTINUED | OUTPATIENT
Start: 2023-11-27 | End: 2023-11-29 | Stop reason: HOSPADM

## 2023-11-27 RX ORDER — MAGNESIUM SULFATE HEPTAHYDRATE 40 MG/ML
2 INJECTION, SOLUTION INTRAVENOUS ONCE
Status: COMPLETED | OUTPATIENT
Start: 2023-11-27 | End: 2023-11-27

## 2023-11-27 RX ORDER — POTASSIUM CHLORIDE 7.45 MG/ML
10 INJECTION INTRAVENOUS ONCE
Status: DISCONTINUED | OUTPATIENT
Start: 2023-11-27 | End: 2023-11-27

## 2023-11-27 RX ORDER — LEVOTHYROXINE SODIUM 150 UG/1
150 TABLET ORAL EVERY OTHER DAY
Status: DISCONTINUED | OUTPATIENT
Start: 2023-11-28 | End: 2023-11-29 | Stop reason: HOSPADM

## 2023-11-27 RX ORDER — ONDANSETRON 4 MG/1
4 TABLET, ORALLY DISINTEGRATING ORAL EVERY 6 HOURS PRN
Status: DISCONTINUED | OUTPATIENT
Start: 2023-11-27 | End: 2023-11-29 | Stop reason: HOSPADM

## 2023-11-27 RX ADMIN — MAGNESIUM SULFATE HEPTAHYDRATE 2 G: 40 INJECTION, SOLUTION INTRAVENOUS at 14:58

## 2023-11-27 RX ADMIN — SODIUM CHLORIDE 1000 ML: 9 INJECTION, SOLUTION INTRAVENOUS at 10:52

## 2023-11-27 RX ADMIN — POTASSIUM CHLORIDE 60 MEQ: 1.5 POWDER, FOR SOLUTION ORAL at 12:30

## 2023-11-27 RX ADMIN — SODIUM CHLORIDE 1000 ML: 9 INJECTION, SOLUTION INTRAVENOUS at 12:06

## 2023-11-27 RX ADMIN — POTASSIUM CHLORIDE 10 MEQ: 7.46 INJECTION, SOLUTION INTRAVENOUS at 15:59

## 2023-11-27 RX ADMIN — IOPAMIDOL 91 ML: 755 INJECTION, SOLUTION INTRAVENOUS at 14:24

## 2023-11-27 RX ADMIN — SODIUM CHLORIDE: 9 INJECTION, SOLUTION INTRAVENOUS at 16:04

## 2023-11-27 RX ADMIN — SODIUM CHLORIDE 66 ML: 9 INJECTION, SOLUTION INTRAVENOUS at 14:24

## 2023-11-27 RX ADMIN — POTASSIUM CHLORIDE 10 MEQ: 7.46 INJECTION, SOLUTION INTRAVENOUS at 12:34

## 2023-11-27 ASSESSMENT — ACTIVITIES OF DAILY LIVING (ADL)
ADLS_ACUITY_SCORE: 35
ADLS_ACUITY_SCORE: 20
ADLS_ACUITY_SCORE: 35
ADLS_ACUITY_SCORE: 20
ADLS_ACUITY_SCORE: 35
ADLS_ACUITY_SCORE: 20
ADLS_ACUITY_SCORE: 35

## 2023-11-27 NOTE — PHARMACY-ADMISSION MEDICATION HISTORY
Pharmacist Admission Medication History    Admission medication history is complete. The information provided in this note is only as accurate as the sources available at the time of the update.    Information Source(s): Patient and CareEverywhere/SureScripts via in-person    Pertinent Information:     Changes made to PTA medication list:  Added: None  Deleted: None  Changed: None    Medication Affordability:       Allergies reviewed with patient and updates made in EHR: no    Medication History Completed By: Atiya Portillo RP 11/27/2023 1:14 PM    PTA Med List   Medication Sig Last Dose    Ascorbic Acid (VITAMIN C) 500 MG CHEW Take 1 chew tab by mouth daily 11/27/2023    ezetimibe (ZETIA) 10 MG tablet Take 1 tablet (10 mg) by mouth daily 11/27/2023    hydrochlorothiazide (HYDRODIURIL) 25 MG tablet Take 0.5 tablets (12.5 mg) by mouth every morning 11/27/2023    isosorbide mononitrate (IMDUR) 30 MG 24 hr tablet Take 2 tablets (60 mg) by mouth daily 11/27/2023    levothyroxine (SYNTHROID/LEVOTHROID) 137 MCG tablet Take 137 mcg every other day alternating with 150 mcg PO alternating every other day. 11/27/2023    lisinopril (ZESTRIL) 5 MG tablet Take 1 tablet (5 mg) by mouth daily 11/27/2023    metoprolol succinate ER (TOPROL XL) 50 MG 24 hr tablet Take 1 tablet (50 mg) by mouth daily Hold for SBP < 100 11/27/2023    nitroGLYcerin (NITROSTAT) 0.4 MG sublingual tablet For chest pain place 1 tablet under the tongue every 5 minutes for 3 doses. If symptoms persist 5 minutes after 1st dose call 911.     rosuvastatin (CRESTOR) 40 MG tablet Take 1 tablet (40 mg) by mouth daily 11/27/2023    torsemide (DEMADEX) 20 MG tablet Take 1 tablet (20 mg) by mouth daily 11/27/2023    Vitamin D3 (CHOLECALCIFEROL) 125 MCG (5000 UT) tablet Take 1 tablet by mouth daily 11/27/2023     Atiya Portillo PharmD

## 2023-11-27 NOTE — PROGRESS NOTES
RECEIVING UNIT ED HANDOFF REVIEW    ED Nurse Handoff Report was reviewed by: Jeremi Mondragon RN on November 27, 2023 at 4:35 PM

## 2023-11-27 NOTE — ED TRIAGE NOTES
"Patient has been having dizziness and fall (Saturday night), low blood pressures at home with increased dizziness and falls since. Recent decrease in bp meds per primary. \"Have been sick x 1 week as well\".     Triage Assessment (Adult)       Row Name 11/27/23 1034          Respiratory WDL    Respiratory WDL WDL        Cardiac WDL    Cardiac WDL WDL        Cognitive/Neuro/Behavioral WDL    Cognitive/Neuro/Behavioral WDL WDL                     "

## 2023-11-27 NOTE — TELEPHONE ENCOUNTER
[facility]    Who is the name of the provider?:  MAGED SEVILLA   What is the location you see this provider at/preferred location?: [place]  Person calling / Facility: Ramiro Jeffers  Phone number:  [phone] (home)  Nurse call back needed:  YES     Reason for call:  Patient asking for assistance with dangerously low BP, dizziness, and loss of consciousness.    Pharmacy location:     Outside Imaging: n/a   Can we leave a detailed message on this number?  YES

## 2023-11-27 NOTE — ED PROVIDER NOTES
History     Chief Complaint:  Dizziness and Hypotension     HPI   Ramiro Jeffers is a 63 year old male with history of HTN, hyperlipidemia, PE, CAD, CHF, and ischemic cardiomyopathy who presents to the ED with several days of dizziness, falls, and low blood pressures. Patient states that he has hit his head and lost consciousness in some of his falls, most recently yesterday. He adds that he had vomiting, diarrhea, and fevers for four days last week which have since resolved. He has also not eaten for the past few days due to feeling generally unwell. Of note, patient's vascular doctor recently decreased his metoprolol from 75 mg to 50 mg. He took all of his medications prior to arrival this morning.     Independent Historian:   None - Patient Only    Review of External Notes:   I reviewed the nurse telephone line from today regarding the patient's symptoms and recent medication changes, as noted below.    Dr. Davis changed his metoprolol down to 50 mg ER daily instead of 75 mg.       He took the following this morning:     metoprolol 50 ER mg this morning.   Lisinopril 5 mg this morning  Hydrochlorothiazide 12.5 mg   Torsemide 20 mg     Medications:    Aspirin 81 mg  Zetia   Hydrodiuril   Imdur  Synthroid  Zestril   Toprol   Nitrostat  Crestor   Demadex     Past Medical History:    Acquired hypothyroidism   PE, bilateral   Vitamin D deficiency   HTN  PVCs  CAD  Venous insufficiency   Blood clots in the legs   Hyperlipidemia   Ischemic cardiomyopathy   Anemia   Pneumothorax   Skin ulcer with fat layer exposed   Morbid obesity   CHF  Anasarca     Past Surgical History:    Repair incisional hernia   Laparoscopic gastric bypass   Colonoscopy  Phacoemulsification clear cornea with standard IOL implant, right  Laparoscopic cholecystectomy   Laparoscopic lysis adhesions  Heart catheterization with possible intervention, left x2    Physical Exam   Patient Vitals for the past 24 hrs:   BP Temp Temp src Pulse Resp SpO2  "Height Weight   11/27/23 1220 -- -- -- 54 12 99 % -- --   11/27/23 1217 -- -- -- 56 12 98 % -- --   11/27/23 1216 -- -- -- 56 15 97 % -- --   11/27/23 1215 91/58 -- -- 56 12 98 % -- --   11/27/23 1145 90/54 -- -- 60 12 98 % -- --   11/27/23 1130 (!) 82/48 -- -- 60 12 98 % -- --   11/27/23 1117 (!) 84/49 -- -- 57 12 99 % -- --   11/27/23 1116 -- -- -- 55 13 100 % -- --   11/27/23 1101 (!) 84/52 -- -- 60 17 99 % -- --   11/27/23 1046 (!) 88/59 -- -- 75 12 99 % -- --   11/27/23 1036 (!) 86/53 97.7  F (36.5  C) Oral 72 18 100 % 1.753 m (5' 9\") 81.6 kg (180 lb)        Physical Exam  Vitals: reviewed by me  General: Pt seen on [facility], cooperative, and alert to conversation  Eyes: Tracking well, clear conjunctiva BL  ENT: MMM, midline trachea.  No acute trauma to head noted.  Lungs: No tachypnea, no accessory muscle use. No respiratory distress.   CV: Rate as above  Abd: Soft, non tender, no guarding, no rebound. Non distended  MSK: no joint effusion.  Does have some subacute appearing bruising noted to right wrist, full range of motion the wrist noted.  Skin: No rash  Neuro: Clear speech and no facial droop.  Psych: Not RIS, no e/o AH/VH          Emergency Department Course   ECG  ECG taken at 1043, ECG read at 1045  Normal sinus rhythm   Left axis deviation  Incomplete RBBB  Minimal voltage criteria for LVH, may be normal variant ([place] product)  Anteroseptal infarct, age undetermined  Abnormal ECG   Rate 68 bpm. VA interval 206 ms. QRS duration 108 ms. QT/QTc 408/433 ms. P-R-T axes 56 -44 40.     Imaging:  CT Head w/o Contrast   Final Result   IMPRESSION:      1. No evidence of acute intracranial hemorrhage, mass, or herniation.   2. Moderate nonspecific white matter changes likely due to chronic   microvascular ischemic disease.      ELROY LAW MD            SYSTEM ID:  [id number]      CT Abdomen Pelvis w Contrast   Final Result   IMPRESSION:    1.  Stable postsurgical changes without acute " abnormality in the   abdomen or pelvis. Specifically, no radiographic evidence of acute   pancreatitis.      CIARRA FIGUEROA MD            SYSTEM ID:  [id number]      Abdomen US, limited (RUQ only)    (Results Pending)          Laboratory:  Labs Ordered and Resulted from Time of ED Arrival to Time of ED Departure   COMPREHENSIVE METABOLIC PANEL - Abnormal       Result Value    Sodium 131 (*)     Potassium 2.8 (*)     Carbon Dioxide (CO2) 33 (*)     Anion Gap 13      Urea Nitrogen 30.1 (*)     Creatinine 1.17      GFR Estimate 70      Calcium 8.7 (*)     Chloride 85 (*)     Glucose 141 (*)     Alkaline Phosphatase 54      AST 31      ALT 21      Protein Total 7.2      Albumin 4.0      Bilirubin Total 0.6     LACTIC ACID WHOLE BLOOD - Normal    Lactic Acid 1.1     TROPONIN T, HIGH SENSITIVITY - Normal    Troponin T, High Sensitivity 10     NT PROBNP INPATIENT - Normal    N terminal Pro BNP Inpatient 93     CBC WITH PLATELETS AND DIFFERENTIAL    WBC Count 5.4      RBC Count 4.45      Hemoglobin 13.4      Hematocrit 40.1      MCV 90      MCH 30.1      MCHC 33.4      RDW 12.9      Platelet Count 163      % Neutrophils 61      % Lymphocytes 30      % Monocytes 9      % Eosinophils 0      % Basophils 0      % Immature Granulocytes 0      NRBCs per 100 WBC 0      Absolute Neutrophils 3.2      Absolute Lymphocytes 1.6      Absolute Monocytes 0.5      Absolute Eosinophils 0.0      Absolute Basophils 0.0      Absolute Immature Granulocytes 0.0      Absolute NRBCs 0.0     LIPASE   NT PROBNP INPATIENT        Emergency Department Course & Assessments:       Interventions:  Medications   sodium chloride 0.9% BOLUS 1,000 mL (1,000 mLs Intravenous $New Bag 11/27/23 1200)   magnesium sulfate 2 g in 50 mL sterile water intermittent infusion (has no administration in time range)   potassium chloride (KLOR-CON) Packet 60 mEq (has no administration in time range)   potassium chloride 10 mEq in 100 mL sterile water infusion (has no  administration in time range)   potassium chloride 10 mEq in 100 mL sterile water infusion (has no administration in time range)   sodium chloride 0.9% BOLUS 1,000 mL (0 mLs Intravenous Stopped 11/27/23 1210)        Assessments:  1047 I obtained history and examined the patient as noted above.        Consultations/Discussion of Management or Tests:  1215 I spoke with Codi Madrigal PA-C, accepting for Dr. Mane.       Social Determinants of Health affecting care:    Stress/Adjustment Disorders    Disposition:  The patient was admitted to the hospital under the care of Dr. Mane.     Impression & Plan    CMS Diagnoses: None    Medical Decision Making:  This is a very pleasant 63-year-old male who presents the emergency room with appears to be a recent case of vomiting and diarrhea that seems to have resolved, but also now with residual weakness and low blood pressure.  The patient has still been taking his metoprolol and blood pressure pills as well as his diuretic torsemide, and I do suspect that this is also exacerbating his low blood pressure.  I given him some IV fluids here and is doing well, thankfully lactate is normal, and I do not feel he requires antibiotics at this time.  He is not vomiting here and his abdomen is benign although he does have some minimal epigastric tenderness, will plan for an ultrasound to follow on this.  CT scan done out of an abundance of caution given his recent syncopal events, and I am also replacing his electrolytes as he has had some issues with his potassium being low here.  On the whole I do think that he should be admitted to the hospital, will plan for observation at this time, and treat supportively.  Thankfully he is doing well here with no active syncopal episodes and no vomiting episodes.      Diagnosis:    ICD-10-CM    1. Dehydration  E86.0       2. Syncope, unspecified syncope type  R55       3. Traumatic injury of head, initial encounter  S09.90XA           Scribe Disclosure:  I, Gege Oliveirado, am serving as a scribe at 11:07 AM on 11/27/2023 to document services personally performed by Mathew Carr MD based on my observations and the provider's statements to me.   11/27/2023   Mathew Carr MD Fitzgerald, Michael Maxwell Kreofsky, MD  11/27/23 1501

## 2023-11-27 NOTE — H&P
[facility]  History and Physical - Hospitalist Service       Date of Admission:  11/27/2023  PRIMARY CARE PROVIDER:    Addison Davis & Plan   Ramiro WILKERSON Zeayd is a 63 year old male with PMH of CAD s/p PCI, chronic HFpEF, paroxysmal afib, hx remote PE, hypothyroidism who presented to the ED on 11/27/23 with recurrent syncope in the setting of volume depletion from recent nausea, vomiting, upper abdominal pain, diarrhea while on multiple antihypertensives.    Hypotension  Syncope  Recurrent falls  *Presents with SBP upper 80s with dizziness and multiple falls/LOC at home. Endorses recent nausea, vomiting, diarrhea last week as well as poor oral intake. Clinically hypovolemic.  *CT head NAD  *S/p IV NS x 2 L in the ED  *Last echo 7/16/23 with EF 55%, no regional WMA or any significant valvular disease  -Hold PTA Hydrochlorothiazide 12.5 mg daily, Lisinopril 5 mg daily, Metoprolol 50 mg daily, Demadex 20 mg daily, Imdur 60 mg daily  -IV NS @ 75 mL x 1 L  -Vitals q4h  -Continuous telemetry  -Check orthostatics  -PT consult    Elevated lipase  *Lipase 206 in the ED, LFTs are WNL  *Triglycerides noted to be 52 on 10/27/23, denies any alcohol use  -CT A/P NAD with no radiographic evidence of acute pancreatitis, US RUQ also unremarkable  -ADAT with IVF  -Antiemetics as needed  -Acetaminophen or PO Oxycodone as needed    Hypokalemia  *K 2.8 in the ED  -RN replacement protocol    Mild hyponatremia  *Sodium 131, likely hypovolemic. S/p IV NS x 2 L in the ED.  -Holding PTA diuretics  -Recheck sodium tomorrow AM    Acute kidney injury  *Creatinine 1.17, baseline ~0.8  *S/p IV NS x 2 L in the ED  -Recheck BMP tomorrow AM  -Avoid nephrotoxic medications    Paroxysmal atrial fibrillation  Chronic HFpEF  CAD s/p PCI  HTN  HLD  *No longer on Apixaban per cardiology [id number] due to lack of a-fib seen on prior event monitor.  -Continue ASA 81 mg daily  -PTA Metoprolol, Demadex, Lisinopril,  "Hydrochlorothiazide, Imdur on hold due to hypotension  -Hold PTA Zetia and Rosuvastatin while on obs status, resume on discharge    Hypothyroidism  -Continue PTA Synthroid    History of remote PE (2010)  *No longer on anticoagulation    Clinically Significant Risk Factors Present on Admission        # Hypokalemia: Lowest K = 2.8 mmol/L in last 2 days, will replace as needed         # Drug Induced Platelet Defect: home medication list includes an antiplatelet medication   # Hypertension: Noted on problem list    # Chronic heart failure with preserved ejection fraction: heart failure noted on problem list and last echo with EF >50%     # Overweight: Estimated body mass index is 26.58 kg/m  as calculated from the following:    Height as of this encounter: 1.753 m (5' 9\").    Weight as of this encounter: 81.6 kg (180 lb).                 Diet:  ADAT  DVT Prophylaxis: Pneumatic Compression Devices  Davis Catheter: Not present  Lines: None     Cardiac Monitoring: None  Code Status:  Full         Disposition Plan      Expected Discharge Date: 11/28/2023             Entered: Codi Madrigal PA-C 11/27/2023, 1:14 PM     The patient's care was discussed with the Attending Physician, Dr. Sahu, Patient, and ED provider .    Codi Madrigal PA-C  [facility]  Securely message with the Vocera Web Console (learn more here)      ______________________________________________________________________    Chief Complaint   Syncope    History is obtained from the patient and EMR.      History of Present Illness   Ramiro Jeffers is a 63 year old male with PMH of CAD s/p PCI, chronic HFpEF, paroxysmal afib, hx remote PE, hypothyroidism who presented to the ED on 11/27/23 with recurrent syncope. He has been feeling dizzy since Saturday and noted low blood pressures at home, which has resulted in some falls. He has hit his head and lost consciousness during at least two of these falls, most recent yesterday. " This was after attempting to ambulate/stand from sitting position. He states he had nausea, vomiting, diarrhea, subjective fevers for several days last week but this has improved. No further loose stools or dry heaves. No melena or hematochezia. He notes upper abdominal pain which has been worse with oral intake, was pretty severe yesterday and notable this morning after eating breakfast. Currently his abdominal pain is a 2/10. He called his PCP on 11/27 regarding the low blood pressures and was told to decrease his Metoprolol down to 50 mg daily from 75 mg daily. He is also taking Lisinopril 5 mg daily, Hydrochlorothiazide 12.5 mg daily, and Torsemide 20 mg daily all of which he took this morning.     In the ED, BP 86/52 s/p IV fluids and now up to 90/54. EKG NSR with incomplete RBBB, anteroseptal infarct. Labs with sodium 131, potassium 2.8, creatinine 1.17 (baseline 0.8). No leukocytosis, and hgb stable 13.4. Lactic acid 1.1. Pro-BNP 93. Troponin 10. Lipase elevated at 206. CT head NAD. CT A/P NAD with no radiographic evidence of acute pancreatitis. RUQ US with prior cholecystectomy without sonographic evidence of biliary obstruction.    Past Medical History    I have reviewed this patient's medical history and updated it with pertinent information if needed.   Past Medical History:   Diagnosis Date    ACQUIRED HYPOTHYROIDISM      Blood clot in the legs     CAD (coronary artery disease)     11/15/18 Cath: PCI with 3.5-16mm DORY to proximal LAD    Calculus of gallbladder without mention of cholecystitis or obstruction     Cataract     Chest pressure 11/5/2018    Hyperlipidemia     Hypertension     Ischemic cardiomyopathy     EF 40-45% on 11/2018 Echo    Obesity, unspecified     significant weight loss w/diet alone, on 10-15-10, BMI was 56.4    pulmonary emboli 6-2010    Unprovoked large bilateral pulmonary emboli without source identified on lower extremity dopplers, pt had a transient moderate lupus inhibitor upon  initial presentation in June , 2010 which was gone in September, 2010;  all other thrombophilic workup is normal    Pulmonary embolus, bilateral     PVC's (premature ventricular contractions)     4% burden on 11/2018 Holter    Shortness of breath     on exertion    Venous insufficiency     Viral pneumonia, unspecified     Vitamin D deficiency        Prior to Admission Medications   Prior to Admission Medications   Prescriptions Last Dose Informant Patient Reported? Taking?   Ascorbic Acid (VITAMIN C) 500 MG CHEW   Yes Yes   Sig: Take 1 chew tab by mouth daily   Vitamin D3 (CHOLECALCIFEROL) 125 MCG (5000 UT) tablet   Yes Yes   Sig: Take 1 tablet by mouth daily   aspirin (ASA) 81 MG chewable tablet Not Taking  No No   Sig: Take 1 tablet (81 mg) by mouth daily   Patient not taking: Reported on 11/27/2023   ezetimibe (ZETIA) 10 MG tablet 11/27/2023  No Yes   Sig: Take 1 tablet (10 mg) by mouth daily   hydrochlorothiazide (HYDRODIURIL) 25 MG tablet 11/27/2023  No Yes   Sig: Take 0.5 tablets (12.5 mg) by mouth every morning   isosorbide mononitrate (IMDUR) 30 MG 24 hr tablet 11/27/2023  No Yes   Sig: Take 2 tablets (60 mg) by mouth daily   levothyroxine (SYNTHROID/LEVOTHROID) 137 MCG tablet 11/27/2023  No Yes   Sig: Take 137 mcg every other day alternating with 150 mcg PO alternating every other day.   lisinopril (ZESTRIL) 5 MG tablet 11/27/2023  No Yes   Sig: Take 1 tablet (5 mg) by mouth daily   metoprolol succinate ER (TOPROL XL) 50 MG 24 hr tablet 11/27/2023  No Yes   Sig: Take 1 tablet (50 mg) by mouth daily Hold for SBP < 100   nitroGLYcerin (NITROSTAT) 0.4 MG sublingual tablet   No No   Sig: For chest pain place 1 tablet under the tongue every 5 minutes for 3 doses. If symptoms persist 5 minutes after 1st dose call 911.   rosuvastatin (CRESTOR) 40 MG tablet 11/27/2023  No Yes   Sig: Take 1 tablet (40 mg) by mouth daily   torsemide (DEMADEX) 20 MG tablet 11/27/2023  No Yes   Sig: Take 1 tablet (20 mg) by mouth daily       Facility-Administered Medications: None     Allergies   Allergies   Allergen Reactions    No Known Allergies        Physical Exam   Vital Signs: Temp: 97.7  F (36.5  C) Temp src: Oral BP: 91/58 Pulse: 54   Resp: 12 SpO2: 99 %      Weight: 180 lbs 0 oz    Constitutional: Awake, alert, cooperative, no apparent distress.   ENT: Normocephalic, without obvious abnormality, atraumatic. Eye pupils are equal. Normal sclera.    Neck: Supple, symmetrical, trachea midline  Pulmonary: No increased work of breathing, good air exchange, clear to auscultation bilaterally  Cardiovascular: Regular rate and rhythm  GI: Normal bowel sounds, soft, non-distended, minimal epigastric pain  Skin: Bruising around the R wrist  Neuro: Oriented x 3. Moves all extremities spontaneously. No focal neuro deficits.  Psych:  Normal affect and mood  Extremities: Normal muscle tone. No gross deformities noted. Calves non-tender b/l. No pitting edema b/l LE. Good ROM of the right wrist.    Medical Decision Making       75 MINUTES SPENT BY ME on the date of service doing chart review, history, exam, documentation & further activities per the note.         Data   Data reviewed today: I reviewed all medications, new labs and imaging results over the last 24 hours. I personally reviewed no images or EKG's today.      I have personally reviewed the following data over the past 24 hrs:    5.4  \   13.4   / 163     131 (L) 85 (L) 30.1 (H) /  141 (H)   2.8 (L) 33 (H) 1.17 \     ALT: 21 AST: 31 AP: 54 TBILI: 0.6   ALB: 4.0 TOT PROTEIN: 7.2 LIPASE: 206 (H)     Trop: 10 BNP: 93     Procal: N/A CRP: N/A Lactic Acid: 1.1         Imaging results reviewed over the past 24 hrs:   No results found for this or any previous visit (from the past 24 hour(s)).

## 2023-11-27 NOTE — TELEPHONE ENCOUNTER
Called patient to learn more about his symptoms.    Patient states he has been really sick for a week.  He was unable to eat for a while, and then he could start eating and holding down food.  He is still experiencing dizziness, falling, and blacking out.  He has fallen four times in the last 3 days and he blacked out next to the toilet at least once.    His current blood pressure is 76/49.      Dr. Davis changed his metoprolol down to 50 mg ER daily instead of 75 mg.      He took the following this morning:    metoprolol 50 ER mg this morning.   Lisinopril 5 mg this morning  Hydrochlorothiazide 12.5 mg   Torsemide 20 mg    Recommended Ramiro present to the ER for assessment.  Patient verbalized understanding.  His brother is available to take him.

## 2023-11-28 ENCOUNTER — APPOINTMENT (OUTPATIENT)
Dept: PHYSICAL THERAPY | Facility: CLINIC | Age: 63
End: 2023-11-28
Attending: PHYSICIAN ASSISTANT
Payer: COMMERCIAL

## 2023-11-28 PROBLEM — I95.89 HYPOTENSION, IATROGENIC: Status: ACTIVE | Noted: 2023-11-28

## 2023-11-28 PROBLEM — Z98.84 HISTORY OF GASTRIC BYPASS: Status: ACTIVE | Noted: 2023-11-28

## 2023-11-28 LAB
ALBUMIN SERPL BCG-MCNC: 3.5 G/DL (ref 3.5–5.2)
ALP SERPL-CCNC: 45 U/L (ref 40–150)
ALT SERPL W P-5'-P-CCNC: 16 U/L (ref 0–70)
ANION GAP SERPL CALCULATED.3IONS-SCNC: 7 MMOL/L (ref 7–15)
AST SERPL W P-5'-P-CCNC: 23 U/L (ref 0–45)
BASOPHILS # BLD AUTO: 0 10E3/UL (ref 0–0.2)
BASOPHILS NFR BLD AUTO: 0 %
BILIRUB SERPL-MCNC: 0.4 MG/DL
BUN SERPL-MCNC: 16.4 MG/DL (ref 8–23)
CALCIUM SERPL-MCNC: 8.4 MG/DL (ref 8.8–10.2)
CHLORIDE SERPL-SCNC: 97 MMOL/L (ref 98–107)
CREAT SERPL-MCNC: 0.74 MG/DL (ref 0.67–1.17)
DEPRECATED HCO3 PLAS-SCNC: 30 MMOL/L (ref 22–29)
EGFRCR SERPLBLD CKD-EPI 2021: >90 ML/MIN/1.73M2
EOSINOPHIL # BLD AUTO: 0.1 10E3/UL (ref 0–0.7)
EOSINOPHIL NFR BLD AUTO: 2 %
ERYTHROCYTE [DISTWIDTH] IN BLOOD BY AUTOMATED COUNT: 13.1 % (ref 10–15)
GLUCOSE SERPL-MCNC: 97 MG/DL (ref 70–99)
HCT VFR BLD AUTO: 35.8 % (ref 40–53)
HGB BLD-MCNC: 11.9 G/DL (ref 13.3–17.7)
IMM GRANULOCYTES # BLD: 0 10E3/UL
IMM GRANULOCYTES NFR BLD: 0 %
LIPASE SERPL-CCNC: 28 U/L (ref 13–60)
LYMPHOCYTES # BLD AUTO: 1.8 10E3/UL (ref 0.8–5.3)
LYMPHOCYTES NFR BLD AUTO: 38 %
MCH RBC QN AUTO: 30.6 PG (ref 26.5–33)
MCHC RBC AUTO-ENTMCNC: 33.2 G/DL (ref 31.5–36.5)
MCV RBC AUTO: 92 FL (ref 78–100)
MONOCYTES # BLD AUTO: 0.5 10E3/UL (ref 0–1.3)
MONOCYTES NFR BLD AUTO: 11 %
NEUTROPHILS # BLD AUTO: 2.3 10E3/UL (ref 1.6–8.3)
NEUTROPHILS NFR BLD AUTO: 49 %
NRBC # BLD AUTO: 0 10E3/UL
NRBC BLD AUTO-RTO: 0 /100
PLATELET # BLD AUTO: 127 10E3/UL (ref 150–450)
POTASSIUM SERPL-SCNC: 3.8 MMOL/L (ref 3.4–5.3)
PROT SERPL-MCNC: 5.9 G/DL (ref 6.4–8.3)
RBC # BLD AUTO: 3.89 10E6/UL (ref 4.4–5.9)
SODIUM SERPL-SCNC: 134 MMOL/L (ref 135–145)
WBC # BLD AUTO: 4.8 10E3/UL (ref 4–11)

## 2023-11-28 PROCEDURE — 99233 SBSQ HOSP IP/OBS HIGH 50: CPT | Performed by: PHYSICIAN ASSISTANT

## 2023-11-28 PROCEDURE — 97530 THERAPEUTIC ACTIVITIES: CPT | Mod: GP | Performed by: PHYSICAL THERAPIST

## 2023-11-28 PROCEDURE — 36415 COLL VENOUS BLD VENIPUNCTURE: CPT | Performed by: PHYSICIAN ASSISTANT

## 2023-11-28 PROCEDURE — 97116 GAIT TRAINING THERAPY: CPT | Mod: GP | Performed by: PHYSICAL THERAPIST

## 2023-11-28 PROCEDURE — 80053 COMPREHEN METABOLIC PANEL: CPT | Performed by: PHYSICIAN ASSISTANT

## 2023-11-28 PROCEDURE — 85025 COMPLETE CBC W/AUTO DIFF WBC: CPT | Performed by: PHYSICIAN ASSISTANT

## 2023-11-28 PROCEDURE — 250N000013 HC RX MED GY IP 250 OP 250 PS 637: Performed by: PHYSICIAN ASSISTANT

## 2023-11-28 PROCEDURE — 83690 ASSAY OF LIPASE: CPT | Performed by: PHYSICIAN ASSISTANT

## 2023-11-28 PROCEDURE — 96361 HYDRATE IV INFUSION ADD-ON: CPT

## 2023-11-28 PROCEDURE — 97161 PT EVAL LOW COMPLEX 20 MIN: CPT | Mod: GP | Performed by: PHYSICAL THERAPIST

## 2023-11-28 PROCEDURE — G0378 HOSPITAL OBSERVATION PER HR: HCPCS

## 2023-11-28 RX ORDER — NALOXONE HYDROCHLORIDE 0.4 MG/ML
0.4 INJECTION, SOLUTION INTRAMUSCULAR; INTRAVENOUS; SUBCUTANEOUS
Status: DISCONTINUED | OUTPATIENT
Start: 2023-11-28 | End: 2023-11-29 | Stop reason: HOSPADM

## 2023-11-28 RX ORDER — NALOXONE HYDROCHLORIDE 0.4 MG/ML
0.2 INJECTION, SOLUTION INTRAMUSCULAR; INTRAVENOUS; SUBCUTANEOUS
Status: DISCONTINUED | OUTPATIENT
Start: 2023-11-28 | End: 2023-11-29 | Stop reason: HOSPADM

## 2023-11-28 RX ADMIN — LEVOTHYROXINE SODIUM 150 MCG: 150 TABLET ORAL at 06:57

## 2023-11-28 RX ADMIN — POTASSIUM CHLORIDE 40 MEQ: 1500 TABLET, EXTENDED RELEASE ORAL at 00:15

## 2023-11-28 RX ADMIN — ASPIRIN 81 MG CHEWABLE TABLET 81 MG: 81 TABLET CHEWABLE at 08:42

## 2023-11-28 ASSESSMENT — ACTIVITIES OF DAILY LIVING (ADL)
ADLS_ACUITY_SCORE: 20

## 2023-11-28 NOTE — PROGRESS NOTES
Observation goals  PRIOR TO DISCHARGE       Comments: -diagnostic tests and consults completed and resulted- not met  -vital signs normal or at patient baseline- not met  -returns to baseline functional status- not met  Nurse to notify provider when observation goals have been met and patient is ready for discharge.

## 2023-11-28 NOTE — PROGRESS NOTES
PRIMARY Concern: Syncope, Fall  SAFETY RISK Concerns (fall risk, behaviors, etc.): Fall Risk      Isolation/Type: n/a  Tests/Procedures for NEXT shift: Pending  Consults? (Pending/following, signed-off?) PT consult  Where is patient from? (Home, TCU, etc.): Home  Other Important info for NEXT shift: AM lab draw  Anticipated DC date & active delays: 11/28/23  _____________________________________________________________________________  SUMMARY NOTE:              (either paste note here OR complete the info below- RN to choose one- delete info below if not used)  Orientation/Cognitive: A&Ox4  Observation Goals (Met/ Not Met): Not Met  Mobility Level/Assist Equipment: SBA  Antibiotics & Plan (IV/po, length of tx left): n/a  Pain Management: Denies  Tele/VS/O2: VSS on RA except hypotensive  ABNL Lab/BG: Na- 131, Cr- 1.17, K-  Diet: Reg  Bowel/Bladder: Cont. B&B  Skin Concerns: Igor BLE, mild edema to BLE  Drains/Devices: IV infusing NS @ 75ml/hr  Patient Stated Goal for Today: none

## 2023-11-28 NOTE — PROGRESS NOTES
Observation goals  PRIOR TO DISCHARGE        Comments: -diagnostic tests and consults completed and resulted Not Met  -vital signs normal or at patient baseline Not Met  -returns to baseline functional status Not Met  Nurse to notify provider when observation goals have been met and patient is ready for discharge.

## 2023-11-28 NOTE — PLAN OF CARE
PRIMARY Concern: syncope, orthostatic hypotension  SAFETY RISK Concerns (fall risk, behaviors, etc.): fall risk      Isolation/Type: n/a  Tests/Procedures for NEXT shift: none  Consults? (Pending/following, signed-off?) PT following  Where is patient from? (Home, TCU, etc.): home  Other Important info for NEXT shift: monitor potassium, restart metoprolol  Anticipated DC date & active delays: today vs. tomorrow  _____________________________________________________________________________  SUMMARY NOTE:  Orientation/Cognitive: A&Ox4  Observation Goals (Met/ Not Met): Not Met  Mobility Level/Assist Equipment: SBA  Antibiotics & Plan (IV/po, length of tx left): n/a  Pain Management: denies pain  Tele/VS/O2: Tele is SR, VSS on room air  ABNL Lab/BG: Na 134, K+ 3.8  Diet: tolerating a regular diet  Bowel/Bladder: WDL  Skin Concerns: 1+ edema BLE, skin on BLE is dry and cracked  Drains/Devices: PIV SL  Patient Stated Goal for Today: go home

## 2023-11-28 NOTE — PROGRESS NOTES
[facility]  Hospitalist Progress Note    Assessment & Plan   Ramiro Jeffers is a 63 year old male, w a PMH ofCAD s/p PCI, chronic HFpEF, paroxysmal afib, hx remote PE, hypothyroidism , who was admitted on 11/27/2023 with a c/c of syncope w recent nausea, vomiting, upper abdominal pain, diarrhea.     Principal Problem:    Hypotension, iatrogenic  Active Problems:    Coronary artery disease involving native heart with angina pectoris, unspecified vessel or lesion type (H24)    Hypokalemia    Congestive heart failure, unspecified HF chronicity, unspecified heart failure type (H)    Severe dehydration    Traumatic injury of head, initial encounter    Syncope, unspecified syncope type    History of gastric bypass      Hypovolmic Hypotension, improved  Syncope w dehydration, Recurrent falls  Patient with febrile illness nausea vomiting diarrhea last week, still taking all 5 PTA antihypertensive (HCTZ, torsemide, metoprolol, lisinopril, Imdur), w pt complaining of severe dizziness when he stood. .  Likely hypovolemic, consequence of N/V/D (resolved) plus continuing PTA antihypertensives/diuretic, Pt reports home BPs 60s systolic per his report, presented after syncope multiple fallsSBP 80s on arrival dizzy with standing, clinically hypovolemic and orthostatics are positive.  Not likely heart failure related especially because he was still taking all of his meds, this is likely the main reason for his persistent low blood pressures. CT head NAD  11/28-orthostatic VS w/o 20pt drop, but 85/52 sitting, push PO--> later incr tp 110s  will try to resume the metop today and see if can hector.   -IVF-  NS x 2 L in ED, --> eating well, to push PO fluids (caution w HF hx)  -Resuming PTA metoprolol as 12.5 twice daily (recent decrease to 50)  -Stopping PTA HCTZ 12.5, could contribute to hypovolemia, hyponatremia  -Holding PTA torsemide 20 , consider resumption with leg edema  -Holding PTA lisinopril 5, resume  outpatient low-dose  -Holding PTA Imdur at present, resume outpatient as tolerated  -PT consulted, cleared for home  -Continuous telemetry  -Vitals q4h  -Check orthostatics q shift  - am CBC BMP     Recent Acute Nausea vomiting diarrhea- resolved  Patient reports last week he had onset of severe nausea vomiting diarrhea.  He reports he was febrile at that time.  ? Norovirus, ? Food, pancreatitis? This led to hypovolemia.  The n/v/d, GI sx however had fully resolved at home  about 4 days ago and has been able to eat.  Although somewhat poor intake since.  He has no abdominal pain.  Elevated lipase raises question of pancreatitis related?  -Advancing diet as tolerated  -Treatment for dehydration     Acute kidney injury , resolved  Severe dehydration  Patient with severe nausea vomiting diarrhea last week, since resolved but still with low p.o. intake and taking PTA HCTZ and torsemide.  On arrival elevated creatinine 1.17, baseline ~0.8  11/28 after IVF creatinine improved to 0.74, able to switch to p.o. fluid  *S/p IV NS x 2 L in the ED-now p.o.  -Recheck BMP tomorrow AM  -Avoid nephrotoxic medications  -Stopping PTA HCTZ, careful resumption of torsemide.  -Educated about careful management of antihypertensives and future illness.     Hypokalemia, resolved  admit K 2.8 in the ED, History of hypokalemia, with severe nausea vomiting volume depletion as noted above, plus contribution from PTA diuretics torsemide and HCTZ,   11/28-potassium is improved to 3.8.  Stopping IVF.  -high replacement protocol  -Resume PTA p.o. supplement  -Diuretic adjustments as above  -A.m. BMP  -  Hyponatremia, possibly related to thiazide versus vomiting.  Resolved  Sodium 131, hypovolemic hyponatremia as above. S/p IV NS x 2 L in the ED.  1128-sodium stable 134 status post IVF.  Will push p.o. fluids today.  -Holding PTA HCTZ as likely contributor to low NA, stop completely  -Consider resuming PTA torsemide when BP support/for heart  failure  -A.m. BMP  -PCP reeval    Possible thiazide related pancreatitis, elevated lipase, resolved  Lipase 206 in the ED, LFTs are WNL in context of prior nausea vomiting diarrhea.  Denies alcohol use.  Contributor could be from his HCTZ.. Triglycerides noted to be 52 on 10/27/23, CT A/P NAD with no radiographic evidence of acute pancreatitis, US RUQ also unremarkable  11/28-lipase has normalized without treatment.  -Continues to advance diet  -Antiemetics as needed    HTN w (likely iatrogenic) hypotension  Chronic HFpEF  CAD s/p PCI  Admitted 7/2023 with acute heart failure, since then he has been on torsemide 20 daily plus HCTZ.  Last saw provider a month ago.  At that point he was considered to be hypotensive and they decreased his metoprolol from 75-50.  Last echo 7/16/23 with EF 55%, no regional WMA or any significant valvular disease..  No acute heart failure.  No chest pain symptoms.  He did not realize he should hold his meds when hypovolemic and continue to take all.  Likely contributed above. BP has incr to >100, will retry   -Resuming PTA metoprolol as 12.5 twice daily (recent decrease to 50)  -Stopping PTA HCTZ 12.5, could contribute to hypovolemia, hyponatremia  -Holding PTA torsemide 20 , consider resumption with leg edema  -Holding PTA lisinopril 5, resume outpatient low-dose  -Holding PTA Imdur at present, resume outpatient as tolerated  -Follow-up with cardiology, Call if wt increases 2 # day or 5# over a week.  -Continue ASA 81 mg daily    Paroxysmal atrial fibrillation  8/2022 Cardiac event monitor showed he was in AF.  Started on Eliquis then. Per pharm D, last Eliquis  fill was 4/2023. Per Pharm D 7/2023- not on DOAC as AF not present. BUT, Per cardiology visit 11/2023  rate is controlled. Rhythm is regular presently so he is likely in SR. He needs AC due to [id number] score of 5.  appears should resume PTA eliquis 5 mg twice daily.   -d.w PharmD  -need outpt Vasc or PCP to clarify is should  "be on eliquis, contradictory notes  -see above re PTA Metoprolol,  - on ASA 81    History of remote PE (2010),  1 event -Not anticoagulated     HLD  -Hold PTA Zetia and Rosuvastatin while on obs status, resume on discharge     Hypothyroidism  -Continue PTA Synthroid 175     Anemia, unspecified type  -Per cardiology-likely ACDs. Monitor.     Hx of Gastric Bypass  CT w Prior gastric bypass with jejunojejunostomy in expected position in the left mid abdomen. No obstruction or inflammatory change.  Hx of malabsorption per notes    Clinically Significant Risk Factors Present on Admission        # Hypokalemia: Lowest K = 2.8 mmol/L in last 2 days, will replace as needed         # Drug Induced Platelet Defect: home medication list includes an antiplatelet medication   # Hypertension: Noted on problem list  # Chronic heart failure with preserved ejection fraction: heart failure noted on problem list and last echo with EF >50%     # Overweight: Estimated body mass index is 25.39 kg/m  as calculated from the following:    Height as of this encounter: 1.753 m (5' 9\").    Weight as of this encounter: 78 kg (171 lb 14.4 oz).              Diet: Advance Diet as Tolerated: Regular Diet Adult     DVT Prophylaxis: DOAC   Davis Catheter: Not present  Lines: None     Cardiac Monitoring: ACTIVE order. Indication: Syncope- low cardiac risk (24 hours)  Code Status: Full Code      Disposition Plan       Expected Discharge Date: 11/29/2023        Discharge Comments: PT consult  + orthostatics  BP meds on hold      Entered: Naga Dobson PA-C 11/28/2023, 7:58 AM        The patient's care was discussed with the Attending Physician, Dr. Truong and Patient.    Pt was staffed and discussed in detail w Dr Truong,.who concurs w the above treatment plan.    Time - I spent 50 minutes w greater than 50% spent face to face counseling and including coordination of care    Naga Dobson PA-C  [facility] " Hospital  Securely message with the Vocera Web Console (learn more here)  Text page via [employer] Paging/Directory      Interval History   C/c feels better, improved standing dizziness.     -Patient seen eating lunch.  He states overall he is feeling somewhat better.   Even earlier this morning was still dizzy when standing but now his last systolic was just above 100 and he reports he is no longer having the dizziness with standing. Patient reports that he is feeling a lot better standing now.    He does add a lot more history.  He reports that last week when he had a New febrile GI illness including nausea vomiting with diarrhea that he started to become quite lightheaded anytime he stood up.  He has a home blood pressure cuff and states every time he checked his top number was never above 60.  Despite that he was taking all 5 of his antihypertensives including leading up to this admission.  He states he had multiple falls at home secondary to the dizziness when standing.  Does not have any new trauma or symptoms related to that however.  States no one ever told him in the past to be careful about his antihypertensives when he gets sick or volume deplete.  He is clear that he has not had any GI symptoms for about 4 days now and though he has had low p.o. intake he has not had any nausea.   No current fevers chills no chest pain shortness of breath abdominal symptoms no other new concerns.Patient denies any other new symptoms at this time.     Last visit with vascular was reviewed from earlier in the month.  His metoprolol been decreased at that point due to pt report his BP was low (no BP charted)     Physical Exam   Temp: 98  F (36.7  C) Temp src: Oral BP: 96/53 Pulse: 60   Resp: 18 SpO2: 98 % O2 Device: None (Room air)    Vitals:    11/27/23 1036 11/28/23 0500   Weight: 81.6 kg (180 lb) 78 kg (171 lb 14.4 oz)     Vital Signs with Ranges  Temp:  [97.6  F (36.4  C)-98  F (36.7  C)] 98  F (36.7  C)  Pulse:  [54-75]  60  Resp:  [10-22] 18  BP: ()/(48-78) 96/53  SpO2:  [96 %-100 %] 98 %  I/O last 3 completed shifts:  In: -   Out: 1750 [Urine:1750]    Lines: PIV,   Constitutional: Awake, alert, cooperative, no apparent distress.    ENT: Normocephalic, atraumatic without obvious abnormality, oral dry  Neck: no JVD   Pulmonary: on RA No increased work of breathing, good air exchange, clear to auscultation bilaterally, no crackles or wheezing.  Cardiovascular: Regular rate and rhythm, normal S1 and S2, no murmur noted.  GI: Normal bowel sounds, soft, non-distended, non-tender.  Skin/Integumen: Visualized skin appeared clear.  Neuro: Nonfocal, no obvious droop/lateralizing weakness, .  Psych:  Alert and oriented x 3. Normal affect.  Extremities:  MAEW, No lower extremity edema noted,     Meds:  Medications    sodium chloride Stopped (11/28/23 0600)      aspirin  81 mg Oral Daily    [Held by provider] hydrochlorothiazide  12.5 mg Oral QAM    [Held by provider] isosorbide mononitrate  60 mg Oral Daily    [START ON 11/29/2023] levothyroxine  137 mcg Oral Every Other Day    levothyroxine  150 mcg Oral Every Other Day    [Held by provider] lisinopril  5 mg Oral Daily    [Held by provider] metoprolol succinate ER  50 mg Oral Daily    [Held by provider] torsemide  20 mg Oral Daily       Data reviewed today: I reviewed all new labs and imaging results over the last 24 hours.     Data     Recent Labs   Lab 11/28/23  0736 11/27/23  2135 11/27/23  1051   WBC 4.8  --  5.4   HGB 11.9*  --  13.4   MCV 92  --  90   *  --  163   *  --  131*   POTASSIUM 3.8 3.1* 2.8*   CHLORIDE 97*  --  85*   CO2 30*  --  33*   BUN 16.4  --  30.1*   CR 0.74  --  1.17   ANIONGAP 7  --  13   GUERITA 8.4*  --  8.7*   GLC 97  --  141*   ALBUMIN 3.5  --  4.0   PROTTOTAL 5.9*  --  7.2   BILITOTAL 0.4  --  0.6   ALKPHOS 45  --  54   ALT 16  --  21   AST 23  --  31       Abdomen US, limited (RUQ only) yes 11/27/2023 2:52 PM   PANCREAS: The pancreas is largely  obscured by overlying gas. No ascites.   IMPRESSION: 1.  Prior cholecystectomy without sonographic evidence of biliary obstruction.      CT Abdomen Pelvis w Contrast 11/27/2023 2:38 PM   IMPRESSION: 1.  Stable postsurgical changes without acute abnormality in the abdomen or pelvis. Specifically, no radiographic evidence of acute pancreatitis     CT Head w/o Contrast: 11/27/2023  IMPRESSION:   1. No evidence of acute intracranial hemorrhage, mass, or herniation. 2. Moderate nonspecific white matter changes likely due to chronic microvascular ischemic disease.     Medical Decision Making     The above form was partially completed using Dragon voice dictation software.  At times inadvertent word substitutions or word omissions may occur, not seen on initial proof read.    Also this is intended as a direct peer to peer communication with abbreviations and verbiage that may appear to be 'direct' as to succinctly relay medical opinions.    Should any part of the documentation be unclear, or contradictory, the reader is encouraged to please contact me for clarification.     X Size Of Lesion In Cm (Optional): 0

## 2023-11-28 NOTE — PLAN OF CARE
PRIMARY Concern: Syncope, Fall  SAFETY RISK Concerns (fall risk, behaviors, etc.): Fall Risk      Isolation/Type: n/a  Tests/Procedures for NEXT shift: Pending  Consults? (Pending/following, signed-off?) PT consult  Where is patient from? (Home, TCU, etc.): Home  Other Important info for NEXT shift: K monitor   Anticipated DC date & active delays: 11/28/23  _____________________________________________________________________________  SUMMARY NOTE:              (either paste note here OR complete the info below- RN to choose one- delete info below if not used)  Orientation/Cognitive: A&Ox4  Observation Goals (Met/ Not Met): Not Met  Mobility Level/Assist Equipment: SBA  Antibiotics & Plan (IV/po, length of tx left): n/a  Pain Management: Denies  Tele/VS/O2: VSS on RA except hypotensive  ABNL Lab/BG: Na- 131, Cr- 1.17, K-3.1, replaced with 40Mcq  See chart  Diet: Reg  Bowel/Bladder: Cont. B&B  Skin Concerns: Igor BLE, mild edema to BLE  Drains/Devices: IV infusing NS @ 75ml/hr  Patient Stated Goal for Today: none          Observation goals  PRIOR TO DISCHARGE       Comments: -diagnostic tests and consults completed and resulted- not met  -vital signs normal or at patient baseline- not met  -returns to baseline functional status- not met  Nurse to notify provider when observation goals have been met and patient is ready for discharge.                I have reviewed and confirmed nurses' notes for patient's medications, allergies, medical history, and surgical history.

## 2023-11-28 NOTE — PROVIDER NOTIFICATION
MD Notification    Notified Person: MD    Notified Person Name: Dr. Cowart     Notification Date/Time: 11/27/23 2145    Notification Interaction: myV[employer] Messaging    Purpose of Notification: FYI pt positive for orthostatics.     Orders Received: Verbal order for compression stockings and keep IV fluids.    Comments:

## 2023-11-28 NOTE — PROGRESS NOTES
"   11/28/23 1100   Appointment Info   Signing Clinician's Name / Credentials (PT) Corbin Elizalde DPT   Quick Adds   Quick Adds Certification       Present no   Living Environment   People in Home alone   Current Living Arrangements house   Home Accessibility stairs to enter home   Number of Stairs, Main Entrance 2   Stair Railings, Main Entrance railing on right side (ascending)   Transportation Anticipated family or friend will provide   Living Environment Comments Pt lives in a house alone. Stairs to enter. Pt reports a family member will pick him up at discharge. Pt plans on returning home alone but has assist if needed.   Self-Care   Usual Activity Tolerance good   Current Activity Tolerance good   Regular Exercise No   Equipment Currently Used at Home none   Fall history within last six months yes   Number of times patient has fallen within last six months 4   Activity/Exercise/Self-Care Comment Pt reports being IND at baseline with all ADLs. Pt ambulates w/o an AD at baseline but has a FWW if needed. Pt drives. Pt works as a .   General Information   Onset of Illness/Injury or Date of Surgery 11/28/23   Referring Physician Codi Madrigal PA-C   Patient/Family Therapy Goals Statement (PT) \"To go home\"   Pertinent History of Current Problem (include personal factors and/or comorbidities that impact the POC) Per Chart: Ramiro Jeffers is a 63 year old male with PMH of CAD s/p PCI, chronic HFpEF, paroxysmal afib, hx remote PE, hypothyroidism who presented to the ED on 11/27/23 with recurrent syncope in the setting of volume depletion from recent nausea, vomiting, upper abdominal pain, diarrhea while on multiple antihypertensives.   Existing Precautions/Restrictions fall   Weight-Bearing Status - LLE full weight-bearing   Weight-Bearing Status - RLE full weight-bearing   Cognition   Orientation Status (Cognition) oriented x 4   Pain Assessment   Patient Currently in Pain No "   Integumentary/Edema   Integumentary/Edema Comments 1+ BLE edema; compression socks donned   Posture    Posture Forward head position;Protracted shoulders   Range of Motion (ROM)   Range of Motion ROM is WFL   Strength (Manual Muscle Testing)   Strength (Manual Muscle Testing) strength is WFL;Able to perform R SLR;Able to perform L SLR   Bed Mobility   Comment, (Bed Mobility) Supine>Sit w/ IND   Transfers   Comment, (Transfers) Sit>stand w/o AD and SBA   Gait/Stairs (Locomotion)   [place] Level (Gait) supervision   Assistive Device (Gait)   (no AD)   Distance in Feet (Gait) 5'   Balance   Balance Comments Adequate static sitting balance; pt ambulating w/o an AD   Sensory Examination   Sensory Perception patient reports no sensory changes   Clinical Impression   Criteria for Skilled Therapeutic Intervention Yes, treatment indicated   PT Diagnosis (PT) Impaired gait   Influenced by the following impairments Decreased balance   Functional limitations due to impairments Impaired functional mobility   Clinical Presentation (PT Evaluation Complexity) stable   Clinical Presentation Rationale Clinical judgement   Clinical Decision Making (Complexity) low complexity   Planned Therapy Interventions (PT) balance training;bed mobility training;gait training;patient/family education;stair training;strengthening;transfer training;progressive activity/exercise   Risk & Benefits of therapy have been explained evaluation/treatment results reviewed;care plan/treatment goals reviewed;risks/benefits reviewed;current/potential barriers reviewed;participants included;participants voiced agreement with care plan;patient   PT Total Evaluation Time   PT Eval, Low Complexity Minutes (75623) 10   Therapy Certification   Start of care date 11/28/23   Certification date from 11/28/23   Certification date to 12/03/23   Medical Diagnosis Syncope   Physical Therapy Goals   PT Frequency One time eval and treatment only   PT Predicted  Duration/Target Date for Goal Attainment 12/03/23   PT Goals Bed Mobility;Transfers;Gait;Stairs   PT: Bed Mobility Supervision/stand-by assist;Supine to/from sit;Goal Met   PT: Transfers Supervision/stand-by assist;Sit to/from stand;Goal Met   PT: Gait Supervision/stand-by assist;150 feet;Completed   PT: Stairs Supervision/stand-by assist;2 stairs;Rail on right;Goal Met   Interventions   Interventions Quick Adds Gait Training;Therapeutic Activity   Therapeutic Activity   Therapeutic Activities: dynamic activities to improve functional performance Minutes (70228) 13   Symptoms Noted During/After Treatment None   Treatment Detail/Skilled Intervention Greeted pt supine in bed, agreed to PT. VSS on RA throughout session. BP taken, pt negative for orthostatic hypotension, see VS flowsheet for details. Pt performed supine>sit w/ IND. Once in sitting, pt able to scoot self to EOB and sit unsupported wtihout LOB. Pt able to don/doff shoes without assist. Pt performed sit>stand x 6 w/o AD and SBA, verbal cues for hand placement. Pt ambulated to the bathroom, pt able to perform hygiene without assist. After ambulation, pt returned to supine in bed w/ IND, demonstrating ability to safely lift BLEs back into bed and reposition self. Pt ended session supine in bed, with all needs met and call light within reach.   Gait Training   Gait Training Minutes (72548) 17   Symptoms Noted During/After Treatment (Gait Training) none   Treatment Detail/Skilled Intervention Pt ambulated w/o AD and SBA. Pt ambulated with decreased gait speed, downward gaze, and mild path devations. Verbal cues for upright gaze and posture, and for pacing. PT introduced dynamic balance challenges during ambulation including vertical/horizontal head turns, changes in gait speed, sidestepping, retroambulation, ambulation with eyes closed, and tandem walking. Mild unsteadiness but no LOB. Pt negotiated 4 stairs continuously using R rail and SBA. PT provided  visual demonstration for safe stair negotiation. Pt negotiated with a step-to pattern. No LOB noted.   Distance in Feet 400'   PT Discharge Planning   PT Plan DC   PT Discharge Recommendation (DC Rec) home;home with assist   PT Rationale for DC Rec Pt appears at/near baseline for functional mobility. Pt demonstrates safe and effective techiques with all transfers, ambulation, and stairs w/o AD. Anticipate pt will be able to return home at discharge safely.   PT Brief overview of current status SBA w/o AD   Total Session Time   Timed Code Treatment Minutes 30   Total Session Time (sum of timed and untimed services) 40     [facility]  OUTPATIENT PHYSICAL THERAPY EVALUATION  PLAN OF TREATMENT FOR OUTPATIENT REHABILITATION  (COMPLETE FOR INITIAL CLAIMS ONLY)  Patient's Last Name, First Name, M.I.  YOB: 1960  Ramiro Jeffers                        Provider's Name  [facility] Medical Record No.  [id number]                             Onset Date:  11/28/23   Start of Care Date:  11/28/23   Type:     _X_PT   ___OT   ___SLP Medical Diagnosis:  Syncope              PT Diagnosis:  Impaired gait Visits from SOC:  1     See note for plan of treatment, functional goals and certification details    I CERTIFY THE NEED FOR THESE SERVICES FURNISHED UNDER        THIS PLAN OF TREATMENT AND WHILE UNDER MY CARE     (Physician co-signature of this document indicates review and certification of the therapy plan).

## 2023-11-28 NOTE — PLAN OF CARE
Goal Outcome Evaluation:    Observation goals  PRIOR TO DISCHARGE        Comments: -diagnostic tests and consults completed and resulted- not met  -vital signs normal or at patient baseline- not met  -returns to baseline functional status- not met  Nurse to notify provider when observation goals have been met and patient is ready for discharge.

## 2023-11-28 NOTE — PROGRESS NOTES
Introduce self to patient, patient was pleasant and allowing medication and assessment to be done at this time.   Patient denies pain and states feeling better at this time.   Patient had breakfast tray in room, and has call light within reach.     Yanna Hernandez on 11/28/2023 at 10:04 AM

## 2023-11-29 VITALS
HEIGHT: 69 IN | DIASTOLIC BLOOD PRESSURE: 68 MMHG | WEIGHT: 173.5 LBS | SYSTOLIC BLOOD PRESSURE: 103 MMHG | RESPIRATION RATE: 18 BRPM | HEART RATE: 71 BPM | TEMPERATURE: 97.8 F | BODY MASS INDEX: 25.7 KG/M2 | OXYGEN SATURATION: 95 %

## 2023-11-29 PROBLEM — J06.9 VIRAL URI WITH COUGH: Status: ACTIVE | Noted: 2023-11-29

## 2023-11-29 PROCEDURE — 93010 ELECTROCARDIOGRAM REPORT: CPT | Performed by: INTERNAL MEDICINE

## 2023-11-29 PROCEDURE — 99239 HOSP IP/OBS DSCHRG MGMT >30: CPT | Performed by: PHYSICIAN ASSISTANT

## 2023-11-29 PROCEDURE — 93005 ELECTROCARDIOGRAM TRACING: CPT

## 2023-11-29 PROCEDURE — 250N000013 HC RX MED GY IP 250 OP 250 PS 637: Performed by: PHYSICIAN ASSISTANT

## 2023-11-29 PROCEDURE — G0378 HOSPITAL OBSERVATION PER HR: HCPCS

## 2023-11-29 RX ORDER — METOPROLOL SUCCINATE 50 MG/1
50 TABLET, EXTENDED RELEASE ORAL DAILY
Start: 2023-11-29

## 2023-11-29 RX ADMIN — LEVOTHYROXINE SODIUM 137 MCG: 137 TABLET ORAL at 06:04

## 2023-11-29 RX ADMIN — ASPIRIN 81 MG CHEWABLE TABLET 81 MG: 81 TABLET CHEWABLE at 09:37

## 2023-11-29 ASSESSMENT — ACTIVITIES OF DAILY LIVING (ADL)
ADLS_ACUITY_SCORE: 20

## 2023-11-29 NOTE — PROGRESS NOTES
Observation goals  PRIOR TO DISCHARGE        Comments: -diagnostic tests and consults completed and resulted Not Met  -vital signs normal or at patient baseline In Progress  -returns to baseline functional status Met  Nurse to notify provider when observation goals have been met and patient is ready for discharge.

## 2023-11-29 NOTE — TELEPHONE ENCOUNTER
[facility]    Who is the name of the provider?:  MAGED SEVILLA   What is the location you see this provider at/preferred location?: [place]  Person calling / Facility: Ramiro Jeffers  Phone number:  [phone] or [phone]   Nurse call back needed:  Yes     Reason for call:  He went to ER Friday --Just released from hospital today- he wants to give a heads up - he wants to speak to a nurse about blood pressure and adding back in meds before he travels-    Pharmacy location:     Outside Imaging: n/a   Can we leave a detailed message on this number?  YES

## 2023-11-29 NOTE — TELEPHONE ENCOUNTER
Per discharge instructions:    While you are hospitalized your blood pressures were noted to be lower than normal.  This required stopping ALL of your prior antihypertensive blood pressure lowering medications.    At this time you will have to be careful and slow in reinstituting them.    Use a home blood pressure cuff to check BP twice a day.  Write down those numbers and keep available for primary care.  In general resume half doses of medication (taken in the morning), checking BP twice a day, once systolic blood pressures (top number) are consistently above 120 with each med resumed you can then add the next med.    In the following order:   -Metoprolol-resume at half tab once a day, then as BP> 120, can increase to full tab once a day  -Isosorbide/Imdur can be resumed as 1 tab a day, if BP ok, increase to 2 tabs a day  -Lisinopril - move to take as a BEDTIME/evening dose- resume as half tab, then to 1 tab  -Torsemide-in 3 days (sooner if onset of ankle swelling) resume half tab once a day, then moved to full tab a day  -Hydrochlorothiazide should be STOPPED, it is also a water pill and not needed at this time.      Will call patient tomorrow morning.

## 2023-11-29 NOTE — DISCHARGE SUMMARY
[facility]  Hospitalist Discharge Summary     Name: Ramiro WILKERSON Chose  Admit Date:  11/27/2023  Discharge Date:     11/29/2023  Discharging Provider: Naga Dobson PA-C    PRIMARY CARE PROVIDER:Addison Davis     BRIEF HISTORY OF PRESENT ILLNESS:     63 year old male, w a PMH ofCAD s/p PCI, chronic HFpEF, paroxysmal afib, hx remote PE, hypothyroidism , who was admitted on 11/27/2023 with a c/c of syncope in the sitting of recent volume depletion with nausea, vomiting, upper abdominal pain, diarrhea       DISCHARGE DIAGNOSES:  Principal Problem:    Hypotension, iatrogenic  Active Problems:    Coronary artery disease involving native heart with angina pectoris, unspecified vessel or lesion type (H24)    Hypokalemia    Congestive heart failure, unspecified HF chronicity, unspecified heart failure type (H)    Severe dehydration    Traumatic injury of head, initial encounter    Syncope, unspecified syncope type    History of gastric bypass    Viral URI with cough    Consultations This Hospital Stay   PHYSICAL THERAPY ADULT IP CONSULT    PENDING RESULTS:    Unresulted Labs Ordered in the Past 30 Days of this Admission       No orders found from 10/28/2023 to 11/28/2023.           HOSPITAL COURSE:   Hypovolmic Hypotension, improved. Multifactorial  Syncope w dehydration, Recurrent falls  Patient with febrile illness nausea vomiting diarrhea last week, still taking all 5 PTA antihypertensive (HCTZ, torsemide, metoprolol, lisinopril, Imdur), w pt complaining of severe dizziness when he stood up. Pt reports home BPs 60s systolic per his report, presented after syncope multiple falls.  Main dx was marked hypotension w syncope, mainly hypovolemic as consequence of N/V/D (resolved) plus continuing PTA antihypertensives/diuretic (when he should have had a brief interruption),  ED SBP 80s on arrival, dizzy with standing, clinically hypovolemic and orthostatics are positive.  Treated with held  meds and IVF-  NS x 2 L in ED, --> eating well, to push PO fluids (caution w HF hx). Not likely heart failure related especially because he was still taking all of his meds,  CT head WNL.11/28-orthostatic VS w/o 20pt drop, but low w 85/52 sitting, push PO fluids now 11/29-Asx, but not able to start his any PTA rx or metoprolol yet as SBP below 105, but felt fine without dizziness and asked to go home.  We will discharge him .  We will have him resume his medications in a slow manner.  He has a home blood pressure cuff he will check twice a day.  As blood pressure remains above 110 consistently he can slowly resume meds, likely taking a few days to get all 5 of them all back on board.  I gave him detailed instructions on the AVS.  Will start with his metoprolol at half a tab, increase to 1 tab as tolerated.  Then reintroduce with Imdur next.  Torsemide third at half dose resume when tolerated. Next will resume and move his lisinopril to evening dosing - started half tab.  Stopping HCTZ due to concerns noted below, duplicate diuresis, with suspected contributor to hyponatremia, hypovolemia, hypokalemia, and possible pancreatitis.  He will continue to check his blood pressure at home, if he is running atypically low with systolics below 100 he should not take any meds until he can get clarification from either vascular or primary care.  Likewise caution w PTA rx if future diarrheal or volume depleting illness.  Understands to return to ED should he have any recurrent symptoms.  See primary care in 7 to 10 days with additional med management.  recheck BMP outpt     Acute kidney injury , resolved  Severe dehydration, resolved  Patient with severe nausea vomiting diarrhea last week, since resolved but still with low p.o. intake and taking PTA HCTZ and torsemide.  On arrival elevated creatinine 1.17, baseline ~0.8 . *S/p IV NS x 2 L in the ED-now p.o11/28 after IVF creatinine improved to 0.74 11/28, able to switch to p.o.  fluid. -Avoid nephrotoxic medications - Stopping PTA HCTZ, as above, careful resumption of torsemide.-Educated about careful management of antihypertensives and future illness.     Recent Acute Nausea Vomiting Diarrhea- resolved  Patient reports last week he had onset of severe nausea vomiting diarrhea.  He reports he was febrile at that time.   pancreatitis? ? Norovirus, ? Food,This led to hypovolemia w n/v/d, GI sx however had fully resolved at home about 4 days ago and has been able to eat.  Although somewhat poor intake since contributing to hypovolemia and above sx.  He has no abdominal pain.  Elevated lipase raises question of pancreatitis related? Reg diet was tolerated -Treatment for dehydration, w multiple meds held as above.  -No Antiemetics as needed     Hypokalemia, resolved  admit K 2.8 in the ED, History of hypokalemia, with severe nausea vomiting volume depletion as noted above, plus contribution from PTA diuretics torsemide and HCTZ,  11/28-potassium is improved to 3.8.  Stopping IVF.  -high replacement protocol here, on d.c resume PTA p.o. supplement-Diuretic adjustments as above- recheck BMP outpt    Hyponatremia, Resolved. Possibly thiazide related versus vomiting.    Sodium 131, hypovolemic hyponatremia as above. S/p IV NS x 2 L in the ED.  1128-sodium stable 134 status post IVF.  Will push p.o. fluids here now. -STOPPED PTA HCTZ as likely contributor to low NA, stop completely.  Resume torsemide at home.  Recheck BMP with primary care, PCP reeval.    Elevated lipase, resolved, R/o thiazide related pancreatitis  Hx Prior cholecystectomy. Had above prior nausea vomiting diarrhea and on HCTZ, Elevtaed Lipase 206 in the ED, LFTs are WNL  Denies alcohol use.  Contributor could be from his HCTZ.. Triglycerides noted to be 52 on 10/27/23, CT A/P NAD with no radiographic evidence of acute pancreatitis, US RUQ also unremarkable 11/28-lipase 26 has normalized without treatment.-STOPPED PTA HCTZ as  above-Continues to advance diet w/o issues     HTN w (suspected iatrogenic) hypotension  Chronic HFpEF  CAD s/p PCI  Admitted 7/2023 with acute heart failure, since then he has been on torsemide 20 daily plus HCTZ.  Last saw provider a month ago.  At that point he was considered to be hypotensive and they decreased his metoprolol from 75-50.  Last echo 7/16/23 with EF 55%, no regional WMA or any significant valvular disease..  No acute heart failure.  No chest pain symptoms.  He did not realize he should hold his meds when hypovolemic and continues to take all.  Likely contributed above. BP has incr to >100.  Had no evidence of acute heart failure exacerbation.  No ankle edema with volume depletion, off of diuretics   See above regarding slow reintroduction of PTA meds including: metoprolol 50, torsemide 20, lisinopril 5 (moved at bedtime), Imdur 60, ASA 81, and stopping PTA HCTZ.-Follow-up with PCP within 1 week, d/c (dry wgt) 78.7 kg (173 lb 8 oz)  as of 11/29/2023 . Call if wt increases 2 # day or 5# over a week.     Hx of PACs, (inaccurate dx of Paroxysmal atrial fibrillation)  8/2022 Cardiac event monitor showed he was in AF.  Started on Eliquis then. Per pharm D, last Eliquis fill was 4/2023.   Per cardiology notes from last hospitalization, question of atrial fibrillation on event monitor from last August, I reviewed the strips and also reviewed with Dr. Avendaño over read the event monitor.  Cording to his report, there was no A-fib and now he reviewed the tracings again and tells me that only PACs and atrial run.  I discussed that with Dr. Davis and we will now stop the Eliquis as atrial fibrillation is not likely especially sustained.  Continue baby aspirin.   Per Dr Maki/ visit 11/2023 rate is controlled. Rhythm is regular presently so he is likely in SR. He needs AC due to [id number] score of 5.    -d.w PharmD**Not clear if he should restart PTA eliquis 5 mg twice daily.**   -need outpt w PCP to clarify if  "should be restarted on eliquis, see above notes -see above re PTA Metoprolol, - on ASA 81     History of remote PE (2010),  lifetime 1 event -Not anticoagulated     HLD-held PTA Zetia and Rosuvastatin on obs status, resume on discharge     Hypothyroidism-Continue PTA Synthroid 175, TSH recheck pending.     Anemia, unspecified type-Per cardiology-likely ACDs. PCP recheck.      Hx of Gastric Bypass- CT w Prior gastric bypass with jejunojejunostomy in expected position in the left mid abdomen. No obstruction or inflammatory change.  Hx of malabsorption per notes.  Patient reports stable.    Viral URI with cough-patient reporting mild cough and sinus congestion with frontal sinus pressure especially when he leans over.  Present for about a week.  Doubt COVID.  Mild symptoms.  We talked about adding in as needed Mucinex.  He could even use Sudafed noting that he could tolerate the increase in blood pressure. Prn robitussin (dext) if the cough became bothersome but it really does not sound like it.  Discouraged ABX as viral is most likely.  We did not do any imaging.  No significant respiratory symptoms.  Follow-up with primary care    -  Clinically Significant Risk Factors Present on Admission        # Hypokalemia: Lowest K = 2.8 mmol/L in last 2 days, will replace as needed   # Hypocalcemia: Lowest Ca = 8.4 mg/dL in last 2 days, will monitor and replace as appropriate       # Drug Induced Platelet Defect: home medication list includes an antiplatelet medication   # Hypertension: Noted on problem list  # Chronic heart failure with preserved ejection fraction: heart failure noted on problem list and last echo with EF >50%     # Overweight: Estimated body mass index is 25.62 kg/m  as calculated from the following:    Height as of this encounter: 1.753 m (5' 9\").    Weight as of this encounter: 78.7 kg (173 lb 8 oz).                DISCHARGE MEDICATIONS:       Review of your medicines        START taking        Dose / " Directions   aspirin 81 MG chewable tablet  Commonly known as: ASA  Used for: Congestive heart failure, unspecified HF chronicity, unspecified heart failure type (H)      Dose: 81 mg  Take 1 tablet (81 mg) by mouth daily  Quantity: 30 tablet  Refills: 1            CONTINUE these medicines which may have CHANGED, or have new prescriptions. If we are uncertain of the size of tablets/capsules you have at home, strength may be listed as something that might have changed.        Dose / Directions   metoprolol succinate ER 50 MG 24 hr tablet  Commonly known as: TOPROL XL  This may have changed: additional instructions  Used for: Coronary artery disease of native artery of native heart with stable angina pectoris (H24)      Dose: 50 mg  Take 1 tablet (50 mg) by mouth daily Hold for BP < 100  Refills: 0            CONTINUE these medicines which have NOT CHANGED        Dose / Directions   ezetimibe 10 MG tablet  Commonly known as: ZETIA  Used for: Hyperlipidemia LDL goal <70      Dose: 10 mg  Take 1 tablet (10 mg) by mouth daily  Quantity: 90 tablet  Refills: 0     isosorbide mononitrate 30 MG 24 hr tablet  Commonly known as: IMDUR  Used for: Essential hypertension, Coronary artery disease of native artery of native heart with stable angina pectoris (H24)      Dose: 60 mg  Take 2 tablets (60 mg) by mouth daily  Quantity: 180 tablet  Refills: 3     levothyroxine 137 MCG tablet  Commonly known as: SYNTHROID/LEVOTHROID  Used for: Hypothyroidism due to Hashimoto's thyroiditis      Take 137 mcg every other day alternating with 150 mcg PO alternating every other day.  Quantity: 45 tablet  Refills: 3     lisinopril 5 MG tablet  Commonly known as: ZESTRIL      Dose: 5 mg  Take 1 tablet (5 mg) by mouth daily  Quantity: 90 tablet  Refills: 3     nitroGLYcerin 0.4 MG sublingual tablet  Commonly known as: NITROSTAT      For chest pain place 1 tablet under the tongue every 5 minutes for 3 doses. If symptoms persist 5 minutes after 1st  dose call 911.  Quantity: 25 tablet  Refills: 3     rosuvastatin 40 MG tablet  Commonly known as: CRESTOR  Used for: Hyperlipidemia LDL goal <70      Dose: 40 mg  Take 1 tablet (40 mg) by mouth daily  Quantity: 90 tablet  Refills: 3     torsemide 20 MG tablet  Commonly known as: DEMADEX  Used for: Congestive heart failure, unspecified HF chronicity, unspecified heart failure type (H)      Dose: 20 mg  Take 1 tablet (20 mg) by mouth daily  Quantity: 90 tablet  Refills: 3     vitamin C 500 MG Chew      Dose: 1 chew tab  Take 1 chew tab by mouth daily  Refills: 0     Vitamin D3 125 MCG (5000 UT) tablet  Commonly known as: CHOLECALCIFEROL      Dose: 1 tablet  Take 1 tablet by mouth daily  Refills: 0            STOP taking      hydrochlorothiazide 25 MG tablet  Commonly known as: HYDRODIURIL                ALLERGIES:    Allergies   Allergen Reactions    No Known Allergies           Reason for your hospital stay    Fainting/low blood pressure, from recent GI illness and antihypertensive medications.     Follow-up and recommended labs and tests     Follow up with primary care provider, Addison Davis, within 7-10 days to evaluate medication change and for hospital follow- up.  No follow up labs or test are needed.     Activity    Your activity upon discharge: activity as tolerated     Discharge Instructions    While you are hospitalized your blood pressures were noted to be lower than normal.  This required stopping ALL of your prior antihypertensive blood pressure lowering medications.    At this time you will have to be careful and slow in reinstituting them.    Use a home blood pressure cuff to check BP twice a day.  Write down those numbers and keep available for primary care.  In general resume half doses of medication (taken in the morning), checking BP twice a day, once systolic blood pressures (top number) are consistently above 120 with each med resumed you can then add the next med.    In the following  order:   -Metoprolol-resume at half tab once a day, then as BP> 120, can increase to full tab once a day  -Isosorbide/Imdur can be resumed as 1 tab a day, if BP ok, increase to 2 tabs a day  -Lisinopril - move to take as a BEDTIME/evening dose- resume as half tab, then to 1 tab  -Torsemide-in 3 days (sooner if onset of ankle swelling) resume half tab once a day, then moved to full tab a day  -Hydrochlorothiazide should be STOPPED, it is also a water pill and not needed at this time.    -See primary care in 7 days, get new labs    -If your blood pressures are still running persistently low with the top number under 100, you should not take any of your blood pressure medicines, or risk possibility of fainting again, Call primary care if questions.    -In the future if you had a diarrheal illness or lose a lot of water and your blood pressures are low and causing you to be dizzy should not take your blood pressure medicines until you talk with either primary care or your vascular surgeon    - See Primary care to reeval dosing. Can also call if far too low (BP<100 ) or BP too high >160     Discharge Instructions    Discharge (dry wgt) 78.7 kg (173 lb 8 oz) on 11/29/2023    Common symptoms associated with worsening Heart Failure (HF) include:   Sudden weight gain -o TWO or more pounds in one day AND/OR  o Five or more pounds in one week     Shortness of breath not related to exercise/exertion   Increase swelling in legs, ankles and/or feet   Trouble sleeping (using more pillows, waking up short of breath)   Frequent dry, hacking cough   Increased fatigue     Call vascular or primary care if these occur     Diet    Follow this diet upon discharge: Orders Placed This Encounter-coronary/low-salt diet.       DISPOSITION:      Discharged to home  Condition at discharge: Stable     LABORATORY , IMAGING AND PROCEDURES:   Recent Labs   Lab Test 11/28/23  0736 11/27/23  2135 11/27/23  1051 10/27/23  0820   WBC 4.8  --  5.4 6.5  "  HGB 11.9*  --  13.4 12.8*   MCV 92  --  90 92   *  --  163 155   *  --  131* 134*   POTASSIUM 3.8 3.1* 2.8* 3.5   CHLORIDE 97*  --  85* 94*   CO2 30*  --  33* 31*   ANIONGAP 7  --  13 9   GLC 97  --  141* 91   BUN 16.4  --  30.1* 25.2*   CR 0.74  --  1.17 0.76   GFRESTIMATED >90  --  70 >90   GFRESTBLACK  --   --   --   --    GUERITA 8.4*  --  8.7* 9.1   MAG  --   --  2.2 2.3   AST 23  --  31 33   ALT 16  --  21 20   ALKPHOS 45  --  54 58   PROTTOTAL 5.9*  --  7.2 6.9   ALBUMIN 3.5  --  4.0 4.2   BILITOTAL 0.4  --  0.6 0.6   INR  --   --   --   --    LACT  --   --  1.1  --     < > = values in this interval not displayed.        Imaging:  Abdomen US, limited (RUQ only) yes 11/27/2023 2:52 PM   PANCREAS: The pancreas is largely obscured by overlying gas   IMPRESSION: 1.  Prior cholecystectomy without sonographic evidence of biliary obstruction.       CT Abdomen Pelvis w Contrast 11/27/2023 2:38 PM   IMPRESSION: 1.  Stable postsurgical changes without acute abnormality in the abdomen or pelvis. Specifically, no radiographic evidence of acute pancreatitis      CT Head w/o Contrast: 11/27/2023  IMPRESSION:   1. No evidence of acute intracranial hemorrhage, mass, or herniation. 2. Moderate nonspecific white matter changes likely due to chronic microvascular ischemic disease.       PHYSICAL EXAMINATION ON DAY OF DISCHARGE   /61 (BP Location: Left arm, Patient Position: Semi-Jean's, Cuff Size: Adult Regular)   Pulse 61   Temp 98  F (36.7  C) (Oral)   Resp 18   Ht 1.753 m (5' 9\")   Wt 78.7 kg (173 lb 8 oz)   SpO2 97%   BMI 25.62 kg/m    Vitals:    11/28/23 1602 11/28/23 1939 11/28/23 2029 11/29/23 0356   BP:  100/61 97/57 106/61   BP Location:  Left arm Left arm Left arm   Patient Position:   Semi-Jean's Semi-Jean's   Cuff Size:   Adult Regular Adult Regular   Pulse:  73 63 61   Resp:  18  18   Temp: 98.1  F (36.7  C) 98  F (36.7  C)  98  F (36.7  C)   TempSrc:  Oral  Oral   SpO2: 100% 98%  97% "   Weight:    78.7 kg (173 lb 8 oz)   Height:          Wt Readings from Last 2 Encounters:   11/29/23 78.7 kg (173 lb 8 oz)   07/20/23 80.8 kg (178 lb 3.2 oz)     Constitutional: Awake, alert, cooperative, no apparent distress.    ENT: Normocephalic, atraumatic without obvious abnormality, oral dry  Neck: no JVD   Pulmonary: on RA No increased work of breathing, good air exchange, clear to auscultation bilaterally, no crackles or wheezing.  Cardiovascular: Regular rate and rhythm, normal S1 and S2, no murmur noted.  GI: Normal bowel sounds, soft, non-distended, non-tender.  Skin/Integumen: Visualized skin appeared clear.  Neuro: Nonfocal, no obvious droop/lateralizing weakness, .  Psych:  Alert and oriented x 3. Normal affect.  Extremities:  MAEW, No lower extremity edema noted,    The patient was discussed with Dr. Truong who agrees with the above plan and discharge at this time.    It was a pleasure taking care of this patient.  If you have any questions, please contact me for clarification.    Please note: This was partially completed using Dragon voice dictation software.  It was proofread, but may nonetheless have unintended substitutions, incorrect words or phrases. Should any part of the documentation be unclear, or otherwise contradictory, reader encouraged to please contact me for clarification. Also this is intended as a direct peer to peer communication with abbreviations and verbiage that may appear to be 'direct' as to succinctly relay medical opinions.  If there are any questions regarding content, please contact me for clarification.    TOTAL DISCHARGE TIME:  I, Naga Dobson PA-C, personally saw the patient today and spent greater than 30 minutes discharging this patient.W prolonged pt education re dx, tx, rx resumption, and followup.     Naga Dobson PA-C  [facility]

## 2023-11-29 NOTE — PROGRESS NOTES
Observation goals  PRIOR TO DISCHARGE        Comments: -diagnostic tests and consults completed and resulted Not Met  -vital signs normal or at patient baseline Not Met: hypotensive  -returns to baseline functional status Met  Nurse to notify provider when observation goals have been met and patient is ready for discharge.

## 2023-11-29 NOTE — PLAN OF CARE
PRIMARY Concern: syncope  SAFETY RISK Concerns (fall risk, behaviors, etc.): none      Isolation/Type: n/a  Tests/Procedures for NEXT shift: n/a  Consults? (Pending/following, signed-off?) none  Where is patient from? (Home, TCU, etc.): home  Other Important info for NEXT shift: discharge today  Anticipated DC date & active delays: 11/29/23   _____________________________________________________________________________  SUMMARY NOTE:  Orientation/Cognitive: A&Ox4  Observation Goals (Met/ Not Met): Met  Mobility Level/Assist Equipment: Independent  Antibiotics & Plan (IV/po, length of tx left): n/a  Pain Management: denies pain  Tele/VS/O2: Tele is NSR, BP soft  ABNL Lab/BG: no new labs today  Diet: tolerating a regular diet  Bowel/Bladder: WDL  Skin Concerns: trace edema in BLE, compression stockings on  Drains/Devices: PIV SL  Patient Stated Goal for Today: go home

## 2023-11-29 NOTE — PLAN OF CARE
Goal Outcome Evaluation:    PRIMARY Concern: Syncope  SAFETY RISK Concerns (fall risk, behaviors, etc.): Fall risk      Isolation/Type: n/a  Tests/Procedures for NEXT shift: none  Consults? (Pending/following, signed-off?) PT following  Where is patient from? (Home, TCU, etc.): home  Other Important info for NEXT shift: monitor orthostatic hypotension & K+  Anticipated DC date & active delays: Today  _____________________________________________________________________________    SUMMARY NOTE:                Orientation/Cognitive: A&Ox4  Observation Goals (Met/ Not Met): not met  Mobility Level/Assist Equipment: SBA/Ind  Antibiotics & Plan (IV/po, length of tx left): n/a  Pain Management: Denies pain  Tele/VS/O2: VSS on RA. Tele was Sinus shilo  ABNL Lab/BG: Na, hematocrit, calcium  Diet: Regular  Bowel/Bladder: Continent  Skin Concerns: Edema to BLE with wrap on  Drains/Devices: PIV SL  Patient Stated Goal for Today: to go home

## 2023-11-30 ENCOUNTER — PATIENT OUTREACH (OUTPATIENT)
Dept: CARE COORDINATION | Facility: CLINIC | Age: 63
End: 2023-11-30

## 2023-11-30 ENCOUNTER — VIRTUAL VISIT (OUTPATIENT)
Dept: OTHER | Facility: CLINIC | Age: 63
End: 2023-11-30
Attending: INTERNAL MEDICINE
Payer: COMMERCIAL

## 2023-11-30 DIAGNOSIS — E78.5 HYPERLIPIDEMIA LDL GOAL <70: Primary | ICD-10-CM

## 2023-11-30 DIAGNOSIS — R73.01 IFG (IMPAIRED FASTING GLUCOSE): ICD-10-CM

## 2023-11-30 DIAGNOSIS — I10 ESSENTIAL HYPERTENSION: ICD-10-CM

## 2023-11-30 DIAGNOSIS — E06.3 HYPOTHYROIDISM DUE TO HASHIMOTO'S THYROIDITIS: ICD-10-CM

## 2023-11-30 DIAGNOSIS — I50.30 HEART FAILURE WITH PRESERVED EJECTION FRACTION, NYHA CLASS I (H): ICD-10-CM

## 2023-11-30 DIAGNOSIS — N17.9 AKI (ACUTE KIDNEY INJURY) (H): ICD-10-CM

## 2023-11-30 LAB
ATRIAL RATE - MUSE: 58 BPM
DIASTOLIC BLOOD PRESSURE - MUSE: NORMAL MMHG
INTERPRETATION ECG - MUSE: NORMAL
P AXIS - MUSE: 59 DEGREES
PR INTERVAL - MUSE: 226 MS
QRS DURATION - MUSE: 124 MS
QT - MUSE: 450 MS
QTC - MUSE: 441 MS
R AXIS - MUSE: -20 DEGREES
SYSTOLIC BLOOD PRESSURE - MUSE: NORMAL MMHG
T AXIS - MUSE: -12 DEGREES
VENTRICULAR RATE- MUSE: 58 BPM

## 2023-11-30 PROCEDURE — 99443 PR PHYSICIAN TELEPHONE EVALUATION 21-30 MIN: CPT | Mod: 95 | Performed by: INTERNAL MEDICINE

## 2023-11-30 NOTE — PROGRESS NOTES
Patient would like to discuss hospital Discharge Instructions for blood pressure medications:     While you are hospitalized your blood pressures were noted to be lower than normal.  This required stopping ALL of your prior antihypertensive blood pressure lowering medications.    At this time you will have to be careful and slow in reinstituting them.    Use a home blood pressure cuff to check BP twice a day.  Write down those numbers and keep available for primary care.  In general resume half doses of medication (taken in the morning), checking BP twice a day, once systolic blood pressures (top number) are consistently above 120 with each med resumed you can then add the next med.    In the following order:   -Metoprolol-resume at half tab once a day, then as BP> 120, can increase to full tab once a day  -Isosorbide/Imdur can be resumed as 1 tab a day, if BP ok, increase to 2 tabs a day  -Lisinopril - move to take as a BEDTIME/evening dose- resume as half tab, then to 1 tab  -Torsemide-in 3 days (sooner if onset of ankle swelling) resume half tab once a day, then moved to full tab a day  -Hydrochlorothiazide should be STOPPED, it is also a water pill and not needed at this time.

## 2023-11-30 NOTE — PROGRESS NOTES
Ramiro Jeffers is a 63 year old male who is presenting at the current time to discuss his diagnosi(es) of         Heart failure with preserved ejection fraction, NYHA class I (H)     HPI: Ramiro Jeffers is a 63 year old formerly morbidly obese male (current BMI 26.32)  With h/o single lifetime VTE presenting s PE with mislabel of PAF on event monitor causding prolonged AC who recently stopped AC at cardiology's direction during his recent hospitalization for HFpEF. He presented with a 30# weight gain and ansarca. He was diuresed off. This was not felt to be ischemic in nature. He had Demadex added. He was discharged 7/19/23. He was doing much better until his 11/27/23 hospitalization.     He was recently admitted to [facility] 11/27/29 for dehydration, hyponatremia, hypokalemia , NADYA b/o continued antihypertensive use despite antecedent GI event of unclear etiology with since resolved n,v,d.  With rehydration, he rapidly improved. He is not SOB. NO CP, no ALAS. His BP at home today is 139/74 standing, 132/77 sitting, lying down 139/73. HR has been 58. He feels much better.    He would like to discuss hospital Discharge Instructions for blood pressure medications:     While you are hospitalized your blood pressures were noted to be lower than normal.  This required stopping ALL of your prior antihypertensive blood pressure lowering medications.    At this time you will have to be careful and slow in reinstituting them.    Use a home blood pressure cuff to check BP twice a day.  Write down those numbers and keep available for primary care.  In general resume half doses of medication (taken in the morning), checking BP twice a day, once systolic blood pressures (top number) are consistently above 120 with each med resumed you can then add the next med.    In the following order:   -Metoprolol-resume at half tab once a day, then as BP> 120, can increase to full tab once a day  -Isosorbide/Imdur can be resumed as 1 tab a day, if BP  ok, increase to 2 tabs a day  -Lisinopril - move to take as a BEDTIME/evening dose- resume as half tab, then to 1 tab  -Torsemide-in 3 days (sooner if onset of ankle swelling) resume half tab once a day, then moved to full tab a day  -Hydrochlorothiazide should be STOPPED, it is also a water pill and not needed at this time.             Review Of Systems  Skin: negative  Eyes: negative  Ears/Nose/Throat: negative  Respiratory: No shortness of breath, dyspnea on exertion, cough, or hemoptysis  Cardiovascular: negative  Gastrointestinal: negative  Genitourinary: negative  Musculoskeletal: negative  Neurologic: negative  Psychiatric: negative  Hematologic/Lymphatic/Immunologic: negative  Endocrine: negative     PAST MEDICAL HISTORY:                  Past Medical History           Past Medical History:   Diagnosis Date    ACQUIRED HYPOTHYROIDISM       Blood clot in the legs      CAD (coronary artery disease)       11/15/18 Cath: PCI with 3.5-16mm DORY to proximal LAD    Calculus of gallbladder without mention of cholecystitis or obstruction      Cataract      Chest pressure 11/5/2018    Hyperlipidemia      Hypertension      Ischemic cardiomyopathy       EF 40-45% on 11/2018 Echo    Obesity, unspecified       significant weight loss w/diet alone, on 10-15-10, BMI was 56.4    pulmonary emboli 6-2010     Unprovoked large bilateral pulmonary emboli without source identified on lower extremity dopplers, pt had a transient moderate lupus inhibitor upon initial presentation in June , 2010 which was gone in September, 2010;  all other thrombophilic workup is normal    Pulmonary embolus, bilateral      PVC's (premature ventricular contractions)       4% burden on 11/2018 Holter    Shortness of breath       on exertion    Venous insufficiency      Viral pneumonia, unspecified      Vitamin D deficiency              PAST SURGICAL HISTORY:                  Past Surgical History             Past Surgical History:   Procedure  Laterality Date    COLONOSCOPY   11/17/2011     Procedure:COLONOSCOPY; colonoscopy; Surgeon:ELENA OROURKE; Location: GI    CV HEART CATHETERIZATION WITH POSSIBLE INTERVENTION Left 2/15/2019     Procedure: Coronary Angiogram;  Surgeon: Tulio Ortiz MD;  Location:  HEART CARDIAC CATH LAB    CV HEART CATHETERIZATION WITH POSSIBLE INTERVENTION N/A 10/5/2021     Procedure: Heart Catheterization with Possible Intervention;  Surgeon: Jose Francisco Dave MD;  Location:  HEART CARDIAC CATH LAB    CV LEFT HEART CATH N/A 10/5/2021     Procedure: Left Heart Cath;  Surgeon: Jose Francisco Dave MD;  Location:  HEART CARDIAC CATH LAB    CV LEFT VENTRICULOGRAM N/A 10/5/2021     Procedure: Left Ventriculogram;  Surgeon: Jose Francisco Dave MD;  Location:  HEART CARDIAC CATH LAB    LAPAROSCOPIC CHOLECYSTECTOMY   9/28/2012     Procedure: LAPAROSCOPIC CHOLECYSTECTOMY;  LAPAROSCOPIC CHOLECYSTECTOMY, LYSIS OF ADHESIONS;  Surgeon: Dutch Matta MD;  Location:  OR    LAPAROSCOPIC LYSIS ADHESIONS   9/28/2012     Procedure: LAPAROSCOPIC LYSIS ADHESIONS;;  Surgeon: Duthc Matta MD;  Location:  OR    LAPAROSCOPY, SURGICAL; REPAIR INCISIONAL OR VENTRAL HERNIA         repair of ventral strangulated surgery    PHACOEMULSIFICATION CLEAR CORNEA WITH STANDARD INTRAOCULAR LENS IMPLANT   12/19/2011     Procedure:PHACOEMULSIFICATION CLEAR CORNEA WITH STANDARD INTRAOCULAR LENS IMPLANT; RIGHT PHACOEMULSIFICATION CLEAR CORNEA WITH STANDARD INTRAOCULAR LENS IMPLANT ; Surgeon:ALLISON ANTONIO; Location:[facility] NONSPECIFIC PROCEDURE   10-     Laparoscopic gastric bypass,    [facility] NONSPECIFIC PROCEDURE   10-     1.  Attempted laparoscopic exploration with conversion to: .  Abdominal exploration. 3.  Reduction of incarcerated incisional hernia. 4.  Repair of incisional hernia.5.  Gastrostomy tube placement (#22 Bulgarian) into defunctionalized stomach.6.  Enterotomy with bowel decompression and primary  repair. 7.  Abdominal lavage.             CURRENT MEDICATIONS:                  Current Outpatient Prescriptions               Current Outpatient Medications   Medication Sig Dispense Refill    aspirin (ASA) 81 MG chewable tablet Take 1 tablet (81 mg) by mouth daily 30 tablet 1    ezetimibe (ZETIA) 10 MG tablet TAKE 1 TABLET(10 MG) BY MOUTH DAILY 90 tablet 0    hydrochlorothiazide (HYDRODIURIL) 25 MG tablet Take 0.5 tablets (12.5 mg) by mouth every morning 90 tablet 3    isosorbide mononitrate (IMDUR) 30 MG 24 hr tablet Take 2 tablets (60 mg) by mouth daily 180 tablet 3    levothyroxine (SYNTHROID/LEVOTHROID) 137 MCG tablet Take 137 mcg every other day alternating with 150 mcg PO alternating every other day. 45 tablet 3    levothyroxine (SYNTHROID/LEVOTHROID) 150 MCG tablet Take 137 mcg every other day alternating with 150 mcg PO alternating every other day. 45 tablet 3    metoprolol succinate ER (TOPROL XL) 50 MG 24 hr tablet TAKE 1 AND 1/2 TABLETS(75 MG) BY MOUTH DAILY 135 tablet 3    nitroGLYcerin (NITROSTAT) 0.4 MG sublingual tablet For chest pain place 1 tablet under the tongue every 5 minutes for 3 doses. If symptoms persist 5 minutes after 1st dose call 911. 25 tablet 3    rosuvastatin (CRESTOR) 40 MG tablet TAKE 1 TABLET(40 MG) BY MOUTH DAILY 90 tablet 3    torsemide (DEMADEX) 20 MG tablet Take 1 tablet (20 mg) by mouth daily 30 tablet 1            ALLERGIES:                          Allergies   Allergen Reactions    No Known Allergies           SOCIAL HISTORY:                  Social History   Social History                Socioeconomic History    Marital status: Single       Spouse name: Not on file    Number of children: Not on file    Years of education: Not on file    Highest education level: Not on file   Occupational History    Occupation:        Employer: SELF   Tobacco Use    Smoking status: Never    Smokeless tobacco: Never   Substance and Sexual Activity    Alcohol use: No        Alcohol/week: 0.0 standard drinks of alcohol    Drug use: No    Sexual activity: Not on file   Other Topics Concern    Parent/sibling w/ CABG, MI or angioplasty before 65F 55M? Not Asked   Social History Narrative    Not on file      Social Determinants of Health      Financial Resource Strain: Not on file   Food Insecurity: Not on file   Transportation Needs: Not on file   Physical Activity: Not on file   Stress: Not on file   Social Connections: Not on file   Intimate Partner Violence: Not on file   Housing Stability: Not on file            FAMILY HISTORY:                   Family History             Family History   Problem Relation Age of Onset    Osteoporosis Mother      Thyroid Disease Mother           no thyroid    Cancer Maternal Grandmother           ? lung cancer    Cancer Father           melanoma    Coronary Artery Disease Brother      Coronary Artery Disease Brother                 Physical exam was not undertaken as it was a billable telephone encounter.         Component      Latest Ref Rng 10/27/2023  8:20 AM   WBC      4.0 - 11.0 10e3/uL 6.5    RBC Count      4.40 - 5.90 10e6/uL 4.34 (L)    Hemoglobin      13.3 - 17.7 g/dL 12.8 (L)    Hematocrit      40.0 - 53.0 % 39.7 (L)    MCV      78 - 100 fL 92    MCH      26.5 - 33.0 pg 29.5    MCHC      31.5 - 36.5 g/dL 32.2    RDW      10.0 - 15.0 % 14.2    Platelet Count      150 - 450 10e3/uL 155    % Neutrophils      % 53    % Lymphocytes      % 34    % Monocytes      % 9    % Eosinophils      % 3    % Basophils      % 1    % Immature Granulocytes      % 0    Absolute Neutrophils      1.6 - 8.3 10e3/uL 3.4    Absolute Lymphocytes      0.8 - 5.3 10e3/uL 2.2    Absolute Monocytes      0.0 - 1.3 10e3/uL 0.6    Absolute Eosinophils      0.0 - 0.7 10e3/uL 0.2    Absolute Basophils      0.0 - 0.2 10e3/uL 0.1    Absolute Immature Granulocytes      <=0.4 10e3/uL 0.0    Sodium      135 - 145 mmol/L 134 (L)    Potassium      3.4 - 5.3 mmol/L 3.5    Carbon Dioxide  (CO2)      22 - 29 mmol/L 31 (H)    Anion Gap      7 - 15 mmol/L 9    Urea Nitrogen      8.0 - 23.0 mg/dL 25.2 (H)    Creatinine      0.67 - 1.17 mg/dL 0.76    GFR Estimate      >60 mL/min/1.73m2 >90    Calcium      8.8 - 10.2 mg/dL 9.1    Chloride      98 - 107 mmol/L 94 (L)    Glucose      70 - 99 mg/dL 91    Alkaline Phosphatase      40 - 129 U/L 58    AST      0 - 45 U/L 33    ALT      0 - 70 U/L 20    Protein Total      6.4 - 8.3 g/dL 6.9    Albumin      3.5 - 5.2 g/dL 4.2    Bilirubin Total      <=1.2 mg/dL 0.6    Total Cholesterol      <=199 mg/dL 127    Triglycerides      30 - 149 mg/dL 52    HDL Cholesterol      40 - 59 mg/dL 50    LDL Cholesterol      <=129 mg/dL 67    HDL Size      >=8.9 nm 9.3    VLDL Size      <=46.7 nm 49.4 (H)    LDL Particle Size      >=20.7 nm 20.8    Lge HDL Particle number      >=4.2 umol/L 7.1    HDL Particle Number NMR      >=33.0 umol/L 32.6 (L)    Lge VLDL Part number      <=2.7 nmol/L <1.5    Small LDL Particle number      <=634 nmol/L 473    LDL Particle Number      <=1135 nmol/L 829    EER LipoFit by NMR See Note    Iron      61 - 157 ug/dL 98    Iron Binding Capacity      240 - 430 ug/dL 296    Iron Sat Index      15 - 46 % 33    % Retic      0.5 - 2.0 % 0.8    Absolute Retic      0.025 - 0.095 10e6/uL 0.036    C-Reactive Protein High Sensitivity <0.15    Hemoglobin A1C      0.0 - 5.6 % 5.5    Lipoprotein (a)      <30 mg/dL 10    Free T3      2.0 - 4.4 pg/mL 2.3    T4 Free      0.90 - 1.70 ng/dL 1.81 (H)    TSH      0.30 - 4.20 uIU/mL 0.83    Transferrin      200.0 - 360.0 mg/dL 243.0    Ferritin      31 - 409 ng/mL 110    Vitamin B12      232 - 1,245 pg/mL 538    Folate      4.6 - 34.8 ng/mL 14.9    Magnesium      1.7 - 2.3 mg/dL 2.3       Legend:  (L) Low  (H) High        A/P:           (I50.9) Congestive heart failure, unspecified HF chronicity, unspecified heart failure type (H)  Comment: doing well. Weigh daily. Call if wt increases 2 # day to day or 5# over a  week.  Plan: Reinstitute Demadex per his discharge instructions above             (R06.09) ALAS (dyspnea on exertion)  (primary encounter diagnosis)  Comment: CT chest shows no PE, TTE reveals normal EF. He is limited by his ALAS.PFTs were normal other than a mild diffusion deficit. 6 minute walk test did reveal mild desaturation at 6 minutes to 88%.  Plan: He is advised he is out of shape. He is advised to walk more as much as possible, RTC prn worsening.                (E03.8,  E06.3) Hypothyroidism due to Hashimoto's thyroiditis  Comment:on 137/150 QOD instead of 150 Daily, TSH still suppressed, FT4 elevated. Change to 137 daily.   Plan: levothyroxine (SYNTHROID/LEVOTHROID)         175 MCG tablet, Check CBC with Platelets &         Differential, T3 Free, T4 free, TSH in six months, RTC tow weeks later            (I10) Essential hypertension  Comment:Feeling much better since he was rehydrated.  Plan: Reinstitute BP meds per his discharge instructions above. He feels much better and is planning on driving to [place] on Saturday. I had wanted to check labs on him but since he is leaving advised to be mindful of his overall well being on the drive, and to seek medical care in [place] if doing poorly.      (E78.5) Hyperlipidemia LDL goal <100  Comment: at goal now that zetia has been added. Check labs in six months, RTC 2 weeks later  Plan: C-Reactive Protein, High Sensitivity, LipoFit         by NMR, Lipoprotein (a)             (R73.01) IFG (impaired fasting glucose)  Comment: avoid CHO  Plan: Check Albumin Random Urine Quantitative with Creat         Ratio, Comprehensive metabolic panel,         Hemoglobin A1c in six months, RTC 2 weeks later     (D64.9) Anemia, unspecified type  Comment: Likely ACDs. Monitor.  Plan: Check Reticulocyte count, Iron & Iron Binding         Capacity, Transferrin, Ferritin, Vitamin B12,         Folate in six months, RTC 2 weeks later            21 minutes total medical care on today's  date.    MD location: office  Pt location: home    Phone call start: 09:30  Phone call end: 09:51         Yes

## 2023-11-30 NOTE — TELEPHONE ENCOUNTER
Patient scheduled with Dr. Davis at 10:10 today to discuss.    Future Appointments   Date Time Provider Department Center   11/30/2023 10:10 AM Addison Davis MD [facility]

## 2023-11-30 NOTE — PROGRESS NOTES
Ramiro is a 63 year old who is being evaluated via a billable telephone visit.      What phone number would you like to be contacted at? [phone]  How would you like to obtain your AVS? Pilar    Distant Location (provider location):  On-site      Objective           Vitals:  No vitals were obtained today due to virtual visit.      BRANDON MCCLELLAN

## 2023-11-30 NOTE — PROGRESS NOTES
[facility]    Background: Transitional Care Management program identified per system criteria and reviewed by [facility] team for possible outreach.    Assessment: Upon chart review, CCR Team member will not proceed with patient outreach related to this episode of Transitional Care Management program due to reason below:    Patient has a follow up appointment with an appropriate provider today for hospital discharge    Plan: Transitional Care Management episode addressed appropriately per reason noted above.      NATE Yanez  [facility] [place] Resource Team does NOT follow patient ongoing. Referrals are identified based on internal discharge reports and the outreach is to ensure patient has an understanding of their discharge instructions.

## 2024-02-10 DIAGNOSIS — E06.3 HYPOTHYROIDISM DUE TO HASHIMOTO'S THYROIDITIS: ICD-10-CM

## 2024-02-12 RX ORDER — LEVOTHYROXINE SODIUM 150 UG/1
TABLET ORAL
Qty: 30 TABLET | Refills: 3 | OUTPATIENT
Start: 2024-02-12

## 2024-05-11 ENCOUNTER — LAB (OUTPATIENT)
Dept: LAB | Facility: CLINIC | Age: 64
End: 2024-05-11
Payer: COMMERCIAL

## 2024-05-11 DIAGNOSIS — E06.3 HYPOTHYROIDISM DUE TO HASHIMOTO'S THYROIDITIS: ICD-10-CM

## 2024-05-11 DIAGNOSIS — R73.01 IFG (IMPAIRED FASTING GLUCOSE): ICD-10-CM

## 2024-05-11 DIAGNOSIS — E78.5 HYPERLIPIDEMIA LDL GOAL <70: ICD-10-CM

## 2024-05-11 DIAGNOSIS — D64.9 ANEMIA, UNSPECIFIED TYPE: ICD-10-CM

## 2024-05-11 DIAGNOSIS — I10 ESSENTIAL HYPERTENSION: ICD-10-CM

## 2024-05-11 LAB
ALBUMIN SERPL BCG-MCNC: 4.1 G/DL (ref 3.5–5.2)
ALP SERPL-CCNC: 67 U/L (ref 40–150)
ALT SERPL W P-5'-P-CCNC: 16 U/L (ref 0–70)
ANION GAP SERPL CALCULATED.3IONS-SCNC: 8 MMOL/L (ref 7–15)
APO A-I SERPL-MCNC: 8 MG/DL
AST SERPL W P-5'-P-CCNC: 25 U/L (ref 0–45)
BASOPHILS # BLD AUTO: 0 10E3/UL (ref 0–0.2)
BASOPHILS NFR BLD AUTO: 1 %
BILIRUB SERPL-MCNC: 0.5 MG/DL
BUN SERPL-MCNC: 16.9 MG/DL (ref 8–23)
CALCIUM SERPL-MCNC: 9 MG/DL (ref 8.8–10.2)
CHLORIDE SERPL-SCNC: 100 MMOL/L (ref 98–107)
CHOLEST SERPL-MCNC: 123 MG/DL
CREAT SERPL-MCNC: 0.78 MG/DL (ref 0.67–1.17)
CREAT UR-MCNC: 113 MG/DL
CRP SERPL HS-MCNC: <0.15 MG/L
DEPRECATED HCO3 PLAS-SCNC: 26 MMOL/L (ref 22–29)
EGFRCR SERPLBLD CKD-EPI 2021: >90 ML/MIN/1.73M2
EOSINOPHIL # BLD AUTO: 0.3 10E3/UL (ref 0–0.7)
EOSINOPHIL NFR BLD AUTO: 5 %
ERYTHROCYTE [DISTWIDTH] IN BLOOD BY AUTOMATED COUNT: 14.2 % (ref 10–15)
FASTING STATUS PATIENT QL REPORTED: YES
FASTING STATUS PATIENT QL REPORTED: YES
FERRITIN SERPL-MCNC: 29 NG/ML (ref 31–409)
FOLATE SERPL-MCNC: 16.1 NG/ML (ref 4.6–34.8)
GLUCOSE SERPL-MCNC: 98 MG/DL (ref 70–99)
HBA1C MFR BLD: 5.5 % (ref 0–5.6)
HCT VFR BLD AUTO: 39 % (ref 40–53)
HDLC SERPL-MCNC: 48 MG/DL
HGB BLD-MCNC: 12.5 G/DL (ref 13.3–17.7)
IMM GRANULOCYTES # BLD: 0 10E3/UL
IMM GRANULOCYTES NFR BLD: 0 %
IRON BINDING CAPACITY (ROCHE): 304 UG/DL (ref 240–430)
IRON SATN MFR SERPL: 27 % (ref 15–46)
IRON SERPL-MCNC: 83 UG/DL (ref 61–157)
LDLC SERPL CALC-MCNC: 64 MG/DL
LYMPHOCYTES # BLD AUTO: 1.8 10E3/UL (ref 0.8–5.3)
LYMPHOCYTES NFR BLD AUTO: 30 %
MCH RBC QN AUTO: 29.6 PG (ref 26.5–33)
MCHC RBC AUTO-ENTMCNC: 32.1 G/DL (ref 31.5–36.5)
MCV RBC AUTO: 92 FL (ref 78–100)
MICROALBUMIN UR-MCNC: <12 MG/L
MICROALBUMIN/CREAT UR: NORMAL MG/G{CREAT}
MONOCYTES # BLD AUTO: 0.4 10E3/UL (ref 0–1.3)
MONOCYTES NFR BLD AUTO: 7 %
NEUTROPHILS # BLD AUTO: 3.3 10E3/UL (ref 1.6–8.3)
NEUTROPHILS NFR BLD AUTO: 57 %
NONHDLC SERPL-MCNC: 75 MG/DL
PLATELET # BLD AUTO: 153 10E3/UL (ref 150–450)
POTASSIUM SERPL-SCNC: 3.9 MMOL/L (ref 3.4–5.3)
PROT SERPL-MCNC: 6.5 G/DL (ref 6.4–8.3)
RBC # BLD AUTO: 4.22 10E6/UL (ref 4.4–5.9)
RETICS # AUTO: 0.06 10E6/UL (ref 0.03–0.1)
RETICS/RBC NFR AUTO: 1.5 % (ref 0.5–2)
SODIUM SERPL-SCNC: 134 MMOL/L (ref 135–145)
T3FREE SERPL-MCNC: 2.6 PG/ML (ref 2–4.4)
T4 FREE SERPL-MCNC: 1.51 NG/DL (ref 0.9–1.7)
TRANSFERRIN SERPL-MCNC: 253 MG/DL (ref 200–360)
TRIGL SERPL-MCNC: 54 MG/DL
TSH SERPL DL<=0.005 MIU/L-ACNC: 0.64 UIU/ML (ref 0.3–4.2)
VIT B12 SERPL-MCNC: 736 PG/ML (ref 232–1245)
WBC # BLD AUTO: 5.8 10E3/UL (ref 4–11)

## 2024-05-11 PROCEDURE — 82043 UR ALBUMIN QUANTITATIVE: CPT

## 2024-05-11 PROCEDURE — 84443 ASSAY THYROID STIM HORMONE: CPT

## 2024-05-11 PROCEDURE — 82728 ASSAY OF FERRITIN: CPT

## 2024-05-11 PROCEDURE — 86141 C-REACTIVE PROTEIN HS: CPT

## 2024-05-11 PROCEDURE — 80053 COMPREHEN METABOLIC PANEL: CPT

## 2024-05-11 PROCEDURE — 84466 ASSAY OF TRANSFERRIN: CPT

## 2024-05-11 PROCEDURE — 84481 FREE ASSAY (FT-3): CPT

## 2024-05-11 PROCEDURE — 99000 SPECIMEN HANDLING OFFICE-LAB: CPT

## 2024-05-11 PROCEDURE — 83704 LIPOPROTEIN BLD QUAN PART: CPT | Mod: 90

## 2024-05-11 PROCEDURE — 82570 ASSAY OF URINE CREATININE: CPT

## 2024-05-11 PROCEDURE — 83036 HEMOGLOBIN GLYCOSYLATED A1C: CPT

## 2024-05-11 PROCEDURE — 85025 COMPLETE CBC W/AUTO DIFF WBC: CPT

## 2024-05-11 PROCEDURE — 36415 COLL VENOUS BLD VENIPUNCTURE: CPT

## 2024-05-11 PROCEDURE — 82607 VITAMIN B-12: CPT

## 2024-05-11 PROCEDURE — 84439 ASSAY OF FREE THYROXINE: CPT

## 2024-05-11 PROCEDURE — 82746 ASSAY OF FOLIC ACID SERUM: CPT

## 2024-05-11 PROCEDURE — 83540 ASSAY OF IRON: CPT

## 2024-05-11 PROCEDURE — 83695 ASSAY OF LIPOPROTEIN(A): CPT

## 2024-05-11 PROCEDURE — 85045 AUTOMATED RETICULOCYTE COUNT: CPT

## 2024-05-14 LAB
CHOLEST SERPL-MCNC: 131 MG/DL
HDL SERPL QN: 9.2 NM
HDL SERPL-SCNC: 31.5 UMOL/L
HDLC SERPL-MCNC: 50 MG/DL
HLD.LARGE SERPL-SCNC: 6.8 UMOL/L
LDL SERPL QN: 20.9 NM
LDL SERPL-SCNC: 883 NMOL/L
LDL SMALL SERPL-SCNC: 447 NMOL/L
LDLC SERPL CALC-MCNC: 69 MG/DL
PATHOLOGY STUDY: ABNORMAL
TRIGL SERPL-MCNC: 61 MG/DL
VLDL LARGE SERPL-SCNC: <1.5 NMOL/L
VLDL SERPL QN: 48.2 NM

## 2024-05-15 DIAGNOSIS — E78.5 HYPERLIPIDEMIA LDL GOAL <70: ICD-10-CM

## 2024-05-15 RX ORDER — EZETIMIBE 10 MG/1
10 TABLET ORAL DAILY
Qty: 90 TABLET | Refills: 1 | Status: SHIPPED | OUTPATIENT
Start: 2024-05-15

## 2024-05-15 NOTE — TELEPHONE ENCOUNTER
Unable to refill per [facility] protocol.  Med & pharmacy loaded.    Melania Can RN on 5/15/2024 at 12:41 PM

## 2024-05-29 ENCOUNTER — OFFICE VISIT (OUTPATIENT)
Dept: OTHER | Facility: CLINIC | Age: 64
End: 2024-05-29
Attending: INTERNAL MEDICINE
Payer: COMMERCIAL

## 2024-05-29 VITALS — HEART RATE: 74 BPM | OXYGEN SATURATION: 94 % | DIASTOLIC BLOOD PRESSURE: 69 MMHG | SYSTOLIC BLOOD PRESSURE: 113 MMHG

## 2024-05-29 DIAGNOSIS — R10.31 RLQ ABDOMINAL PAIN: ICD-10-CM

## 2024-05-29 DIAGNOSIS — E06.3 HYPOTHYROIDISM DUE TO HASHIMOTO'S THYROIDITIS: ICD-10-CM

## 2024-05-29 DIAGNOSIS — E78.5 HYPERLIPIDEMIA LDL GOAL <100: ICD-10-CM

## 2024-05-29 DIAGNOSIS — I10 ESSENTIAL HYPERTENSION: ICD-10-CM

## 2024-05-29 DIAGNOSIS — I87.2 VENOUS (PERIPHERAL) INSUFFICIENCY: ICD-10-CM

## 2024-05-29 DIAGNOSIS — R63.5 WEIGHT GAIN: ICD-10-CM

## 2024-05-29 DIAGNOSIS — R73.01 IFG (IMPAIRED FASTING GLUCOSE): ICD-10-CM

## 2024-05-29 DIAGNOSIS — I50.30 HEART FAILURE WITH PRESERVED EJECTION FRACTION, NYHA CLASS I (H): ICD-10-CM

## 2024-05-29 DIAGNOSIS — E78.5 HYPERLIPIDEMIA LDL GOAL <70: Primary | ICD-10-CM

## 2024-05-29 DIAGNOSIS — R06.09 DYSPNEA ON EXERTION: ICD-10-CM

## 2024-05-29 PROCEDURE — 99215 OFFICE O/P EST HI 40 MIN: CPT | Performed by: INTERNAL MEDICINE

## 2024-05-29 PROCEDURE — G0463 HOSPITAL OUTPT CLINIC VISIT: HCPCS | Performed by: INTERNAL MEDICINE

## 2024-05-29 PROCEDURE — G2211 COMPLEX E/M VISIT ADD ON: HCPCS | Performed by: INTERNAL MEDICINE

## 2024-05-29 NOTE — PROGRESS NOTES
Patient is here to discuss follow up    /69 (BP Location: Right arm, Patient Position: Chair, Cuff Size: Adult Large)   Pulse 74   SpO2 94%     Questions patient would like addressed today are: N/A.    Refills are needed: No    Has homecare services and agency name:  Jessy MCCLELLAN

## 2024-05-29 NOTE — PROGRESS NOTES
VASCULAR MEDICINE FOLLOW UP VISIT        A/P:           (I50.9) Congestive heart failure, unspecified HF chronicity, unspecified heart failure type (H)  Comment: doing well. Weigh daily. Call if wt increases 2 # day to day or 5# over a week.  Plan: Continue Demadex. Check TTE given weight gain and increased edema in his legs, which may be due to venous insufficiency.             (R06.09) ALAS (dyspnea on exertion)  (primary encounter diagnosis)  Comment: CT chest showed no PE, TTE revealed normal EF. He was limited by his ALAS. PFTs were normal other than a mild diffusion deficit. 6 minute walk test did reveal mild desaturation at 6 minutes to 88%.He was advised he is out of shape. He was advised to walk more as much as possible, RTC prn worsening. With this he notes significant improvement in ALAS.  Plan: F/U prn.                (E03.8,  E06.3) Hypothyroidism due to Hashimoto's thyroiditis  Comment:on 137 daily TFTs WNL.   Plan: levothyroxine (SYNTHROID/LEVOTHROID)         137 MCG tablet, Check CBC with Platelets &         Differential, T3 Free, T4 free, TSH in six months, RTC tow weeks later            (I10) Essential hypertension  Comment:At goal. No BP med changes.   Plan: RTC six months for BP check       (E78.5) Hyperlipidemia LDL goal <100  Comment: at goal now that zetia has been added. Check labs in six months, RTC 2 weeks later  Plan: C-Reactive Protein, High Sensitivity, LipoFit         by NMR, Lipoprotein (a)             (R73.01) IFG (impaired fasting glucose)  Comment: avoid CHO  Plan: Check Albumin Random Urine Quantitative with Creat         Ratio, Comprehensive metabolic panel,         Hemoglobin A1c in six months, RTC 2 weeks later     (D64.9) Anemia, unspecified type  Comment: Likely ACDs. Monitor.  Plan: Check Reticulocyte count, Iron & Iron Binding         Capacity, Transferrin, Ferritin, Vitamin B12,         Folate in six months, RTC 2 weeks later          (I87.2) Venous (peripheral)  insufficiency  Comment: Has CEAP C4 venous insufficiency, is wearing knee high compression hosiery. Likely has venous insufficiency in his thighs but does not want to wear thigh high compression.   Plan: Check US Venous Competency Bilateral to document anatomical extent of venous insufficiency        RTC one week alter    (R10.31) RLQ abdominal pain  Comment: Check the below  Plan: Comprehensive metabolic panel, CBC with         Platelets & Differential, CT Abdomen Pelvis w         Contrast            (R63.5) Weight gain  Comment: Check the below  Plan: Comprehensive metabolic panel, CBC with         Platelets & Differential, CT Abdomen Pelvis w         Contrast                       The longitudinal care of plan for Ramiro was addressed during this visit. Due to added complexity of care, we will continue to support Ramiro Jeffers  and the subsequent management of these conditions and with ongoing continuity of care for these conditions.        48 minutes total medical care on today's date.        Ramiro Jeffers is a 63 year old male who is presenting at the current time to discuss his diagnosi(es) of         Heart failure with preserved ejection fraction, NYHA class I (H)     HPI: Ramiro Jeffers is a 63 year old formerly morbidly obese male (current BMI 26.32)  With h/o single lifetime VTE presenting s PE with mislabel of PAF on event monitor causding prolonged AC who recently stopped AC at cardiology's direction during his recent hospitalization for HFpEF. He presented with a 30# weight gain and ansarca. He was diuresed off. This was not felt to be ischemic in nature. He had Demadex added. He was discharged 7/19/23. He was doing much better until his 11/27/23 hospitalization.      He was recently admitted to [facility] 11/27/29 for dehydration, hyponatremia, hypokalemia , NADYA b/o continued antihypertensive use despite antecedent GI event of unclear etiology with since resolved n,v,d.  With rehydration, he rapidly improved. He  is not SOB. NO CP, no ALAS. His BP at home today is 139/74 standing, 132/77 sitting, lying down 139/73. HR has been 58. He feels much better.    He recently developed 20# unintentional weight gain. He notes RLQ pain not in relation to eating with this.            Review Of Systems  Skin: negative  Eyes: negative  Ears/Nose/Throat: negative  Respiratory: No shortness of breath, dyspnea on exertion, cough, or hemoptysis  Cardiovascular: negative  Gastrointestinal: RLQ abdominal pain as above    Genitourinary: negative  Musculoskeletal: negative  Neurologic: negative  Psychiatric: negative  Hematologic/Lymphatic/Immunologic: negative  Endocrine: negative     PAST MEDICAL HISTORY:                  Past Medical History           Past Medical History:   Diagnosis Date    ACQUIRED HYPOTHYROIDISM       Blood clot in the legs      CAD (coronary artery disease)       11/15/18 Cath: PCI with 3.5-16mm DORY to proximal LAD    Calculus of gallbladder without mention of cholecystitis or obstruction      Cataract      Chest pressure 11/5/2018    Hyperlipidemia      Hypertension      Ischemic cardiomyopathy       EF 40-45% on 11/2018 Echo    Obesity, unspecified       significant weight loss w/diet alone, on 10-15-10, BMI was 56.4    pulmonary emboli 6-2010     Unprovoked large bilateral pulmonary emboli without source identified on lower extremity dopplers, pt had a transient moderate lupus inhibitor upon initial presentation in June , 2010 which was gone in September, 2010;  all other thrombophilic workup is normal    Pulmonary embolus, bilateral      PVC's (premature ventricular contractions)       4% burden on 11/2018 Holter    Shortness of breath       on exertion    Venous insufficiency      Viral pneumonia, unspecified      Vitamin D deficiency              PAST SURGICAL HISTORY:                  Past Surgical History             Past Surgical History:   Procedure Laterality Date    COLONOSCOPY   11/17/2011      Procedure:COLONOSCOPY; colonoscopy; Surgeon:ELENA OROURKE; Location: GI    CV HEART CATHETERIZATION WITH POSSIBLE INTERVENTION Left 2/15/2019     Procedure: Coronary Angiogram;  Surgeon: Tulio Ortiz MD;  Location:  HEART CARDIAC CATH LAB    CV HEART CATHETERIZATION WITH POSSIBLE INTERVENTION N/A 10/5/2021     Procedure: Heart Catheterization with Possible Intervention;  Surgeon: Jose Francisco Dave MD;  Location:  HEART CARDIAC CATH LAB    CV LEFT HEART CATH N/A 10/5/2021     Procedure: Left Heart Cath;  Surgeon: Jose Francisco Dave MD;  Location:  HEART CARDIAC CATH LAB    CV LEFT VENTRICULOGRAM N/A 10/5/2021     Procedure: Left Ventriculogram;  Surgeon: Jose Francisco Dave MD;  Location:  HEART CARDIAC CATH LAB    LAPAROSCOPIC CHOLECYSTECTOMY   9/28/2012     Procedure: LAPAROSCOPIC CHOLECYSTECTOMY;  LAPAROSCOPIC CHOLECYSTECTOMY, LYSIS OF ADHESIONS;  Surgeon: Dutch Matta MD;  Location:  OR    LAPAROSCOPIC LYSIS ADHESIONS   9/28/2012     Procedure: LAPAROSCOPIC LYSIS ADHESIONS;;  Surgeon: Dutch Matta MD;  Location:  OR    LAPAROSCOPY, SURGICAL; REPAIR INCISIONAL OR VENTRAL HERNIA         repair of ventral strangulated surgery    PHACOEMULSIFICATION CLEAR CORNEA WITH STANDARD INTRAOCULAR LENS IMPLANT   12/19/2011     Procedure:PHACOEMULSIFICATION CLEAR CORNEA WITH STANDARD INTRAOCULAR LENS IMPLANT; RIGHT PHACOEMULSIFICATION CLEAR CORNEA WITH STANDARD INTRAOCULAR LENS IMPLANT ; Surgeon:ALLISON ANTONIO; Location:[facility] NONSPECIFIC PROCEDURE   10-     Laparoscopic gastric bypass,    [facility] NONSPECIFIC PROCEDURE   10-     1.  Attempted laparoscopic exploration with conversion to: .  Abdominal exploration. 3.  Reduction of incarcerated incisional hernia. 4.  Repair of incisional hernia.5.  Gastrostomy tube placement (#22 Pashto) into defunctionalized stomach.6.  Enterotomy with bowel decompression and primary repair. 7.  Abdominal lavage.              CURRENT MEDICATIONS:                  Current Outpatient Prescriptions               Current Outpatient Medications   Medication Sig Dispense Refill    aspirin (ASA) 81 MG chewable tablet Take 1 tablet (81 mg) by mouth daily 30 tablet 1    ezetimibe (ZETIA) 10 MG tablet TAKE 1 TABLET(10 MG) BY MOUTH DAILY 90 tablet 0    hydrochlorothiazide (HYDRODIURIL) 25 MG tablet Take 0.5 tablets (12.5 mg) by mouth every morning 90 tablet 3    isosorbide mononitrate (IMDUR) 30 MG 24 hr tablet Take 2 tablets (60 mg) by mouth daily 180 tablet 3    levothyroxine (SYNTHROID/LEVOTHROID) 137 MCG tablet Take 137 mcg every other day alternating with 150 mcg PO alternating every other day. 45 tablet 3    levothyroxine (SYNTHROID/LEVOTHROID) 150 MCG tablet Take 137 mcg every other day alternating with 150 mcg PO alternating every other day. 45 tablet 3    metoprolol succinate ER (TOPROL XL) 50 MG 24 hr tablet TAKE 1 AND 1/2 TABLETS(75 MG) BY MOUTH DAILY 135 tablet 3    nitroGLYcerin (NITROSTAT) 0.4 MG sublingual tablet For chest pain place 1 tablet under the tongue every 5 minutes for 3 doses. If symptoms persist 5 minutes after 1st dose call 911. 25 tablet 3    rosuvastatin (CRESTOR) 40 MG tablet TAKE 1 TABLET(40 MG) BY MOUTH DAILY 90 tablet 3    torsemide (DEMADEX) 20 MG tablet Take 1 tablet (20 mg) by mouth daily 30 tablet 1            ALLERGIES:                          Allergies   Allergen Reactions    No Known Allergies           SOCIAL HISTORY:                  Social History   Social History                Socioeconomic History    Marital status: Single       Spouse name: Not on file    Number of children: Not on file    Years of education: Not on file    Highest education level: Not on file   Occupational History    Occupation:        Employer: SELF   Tobacco Use    Smoking status: Never    Smokeless tobacco: Never   Substance and Sexual Activity    Alcohol use: No       Alcohol/week: 0.0 standard drinks of  alcohol    Drug use: No    Sexual activity: Not on file   Other Topics Concern    Parent/sibling w/ CABG, MI or angioplasty before 65F 55M? Not Asked   Social History Narrative    Not on file      Social Determinants of Health      Financial Resource Strain: Not on file   Food Insecurity: Not on file   Transportation Needs: Not on file   Physical Activity: Not on file   Stress: Not on file   Social Connections: Not on file   Intimate Partner Violence: Not on file   Housing Stability: Not on file            FAMILY HISTORY:                   Family History             Family History   Problem Relation Age of Onset    Osteoporosis Mother      Thyroid Disease Mother           no thyroid    Cancer Maternal Grandmother           ? lung cancer    Cancer Father           melanoma    Coronary Artery Disease Brother      Coronary Artery Disease Brother               /69 (BP Location: Right arm, Patient Position: Chair, Cuff Size: Adult Large)   Pulse 74   SpO2 94%       Physical exam Reveals:    O/P: WNL  HEENT: WNL  NECK: No JVD, thyromegaly, or lymphadenopathy  HEART: RRR, no murmurs, gallops, or rubs  LUNGS: CTA bilaterally without rales, wheezes, or rhonchi  GI: NABS, nondistended, soft; mild RLQ tenderness with palpation, nontoxic appearing, no peritoneal signs  EXT:without cyanosis, clubbing; two plus RLE edema, one plus LLE edema; CEAP C4 venous insufficiency  NEURO: nonfocal  : no flank tenderness          Component      Latest Ref [facility] 10/27/2023  8:20 AM   WBC      4.0 - 11.0 10e3/uL 6.5    RBC Count      4.40 - 5.90 10e6/uL 4.34 (L)    Hemoglobin      13.3 - 17.7 g/dL 12.8 (L)    Hematocrit      40.0 - 53.0 % 39.7 (L)    MCV      78 - 100 fL 92    MCH      26.5 - 33.0 pg 29.5    MCHC      31.5 - 36.5 g/dL 32.2    RDW      10.0 - 15.0 % 14.2    Platelet Count      150 - 450 10e3/uL 155    % Neutrophils      % 53    % Lymphocytes      % 34    % Monocytes      % 9    % Eosinophils      % 3    % Basophils       % 1    % Immature Granulocytes      % 0    Absolute Neutrophils      1.6 - 8.3 10e3/uL 3.4    Absolute Lymphocytes      0.8 - 5.3 10e3/uL 2.2    Absolute Monocytes      0.0 - 1.3 10e3/uL 0.6    Absolute Eosinophils      0.0 - 0.7 10e3/uL 0.2    Absolute Basophils      0.0 - 0.2 10e3/uL 0.1    Absolute Immature Granulocytes      <=0.4 10e3/uL 0.0    Sodium      135 - 145 mmol/L 134 (L)    Potassium      3.4 - 5.3 mmol/L 3.5    Carbon Dioxide (CO2)      22 - 29 mmol/L 31 (H)    Anion Gap      7 - 15 mmol/L 9    Urea Nitrogen      8.0 - 23.0 mg/dL 25.2 (H)    Creatinine      0.67 - 1.17 mg/dL 0.76    GFR Estimate      >60 mL/min/1.73m2 >90    Calcium      8.8 - 10.2 mg/dL 9.1    Chloride      98 - 107 mmol/L 94 (L)    Glucose      70 - 99 mg/dL 91    Alkaline Phosphatase      40 - 129 U/L 58    AST      0 - 45 U/L 33    ALT      0 - 70 U/L 20    Protein Total      6.4 - 8.3 g/dL 6.9    Albumin      3.5 - 5.2 g/dL 4.2    Bilirubin Total      <=1.2 mg/dL 0.6    Total Cholesterol      <=199 mg/dL 127    Triglycerides      30 - 149 mg/dL 52    HDL Cholesterol      40 - 59 mg/dL 50    LDL Cholesterol      <=129 mg/dL 67    HDL Size      >=8.9 nm 9.3    VLDL Size      <=46.7 nm 49.4 (H)    LDL Particle Size      >=20.7 nm 20.8    Lge HDL Particle number      >=4.2 umol/L 7.1    HDL Particle Number NMR      >=33.0 umol/L 32.6 (L)    Lge VLDL Part number      <=2.7 nmol/L <1.5    Small LDL Particle number      <=634 nmol/L 473    LDL Particle Number      <=1135 nmol/L 829    EER LipoFit by NMR See Note    Iron      61 - 157 ug/dL 98    Iron Binding Capacity      240 - 430 ug/dL 296    Iron Sat Index      15 - 46 % 33    % Retic      0.5 - 2.0 % 0.8    Absolute Retic      0.025 - 0.095 10e6/uL 0.036    C-Reactive Protein High Sensitivity <0.15    Hemoglobin A1C      0.0 - 5.6 % 5.5    Lipoprotein (a)      <30 mg/dL 10    Free T3      2.0 - 4.4 pg/mL 2.3    T4 Free      0.90 - 1.70 ng/dL 1.81 (H)    TSH      0.30 - 4.20 uIU/mL  0.83    Transferrin      200.0 - 360.0 mg/dL 243.0    Ferritin      31 - 409 ng/mL 110    Vitamin B12      232 - 1,245 pg/mL 538    Folate      4.6 - 34.8 ng/mL 14.9    Magnesium      1.7 - 2.3 mg/dL 2.3       Legend:  (L) Low  (H) High        Component      Latest Ref Rng 5/11/2024  9:09 AM 5/11/2024  9:57 AM   WBC      4.0 - 11.0 10e3/uL 5.8     RBC Count      4.40 - 5.90 10e6/uL 4.22 (L)     Hemoglobin      13.3 - 17.7 g/dL 12.5 (L)     Hematocrit      40.0 - 53.0 % 39.0 (L)     MCV      78 - 100 fL 92     MCH      26.5 - 33.0 pg 29.6     MCHC      31.5 - 36.5 g/dL 32.1     RDW      10.0 - 15.0 % 14.2     Platelet Count      150 - 450 10e3/uL 153     % Neutrophils      % 57     % Lymphocytes      % 30     % Monocytes      % 7     % Eosinophils      % 5     % Basophils      % 1     % Immature Granulocytes      % 0     Absolute Neutrophils      1.6 - 8.3 10e3/uL 3.3     Absolute Lymphocytes      0.8 - 5.3 10e3/uL 1.8     Absolute Monocytes      0.0 - 1.3 10e3/uL 0.4     Absolute Eosinophils      0.0 - 0.7 10e3/uL 0.3     Absolute Basophils      0.0 - 0.2 10e3/uL 0.0     Absolute Immature Granulocytes      <=0.4 10e3/uL 0.0     Sodium      135 - 145 mmol/L 134 (L)     Potassium      3.4 - 5.3 mmol/L 3.9     Carbon Dioxide (CO2)      22 - 29 mmol/L 26     Anion Gap      7 - 15 mmol/L 8     Urea Nitrogen      8.0 - 23.0 mg/dL 16.9     Creatinine      0.67 - 1.17 mg/dL 0.78     GFR Estimate      >60 mL/min/1.73m2 >90     Calcium      8.8 - 10.2 mg/dL 9.0     Chloride      98 - 107 mmol/L 100     Glucose      70 - 99 mg/dL 98     Alkaline Phosphatase      40 - 150 U/L 67     AST      0 - 45 U/L 25     ALT      0 - 70 U/L 16     Protein Total      6.4 - 8.3 g/dL 6.5     Albumin      3.5 - 5.2 g/dL 4.1     Bilirubin Total      <=1.2 mg/dL 0.5     Patient Fasting? Yes     Patient Fasting? Yes     Total Cholesterol      <=199 mg/dL 131     Triglycerides      30 - 149 mg/dL 61     Triglycerides      <150 mg/dL 54     HDL  Cholesterol      40 - 59 mg/dL 50     LDL Cholesterol      <=129 mg/dL 69     HDL Size      >=8.9 nm 9.2     VLDL Size      <=46.7 nm 48.2 (H)     LDL Particle Size      >=20.7 nm 20.9     Lge HDL Particle number      >=4.2 umol/L 6.8     HDL Particle Number NMR      >=33.0 umol/L 31.5 (L)     Lge VLDL Part number      <=2.7 nmol/L <1.5     Small LDL Particle number      <=634 nmol/L 447     LDL Particle Number      <=1135 nmol/L 883     EER LipoFit by NMR See Note     Cholesterol      <200 mg/dL 123     HDL Cholesterol      >=40 mg/dL 48     LDL Cholesterol Calculated      <=100 mg/dL 64     Non HDL Cholesterol      <130 mg/dL 75     Creatinine Urine      mg/dL  113.0    Albumin Urine mg/L      mg/L  <12.0    Albumin Urine mg/g Cr  --    Iron      61 - 157 ug/dL 83     Iron Binding Capacity      240 - 430 ug/dL 304     Iron Sat Index      15 - 46 % 27     % Retic      0.5 - 2.0 % 1.5     Absolute Retic      0.025 - 0.095 10e6/uL 0.061     C-Reactive Protein High Sensitivity <0.15     Hemoglobin A1C      0.0 - 5.6 % 5.5     Lipoprotein (a)      <30 mg/dL 8     Free T3      2.0 - 4.4 pg/mL 2.6     T4 Free      0.90 - 1.70 ng/dL 1.51     TSH      0.30 - 4.20 uIU/mL 0.64     Transferrin      200.0 - 360.0 mg/dL 253.0     Ferritin      31 - 409 ng/mL 29 (L)     Vitamin B12      232 - 1,245 pg/mL 736     Folate      4.6 - 34.8 ng/mL 16.1        Legend:  (L) Low  (H) High

## 2024-05-30 ENCOUNTER — TELEPHONE (OUTPATIENT)
Dept: OTHER | Facility: CLINIC | Age: 64
End: 2024-05-30
Payer: COMMERCIAL

## 2024-05-30 NOTE — TELEPHONE ENCOUNTER
Follow-up to 5/29/24    Fasting labs in the next few days (if possible)  BLE venous competency ultrasound  CT abdomen/pelvis with contrast  Echocardiogram  Follow up TWO weeks later - In clinic.

## 2024-05-30 NOTE — TELEPHONE ENCOUNTER
Called patient, he was driving and not available to look at his calendar. He will call back to schedule. Will flag to follow up.

## 2024-06-04 NOTE — TELEPHONE ENCOUNTER
Future Appointments   Date Time Provider Department Center   6/8/2024 11:15 AM CS LAB [facility]     Fasting lab scheduled. Patient is coordinating time off. Provided central imaging scheduling contact info.    Flagging for callback on 6/7

## 2024-06-08 ENCOUNTER — LAB (OUTPATIENT)
Dept: LAB | Facility: CLINIC | Age: 64
End: 2024-06-08
Attending: INTERNAL MEDICINE
Payer: COMMERCIAL

## 2024-06-08 DIAGNOSIS — R63.5 WEIGHT GAIN: ICD-10-CM

## 2024-06-08 DIAGNOSIS — R10.31 RLQ ABDOMINAL PAIN: ICD-10-CM

## 2024-06-08 LAB
ALBUMIN SERPL BCG-MCNC: 4.2 G/DL (ref 3.5–5.2)
ALP SERPL-CCNC: 74 U/L (ref 40–150)
ALT SERPL W P-5'-P-CCNC: 24 U/L (ref 0–70)
ANION GAP SERPL CALCULATED.3IONS-SCNC: 9 MMOL/L (ref 7–15)
AST SERPL W P-5'-P-CCNC: 34 U/L (ref 0–45)
BASOPHILS # BLD AUTO: 0 10E3/UL (ref 0–0.2)
BASOPHILS NFR BLD AUTO: 1 %
BILIRUB SERPL-MCNC: 0.5 MG/DL
BUN SERPL-MCNC: 22.3 MG/DL (ref 8–23)
CALCIUM SERPL-MCNC: 8.8 MG/DL (ref 8.8–10.2)
CHLORIDE SERPL-SCNC: 102 MMOL/L (ref 98–107)
CREAT SERPL-MCNC: 0.77 MG/DL (ref 0.67–1.17)
DEPRECATED HCO3 PLAS-SCNC: 25 MMOL/L (ref 22–29)
EGFRCR SERPLBLD CKD-EPI 2021: >90 ML/MIN/1.73M2
EOSINOPHIL # BLD AUTO: 0.3 10E3/UL (ref 0–0.7)
EOSINOPHIL NFR BLD AUTO: 5 %
ERYTHROCYTE [DISTWIDTH] IN BLOOD BY AUTOMATED COUNT: 13.8 % (ref 10–15)
GLUCOSE SERPL-MCNC: 94 MG/DL (ref 70–99)
HCT VFR BLD AUTO: 37.4 % (ref 40–53)
HGB BLD-MCNC: 12.2 G/DL (ref 13.3–17.7)
IMM GRANULOCYTES # BLD: 0 10E3/UL
IMM GRANULOCYTES NFR BLD: 0 %
LYMPHOCYTES # BLD AUTO: 1.8 10E3/UL (ref 0.8–5.3)
LYMPHOCYTES NFR BLD AUTO: 32 %
MCH RBC QN AUTO: 29.5 PG (ref 26.5–33)
MCHC RBC AUTO-ENTMCNC: 32.6 G/DL (ref 31.5–36.5)
MCV RBC AUTO: 91 FL (ref 78–100)
MONOCYTES # BLD AUTO: 0.4 10E3/UL (ref 0–1.3)
MONOCYTES NFR BLD AUTO: 8 %
NEUTROPHILS # BLD AUTO: 3.2 10E3/UL (ref 1.6–8.3)
NEUTROPHILS NFR BLD AUTO: 55 %
PLATELET # BLD AUTO: 147 10E3/UL (ref 150–450)
POTASSIUM SERPL-SCNC: 4 MMOL/L (ref 3.4–5.3)
PROT SERPL-MCNC: 6.7 G/DL (ref 6.4–8.3)
RBC # BLD AUTO: 4.13 10E6/UL (ref 4.4–5.9)
SODIUM SERPL-SCNC: 136 MMOL/L (ref 135–145)
WBC # BLD AUTO: 5.7 10E3/UL (ref 4–11)

## 2024-06-08 PROCEDURE — 36415 COLL VENOUS BLD VENIPUNCTURE: CPT

## 2024-06-08 PROCEDURE — 85025 COMPLETE CBC W/AUTO DIFF WBC: CPT

## 2024-06-08 PROCEDURE — 80053 COMPREHEN METABOLIC PANEL: CPT

## 2024-06-10 NOTE — TELEPHONE ENCOUNTER
Please arrange for:    BLE venous competency ultrasound  CT abdomen/pelvis with contrast  Echocardiogram  Follow up TWO weeks later - In clinic.     (Labs have been completed).

## 2024-06-10 NOTE — TELEPHONE ENCOUNTER
Patient is scheduled for CT and freya.comp 6/19/24. Transferred to Central Scheduling to schedule ECHO as first avail that worked with pt schedule was mid July.  Flagging to follow up 6/14/24

## 2024-06-13 NOTE — TELEPHONE ENCOUNTER
Patient's Echo is also scheduled for 06/19/24. Patient is scheduled for his follow up visit with Dr Davis on 07/01/24.

## 2024-06-19 ENCOUNTER — HOSPITAL ENCOUNTER (OUTPATIENT)
Dept: CARDIOLOGY | Facility: CLINIC | Age: 64
Discharge: HOME | End: 2024-06-19
Attending: INTERNAL MEDICINE | Admitting: INTERNAL MEDICINE
Payer: COMMERCIAL

## 2024-06-19 ENCOUNTER — HOSPITAL ENCOUNTER (OUTPATIENT)
Dept: CT IMAGING | Facility: CLINIC | Age: 64
Discharge: HOME | End: 2024-06-19
Attending: INTERNAL MEDICINE | Admitting: INTERNAL MEDICINE
Payer: COMMERCIAL

## 2024-06-19 ENCOUNTER — ANCILLARY PROCEDURE (OUTPATIENT)
Dept: ULTRASOUND IMAGING | Facility: CLINIC | Age: 64
End: 2024-06-19
Attending: INTERNAL MEDICINE
Payer: COMMERCIAL

## 2024-06-19 DIAGNOSIS — I50.30 HEART FAILURE WITH PRESERVED EJECTION FRACTION, NYHA CLASS I (H): ICD-10-CM

## 2024-06-19 DIAGNOSIS — R10.31 RLQ ABDOMINAL PAIN: ICD-10-CM

## 2024-06-19 DIAGNOSIS — R63.5 WEIGHT GAIN: ICD-10-CM

## 2024-06-19 DIAGNOSIS — I87.2 VENOUS (PERIPHERAL) INSUFFICIENCY: ICD-10-CM

## 2024-06-19 LAB — LVEF ECHO: NORMAL

## 2024-06-19 PROCEDURE — 93306 TTE W/DOPPLER COMPLETE: CPT

## 2024-06-19 PROCEDURE — 74177 CT ABD & PELVIS W/CONTRAST: CPT

## 2024-06-19 PROCEDURE — 93970 EXTREMITY STUDY: CPT | Performed by: SURGERY

## 2024-06-19 PROCEDURE — 250N000011 HC RX IP 250 OP 636: Performed by: INTERNAL MEDICINE

## 2024-06-19 PROCEDURE — 250N000009 HC RX 250: Performed by: INTERNAL MEDICINE

## 2024-06-19 PROCEDURE — 93306 TTE W/DOPPLER COMPLETE: CPT | Mod: 26 | Performed by: INTERNAL MEDICINE

## 2024-06-19 RX ORDER — IOPAMIDOL 755 MG/ML
84 INJECTION, SOLUTION INTRAVASCULAR ONCE
Status: COMPLETED | OUTPATIENT
Start: 2024-06-19 | End: 2024-06-19

## 2024-06-19 RX ADMIN — IOPAMIDOL 84 ML: 755 INJECTION, SOLUTION INTRAVENOUS at 09:54

## 2024-06-19 RX ADMIN — SODIUM CHLORIDE 63 ML: 9 INJECTION, SOLUTION INTRAVENOUS at 09:54

## 2024-08-10 ENCOUNTER — HEALTH MAINTENANCE LETTER (OUTPATIENT)
Age: 64
End: 2024-08-10

## 2024-10-02 NOTE — TELEPHONE ENCOUNTER
Would not recommend to increase to 10 mg as risk for side effects is greater with higher doses. What is the reason she wants to increase?   [facility]    Who is the name of the provider?:  Ryan      What is the location you see this provider at?: [place]    Reason for call:  Fully vaccinated.  Exposed to COVID 10/17.  Having some symptoms, chest heaviness, slight gastric and cold symptoms.  Wants to get tested ASAP, he has to meet with  re his mother's memorial service arrangements tomorrow.    Can we leave a detailed message on this number?  YES

## 2024-11-11 DIAGNOSIS — I10 ESSENTIAL HYPERTENSION: ICD-10-CM

## 2024-11-11 DIAGNOSIS — I25.118 CORONARY ARTERY DISEASE OF NATIVE ARTERY OF NATIVE HEART WITH STABLE ANGINA PECTORIS (H): ICD-10-CM

## 2024-11-11 DIAGNOSIS — E78.5 HYPERLIPIDEMIA LDL GOAL <70: ICD-10-CM

## 2024-11-12 ENCOUNTER — OFFICE VISIT (OUTPATIENT)
Dept: CARDIOLOGY | Facility: CLINIC | Age: 64
End: 2024-11-12
Payer: COMMERCIAL

## 2024-11-12 VITALS
WEIGHT: 213.1 LBS | OXYGEN SATURATION: 97 % | DIASTOLIC BLOOD PRESSURE: 60 MMHG | BODY MASS INDEX: 31.56 KG/M2 | HEIGHT: 69 IN | SYSTOLIC BLOOD PRESSURE: 100 MMHG | HEART RATE: 65 BPM

## 2024-11-12 DIAGNOSIS — R00.2 PALPITATIONS: Primary | ICD-10-CM

## 2024-11-12 NOTE — PROGRESS NOTES
Ramiro Jeffers arrived here on 11/12/2024 11:16 AM for 8-14 Days  Zio monitor placement per ordering provider Dr. Key for the diagnosis [R00.2].  Patient s skin was prepped per protocol. Dr. Key is the supervising MD.  Zio monitor was placed.  Instructions were reviewed with and given to the patient.  Patient verbalized understanding of wear, troubleshooting and monitor return instructions.    SANDRA Coon

## 2024-11-12 NOTE — LETTER
11/12/2024    Addison Davis MD  [address]  [place] [address]    RE: Ramiro Jeffers       Dear Colleague,     I had the pleasure of seeing R[facility]ers in the [facility].  CARDIOLOGY CLINIC CONSULTATION    PRIMARY CARE PHYSICIAN:  Addison Davis    HISTORY OF PRESENT ILLNESS:  This is a very pleasant 64-year-old gentleman who has a history of coronary artery disease who is seen in follow-up.     Proximal LAD stenting in 2018 with 2 subsequent angiograms in 2019 and 2021 showing no new obstructive lesions and patent stent.  Normal EF.  History of bilateral PE  History of short runs of atrial fibrillation diagnosed 2022 on event monitoring.  However subsequently evaluated by EP during inpatient stay.  See discharge summary from November 2023.  There was no convincing evidence of atrial fibrillation that was sustained.  As such anticoagulation was discontinued.  Chronic anticoagulation for above initially but now on aspirin monotherapy  Mild heart failure with preserved ejection fraction NYHA class I at this time on low-dose of diuretics.     Patient is here for routine follow-up.  Denies any new cardiovascular symptoms.  No angina.  He is functionally active.  Over the last 1 year he has gained significant amount of weight but this is predominantly caloric weight.  Denies edema orthopnea PND.  He is NYHA class I.  He is currently on 20 mg of torsemide.    The only thing the patient has complaints about today is recurrent episodes of palpitations that happen multiple times through the day lasting 1 to 2 minutes.  No associated syncope presyncope.  Of note he has been taken off his anticoagulation as mentioned above.    PAST MEDICAL HISTORY:  Past Medical History:   Diagnosis Date     ACQUIRED HYPOTHYROIDISM       Blood clot in the legs      CAD (coronary artery disease)     11/15/18 Cath: PCI with 3.5-16mm DORY to proximal LAD     Calculus of gallbladder without mention of  cholecystitis or obstruction      Cataract      Chest pressure 11/05/2018     History of gastric bypass      Hyperlipidemia      Hypertension      Ischemic cardiomyopathy     EF 40-45% on 11/2018 Echo     Obesity, unspecified     significant weight loss w/diet alone, on 10-15-10, BMI was 56.4     pulmonary emboli 06/2010    Unprovoked large bilateral pulmonary emboli without source identified on lower extremity dopplers, pt had a transient moderate lupus inhibitor upon initial presentation in June , 2010 which was gone in September, 2010;  all other thrombophilic workup is normal     Pulmonary embolus, bilateral      PVC's (premature ventricular contractions)     4% burden on 11/2018 Holter     Shortness of breath     on exertion     Venous insufficiency      Viral pneumonia, unspecified      Vitamin D deficiency        MEDICATIONS:  Current Outpatient Medications   Medication Sig Dispense Refill     Ascorbic Acid (VITAMIN C) 500 MG CHEW Take 1 chew tab by mouth daily       ezetimibe (ZETIA) 10 MG tablet Take 1 tablet (10 mg) by mouth daily 90 tablet 1     isosorbide mononitrate (IMDUR) 30 MG 24 hr tablet Take 2 tablets (60 mg) by mouth daily 180 tablet 3     levothyroxine (SYNTHROID/LEVOTHROID) 137 MCG tablet Take 137 mcg every other day alternating with 150 mcg PO alternating every other day. 45 tablet 3     lisinopril (ZESTRIL) 5 MG tablet Take 1 tablet (5 mg) by mouth daily 90 tablet 3     metoprolol succinate ER (TOPROL XL) 50 MG 24 hr tablet Take 1 tablet (50 mg) by mouth daily Hold for BP < 100       rosuvastatin (CRESTOR) 40 MG tablet Take 1 tablet (40 mg) by mouth daily 90 tablet 3     torsemide (DEMADEX) 20 MG tablet Take 1 tablet (20 mg) by mouth daily 90 tablet 3     aspirin (ASA) 81 MG chewable tablet Take 1 tablet (81 mg) by mouth daily (Patient not taking: Reported on 11/12/2024) 30 tablet 1     nitroGLYcerin (NITROSTAT) 0.4 MG sublingual tablet For chest pain place 1 tablet under the tongue every 5  minutes for 3 doses. If symptoms persist 5 minutes after 1st dose call 911. 25 tablet 3     Vitamin D3 (CHOLECALCIFEROL) 125 MCG (5000 UT) tablet Take 1 tablet by mouth daily (Patient not taking: Reported on 11/12/2024)       No current facility-administered medications for this visit.       SOCIAL HISTORY:  I have reviewed this patient's social history and updated it with pertinent information if needed. Ramiro Jeffers  reports that he has never smoked. He has never used smokeless tobacco. He reports that he does not drink alcohol and does not use drugs.    PHYSICAL EXAM:  Pulse:  [65] 65  BP: (100)/(60) 100/60  SpO2:  [97 %] 97 %  213 lbs 1.6 oz    Constitutional: alert, no distress  Respiratory: Good bilateral air entry  Cardiovascular: Regular heart sounds no murmur rubs or gallops  GI: nondistended  Neuropsychiatric: appropriate affact    ASSESSMENT: Pertinent issues addressed/ reviewed during this cardiology visit  Coronary artery disease without angina  Chronic heart failure with preserved ejection fraction  Short nonsustained episodes of A-fib versus A. tach diagnosed in 2022  History of PE    RECOMMENDATIONS:  Given the patient's symptoms of palpitations, I recommend getting a Zio monitor for 14 days.  His case was reviewed by EP and he was taken off anticoagulation previously.  Currently is taking aspirin monotherapy.  Having said that if he has recurrence of atrial fibrillation on his Zio, I would recommend he go back on anticoagulation.  He has coronary disease hypertension and HFpEF.  If there is no evidence of atrial fibrillation on Zio, but continue current medications and plan.  Cost has been an issue for him for multiple drugs including DOAC.  Currently he is stable from a heart failure standpoint however if symptoms worsen, would recommend addition of SGLT2 inhibitors.  Continue statin and Zetia.  LDL is at goal now.  Return to cardiology clinic in 1 year from now sooner if anything changes  clinically.    It was a pleasure seeing this patient in clinic today. Please do not hesitate to contact me with any future questions.     BE Yoo, [facility]  Cardiology - [facility]  November 12, 2024    Review of the result(s) of each unique test - Last CBC BMP lipids echocardiogram ECG cardiac monitoring     The level of medical decision making during this visit was of moderate complexity.    This note was completed in part using dictation via the Dragon voice recognition software. Some word and grammatical errors may occur and must be interpreted in the appropriate clinical context.  If there are any questions pertaining to this issue, please contact me for further clarification.      Thank you for allowing me to participate in the care of your patient.      Sincerely,     Tereza Key MD     [facility]  cc:   Tereza Key MD  [address] [facility] [address]  [place] [address]

## 2024-11-12 NOTE — TELEPHONE ENCOUNTER
Unable to fill per [facility] protocol.  Medication and pharmacy loaded.    Jeannie MENDOZA, RN    [facility]  Office: [phone]  Fax: [phone]

## 2024-11-12 NOTE — PROGRESS NOTES
CARDIOLOGY CLINIC CONSULTATION    PRIMARY CARE PHYSICIAN:  Addison Davis    HISTORY OF PRESENT ILLNESS:  This is a very pleasant 64-year-old gentleman who has a history of coronary artery disease who is seen in follow-up.     Proximal LAD stenting in 2018 with 2 subsequent angiograms in 2019 and 2021 showing no new obstructive lesions and patent stent.  Normal EF.  History of bilateral PE  History of short runs of atrial fibrillation diagnosed 2022 on event monitoring.  However subsequently evaluated by EP during inpatient stay.  See discharge summary from November 2023.  There was no convincing evidence of atrial fibrillation that was sustained.  As such anticoagulation was discontinued.  Chronic anticoagulation for above initially but now on aspirin monotherapy  Mild heart failure with preserved ejection fraction NYHA class I at this time on low-dose of diuretics.     Patient is here for routine follow-up.  Denies any new cardiovascular symptoms.  No angina.  He is functionally active.  Over the last 1 year he has gained significant amount of weight but this is predominantly caloric weight.  Denies edema orthopnea PND.  He is NYHA class I.  He is currently on 20 mg of torsemide.    The only thing the patient has complaints about today is recurrent episodes of palpitations that happen multiple times through the day lasting 1 to 2 minutes.  No associated syncope presyncope.  Of note he has been taken off his anticoagulation as mentioned above.    PAST MEDICAL HISTORY:  Past Medical History:   Diagnosis Date    ACQUIRED HYPOTHYROIDISM      Blood clot in the legs     CAD (coronary artery disease)     11/15/18 Cath: PCI with 3.5-16mm DORY to proximal LAD    Calculus of gallbladder without mention of cholecystitis or obstruction     Cataract     Chest pressure 11/05/2018    History of gastric bypass     Hyperlipidemia     Hypertension     Ischemic cardiomyopathy     EF 40-45% on 11/2018 Echo    Obesity,  unspecified     significant weight loss w/diet alone, on 10-15-10, BMI was 56.4    pulmonary emboli 06/2010    Unprovoked large bilateral pulmonary emboli without source identified on lower extremity dopplers, pt had a transient moderate lupus inhibitor upon initial presentation in June , 2010 which was gone in September, 2010;  all other thrombophilic workup is normal    Pulmonary embolus, bilateral     PVC's (premature ventricular contractions)     4% burden on 11/2018 Holter    Shortness of breath     on exertion    Venous insufficiency     Viral pneumonia, unspecified     Vitamin D deficiency        MEDICATIONS:  Current Outpatient Medications   Medication Sig Dispense Refill    Ascorbic Acid (VITAMIN C) 500 MG CHEW Take 1 chew tab by mouth daily      ezetimibe (ZETIA) 10 MG tablet Take 1 tablet (10 mg) by mouth daily 90 tablet 1    isosorbide mononitrate (IMDUR) 30 MG 24 hr tablet Take 2 tablets (60 mg) by mouth daily 180 tablet 3    levothyroxine (SYNTHROID/LEVOTHROID) 137 MCG tablet Take 137 mcg every other day alternating with 150 mcg PO alternating every other day. 45 tablet 3    lisinopril (ZESTRIL) 5 MG tablet Take 1 tablet (5 mg) by mouth daily 90 tablet 3    metoprolol succinate ER (TOPROL XL) 50 MG 24 hr tablet Take 1 tablet (50 mg) by mouth daily Hold for BP < 100      rosuvastatin (CRESTOR) 40 MG tablet Take 1 tablet (40 mg) by mouth daily 90 tablet 3    torsemide (DEMADEX) 20 MG tablet Take 1 tablet (20 mg) by mouth daily 90 tablet 3    aspirin (ASA) 81 MG chewable tablet Take 1 tablet (81 mg) by mouth daily (Patient not taking: Reported on 11/12/2024) 30 tablet 1    nitroGLYcerin (NITROSTAT) 0.4 MG sublingual tablet For chest pain place 1 tablet under the tongue every 5 minutes for 3 doses. If symptoms persist 5 minutes after 1st dose call 911. 25 tablet 3    Vitamin D3 (CHOLECALCIFEROL) 125 MCG (5000 UT) tablet Take 1 tablet by mouth daily (Patient not taking: Reported on 11/12/2024)       No  current facility-administered medications for this visit.       SOCIAL HISTORY:  I have reviewed this patient's social history and updated it with pertinent information if needed. Ramiro Jeffers  reports that he has never smoked. He has never used smokeless tobacco. He reports that he does not drink alcohol and does not use drugs.    PHYSICAL EXAM:  Pulse:  [65] 65  BP: (100)/(60) 100/60  SpO2:  [97 %] 97 %  213 lbs 1.6 oz    Constitutional: alert, no distress  Respiratory: Good bilateral air entry  Cardiovascular: Regular heart sounds no murmur rubs or gallops  GI: nondistended  Neuropsychiatric: appropriate affact    ASSESSMENT: Pertinent issues addressed/ reviewed during this cardiology visit  Coronary artery disease without angina  Chronic heart failure with preserved ejection fraction  Short nonsustained episodes of A-fib versus A. tach diagnosed in 2022  History of PE    RECOMMENDATIONS:  Given the patient's symptoms of palpitations, I recommend getting a Zio monitor for 14 days.  His case was reviewed by EP and he was taken off anticoagulation previously.  Currently is taking aspirin monotherapy.  Having said that if he has recurrence of atrial fibrillation on his Zio, I would recommend he go back on anticoagulation.  He has coronary disease hypertension and HFpEF.  If there is no evidence of atrial fibrillation on Zio, but continue current medications and plan.  Cost has been an issue for him for multiple drugs including DOAC.  Currently he is stable from a heart failure standpoint however if symptoms worsen, would recommend addition of SGLT2 inhibitors.  Continue statin and Zetia.  LDL is at goal now.  Return to cardiology clinic in 1 year from now sooner if anything changes clinically.    It was a pleasure seeing this patient in clinic today. Please do not hesitate to contact me with any future questions.     BE Yoo, [facility]  Cardiology - [facility]  November 12, 2024    Review of the result(s) of  each unique test - Last CBC BMP lipids echocardiogram ECG cardiac monitoring     The level of medical decision making during this visit was of moderate complexity.    This note was completed in part using dictation via the Dragon voice recognition software. Some word and grammatical errors may occur and must be interpreted in the appropriate clinical context.  If there are any questions pertaining to this issue, please contact me for further clarification.

## 2024-11-13 RX ORDER — ISOSORBIDE MONONITRATE 30 MG/1
60 TABLET, EXTENDED RELEASE ORAL DAILY
Qty: 180 TABLET | Refills: 2 | Status: SHIPPED | OUTPATIENT
Start: 2024-11-13

## 2024-11-13 RX ORDER — EZETIMIBE 10 MG/1
10 TABLET ORAL DAILY
Qty: 90 TABLET | Refills: 1 | Status: SHIPPED | OUTPATIENT
Start: 2024-11-13

## 2024-12-02 LAB — CV ZIO PRELIM RESULTS: NORMAL

## 2024-12-30 DIAGNOSIS — I50.9 CONGESTIVE HEART FAILURE, UNSPECIFIED HF CHRONICITY, UNSPECIFIED HEART FAILURE TYPE (H): ICD-10-CM

## 2024-12-30 DIAGNOSIS — I10 ESSENTIAL HYPERTENSION: ICD-10-CM

## 2024-12-30 DIAGNOSIS — I25.118 CORONARY ARTERY DISEASE OF NATIVE ARTERY OF NATIVE HEART WITH STABLE ANGINA PECTORIS (H): ICD-10-CM

## 2024-12-31 RX ORDER — TORSEMIDE 20 MG/1
20 TABLET ORAL DAILY
Qty: 90 TABLET | Refills: 1 | Status: SHIPPED | OUTPATIENT
Start: 2024-12-31

## 2024-12-31 NOTE — TELEPHONE ENCOUNTER
Last Written Prescription Date:  11/9/23  Last Fill Quantity: 90,  # refills: 3   Last office visit: 5/29/2024 ; last virtual visit: 11/30/2023 with prescribing provider:     Future Office Visit:      Requested Prescriptions   Pending Prescriptions Disp Refills    torsemide (DEMADEX) 20 MG tablet 90 tablet 3     Sig: Take 1 tablet (20 mg) by mouth daily.       Diuretics (Including Combos) Protocol Passed - 12/31/2024  6:55 AM        Passed - Most recent blood pressure under 140/90 in past 12 months     BP Readings from Last 3 Encounters:   11/12/24 100/60   05/29/24 113/69   11/29/23 103/68       No data recorded            Passed - Potassium level on file in past 12 months        Passed - Medication is active on med list        Passed - Has GFR on file in past 12 months and most recent value is normal        Passed - Medication indicated for associated diagnosis     Medication is associated with one or more of the following diagnoses:     Edema   Hypertension   Hypercalcemia   Heart Failure   Chronic Kidney Disease (CKD)   Cardiomyopathy   Dyspnea   Chronic Thromboembolic Pulmonary Hypertension   Pulmonary Hypertension          Passed - Recent (12 mo) or future (90 days) visit within the authorizing provider's specialty     The patient must have completed an in-person or virtual visit within the past 12 months or has a future visit scheduled within the next 90 days with the authorizing provider s specialty.  Urgent care and e-visits do not qualify as an office visit for this protocol.          Passed - Patient is age 18 or older           Prescription approved per [facility] Refill Protocol.    Jeannie MENDOZA RN    [facility]  Office: [phone]  Fax: [phone]

## 2025-01-28 DIAGNOSIS — I10 ESSENTIAL HYPERTENSION: Primary | ICD-10-CM

## 2025-01-28 RX ORDER — LISINOPRIL 5 MG/1
5 TABLET ORAL DAILY
Qty: 90 TABLET | Refills: 1 | Status: SHIPPED | OUTPATIENT
Start: 2025-01-28

## 2025-01-28 NOTE — TELEPHONE ENCOUNTER
Unable to fill per [facility] protocol.  Medication and pharmacy loaded.    Jeannie MENDOZA, RN    [facility]  Office: [phone]  Fax: [phone]

## 2025-01-30 ENCOUNTER — TELEPHONE (OUTPATIENT)
Dept: OTHER | Facility: CLINIC | Age: 65
End: 2025-01-30
Payer: COMMERCIAL

## 2025-01-30 DIAGNOSIS — E06.3 HYPOTHYROIDISM DUE TO HASHIMOTO'S THYROIDITIS: ICD-10-CM

## 2025-01-30 DIAGNOSIS — E78.5 HYPERLIPIDEMIA LDL GOAL <70: ICD-10-CM

## 2025-02-07 ENCOUNTER — LAB (OUTPATIENT)
Dept: LAB | Facility: CLINIC | Age: 65
End: 2025-02-07
Payer: COMMERCIAL

## 2025-02-07 DIAGNOSIS — E78.5 HYPERLIPIDEMIA LDL GOAL <100: ICD-10-CM

## 2025-02-07 DIAGNOSIS — I50.9 CONGESTIVE HEART FAILURE, UNSPECIFIED HF CHRONICITY, UNSPECIFIED HEART FAILURE TYPE (H): ICD-10-CM

## 2025-02-07 DIAGNOSIS — D64.9 ANEMIA, UNSPECIFIED TYPE: ICD-10-CM

## 2025-02-07 DIAGNOSIS — I25.118 CORONARY ARTERY DISEASE OF NATIVE ARTERY OF NATIVE HEART WITH STABLE ANGINA PECTORIS: ICD-10-CM

## 2025-02-07 DIAGNOSIS — E78.5 HYPERLIPIDEMIA LDL GOAL <70: ICD-10-CM

## 2025-02-07 DIAGNOSIS — R73.01 IFG (IMPAIRED FASTING GLUCOSE): ICD-10-CM

## 2025-02-07 DIAGNOSIS — E06.3 HYPOTHYROIDISM DUE TO HASHIMOTO'S THYROIDITIS: ICD-10-CM

## 2025-02-07 LAB
ALBUMIN SERPL BCG-MCNC: 4 G/DL (ref 3.5–5.2)
ALP SERPL-CCNC: 70 U/L (ref 40–150)
ALT SERPL W P-5'-P-CCNC: 21 U/L (ref 0–70)
ANION GAP SERPL CALCULATED.3IONS-SCNC: 9 MMOL/L (ref 7–15)
APO A-I SERPL-MCNC: 15 MG/DL
AST SERPL W P-5'-P-CCNC: 26 U/L (ref 0–45)
BASOPHILS # BLD AUTO: 0 10E3/UL (ref 0–0.2)
BASOPHILS NFR BLD AUTO: 0 %
BILIRUB SERPL-MCNC: 0.3 MG/DL
BUN SERPL-MCNC: 17.2 MG/DL (ref 8–23)
CALCIUM SERPL-MCNC: 9.3 MG/DL (ref 8.8–10.4)
CHLORIDE SERPL-SCNC: 99 MMOL/L (ref 98–107)
CREAT SERPL-MCNC: 0.77 MG/DL (ref 0.67–1.17)
CREAT UR-MCNC: 39 MG/DL
CRP SERPL HS-MCNC: <0.15 MG/L
EGFRCR SERPLBLD CKD-EPI 2021: >90 ML/MIN/1.73M2
EOSINOPHIL # BLD AUTO: 0.3 10E3/UL (ref 0–0.7)
EOSINOPHIL NFR BLD AUTO: 5 %
ERYTHROCYTE [DISTWIDTH] IN BLOOD BY AUTOMATED COUNT: 14.2 % (ref 10–15)
EST. AVERAGE GLUCOSE BLD GHB EST-MCNC: 123 MG/DL
FERRITIN SERPL-MCNC: 29 NG/ML (ref 31–409)
FOLATE SERPL-MCNC: 15.2 NG/ML (ref 4.6–34.8)
GLUCOSE SERPL-MCNC: 107 MG/DL (ref 70–99)
HBA1C MFR BLD: 5.9 % (ref 0–5.6)
HCO3 SERPL-SCNC: 31 MMOL/L (ref 22–29)
HCT VFR BLD AUTO: 40.1 % (ref 40–53)
HGB BLD-MCNC: 12.9 G/DL (ref 13.3–17.7)
IMM GRANULOCYTES # BLD: 0 10E3/UL
IMM GRANULOCYTES NFR BLD: 0 %
IRON BINDING CAPACITY (ROCHE): 299 UG/DL (ref 240–430)
IRON SATN MFR SERPL: 24 % (ref 15–46)
IRON SERPL-MCNC: 72 UG/DL (ref 61–157)
LYMPHOCYTES # BLD AUTO: 1.9 10E3/UL (ref 0.8–5.3)
LYMPHOCYTES NFR BLD AUTO: 36 %
MCH RBC QN AUTO: 28.4 PG (ref 26.5–33)
MCHC RBC AUTO-ENTMCNC: 32.2 G/DL (ref 31.5–36.5)
MCV RBC AUTO: 88 FL (ref 78–100)
MICROALBUMIN UR-MCNC: <12 MG/L
MICROALBUMIN/CREAT UR: NORMAL MG/G{CREAT}
MONOCYTES # BLD AUTO: 0.5 10E3/UL (ref 0–1.3)
MONOCYTES NFR BLD AUTO: 9 %
NEUTROPHILS # BLD AUTO: 2.6 10E3/UL (ref 1.6–8.3)
NEUTROPHILS NFR BLD AUTO: 50 %
PLATELET # BLD AUTO: 140 10E3/UL (ref 150–450)
POTASSIUM SERPL-SCNC: 3.9 MMOL/L (ref 3.4–5.3)
PROT SERPL-MCNC: 6.9 G/DL (ref 6.4–8.3)
RBC # BLD AUTO: 4.54 10E6/UL (ref 4.4–5.9)
RETICS # AUTO: 0.05 10E6/UL (ref 0.03–0.1)
RETICS/RBC NFR AUTO: 1.1 % (ref 0.5–2)
SODIUM SERPL-SCNC: 139 MMOL/L (ref 135–145)
T3FREE SERPL-MCNC: 2.4 PG/ML (ref 2–4.4)
T4 FREE SERPL-MCNC: 1.42 NG/DL (ref 0.9–1.7)
TRANSFERRIN SERPL-MCNC: 244 MG/DL (ref 200–360)
TSH SERPL DL<=0.005 MIU/L-ACNC: 1.21 UIU/ML (ref 0.3–4.2)
VIT B12 SERPL-MCNC: 471 PG/ML (ref 232–1245)
WBC # BLD AUTO: 5.2 10E3/UL (ref 4–11)

## 2025-02-07 PROCEDURE — 36415 COLL VENOUS BLD VENIPUNCTURE: CPT

## 2025-02-07 PROCEDURE — 83540 ASSAY OF IRON: CPT

## 2025-02-07 PROCEDURE — 84481 FREE ASSAY (FT-3): CPT

## 2025-02-07 PROCEDURE — 82746 ASSAY OF FOLIC ACID SERUM: CPT

## 2025-02-07 PROCEDURE — 86141 C-REACTIVE PROTEIN HS: CPT

## 2025-02-07 PROCEDURE — 83704 LIPOPROTEIN BLD QUAN PART: CPT | Mod: 90

## 2025-02-07 PROCEDURE — 82043 UR ALBUMIN QUANTITATIVE: CPT

## 2025-02-07 PROCEDURE — 80061 LIPID PANEL: CPT | Mod: 90

## 2025-02-07 PROCEDURE — 83036 HEMOGLOBIN GLYCOSYLATED A1C: CPT

## 2025-02-07 PROCEDURE — 84439 ASSAY OF FREE THYROXINE: CPT

## 2025-02-07 PROCEDURE — 82728 ASSAY OF FERRITIN: CPT

## 2025-02-07 PROCEDURE — 83550 IRON BINDING TEST: CPT | Mod: 59

## 2025-02-07 PROCEDURE — 83695 ASSAY OF LIPOPROTEIN(A): CPT

## 2025-02-07 PROCEDURE — 80053 COMPREHEN METABOLIC PANEL: CPT

## 2025-02-07 PROCEDURE — 99000 SPECIMEN HANDLING OFFICE-LAB: CPT

## 2025-02-07 PROCEDURE — 85045 AUTOMATED RETICULOCYTE COUNT: CPT

## 2025-02-07 PROCEDURE — 82607 VITAMIN B-12: CPT

## 2025-02-07 PROCEDURE — 85025 COMPLETE CBC W/AUTO DIFF WBC: CPT

## 2025-02-07 PROCEDURE — 84443 ASSAY THYROID STIM HORMONE: CPT

## 2025-02-07 PROCEDURE — 82570 ASSAY OF URINE CREATININE: CPT

## 2025-02-11 LAB
CHOLEST SERPL-MCNC: 127 MG/DL
HDL SERPL QN: 9 NM
HDL SERPL-SCNC: 31.9 UMOL/L
HDLC SERPL-MCNC: 43 MG/DL
HLD.LARGE SERPL-SCNC: 5.4 UMOL/L
LDL SERPL QN: 20.9 NM
LDL SERPL-SCNC: 865 NMOL/L
LDL SMALL SERPL-SCNC: 420 NMOL/L
LDLC SERPL CALC-MCNC: 66 MG/DL
PATHOLOGY STUDY: ABNORMAL
TRIGL SERPL-MCNC: 90 MG/DL
VLDL LARGE SERPL-SCNC: <1.5 NMOL/L
VLDL SERPL QN: 46.8 NM

## 2025-02-13 ENCOUNTER — OFFICE VISIT (OUTPATIENT)
Dept: OTHER | Facility: CLINIC | Age: 65
End: 2025-02-13
Attending: INTERNAL MEDICINE
Payer: COMMERCIAL

## 2025-02-13 VITALS
BODY MASS INDEX: 31.6 KG/M2 | OXYGEN SATURATION: 95 % | DIASTOLIC BLOOD PRESSURE: 61 MMHG | WEIGHT: 214 LBS | SYSTOLIC BLOOD PRESSURE: 97 MMHG | HEART RATE: 71 BPM

## 2025-02-13 DIAGNOSIS — I50.9 CONGESTIVE HEART FAILURE, UNSPECIFIED HF CHRONICITY, UNSPECIFIED HEART FAILURE TYPE (H): ICD-10-CM

## 2025-02-13 DIAGNOSIS — Z00.00 ANNUAL PHYSICAL EXAM: Primary | ICD-10-CM

## 2025-02-13 DIAGNOSIS — I25.118 CORONARY ARTERY DISEASE OF NATIVE ARTERY OF NATIVE HEART WITH STABLE ANGINA PECTORIS: ICD-10-CM

## 2025-02-13 DIAGNOSIS — E06.3 HYPOTHYROIDISM DUE TO HASHIMOTO'S THYROIDITIS: ICD-10-CM

## 2025-02-13 DIAGNOSIS — D64.9 ANEMIA, UNSPECIFIED TYPE: ICD-10-CM

## 2025-02-13 DIAGNOSIS — R73.01 IFG (IMPAIRED FASTING GLUCOSE): ICD-10-CM

## 2025-02-13 DIAGNOSIS — I10 ESSENTIAL HYPERTENSION: ICD-10-CM

## 2025-02-13 DIAGNOSIS — E78.5 HYPERLIPIDEMIA LDL GOAL <70: ICD-10-CM

## 2025-02-13 DIAGNOSIS — E78.5 HYPERLIPIDEMIA LDL GOAL <100: ICD-10-CM

## 2025-02-13 DIAGNOSIS — R20.2 PARESTHESIA: ICD-10-CM

## 2025-02-13 PROCEDURE — G0463 HOSPITAL OUTPT CLINIC VISIT: HCPCS | Performed by: INTERNAL MEDICINE

## 2025-02-13 RX ORDER — GABAPENTIN 100 MG/1
100 CAPSULE ORAL 3 TIMES DAILY
Qty: 270 CAPSULE | Refills: 3 | Status: SHIPPED | OUTPATIENT
Start: 2025-02-13

## 2025-02-13 RX ORDER — NITROGLYCERIN 0.4 MG/1
TABLET SUBLINGUAL
Qty: 25 TABLET | Refills: 3 | Status: SHIPPED | OUTPATIENT
Start: 2025-02-13

## 2025-02-13 RX ORDER — METOPROLOL SUCCINATE 50 MG/1
50 TABLET, EXTENDED RELEASE ORAL DAILY
Qty: 90 TABLET | Refills: 3 | Status: CANCELLED | OUTPATIENT
Start: 2025-02-13

## 2025-02-13 RX ORDER — METOPROLOL SUCCINATE 25 MG/1
25 TABLET, EXTENDED RELEASE ORAL DAILY
Qty: 90 TABLET | Refills: 3 | Status: SHIPPED | OUTPATIENT
Start: 2025-02-13

## 2025-02-13 RX ORDER — LISINOPRIL 5 MG/1
5 TABLET ORAL DAILY
Qty: 90 TABLET | Refills: 3 | Status: SHIPPED | OUTPATIENT
Start: 2025-02-13

## 2025-02-13 RX ORDER — EZETIMIBE 10 MG/1
10 TABLET ORAL DAILY
Qty: 90 TABLET | Refills: 3 | Status: SHIPPED | OUTPATIENT
Start: 2025-02-13

## 2025-02-13 RX ORDER — TORSEMIDE 20 MG/1
20 TABLET ORAL DAILY
Qty: 90 TABLET | Refills: 3 | Status: SHIPPED | OUTPATIENT
Start: 2025-02-13

## 2025-02-13 RX ORDER — ROSUVASTATIN CALCIUM 40 MG/1
40 TABLET, COATED ORAL DAILY
Qty: 90 TABLET | Refills: 3 | Status: SHIPPED | OUTPATIENT
Start: 2025-02-13

## 2025-02-13 RX ORDER — LEVOTHYROXINE SODIUM 137 UG/1
137 TABLET ORAL DAILY
Qty: 45 TABLET | Refills: 3 | Status: SHIPPED | OUTPATIENT
Start: 2025-02-13

## 2025-02-13 RX ORDER — LEVOTHYROXINE SODIUM 150 UG/1
TABLET ORAL
Qty: 45 TABLET | Refills: 3 | Status: SHIPPED | OUTPATIENT
Start: 2025-02-13

## 2025-02-13 RX ORDER — ISOSORBIDE MONONITRATE 30 MG/1
60 TABLET, EXTENDED RELEASE ORAL DAILY
Qty: 180 TABLET | Refills: 3 | Status: SHIPPED | OUTPATIENT
Start: 2025-02-13

## 2025-02-13 NOTE — PROGRESS NOTES
[facility]        Patient is here for a follow up.    Pt is currently taking Aspirin and Statin.    BP 97/61 (BP Location: Left arm, Patient Position: Sitting, Cuff Size: Adult Regular)   Pulse 71   Wt 214 lb (97.1 kg)   SpO2 95%   BMI 31.60 kg/m      The provider has been notified that the patient has concerns of loss of balance and tingling sensation in hands and legs.     Questions patient would like addressed today are: N/A.    Refills are needed: N/A    Has homecare services and agency name:  No     Patient has been identified as a fall risk.    Interventions were completed as noted below:  [x]Exam tables/chairs remained in the lowest position.   []Patient remained in wheelchair.    []Provider requested patient in exam chair.  Patient required assist and use of transfer belt.  [x]Exam room door remained open unless with a provider.  []A bell provided to patient to notify staff if assistance was needed.  [x]Provider notified patient was identified as a fall risk.      Mindi Escudero MA

## 2025-02-13 NOTE — PROGRESS NOTES
VASCULAR MEDICINE FOLLOW UP VISIT         A/P:      (Z00.00) Annual physical exam  (primary encounter diagnosis)  Comment: Doing well. Due for colonoscopy in 2031  Plan: Proceed with above in 2031       (I50.9) Congestive heart failure, unspecified HF chronicity, unspecified heart failure type (H)  Comment: OFFICE/OUTPT VISIT,EST,LEVL IV,doing well. Weigh daily. Call if wt increases 2 # day to day or 5# over a week.  Plan: Continue Demadex. Check TTE for surveillance in 06/2025.             (R06.09) ALAS (dyspnea on exertion)  (primary encounter diagnosis)  Comment: OFFICE/OUTPT VISIT,EST,LEVL IV,CT chest showed no PE, TTE revealed normal EF. He was limited by his ALAS. PFTs were normal other than a mild diffusion deficit. 6 minute walk test did reveal mild desaturation at 6 minutes to 88%. He was advised he is out of shape. He was advised to walk more as much as possible, RTC prn worsening. With this he notes significant improvement in ALAS.  Plan: F/U prn.                (E03.8,  E06.3) Hypothyroidism due to Hashimoto's thyroiditis  Comment:on 137/150 QOD, TFTs WNL.   Plan:OFFICE/OUTPT VISIT,EST,LEVL IV, levothyroxine (SYNTHROID/LEVOTHROID)         137/150 MCG tablet, Check CBC with Platelets &         Differential, T3 Free, T4 free, TSH in 06/2025, RTC tow weeks later            (I10) Essential hypertension  Comment:At goal, but dizzy and BP low.   Plan: OFFICE/OUTPT VISIT,EST,LEVL IV, Decrease Toprol XL to 25 mg daily, o/w/ no BP med changes. RTC 06/2025 for BP check       (E78.5) Hyperlipidemia LDL goal <100  Comment: at goal now that zetia has been added. Check labs in 06/2025, RTC 2 weeks later  Plan: OFFICE/OUTPT VISIT,EST,LEVL IV, C-Reactive Protein, High Sensitivity, LipoFit         by NMR, Lipoprotein (a)             (R73.01) IFG (impaired fasting glucose)  Comment: avoid CHO  Plan: Check Albumin Random Urine Quantitative with Creat         Ratio, Comprehensive metabolic panel,         Hemoglobin A1c in  06/2025, RTC 2 weeks later     (D64.9) Anemia, unspecified type  Comment: Likely ACDs. Monitor.  Plan: OFFICE/OUTPT VISIT,EST,LEVL IV, Check Reticulocyte count, Iron & Iron Binding         Capacity, Transferrin, Ferritin, Vitamin B12,         Folate in 06/2025, RTC 2 weeks later              (I87.2) Venous (peripheral) insufficiency  Comment: Has CEAP C4 venous insufficiency, is wearing knee high compression hosiery. Likely has venous insufficiency in his thighs but does not want to wear thigh high compression.   Plan: OFFICE/OUTPT VISIT,EST,LEVL IV, Wear thigh high compression.      (R10.31) RLQ abdominal pain  Comment: Resolved  Plan: f/u prn     (R63.5) Weight gain  Comment: Check the below  Plan: OFFICE/OUTPT VISIT,EST,LEVL IV, Comprehensive metabolic panel, CBC with         Platelets & Differential, in 06/2025      (R20.2) Paresthesia  Comment: he has stocking glove paresthesiae. Start the below empirically. RTC 06/2025. If not better, undertake peripheral neuropathy w/u and BUE EMGs  Plan: OFFICE/OUTPT VISIT,EST,LEVL IV, gabapentin (NEURONTIN) 100 MG capsule                                The longitudinal care of plan for Ramiro was addressed during this visit. Due to added complexity of care, we will continue to support Ramiro Jeffers  and the subsequent management of these conditions and with ongoing continuity of care for these conditions.      Greater than one half the 35 minutes not spent on preventive care during this visit was spent providing Medical care regarding item(s) # 2 onward as delineated above.          HPI: Ramiro Jeffers is a 64 year old  obese male (current BMI 31.47)  With h/o single lifetime VTE presenting w/o PE with mislabel of PAF on event monitor causding prolonged AC who previously stopped AC at cardiology's direction during his recent hospitalization for HFpEF. He presented with a 30# weight gain and ansarca. He was diuresed off. This was not felt to be ischemic in nature. He had Demadex  added. He was discharged 7/19/23. He was doing much better until his 11/27/23 hospitalization.      He was admitted to [facility] 11/27/29 for dehydration, hyponatremia, hypokalemia , NADYA b/o continued antihypertensive use despite antecedent GI event of unclear etiology with since resolved n,v,d.  With rehydration, he rapidly improved. He is not SOB. NO CP, no ALAS. His BP at home today is 139/74 standing, 132/77 sitting, lying down 139/73. HR has been 58. He feels much better.     He recently developed 20# unintentional weight gain. He notes RLQ pain not in relation to eating with this.       Review Of Systems  Skin: negative  Eyes: negative  Ears/Nose/Throat: negative  Respiratory: No shortness of breath, dyspnea on exertion, cough, or hemoptysis  Cardiovascular: negative  Gastrointestinal: negative  Genitourinary: negative  Musculoskeletal: negative  Neurologic: negative  Psychiatric: negative  Hematologic/Lymphatic/Immunologic: negative  Endocrine: negative        PAST MEDICAL HISTORY:                  Past Medical History:   Diagnosis Date    ACQUIRED HYPOTHYROIDISM      Blood clot in the legs     CAD (coronary artery disease)     11/15/18 Cath: PCI with 3.5-16mm DORY to proximal LAD    Calculus of gallbladder without mention of cholecystitis or obstruction     Cataract     Chest pressure 11/05/2018    History of gastric bypass     Hyperlipidemia     Hypertension     Ischemic cardiomyopathy     EF 40-45% on 11/2018 Echo    Obesity, unspecified     significant weight loss w/diet alone, on 10-15-10, BMI was 56.4    pulmonary emboli 06/2010    Unprovoked large bilateral pulmonary emboli without source identified on lower extremity dopplers, pt had a transient moderate lupus inhibitor upon initial presentation in June , 2010 which was gone in September, 2010;  all other thrombophilic workup is normal    Pulmonary embolus, bilateral     PVC's (premature ventricular contractions)     4% burden on 11/2018 Holter    Shortness  of breath     on exertion    Venous insufficiency     Viral pneumonia, unspecified     Vitamin D deficiency        PAST SURGICAL HISTORY:                  Past Surgical History:   Procedure Laterality Date    COLONOSCOPY  11/17/2011    Procedure:COLONOSCOPY; colonoscopy; Surgeon:ELENA OROURKE; Location: GI    CV HEART CATHETERIZATION WITH POSSIBLE INTERVENTION Left 2/15/2019    Procedure: Coronary Angiogram;  Surgeon: Tulio Ortiz MD;  Location:  HEART CARDIAC CATH LAB    CV HEART CATHETERIZATION WITH POSSIBLE INTERVENTION N/A 10/5/2021    Procedure: Heart Catheterization with Possible Intervention;  Surgeon: Jose Francisco Dave MD;  Location:  HEART CARDIAC CATH LAB    CV LEFT HEART CATH N/A 10/5/2021    Procedure: Left Heart Cath;  Surgeon: Jose Francisco Dave MD;  Location:  HEART CARDIAC CATH LAB    CV LEFT VENTRICULOGRAM N/A 10/5/2021    Procedure: Left Ventriculogram;  Surgeon: Jose Francisco Dave MD;  Location:  HEART CARDIAC CATH LAB    LAPAROSCOPIC CHOLECYSTECTOMY  9/28/2012    Procedure: LAPAROSCOPIC CHOLECYSTECTOMY;  LAPAROSCOPIC CHOLECYSTECTOMY, LYSIS OF ADHESIONS;  Surgeon: Dutch Matta MD;  Location:  OR    LAPAROSCOPIC LYSIS ADHESIONS  9/28/2012    Procedure: LAPAROSCOPIC LYSIS ADHESIONS;;  Surgeon: Dutch Matta MD;  Location:  OR    LAPAROSCOPY, SURGICAL; REPAIR INCISIONAL OR VENTRAL HERNIA      repair of ventral strangulated surgery    PHACOEMULSIFICATION CLEAR CORNEA WITH STANDARD INTRAOCULAR LENS IMPLANT  12/19/2011    Procedure:PHACOEMULSIFICATION CLEAR CORNEA WITH STANDARD INTRAOCULAR LENS IMPLANT; RIGHT PHACOEMULSIFICATION CLEAR CORNEA WITH STANDARD INTRAOCULAR LENS IMPLANT ; Surgeon:ALLISON ANTONIO; Location:[facility] NONSPECIFIC PROCEDURE  10-    Laparoscopic gastric bypass,    [facility] NONSPECIFIC PROCEDURE  10-    1.  Attempted laparoscopic exploration with conversion to: .  Abdominal exploration. 3.  Reduction of incarcerated  incisional hernia. 4.  Repair of incisional hernia.5.  Gastrostomy tube placement (#22 Haitian) into defunctionalized stomach.6.  Enterotomy with bowel decompression and primary repair. 7.  Abdominal lavage.        CURRENT MEDICATIONS:                  Current Outpatient Medications   Medication Sig Dispense Refill    Ascorbic Acid (VITAMIN C) 500 MG CHEW Take 1 chew tab by mouth daily      aspirin (ASA) 81 MG chewable tablet Take 1 tablet (81 mg) by mouth daily (Patient not taking: Reported on 11/12/2024) 30 tablet 1    ezetimibe (ZETIA) 10 MG tablet Take 1 tablet (10 mg) by mouth daily. 90 tablet 1    isosorbide mononitrate (IMDUR) 30 MG 24 hr tablet Take 2 tablets (60 mg) by mouth daily. 180 tablet 2    levothyroxine (SYNTHROID/LEVOTHROID) 137 MCG tablet Take 137 mcg every other day alternating with 150 mcg PO alternating every other day. 135 tablet 0    lisinopril (ZESTRIL) 5 MG tablet TAKE 1 TABLET(5 MG) BY MOUTH DAILY 90 tablet 1    metoprolol succinate ER (TOPROL XL) 50 MG 24 hr tablet Take 1 tablet (50 mg) by mouth daily. Hold for BP < 100 90 tablet 0    nitroGLYcerin (NITROSTAT) 0.4 MG sublingual tablet For chest pain place 1 tablet under the tongue every 5 minutes for 3 doses. If symptoms persist 5 minutes after 1st dose call 911. 25 tablet 3    rosuvastatin (CRESTOR) 40 MG tablet Take 1 tablet (40 mg) by mouth daily. 90 tablet 0    torsemide (DEMADEX) 20 MG tablet Take 1 tablet (20 mg) by mouth daily. 90 tablet 1    Vitamin D3 (CHOLECALCIFEROL) 125 MCG (5000 UT) tablet Take 1 tablet by mouth daily (Patient not taking: Reported on 11/12/2024)         ALLERGIES:                  Allergies   Allergen Reactions    No Known Allergies        SOCIAL HISTORY:                  Social History     Socioeconomic History    Marital status: Single     Spouse name: Not on file    Number of children: Not on file    Years of education: Not on file    Highest education level: Not on file   Occupational History     Occupation:      Employer: SELF   Tobacco Use    Smoking status: Never    Smokeless tobacco: Never   Substance and Sexual Activity    Alcohol use: No     Alcohol/week: 0.0 standard drinks of alcohol    Drug use: No    Sexual activity: Not on file   Other Topics Concern    Parent/sibling w/ CABG, MI or angioplasty before 65F 55M? Not Asked   Social History Narrative    Not on file     Social Drivers of Health     Financial Resource Strain: Not on file   Food Insecurity: Not on file   Transportation Needs: Not on file   Physical Activity: Not on file   Stress: Not on file   Social Connections: Not on file   Interpersonal Safety: Not on file   Housing Stability: Not on file       FAMILY HISTORY:                   Family History   Problem Relation Age of Onset    Osteoporosis Mother     Thyroid Disease Mother         no thyroid    Cancer Maternal Grandmother         ? lung cancer    Cancer Father         melanoma    Coronary Artery Disease Brother     Coronary Artery Disease Brother          Physical exam Reveals:    O/P: WNL  HEENT: WNL  NECK: No JVD, thyromegaly, or lymphadenopathy  HEART: RRR, no murmurs, gallops, or rubs  LUNGS: CTA bilaterally without rales, wheezes, or rhonchi  GI: NABS, nondistended, nontender, soft  EXT:without cyanosis, clubbing, or edema  NEURO: nonfocal  : no flank tenderness         All relevant labs and imaging reviewed by myself on today's date.

## 2025-02-27 ENCOUNTER — MYC MEDICAL ADVICE (OUTPATIENT)
Dept: OTHER | Facility: CLINIC | Age: 65
End: 2025-02-27
Payer: COMMERCIAL

## 2025-02-27 DIAGNOSIS — I50.9 CONGESTIVE HEART FAILURE, UNSPECIFIED HF CHRONICITY, UNSPECIFIED HEART FAILURE TYPE (H): Primary | ICD-10-CM

## 2025-02-27 NOTE — TELEPHONE ENCOUNTER
Please see [employer] message below.    Routing to scheduling to coordinate the following:    In -person follow up with Dr Davis  Please schedule this today 1:10 pm opening- this time is held- please release hold if pt declines the time.      Appt note: Follow up for significant weight gain and difficulty ambulating.    Jeannie MENDOZA, RN    [facility]  [facility]  Office: [phone]  Fax: [phone]

## 2025-02-27 NOTE — TELEPHONE ENCOUNTER
Left voicemail for patient to call back to schedule appointment(s), provided telephone number for patient to call back to schedule.    In -person follow up with Dr Davis  Please schedule this today 1:10 pm opening- this time is held- please release hold if pt declines the time.      Appt note: Follow up for significant weight gain and difficulty ambulating.

## 2025-03-06 NOTE — TELEPHONE ENCOUNTER
[place] is out of [place] for the next month or so. He will call us back when he knows his return date to schedule his appointment.      Mindi Escudero MA

## 2025-06-23 NOTE — DISCHARGE SUMMARY
Patient : Rodrigue Adam Age: 89 year old Sex: female   MRN: [id number] Encounter Date: 6/23/2025      DIAGNOSTIC IMPRESSION(S)    1. Septic shock  (CMD)    2. Hypotension, unspecified hypotension type    3. Generalized weakness    4. Acute kidney injury (CMD)    5. Urinary tract infection without hematuria, site unspecified        HISTORY     History of Present Illness  This is a female with a history of urinary tract infection and recent ED visit presenting with weakness. The patient arrived in the ED by private vehicle, accompanied by her son.    The patient reports a general feeling of weakness, which has been ongoing for several weeks. This morning, she was unable to rise from her bed to reach her walker, a mobility aid she typically uses. She was previously seen in the ER and diagnosed with a urinary tract infection (UTI). She has since completed her course of antibiotics. She has been following up with her family doctor and has an upcoming appointment on 07/05/2025. Her appetite remains unchanged, but she acknowledges inadequate water intake. She experiences dry mouth but reports no fever, cough, or runny nose. She experienced episodes of nausea last night and this morning, but these have since resolved. She reports no diarrhea or painful urination. She is uncertain about any changes in the frequency or appearance of her urine. She also reports occasional dizziness upon standing and intermittent chills, with a significant episode occurring last night.    SOCIAL HISTORY  She resides with her son.    No Known Allergies    Current Facility-Administered Medications   Medication    sodium chloride 0.9 % injection 10 mL    sodium chloride 0.9 % injection 2 mL    sodium chloride (NORMAL SALINE) 0.9 % bolus 500 mL     Current Outpatient Medications   Medication Sig    ascorbic acid (Vitamin C) 250 MG tablet Take 250 mg by mouth daily.    gabapentin (NEURONTIN) 100 MG capsule Take 1 capsule by mouth in the morning  [facility]    Discharge Summary  Hospitalist    Date of Admission:  3/7/2017  Date of Discharge:  3/9/2017  2:21 PM  Discharging Provider: Amira Bauer PA-C    Discharge Diagnoses   Traumatic right-sided pneumothorax  Hypertension  Right lung nodules  Tyroid nodule  Hypothyroidism      History of Present Illness   Ramiro Jeffers is an 56 year old male with a past medical history of gastric bypass surgery, DVT/PE, HLD, chronic venous insufficiency and hypothyroidism who presented for evaluation after a fall with subsequent right-sided rib pain. The patient was carrying a small table down the stairs at his home when he tripped and fell down 8-9 stairs. He handed hard on his right side. He denied hitting his head or losing consciousness. He had trouble getting up, but after 15 minutes eventually was able to. He noticed significant right chest wall pain with associated shortness of breath and decided to present for evaluation. See H&P by Dr. Avendaño for additional information.     On day of discharge the patient is feeling well. His pain is controlled with oral medications. His shortness of breath is improved and he is maintaining appropriate oxygen saturations on room air.  He is ambulating at his baseline without difficulty. Plan to follow up early next week with Dr. Davis.     Hospital Course   Ramiro Jeffers was admitted on 3/7/2017.  The following problems were addressed during his hospitalization:    Small right sided pneumothorax secondary to fall down stairs  No fracture on CT, but likely has contusion of right chest wall. Xray was obtained 3/8 which showed small apical pneumothorax on the right. Repeat xray 3/9 showed improvement in pneumothorax. Patient was initially requiring 1-2L supplemental oxygen which was weaned prior to discharge. He continued to have right chest wall pain controlled with oral medications.   - Trauma surgery consulted and followed patient during admission. Ok to  and 1 capsule at noon and 1 capsule in the evening.    amLODIPine (NORVASC) 2.5 MG tablet Take 2 tablets by mouth daily. (5 mg daily)    traMADol (ULTRAM) 50 MG tablet Take 1 tablet by mouth every 8 hours as needed for Pain. (Patient not taking: Reported on 5/20/2025)    benazepril (LOTENSIN) 20 MG tablet TAKE 1 TABLET BY MOUTH TWICE  DAILY    sertraline (ZOLOFT) 25 MG tablet Take 1 tablet by mouth daily.    metoPROLOL tartrate (LOPRESSOR) 25 MG tablet Take 1 tablet by mouth in the morning and 1 tablet in the evening.    meloxicam (MOBIC) 15 MG tablet Take 1 tablet by mouth daily.    betamethasone valerate (VALISONE) 0.1 % ointment Apply affected ear twice a day as needed for dry itchy ear (Patient not taking: Reported on 5/20/2025)    lidocaine (XYLOCAINE) 5 % ointment Apply topically as needed (Apply approximately 3 inches to the affected area every 12 hours as needed for pain).    ACETAMINOPHEN PO Take 650 mg by mouth every 8 hours as needed.    amoxicillin (AMOXIL) 500 MG capsule 1 hour prior to dental work. (Patient not taking: Reported on 5/20/2025)    Polyethylene Glycol 3350 (MIRALAX PO) Take 17 g by mouth as needed.    CALCIUM CARBONATE PO Take 2 tablets by mouth daily. Calcium chews       Past Medical History:   Diagnosis Date    Benign essential HTN 05/2019    Cataract     Depression 2019    Diverticulitis X 2    most recent 2007    DJD (degenerative joint disease)     H/O bone density study 2002,2004,2008    all normal    Heart murmur 2009; ECHO normal    History of colon polyps     Normal cardiac stress test 5-2012, 3-2016    normal nuclear ST    Postmenopausal bleeding while on Prempro--w/u neg    S/P colonoscopy 2007,2009 in [place]    will need 5 yr f/u--polyps--2014    SVT (supraventricular tachycardia) (CMD) 07/2019    per Holter for racing heart, on Metoprolol per Dr Frank ( ECHO nl 7-2019)    Thoracic ascending aortic aneurysm (CMD) 05/2022    4.2 cm seen on ECHO 9-2019--roland 5-2022-- 3.6 cm     discharge per trauma as pneumothorax decreasing in size. No need for chest tube. Follow up PCP recommended next week.   - Pain controled with Tylenol and oxycodone. Patient discharged with prescription for oxycodone and stool softener for home.       Hypertension. Blood pressure well controlled through admission.   - Continued PTA HCTZ      Right lung nodules  Incidental finding on chest CT showing several small right lung nodules, largest measuring 9mm.  - Radiologist recommends outpatient f/u with serial CT f/u at 3 months for 2 years vs PET. Will defer to PCP.       Hypothyroidism  2.5 cm left thyroid nodule  - Continued Synthroid through admission  - defer further workup to PCP     This patient was seen and examined with Dr. Del Valle who agrees with the above plan.    Amira Bauer PA-C    Significant Results and Procedures   See below     Code Status   Full Code       Primary Care Physician   Addison Davis    Physical Exam   Temp: 98.2  F (36.8  C) Temp src: Oral BP: 121/63   Heart Rate: 67 Resp: 16 SpO2: 95 % O2 Device: None (Room air)    Vitals:    03/07/17 1428 03/07/17 2006   Weight: 94.3 kg (208 lb) 94.3 kg (208 lb)     Vital Signs with Ranges  Temp:  [97.9  F (36.6  C)-98.2  F (36.8  C)] 98.2  F (36.8  C)  Heart Rate:  [67-78] 67  Resp:  [16] 16  BP: (110-121)/(62-63) 121/63  SpO2:  [94 %-95 %] 95 %     Constitutional: Alert and oriented, sitting up in chair. Appears comfortable and is appropriately conversant.   ENT: normal cephalic, moist mucous membranes  Eyes:  Pupils are equal and reactive to light, EOMI  Respiratory: Lungs clear to auscultation bilaterally, no crackles or wheezing, no increased work of breathing  Cardiovascular: Regular rate and rhythm, no murmur is appreciated.   Extremities: chronic stasis changes lower extremities. Varicose veins.   MSK:  Tenderness to palpation of right chest wall, ribs. No crepitus or obvious deformity  GI: Normal active bowel sounds, abdomen  Tibial plateau fracture, right 2022    happened in airport in [place]    Toxoplasmosis chorioretinitis of left eye        Past Surgical History:   Procedure Laterality Date    Cardiac catherization  2005; Normal no intervention    Cataract extraction, bilateral  OD    Colonoscopy diagnostic  ,     roland 5 yrs--2014  hx of polyps    Cystourethroscopy  2024    D and c  2002    and hysteroscopy for postmenopausal bldg    Dexa bone density axial skeleton  2008    nl in [place], previous nls ,     Discission,2nd cataract,laser  OD     Yag Laser/Second Cataract    Extracapsular cataract removal w insert io lens prosth wo ecp  2011    right eye    Hb delivery vaginal      X 10    Total knee replacement  Left 2006       Family History   Problem Relation Age of Onset    Heart Mother     Diabetes Mother     Heart disease Mother     Stroke Father     Macular degeneration Sister     Cancer Sister         skin    Cataracts Sister     BRCA Untested Sister     Cancer, Breast Sister 60    Diabetes Sister     Cancer Brother         multiple myeloma    Other Brother         mine accident    NEGATIVE FAMILY HX OF Other        Social History     Tobacco Use    Smoking status: Former     Current packs/day: 0.00     Types: Cigarettes     Quit date: 1958     Years since quittin.8    Smokeless tobacco: Never   Vaping Use    Vaping status: never used   Substance Use Topics    Alcohol use: Not Currently     Comment: once in a while    Drug use: No       REVIEW OF SYSTEMS     Pertinent systems were reviewed and pertinent positive and negative items were documented in the HPI.    PHYSICAL EXAM     Physical Exam  Constitutional: Patient appears dry.    ED Triage Vitals [25 1009]   ED Triage Vitals Group      Temp 98.1 °F (36.7 °C)      Heart Rate (!) 102      Resp 20      BP (!) 88/51      SpO2 92 %      EtCO2 mmHg       Height 5' 5\" (1.651 m)      Weight 115 lb 4.8 oz (52.3 kg)  soft, non-tender  Skin/Integumen: warm, dry. No rash or ecchymosis. White plaques to elbows.   Neurologic: cranial nerves 2-12 grossly intact. No focal deficits.      Discharge Disposition   Discharged to home  Condition at discharge: Stable    Consultations This Hospital Stay   TRAUMA SURGERY IP CONSULT    Time Spent on this Encounter   I, Amira Bauer, personally saw the patient today and spent greater than 30 minutes discharging this patient.    Discharge Orders     Reason for your hospital stay   You were here for evaluation after a fall. Your chest xray showed that the fall caused a small air leak between your lung and chest wall called a pneumothorax. Repeat imaging showed that this had decreased in size.     Follow-up and recommended labs and tests    Follow up with primary care provider, Addison Davis, within 7 days. This appointment will be scheduled for you. Discuss ongoing symptom management as well as thyroid and lung nodules.     Activity   Your activity upon discharge: activity as tolerated. Be cautious to avoid additional trauma.     When to contact your care team   Seek medical evaluation if your pain or shortness of breath worsens.     Discharge Instructions   Continue Tylenol and Oxycodone for pain control, you should not drive on this medication and should be cautious as it can be sedating.     Full Code     Diet   Follow this diet upon discharge: Orders Placed This Encounter     Regular Diet Adult       Discharge Medications   Discharge Medication List as of 3/9/2017  2:06 PM      START taking these medications    Details   oxyCODONE (ROXICODONE) 5 MG IR tablet Take 1-2 tablets (5-10 mg) by mouth every 4 hours as needed for moderate to severe pain, Disp-20 tablet, R-0, Local Print      acetaminophen (TYLENOL) 325 MG tablet Take 2 tablets (650 mg) by mouth every 4 hours as needed for mild pain, Disp-100 tablet, R-0, OTC      senna-docusate (SENOKOT-S;PERICOLACE) 8.6-50 MG per       Weight Scale Used Scale in bed      BMI (Calculated) 19.19      IBW/kg (Calculated) 57       Physical Exam  Vitals and nursing note reviewed.   Constitutional:       General: She is awake. She is not in acute distress.     Appearance: She is ill-appearing. She is not toxic-appearing or diaphoretic.   HENT:      Head: Normocephalic and atraumatic.      Right Ear: Hearing and external ear normal.      Left Ear: Hearing and external ear normal.      Nose: Nose normal.      Mouth/Throat:      Mouth: Mucous membranes are dry.   Eyes:      General: Lids are normal. Vision grossly intact. Gaze aligned appropriately. No scleral icterus.        Right eye: No discharge.      Conjunctiva/sclera: Conjunctivae normal.   Cardiovascular:      Rate and Rhythm: Normal rate and regular rhythm.      Heart sounds: Normal heart sounds, S1 normal and S2 normal. No murmur heard.     No gallop.   Pulmonary:      Effort: Pulmonary effort is normal. No tachypnea, accessory muscle usage, respiratory distress or retractions.      Breath sounds: Normal breath sounds. No decreased air movement. No wheezing, rhonchi or rales.      Comments: Speaks in full sentences  Abdominal:      General: There is no distension.      Palpations: Abdomen is soft. There is no hepatomegaly or splenomegaly.      Tenderness: There is no abdominal tenderness. There is no guarding or rebound.   Musculoskeletal:      Right lower leg: No edema.      Left lower leg: No edema.   Skin:     General: Skin is warm and dry.      Capillary Refill: Capillary refill takes less than 2 seconds.      Coloration: Skin is not jaundiced or pale.      Comments: Poor skin turgor   Neurological:      Mental Status: She is alert and oriented to person, place, and time.   Psychiatric:         Attention and Perception: Attention and perception normal.         Mood and Affect: Affect normal.         Speech: Speech normal.         Behavior: Behavior normal. Behavior is cooperative.         tablet Take 1-2 tablets by mouth 2 times daily as needed (constipation ), Disp-100 tablet, R-0, OTC         CONTINUE these medications which have NOT CHANGED    Details   hydrochlorothiazide (HYDRODIURIL) 25 MG tablet Take 1 tablet (25 mg) by mouth every morning, Disp-90 tablet, R-3, E-Prescribe      pravastatin (PRAVACHOL) 20 MG tablet Take 1 tablet (20 mg) by mouth daily, Disp-90 tablet, R-3, E-Prescribe      levothyroxine (SYNTHROID, LEVOTHROID) 125 MCG tablet Take 1 tablet (125 mcg) by mouth daily, Disp-90 tablet, R-3, E-Prescribe      Cholecalciferol (VITAMIN D) 2000 UNITS tablet Take 1 tablet by mouth daily, Disp-100 tablet, R-12, E-Prescribe      cyanocobalamin 1000 MCG SUBL Place 1,000 mcg under the tongue daily, Historical      Multiple Vitamin (MULTI-VITAMIN) per tablet Take 1 tablet by mouth 2 times daily , Historical           Allergies   Allergies   Allergen Reactions     No Known Allergies      Data   Most Recent 3 CBC's:  Recent Labs   Lab Test  03/07/17   1530  12/27/16   1100  11/23/16   1102   WBC  6.7  5.7  9.0   HGB  14.6  14.1  13.3   MCV  92  93  92   PLT  154  137*  143*      Most Recent 3 BMP's:  Recent Labs   Lab Test  03/07/17   1530  12/27/16   1100  11/23/16   1102   NA  141  141  140   POTASSIUM  3.5  4.0  3.9   CHLORIDE  103  103  101   CO2  32  35*  35*   BUN  13  21  19   CR  0.90  0.79  0.80   ANIONGAP  6  3  4   GUERITA  8.9  8.6  9.0   GLC  106*  91  81     Most Recent 2 LFT's:  Recent Labs   Lab Test  03/07/17   1530  11/23/16   1102   AST  27  18   ALT  29  19   ALKPHOS  87  82   BILITOTAL  0.2  0.4     Most Recent INR's and Anticoagulation Dosing History:  Anticoagulation Dose History     Recent Dosing and Labs Latest Ref Rng & Units 11/30/2010 12/13/2010 12/20/2010 12/7/2011 9/13/2012 10/13/2014 12/27/2016    INR 0.86 - 1.14 - - - 1.13 1.07 1.01 1.06    INR - 3.0 2.0 2.7 - - - -    Chromogenic Factor 10 60 - 140 % - - - - - - -        Most Recent 3 Troponin's:No lab results   Thought Content: Thought content normal.         Cognition and Memory: Cognition normal.         Judgment: Judgment normal.           PROCEDURE(S)     Procedures    LAB RESULTS     Results for orders placed or performed during the hospital encounter of 06/23/25   Comprehensive Metabolic Panel    Specimen: Blood, Venous   Result Value Ref Range    Fasting Status      Sodium 131 (L) 135 - 145 mmol/L    Potassium 4.8 3.4 - 5.1 mmol/L    Chloride 100 97 - 110 mmol/L    Carbon Dioxide 21 21 - 32 mmol/L    Anion Gap 15 7 - 19 mmol/L    Glucose 105 (H) 70 - 99 mg/dL    BUN 64 (H) 6 - 20 mg/dL    Creatinine 2.20 (H) 0.51 - 0.95 mg/dL    Glomerular Filtration Rate 21 (L) >=60    BUN/Cr 29 (H) 7 - 25    Calcium 8.6 8.4 - 10.2 mg/dL    Bilirubin, Total 0.4 0.2 - 1.0 mg/dL    GOT/AST 38 (H) <=37 Units/L    GPT/ALT 40 <64 Units/L    Alkaline Phosphatase 138 (H) 45 - 117 Units/L    Albumin 2.6 (L) 3.4 - 5.0 g/dL    Protein, Total 8.7 (H) 6.4 - 8.2 g/dL    Globulin 6.1 (H) 2.0 - 4.0 g/dL    A/G Ratio 0.4 (L) 1.0 - 2.4   Procalcitonin    Specimen: Blood, Venous   Result Value Ref Range    Procalcitonin 103.54 (H) <0.10 ng/mL   Prothrombin Time (INR/PT)    Specimen: Blood, Venous   Result Value Ref Range    Protime- PT 11.9 (H) 9.7 - 11.8 sec    INR 1.1     Partial Thromboplastin Time (PTT)    Specimen: Blood, Venous   Result Value Ref Range    PTT 24 22 - 32 sec   Urinalysis & Reflex Microscopy With Culture If Indicated    Specimen: Urine, Catheterized - Straight   Result Value Ref Range    COLOR, URINALYSIS Yellow     APPEARANCE, URINALYSIS Clear     GLUCOSE, URINALYSIS Negative Negative mg/dL    BILIRUBIN, URINALYSIS Negative Negative    KETONES, URINALYSIS Negative Negative mg/dL    SPECIFIC GRAVITY, URINALYSIS 1.015 1.005 - 1.030    OCCULT BLOOD, URINALYSIS Small (A) Negative    PH, URINALYSIS 5.5 5.0 - 7.0    PROTEIN, URINALYSIS 30 (A) Negative mg/dL    UROBILINOGEN, URINALYSIS 0.2 0.2, 1.0 mg/dL    NITRITE, URINALYSIS  found.  Most Recent Cholesterol Panel:  Recent Labs   Lab Test  11/23/16   1102   CHOL  168   LDL  108*   HDL  51   TRIG  43     Most Recent 6 Bacteria Isolates From Any Culture (See EPIC Reports for Culture Details):  Recent Labs   Lab Test  07/29/11   1000  07/07/11   0040  01/28/11   0850  10/23/10   1615  10/23/10   1610   CULT  Heavy growth Bacteroides stercoralis Moderate growth Prevotella melaninogenica Moderate growth Peptostreptococcus asaccharolyticus Faxed final result to [id number] on 8511sh  Light growth Diptheroids Light growth Strain 2 Diptheroids Faxed final report to [phone] Dr. Matta on 8.4.11 / [place]  Moderate growth Escherichia coli Moderate growth Enterobacter amnigenus Light growth Streptococcus mitis Light growth Diptheroids FAXED .[phone] 07/10/11 [place]  >100,000 colonies/mL Escherichia coli  Moderate growth Escherichia coli  No growth after 6 days     Most Recent TSH, T4 and A1c Labs:  Recent Labs   Lab Test  11/23/16   1102   TSH  4.10*   T4  1.12   A1C  5.3     Results for orders placed or performed during the hospital encounter of 03/07/17   CT Chest/Abdomen/Pelvis w Contrast    Narrative    CT CHEST/ABDOMEN/PELVIS WITH CONTRAST   3/7/2017 4:04 PM     HISTORY: Right-sided chest wall/abdominal pain, trauma.    TECHNIQUE: 104 mL Isovue-370. Radiation dose for this scan was reduced  using automated exposure control, adjustment of the mA and/or kV  according to patient size, or iterative reconstruction technique.    COMPARISON: CT abdomen dated 10/13/2014    FINDINGS:   Chest: Coronary artery calcifications. There is a 2.5 cm partially  calcified left thyroid nodule. Small amount of asymmetric right  pleural thickening or fluid without significant overall effusion. Mild  right pneumothorax. No definite right rib fracture is appreciated.  There are small right lung nodules on images 32, 37, 38, 43, 23, 41.  The largest of these measures 9 mm (image 43). Depending on the  clinical  Positive (A) Negative    LEUKOCYTE ESTERASE, URINALYSIS Small (A) Negative    SQUAMOUS EPITHELIAL, URINALYSIS None Seen None Seen, 1 to 5 /hpf    ERYTHROCYTES, URINALYSIS None Seen None Seen, 1 to 2 /hpf    LEUKOCYTES, URINALYSIS 26 to 100 (A) None Seen, 1 to 5 /hpf    BACTERIA, URINALYSIS Large (A) None Seen /hpf    HYALINE CASTS, URINALYSIS None Seen None Seen, 1 to 5 /lpf    MUCUS Present    CBC with Automated Differential (performable only)    Specimen: Blood, Venous   Result Value Ref Range    WBC 11.3 (H) 4.2 - 11.0 K/mcL    RBC 3.41 (L) 4.00 - 5.20 mil/mcL    HGB 9.7 (L) 12.0 - 15.5 g/dL    HCT 30.6 (L) 36.0 - 46.5 %    MCV 89.7 78.0 - 100.0 fl    MCH 28.4 26.0 - 34.0 pg    MCHC 31.7 (L) 32.0 - 36.5 g/dL    RDW-CV 15.4 (H) 11.0 - 15.0 %    RDW-SD 51.0 (H) 39.0 - 50.0 fL     140 - 450 K/mcL    NRBC 0 <=0 /100 WBC    Neutrophil, Percent 95 %    Lymphocytes, Percent 3 %    Mono, Percent 1 %    Eosinophils, Percent 0 %    Basophils, Percent 0 %    Immature Granulocytes 1 %    Absolute Neutrophils 10.8 (H) 1.8 - 7.7 K/mcL    Absolute Lymphocytes 0.3 (L) 1.0 - 4.0 K/mcL    Absolute Monocytes 0.1 (L) 0.3 - 0.9 K/mcL    Absolute Eosinophils  0.0 0.0 - 0.5 K/mcL    Absolute Basophils 0.0 0.0 - 0.3 K/mcL    Absolute Immature Granulocytes 0.1 0.0 - 0.2 K/mcL   Lactic Acid Venous With Reflex    Specimen: Blood, Venous   Result Value Ref Range    Lactate, Venous 4.4 (HH) 0.0 - 2.0 mmol/L   Blood Culture    Specimen: Blood peripheral - IV Start   Result Value Ref Range    Culture, Blood or Bone Marrow No Growth <24 hours        RADIOLOGY RESULTS     Imaging Results              XR CHEST PA OR AP 1 VIEW (Final result)  Result time 06/23/25 11:14:21      Final result                   Impression:    IMPRESSION:      The heart size and central vasculature are stable. There is no evidence of  dense focal consolidation or significant effusion.    Electronically Signed by: Herbert Aggarwal MD  Signed on: 6/23/2025 11:14  scenario, serial CT followup (first at 3 months, total 2  years), versus PET scan should be performed.    Abdomen, pelvis: Changes of gastric surgery and cholecystectomy.  Lumbosacral spondylolytic spondylolisthesis. At the medial aspect of  the left ilium, there is a 1.4 cm lesion which has demonstrated slow  growth compared with June 2010. This is of unknown significance and is  too small to confidently characterize. Nothing else acute with respect  to the remaining upper abdominal organs.      Impression    IMPRESSION:  1. Mild right pneumothorax.  2. 2.5 cm left thyroid nodule.  3. Several right lung nodules. Follow-up recommended, as above.  4. 1.4 cm indeterminate sclerotic lesion of the medial left ilium.  This has slowly grown compared with June 2010. Continued periodic  surveillance recommended.    CLAUDIA BLOUNT MD   XR Chest 2 Views    Narrative    CHEST TWO VIEWS 3/8/2017 7:35 AM     HISTORY: Pneumothorax.    COMPARISON: Chest CT from 3/7/2017.    FINDINGS: Small apical pneumothorax on the right noted. No left  pneumothorax. There are no acute infiltrates. The cardiac silhouette  is not enlarged. Pulmonary vasculature is unremarkable.       Impression    IMPRESSION: Small right apical pneumothorax.    JOSE M BRENNER MD   XR Chest 2 Views    Narrative    CHEST TWO VIEWS  3/9/2017 7:57 AM     HISTORY: Chest trauma, fall.    COMPARISON: 3/8/2017.    FINDINGS: Improved right apical pneumothorax since the comparison  study. This is overall quite small. There are no acute infiltrates.  The cardiac silhouette is not enlarged. Pulmonary vasculature is  unremarkable.       Impression    IMPRESSION: Improved tiny right apical pneumothorax.    JOSE M BRENNER MD        AM  Created on Workstation ID: [id number]  Signed on Workstation ID: [id number]               Narrative:    EXAM: XR CHEST AP OR PA    INDICATION:  Fever    COMPARISON: 3/1/2016.    FINDINGS/                                    ED MEDICATIONS     ED Medication Orders (From admission, onward)      Ordered Start     Status Ordering Provider    06/23/25 1052 06/23/25 1053  ceFEPIme (MAXIPIME) 2,000 mg in sodium chloride 0.9 % 100 mL IVPB  ONCE         Last MAR action: Completed PANDA WOODY    06/23/25 1049 06/23/25 1050  sodium chloride (NORMAL SALINE) 0.9 % bolus 250 mL  (sodium chloride 0.9% sepsis fluid bolus )  ONCE        Placed in \"Followed by\" Linked Group    Last MAR action: [place] PANDA WOODY    06/23/25 1049 06/23/25 1049  sodium chloride (NORMAL SALINE) 0.9 % bolus 1,000 mL  (sodium chloride 0.9% sepsis fluid bolus )  ONCE        Placed in \"Followed by\" Linked Group    Last MAR action: Completed PANDA WOODY            ED COURSE     Vitals:    06/23/25 1144 06/23/25 1159 06/23/25 1214 06/23/25 1222   BP: 94/53 91/52 91/52 96/52   BP Location:       Patient Position:       Pulse: 76 80 81 77   Resp: (!) 11 17  15   Temp:       TempSrc:       SpO2: 94% 90% 95% 94%   Weight:       Height:           ED Course as of 06/23/25 1248   Mon Jun 23, 2025   1055 Independent review pending final cardiology interpretation: Normal sinus rhythm 90 bpm PVCs.  Otherwise normal EKG. [JK]   1115 PTT: 24 [JK]   1115 INR: 1.1 [JK]   1115 HGB(!): 9.7  baseline [JK]   1115 WBC(!): 11.3  Elevated without bandemia   [JK]   1115 3 SIRS criteria: HR, RR, BP. Sepsis order set utilized. [JK]   1122 Mental status normal. Would like PO water which we will get for her. SBP 84. Fluids and ABX going. She stated she was started on an antibiotic and it was changed regarding the UTI from 2 weeks ago. [JK]   1147 Critical lactic acid 4.4 called by lab. Patient receiving fluids and ABX. [JK]   3212 Creatinine(!): 2.20  ELVI. Last value was 0.8  [JK]   1148 BUN(!): 64  Pre renal azotemia with BUN/Cr ratio>20. Fluids running. [JK]   1150 SBP 94. MAP 69 [JK]   1200 Discussed case with Dr. Ojeda. Will admit to ICU for septic shock presumably from UTI. Cath UTI to be performed soon. [JK]   1200 PROCALCITONIN(!): 103.54 [JK]   1245 Straight cath UA demonstrates  WBC and large bacteria. Culture pending. + nitrite. [JK]      ED Course User Index  [JK] Bonifacio Welsh MD           Consults                ED Consults done in the ED course    Medical Decision Making                  Assessment & Plan  Initial Assessment:  Female patient presenting with weakness, inability to walk, dizziness, and a history of recent urinary tract infection treated with antibiotics.    Differential Diagnosis:  - Urinary tract infection: Recent treatment with antibiotics, persistent symptoms of shivering and weakness. Plan: Obtain urine culture, administer IV fluids.  - Dehydration: Reports not drinking enough fluids, dry mouth, low blood pressure. Plan: Administer IV fluids.  - Septic Shock with SBP 79 currently. IVF 30 mL/kg ordered as well as cultures and early empiric antibiotics. Cefepime ordered. Ucx 2 weeks ago grew our E. Coli resistant to many cephalosporins. Sensitive to cefepime.                     CRITICAL CARE       Due to the presence of sepsis and shock my attendance to this patient required critical care time of 34 minutes, including assessment/reassessment, documentation, ordering and interpreting ancillary studies, discussion with ED staff and consultants, patient and their family, and excludes time spent on separately billable procedures.        DISPOSITION       Clinical Impression and Diagnosis  12:02 PM 06/23/25     Diagnosis:   1. Septic shock  (CMD)    2. Hypotension, unspecified hypotension type    3. Generalized weakness    4. Acute kidney injury (CMD)    5. Urinary tract infection without hematuria, site unspecified                   Admit 6/23/2025 11:59  AM  Telemetry Bed?: No  Admitting Physician: NATALIE PANCHAL [[id number]]  Is this a telephone or verbal order?: This is a telephone order from the admitting physician                 Bonifacio Welsh MD  06/23/25 1202       Bonifacio Welsh MD  06/23/25 1240

## 2025-06-24 ENCOUNTER — MYC MEDICAL ADVICE (OUTPATIENT)
Dept: OTHER | Facility: CLINIC | Age: 65
End: 2025-06-24
Payer: COMMERCIAL

## 2025-06-24 ENCOUNTER — TELEPHONE (OUTPATIENT)
Dept: OTHER | Facility: CLINIC | Age: 65
End: 2025-06-24
Payer: COMMERCIAL

## 2025-06-24 NOTE — TELEPHONE ENCOUNTER
Writer returned call to patient who states he completed a cologuard test through his [facility] and he  has been experiencing anal pain,skinny stool, loose stools, intermittent abdominal pain, nausea and vomiting, decreased appetite for the past several weeks. The test came back abnormal and he would like an appointment with Dr Davis to further discuss his symptoms. He will attempt to attach results in [employer] message otherwise will bring them to the appointment for Dr Davis to review.     Routing to scheduling to coordinate the following:    In person or virtual follow up with Dr Davis- if pt schedules virtual he needs to make sure cologuard results are faxed or sent in [employer] message. If in-person he can bring the results with.     Please schedule this in 1-2 weeks     Appt note: Pt completed abnormal cologuard thru [facility]- c/o several symptoms    Jeannie MENDOZA, RN    [facility]  Office: [phone]  Fax: [phone]

## 2025-06-24 NOTE — TELEPHONE ENCOUNTER
Patient is scheduled for video visit on 07/02/25. Patient asked for a [employer] App message that included [facility] fax number to be sent so that he can get the information sent.

## 2025-06-24 NOTE — TELEPHONE ENCOUNTER
[facility]    Who is the name of the provider? Dr Davis    What is the location you see this provider at/preferred location? [place]    Person calling / Facility: Ramiro    Phone number: [phone]    Nurse call back needed:     Reason for call:  Pt had his Colorguard test come back with  positive results. Pt wa instructed to call his provider right away. Pt is having concerning symptoms.      Pharmacy location: N/A    Outside Imaging: N/A    Can we leave a detailed message on this number? Y    Additional Info:

## 2025-06-25 NOTE — TELEPHONE ENCOUNTER
Form printed and placed on Dr Gamez desrafal- also scanned to [facility]    Jeannie MENDOZA, RN    [facility]  Office: [phone]  Fax: [phone]

## 2025-06-30 ENCOUNTER — RESULTS FOLLOW-UP (OUTPATIENT)
Dept: OTHER | Facility: CLINIC | Age: 65
End: 2025-06-30

## 2025-07-02 ENCOUNTER — VIRTUAL VISIT (OUTPATIENT)
Dept: OTHER | Facility: CLINIC | Age: 65
End: 2025-07-02
Attending: INTERNAL MEDICINE
Payer: COMMERCIAL

## 2025-07-02 DIAGNOSIS — R19.5 POSITIVE COLORECTAL CANCER SCREENING USING COLOGUARD TEST: Primary | ICD-10-CM

## 2025-07-02 PROCEDURE — 98006 SYNCH AUDIO-VIDEO EST MOD 30: CPT | Performed by: INTERNAL MEDICINE

## 2025-07-02 NOTE — PROGRESS NOTES
VASCULAR MEDICINE FOLLOW UP VISIT          A/P:      A/P:    (R19.5) Positive colorectal cancer screening using Cologuard test  (primary encounter diagnosis)  Comment: Undertake colonoscopy. RTC thereafter.   Plan: Adult GI  Referral - Procedure Only              The longitudinal care of plan for Ramiro was addressed during this visit. Due to added complexity of care, we will continue to support Ramiro Jeffers  and the subsequent management of these conditions and with ongoing continuity of care for these conditions.        31 minutes total medical care on today's date.    MD location: office  Pt location: home    Phone call start: 16:15  Phone call end: 16:46       HPI:        Ramiro Jeffers is a 64 year old male who is presenting at the current time to discuss his diagnosi(es) of a positive Cologuard test. He has constipation and thin stools. He has mild normochromic normocytic anemia. His ferritin is low despite normal fe levels.         Review Of Systems  Skin: negative  Eyes: negative  Ears/Nose/Throat: negative  Respiratory: No shortness of breath, dyspnea on exertion, cough, or hemoptysis  Cardiovascular: negative  Gastrointestinal:as above  Genitourinary: negative  Musculoskeletal: negative  Neurologic: negative  Psychiatric: negative  Hematologic/Lymphatic/Immunologic: negative  Endocrine: negative        PAST MEDICAL HISTORY:                  Past Medical History:   Diagnosis Date    ACQUIRED HYPOTHYROIDISM      Blood clot in the legs     CAD (coronary artery disease)     11/15/18 Cath: PCI with 3.5-16mm DORY to proximal LAD    Calculus of gallbladder without mention of cholecystitis or obstruction     Cataract     Chest pressure 11/05/2018    History of gastric bypass     Hyperlipidemia     Hypertension     Ischemic cardiomyopathy     EF 40-45% on 11/2018 Echo    Obesity, unspecified     significant weight loss w/diet alone, on 10-15-10, BMI was 56.4    pulmonary emboli 06/2010    Unprovoked large  bilateral pulmonary emboli without source identified on lower extremity dopplers, pt had a transient moderate lupus inhibitor upon initial presentation in June , 2010 which was gone in September, 2010;  all other thrombophilic workup is normal    Pulmonary embolus, bilateral     PVC's (premature ventricular contractions)     4% burden on 11/2018 Holter    Shortness of breath     on exertion    Venous insufficiency     Viral pneumonia, unspecified     Vitamin D deficiency        PAST SURGICAL HISTORY:                  Past Surgical History:   Procedure Laterality Date    COLONOSCOPY  11/17/2011    Procedure:COLONOSCOPY; colonoscopy; Surgeon:ELENA OROURKE; Location: GI    CV HEART CATHETERIZATION WITH POSSIBLE INTERVENTION Left 2/15/2019    Procedure: Coronary Angiogram;  Surgeon: Tulio Ortiz MD;  Location:  HEART CARDIAC CATH LAB    CV HEART CATHETERIZATION WITH POSSIBLE INTERVENTION N/A 10/5/2021    Procedure: Heart Catheterization with Possible Intervention;  Surgeon: Jose Francisco Dave MD;  Location:  HEART CARDIAC CATH LAB    CV LEFT HEART CATH N/A 10/5/2021    Procedure: Left Heart Cath;  Surgeon: Jose Francisco Dave MD;  Location:  HEART CARDIAC CATH LAB    CV LEFT VENTRICULOGRAM N/A 10/5/2021    Procedure: Left Ventriculogram;  Surgeon: Jose Francisco Dave MD;  Location:  HEART CARDIAC CATH LAB    LAPAROSCOPIC CHOLECYSTECTOMY  9/28/2012    Procedure: LAPAROSCOPIC CHOLECYSTECTOMY;  LAPAROSCOPIC CHOLECYSTECTOMY, LYSIS OF ADHESIONS;  Surgeon: Dutch Matta MD;  Location:  OR    LAPAROSCOPIC LYSIS ADHESIONS  9/28/2012    Procedure: LAPAROSCOPIC LYSIS ADHESIONS;;  Surgeon: Dutch Matta MD;  Location:  OR    LAPAROSCOPY, SURGICAL; REPAIR INCISIONAL OR VENTRAL HERNIA      repair of ventral strangulated surgery    PHACOEMULSIFICATION CLEAR CORNEA WITH STANDARD INTRAOCULAR LENS IMPLANT  12/19/2011    Procedure:PHACOEMULSIFICATION CLEAR CORNEA WITH STANDARD INTRAOCULAR LENS  IMPLANT; RIGHT PHACOEMULSIFICATION CLEAR CORNEA WITH STANDARD INTRAOCULAR LENS IMPLANT ; Surgeon:ALLISON ANTONIO; Location:[facility] NONSPECIFIC PROCEDURE  10-    Laparoscopic gastric bypass,    [facility] NONSPECIFIC PROCEDURE  10-    1.  Attempted laparoscopic exploration with conversion to: .  Abdominal exploration. 3.  Reduction of incarcerated incisional hernia. 4.  Repair of incisional hernia.5.  Gastrostomy tube placement (#22 [place]) into defunctionalized stomach.6.  Enterotomy with bowel decompression and primary repair. 7.  Abdominal lavage.        CURRENT MEDICATIONS:                  Current Outpatient Medications   Medication Sig Dispense Refill    Ascorbic Acid (VITAMIN C) 500 MG CHEW Take 1 chew tab by mouth daily      ezetimibe (ZETIA) 10 MG tablet Take 1 tablet (10 mg) by mouth daily. 90 tablet 3    gabapentin (NEURONTIN) 100 MG capsule Take 1 capsule (100 mg) by mouth 3 times daily. 270 capsule 3    isosorbide mononitrate (IMDUR) 30 MG 24 hr tablet Take 2 tablets (60 mg) by mouth daily. 180 tablet 3    levothyroxine (SYNTHROID/LEVOTHROID) 137 MCG tablet Take 1 tablet (137 mcg) by mouth daily. Take 137 mcg every other day alternating with 150 mcg PO alternating every other day. 45 tablet 3    levothyroxine (SYNTHROID/LEVOTHROID) 150 MCG tablet Take 137 mcg every other day alternating with 150 mcg PO alternating every other day. 45 tablet 3    lisinopril (ZESTRIL) 5 MG tablet Take 1 tablet (5 mg) by mouth daily. 90 tablet 3    metoprolol succinate ER (TOPROL XL) 25 MG 24 hr tablet Take 1 tablet (25 mg) by mouth daily. 90 tablet 3    nitroGLYcerin (NITROSTAT) 0.4 MG sublingual tablet For chest pain place 1 tablet under the tongue every 5 minutes for 3 doses. If symptoms persist 5 minutes after 1st dose call 911. 25 tablet 3    rosuvastatin (CRESTOR) 40 MG tablet Take 1 tablet (40 mg) by mouth daily. 90 tablet 3    torsemide (DEMADEX) 20 MG tablet Take 1 tablet (20 mg) by mouth daily. 90  tablet 3       ALLERGIES:                  Allergies   Allergen Reactions    No Known Allergies        SOCIAL HISTORY:                  Social History     Socioeconomic History    Marital status: Single     Spouse name: Not on file    Number of children: Not on file    Years of education: Not on file    Highest education level: Not on file   Occupational History    Occupation:      Employer: SELF   Tobacco Use    Smoking status: Never    Smokeless tobacco: Never   Substance and Sexual Activity    Alcohol use: No     Alcohol/week: 0.0 standard drinks of alcohol    Drug use: No    Sexual activity: Not on file   Other Topics Concern    Parent/sibling w/ CABG, MI or angioplasty before 65F 55M? Not Asked   Social History Narrative    Not on file     Social Drivers of Health     Financial Resource Strain: Not on file   Food Insecurity: Not on file   Transportation Needs: Not on file   Physical Activity: Not on file   Stress: Not on file   Social Connections: Not on file   Interpersonal Safety: Not on file   Housing Stability: Not on file       FAMILY HISTORY:                   Family History   Problem Relation Age of Onset    Osteoporosis Mother     Thyroid Disease Mother         no thyroid    Cancer Maternal Grandmother         ? lung cancer    Cancer Father         melanoma    Coronary Artery Disease Brother     Coronary Artery Disease Brother            Physical exam was not undertaken as this was a billable video visit.            All relevant labs and imaging reviewed by myself on today's date.

## 2025-07-09 ENCOUNTER — TRANSFERRED RECORDS (OUTPATIENT)
Dept: ADMINISTRATIVE | Facility: CLINIC | Age: 65
End: 2025-07-09
Payer: COMMERCIAL

## 2025-07-09 ENCOUNTER — TRANSFERRED RECORDS (OUTPATIENT)
Dept: HEALTH INFORMATION MANAGEMENT | Facility: CLINIC | Age: 65
End: 2025-07-09
Payer: COMMERCIAL

## 2025-07-09 NOTE — PROCEDURES
[facility]   [address] [place] [address], [address], [place] [address]     Patient Name: Ramiro Jeffers  Gender:   Male  Exam Date: 07/09/2025 Visit Number:   [id number]  Age: 64 Years 11 Months YOB: 1960  Attending MD: Malik Polo MD Medical Record#:   [id number]  -----------------------------------------------------------------------------------------------------------------------------   Procedure: Colonoscopy   Indications: Screening after positive non-invasive stool test  Referring MD: Addison Davis MD  Primary MD:      Addison Davis MD   Medications: Admitting Medications:   0.9% Normal Saline at [facility]   Intra Procedure Medications:   Patient received monitored anesthesia care.     Complications: No immediate complications  ______________________________________________________________________________  Procedure:   An examination of the heart and lungs was performed and found to be within acceptable limits.  The patient was therefore deemed a reasonable candidate for endoscopy and monitored anesthesia care.   The risks, benefits and plan of the procedure were discussed with the patient and/or patient representative and all questions were answered.  After obtaining informed consent, the patient received monitored anesthesia care and I passed the scope   without difficulty via the rectum to the ileum.  The appendiceal orifice and ic valve were identified.  The scope was retroflexed during the examination  The quality of the prep was good  (Dionte/Gat Double Split).    This was a complete examination throughout the entire colon.    Findings:    Normal finding.  Location - ileum.    Hemorrhoids.  Internal hemorrhoids without bleeding.    The entire colon was normal.    Impression:  Hemorrhoids, internal     Plan  Repeat colonoscopy in 10 years.  If you have signs or symptoms of lower GI illness or a new diagnosis of colon cancer in an immediate family member, you should contact your  GI provider or your  primary provider to discuss whether your next exam should be repeated sooner.    We will attempt to contact you at appropriate intervals via [place] mail.  We may not be able to find you or contact you at that time, therefore you should know that the responsibility for following our recommendation rests with you.  If you don't hear from us at the time your procedure is due, please contact our office to schedule an appointment.  If your contact information should change, please contact our office so that we can update your records.        Electronically signed by:___________________  Malik Polo MD                 07/09/2025    Medications:  Medication Dose Sig Description PRN Status PRN Reason Comments   ezetimibe 10 mg tablet 10 mg take 1 tablet by oral route  every day N  taking as directed   gabapentin 100 mg capsule 100 mg take 2 capsule by oral route 3 times every day N     isosorbide mononitrate ER 30 mg tablet,extended release 24 hr 30 mg take 1 tablet by oral route  every day in the morning N  taking as directed   levothyroxine 137 mcg capsule 137 mcg take 1 capsule by oral route  every day N  taking as directed   metoprolol succinate ER 25 mg tablet,extended release 24 hr 25 mg take 1 tablet by oral route  every day N  taking as directed   nitroglycerin 0.4 mg sublingual tablet 0.4 mg place 1 tablet by sublingual route  every 5 minutes as needed for chest pain. Do not exceed 3 doses in 15 minutes. as needed Y  taking as directed   rosuvastatin 40 mg tablet 40 mg take 1 tablet by oral route  every day N  taking as directed   torsemide 20 mg tablet 20 mg take 1 tablet by oral route  every day N  taking as directed     Allergies:  Medication Name Ingredient Reaction Comment    NO KNOWN ALLERGIES       Vital Signs:  Date Time Systolic Diastolic Height Weight BMI   07/09/2025 1:06  64 69 in 202.99 30.00     Race:  White  Ethnicity:  Unknown  Preferred Language:  English      cc:  Addison Davis MD  cc: Addison Davis MD        [facility] [phone]

## 2025-07-10 ENCOUNTER — LAB (OUTPATIENT)
Dept: LAB | Facility: CLINIC | Age: 65
End: 2025-07-10
Payer: COMMERCIAL

## 2025-07-10 DIAGNOSIS — D64.9 ANEMIA, UNSPECIFIED TYPE: ICD-10-CM

## 2025-07-10 DIAGNOSIS — R73.01 IFG (IMPAIRED FASTING GLUCOSE): ICD-10-CM

## 2025-07-10 DIAGNOSIS — E78.5 HYPERLIPIDEMIA LDL GOAL <70: ICD-10-CM

## 2025-07-10 DIAGNOSIS — E06.3 HYPOTHYROIDISM DUE TO HASHIMOTO'S THYROIDITIS: ICD-10-CM

## 2025-07-10 DIAGNOSIS — Z00.00 ANNUAL PHYSICAL EXAM: ICD-10-CM

## 2025-07-10 LAB
ALBUMIN SERPL BCG-MCNC: 4.1 G/DL (ref 3.5–5.2)
ALP SERPL-CCNC: 80 U/L (ref 40–150)
ALT SERPL W P-5'-P-CCNC: 23 U/L (ref 0–70)
ANION GAP SERPL CALCULATED.3IONS-SCNC: 11 MMOL/L (ref 7–15)
APO A-I SERPL-MCNC: 14 MG/DL
AST SERPL W P-5'-P-CCNC: 39 U/L (ref 0–45)
BASOPHILS # BLD AUTO: 0 10E3/UL (ref 0–0.2)
BASOPHILS NFR BLD AUTO: 1 %
BILIRUB SERPL-MCNC: 0.4 MG/DL
BUN SERPL-MCNC: 13.7 MG/DL (ref 8–23)
CALCIUM SERPL-MCNC: 9.3 MG/DL (ref 8.8–10.4)
CHLORIDE SERPL-SCNC: 102 MMOL/L (ref 98–107)
CREAT SERPL-MCNC: 0.74 MG/DL (ref 0.67–1.17)
CRP SERPL HS-MCNC: 0.16 MG/L
EGFRCR SERPLBLD CKD-EPI 2021: >90 ML/MIN/1.73M2
EOSINOPHIL # BLD AUTO: 0.1 10E3/UL (ref 0–0.7)
EOSINOPHIL NFR BLD AUTO: 1 %
ERYTHROCYTE [DISTWIDTH] IN BLOOD BY AUTOMATED COUNT: 13.7 % (ref 10–15)
EST. AVERAGE GLUCOSE BLD GHB EST-MCNC: 117 MG/DL
FERRITIN SERPL-MCNC: 53 NG/ML (ref 31–409)
GLUCOSE SERPL-MCNC: 88 MG/DL (ref 70–99)
HBA1C MFR BLD: 5.7 % (ref 0–5.6)
HCO3 SERPL-SCNC: 25 MMOL/L (ref 22–29)
HCT VFR BLD AUTO: 41.5 % (ref 40–53)
HGB BLD-MCNC: 13.6 G/DL (ref 13.3–17.7)
IMM GRANULOCYTES # BLD: 0 10E3/UL
IMM GRANULOCYTES NFR BLD: 0 %
IRON BINDING CAPACITY (ROCHE): 299 UG/DL (ref 240–430)
IRON SATN MFR SERPL: 12 % (ref 15–46)
IRON SERPL-MCNC: 37 UG/DL (ref 61–157)
LYMPHOCYTES # BLD AUTO: 1.3 10E3/UL (ref 0.8–5.3)
LYMPHOCYTES NFR BLD AUTO: 28 %
MCH RBC QN AUTO: 28.3 PG (ref 26.5–33)
MCHC RBC AUTO-ENTMCNC: 32.8 G/DL (ref 31.5–36.5)
MCV RBC AUTO: 86 FL (ref 78–100)
MONOCYTES # BLD AUTO: 0.4 10E3/UL (ref 0–1.3)
MONOCYTES NFR BLD AUTO: 9 %
NEUTROPHILS # BLD AUTO: 2.7 10E3/UL (ref 1.6–8.3)
NEUTROPHILS NFR BLD AUTO: 61 %
PLATELET # BLD AUTO: 145 10E3/UL (ref 150–450)
POTASSIUM SERPL-SCNC: 4 MMOL/L (ref 3.4–5.3)
PROT SERPL-MCNC: 7.2 G/DL (ref 6.4–8.3)
PSA SERPL DL<=0.01 NG/ML-MCNC: <0.01 NG/ML (ref 0–4.5)
RBC # BLD AUTO: 4.81 10E6/UL (ref 4.4–5.9)
SODIUM SERPL-SCNC: 138 MMOL/L (ref 135–145)
T3FREE SERPL-MCNC: 1.9 PG/ML (ref 2–4.4)
T4 FREE SERPL-MCNC: 1.58 NG/DL (ref 0.9–1.7)
TSH SERPL DL<=0.005 MIU/L-ACNC: 0.62 UIU/ML (ref 0.3–4.2)
WBC # BLD AUTO: 4.4 10E3/UL (ref 4–11)

## 2025-07-14 LAB
CHOLEST SERPL-MCNC: 143 MG/DL
HDL SERPL QN: 9.5 NM
HDL SERPL-SCNC: 35.2 UMOL/L
HDLC SERPL-MCNC: 50 MG/DL
HLD.LARGE SERPL-SCNC: 9.6 UMOL/L
LDL SERPL QN: 20.6 NM
LDL SERPL-SCNC: 972 NMOL/L
LDL SMALL SERPL-SCNC: 548 NMOL/L
LDLC SERPL CALC-MCNC: 84 MG/DL
PATHOLOGY STUDY: ABNORMAL
TRIGL SERPL-MCNC: 45 MG/DL
VLDL LARGE SERPL-SCNC: 1.6 NMOL/L
VLDL SERPL QN: 48.3 NM

## 2025-07-29 NOTE — PROGRESS NOTES
Cardiology Clinic Progress Note  Ramiro Jeffers MRN# [id number]   YOB: 1960 Age: 64 year old         Primary Cardiologist: Dr. Key       Assessment:     1.  CAD - s/p PCI to prox LAD in 2018  - angiogram 2021 with patent LAD stent, mild stenosis of D2 with normal coronary arteries elsewhere  - he describes some recent severe central / right sided chest pain, along with worsening ALAS, and he uses SL nitroglycerin periodically. Will arrange for nuclear stress test. He is also getting an echocardiogram tomorrow.   - continue rosuvastatin 40 mg daily + Zetia, Toprol 25 mg daily, Imdur 60 mg daily  - LDL goal <55, last LDL 84, patient will discuss further therapy with Dr. Davis, he may benefit from PCSK9 inhibitor   - he stopped his ASA a year ago due to bleeding with minor cuts, we discussed he would benefit from restarting ASA 81 mg daily given Hx of CAD. Platelets are a bit low at 145k but not prohibitory for ASA.     2. HFpEF with chronic LE edema - on Torsemide 20 mg daily    3. Hx of PE - no longer on anticoagulation    4. Heart palpitations - brief runs of PSVT noted on Zio monitor 12/2024, lasting up to 14 seconds. Continue Toprol 25 mg daily.      5. Obesity BMI 31 - Hx of gastric bypass       Plan:   - continue current medications for now   - you would benefit from baby Aspirin daily, discuss aspirin and your cholesterol therapy with Dr. Davis  - schedule nuclear stress test   - if no significant abnormalities on echocardiogram or stress test can follow up in 1 year       [facility]        HPI:   Ramiro Jeffers is a  64 year old male with PMH significant for CAD s/p PCI to prox LAD in 2018, bilateral PE, HFpEF, Hx of short runs of possible atrial fibrillation diagnosed 2022 on event monitoring, however subsequently evaluated by EP during inpatient stay with no convincing evidence of atrial fibrillation that was sustained.  As such anticoagulation was  discontinued    Patient presents today for follow up in cardiology clinic and was last seen by Dr. Key in 11/2024 at which time he was having episodic palpitations. Zio monitor 12/2024 demonstrated no atrial fibrillation, rare PVCs and 104 brief runs of PSVT (longest 14 seconds). He was continue on Toprol 25 mg daily.     Today, patient reports he has had some episodes of chest pain. His most recent episode was fairly severe, it occurred in the center / right side of his chest. He decided not to take a SL nitroglycerin that time and pain resolved on it owns. He has take SL nitroglycerin at other times however. 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 He has been noting increasing ALAS with hills. He also notes some periodic chest fluttering. He follows a heart healthy diet.       ROS:   Please see pertinent positives in HPI      Medications:   Current Outpatient Medications   Medication Sig Dispense Refill    Ascorbic Acid (VITAMIN C) 500 MG CHEW Take 1 chew tab by mouth daily      ezetimibe (ZETIA) 10 MG tablet Take 1 tablet (10 mg) by mouth daily. 90 tablet 3    gabapentin (NEURONTIN) 100 MG capsule Take 1 capsule (100 mg) by mouth 3 times daily. 270 capsule 3    isosorbide mononitrate (IMDUR) 30 MG 24 hr tablet Take 2 tablets (60 mg) by mouth daily. 180 tablet 3    levothyroxine (SYNTHROID/LEVOTHROID) 137 MCG tablet Take 1 tablet (137 mcg) by mouth daily. Take 137 mcg every other day alternating with 150 mcg PO alternating every other day. 45 tablet 3    levothyroxine (SYNTHROID/LEVOTHROID) 150 MCG tablet Take 137 mcg every other day alternating with 150 mcg PO alternating every other day. 45 tablet 3    lisinopril (ZESTRIL) 5 MG tablet Take 1 tablet (5 mg) by mouth daily. 90 tablet 3    metoprolol succinate ER (TOPROL XL) 25 MG 24 hr tablet Take 1 tablet (25 mg) by mouth daily. 90 tablet 3    nitroGLYcerin (NITROSTAT) 0.4 MG sublingual  "tablet For chest pain place 1 tablet under the tongue every 5 minutes for 3 doses. If symptoms persist 5 minutes after 1st dose call 911. 25 tablet 3    rosuvastatin (CRESTOR) 40 MG tablet Take 1 tablet (40 mg) by mouth daily. 90 tablet 3    torsemide (DEMADEX) 20 MG tablet Take 1 tablet (20 mg) by mouth daily. 90 tablet 3         Physical Exam:     Vitals: /70 (BP Location: Left arm, Patient Position: Sitting)   Pulse 87   Ht 1.753 m (5' 9\")   Wt 91.9 kg (202 lb 11.2 oz)   SpO2 97%   BMI 29.93 kg/m       Wt Readings from Last 4 Encounters:   02/13/25 97.1 kg (214 lb)   11/12/24 96.7 kg (213 lb 1.6 oz)   11/29/23 78.7 kg (173 lb 8 oz)   07/20/23 80.8 kg (178 lb 3.2 oz)       GEN: NAD   C/V:  Regular rate and rhythm, no murmur.    RESP: Respirations are unlabored. Clear to auscultation bilaterally without wheezing, rales, or rhonchi.  EXT: No pitting LE edema.      Data:     Last lab work personally reviewed     Echo: 6/2024  Left ventricular size, wall motion and function are normal. The ejection  fraction is 55-60%.  Right ventricular function, chamber size, wall motion, and thickness are  normal.  No significant valvular abnormalities were noted.  Grade II or moderate diastolic dysfunction. Diastolic Doppler findings (E/E'  ratio and/or other parameters) suggest left ventricular filling pressures are  indeterminate.  This study was compared with the study from 7/18/23: No significant changes  noted.    o 12/2024  Patient had a min HR of 45 bpm, max HR of 171 bpm, and avg HR of 68 bpm. Predominant underlying rhythm was Sinus Rhythm. 104 Supraventricular Tachycardia runs occurred, the run with the fastest interval lasting 10.2 secs with a max rate of 171 bpm, the longest lasting 14.1 secs with an avg rate of 115 bpm. Isolated SVEs were rare (<1.0%), SVE Couplets were rare (<1.0%), and SVE Triplets were rare (<1.0%). Isolated VEs were rare (<1.0%, 4695), VE Couplets were rare (<1.0%, 76), and VE Triplets " were rare (<1.0%, 1). Ventricular Trigeminy was present.     Symptoms reported (93 episodes) were mostly related to sinus rhythm with rare PVCs.  Monitoring period: 13 day(s)     Angiogram: 2021  1.  Widely patent proximal LAD stent.  2.  Mild stenosis at the ostium of D2.  Normal coronary arteries elsewhere.  3.  Normal LVEF by ventriculogram.  No regional WMA noted.  4.  Normal LVEDP.

## 2025-07-30 ENCOUNTER — OFFICE VISIT (OUTPATIENT)
Dept: CARDIOLOGY | Facility: CLINIC | Age: 65
End: 2025-07-30
Payer: COMMERCIAL

## 2025-07-30 VITALS
BODY MASS INDEX: 30.02 KG/M2 | HEIGHT: 69 IN | OXYGEN SATURATION: 97 % | HEART RATE: 87 BPM | WEIGHT: 202.7 LBS | SYSTOLIC BLOOD PRESSURE: 113 MMHG | DIASTOLIC BLOOD PRESSURE: 70 MMHG

## 2025-07-30 DIAGNOSIS — I25.119 CORONARY ARTERY DISEASE INVOLVING NATIVE CORONARY ARTERY OF NATIVE HEART WITH ANGINA PECTORIS: ICD-10-CM

## 2025-07-30 DIAGNOSIS — R07.9 CHEST PAIN, UNSPECIFIED TYPE: Primary | ICD-10-CM

## 2025-07-30 DIAGNOSIS — R00.2 PALPITATIONS: ICD-10-CM

## 2025-07-30 PROCEDURE — 3078F DIAST BP <80 MM HG: CPT | Performed by: PHYSICIAN ASSISTANT

## 2025-07-30 PROCEDURE — 99214 OFFICE O/P EST MOD 30 MIN: CPT | Performed by: PHYSICIAN ASSISTANT

## 2025-07-30 PROCEDURE — 3074F SYST BP LT 130 MM HG: CPT | Performed by: PHYSICIAN ASSISTANT

## 2025-07-30 NOTE — LETTER
7/30/2025    Addison Davis MD  [address]  [place] [address]    RE: Ramiro eJffers       Dear Colleague,     I had the pleasure of seeing Ramiro Jeffers in the [facility].  Cardiology Clinic Progress Note  Ramiro Jeffers MRN# [id number]   YOB: 1960 Age: 64 year old         Primary Cardiologist: Dr. Key       Assessment:     1.  CAD - s/p PCI to prox LAD in 2018  - angiogram 2021 with patent LAD stent, mild stenosis of D2 with normal coronary arteries elsewhere  - he describes some recent severe central / right sided chest pain, along with worsening ALAS, and he uses SL nitroglycerin periodically. Will arrange for nuclear stress test. He is also getting an echocardiogram tomorrow.   - continue rosuvastatin 40 mg daily + Zetia, Toprol 25 mg daily, Imdur 60 mg daily  - LDL goal <55, last LDL 84, patient will discuss further therapy with Dr. Davis, he may benefit from PCSK9 inhibitor   - he stopped his ASA a year ago due to bleeding with minor cuts, we discussed he would benefit from restarting ASA 81 mg daily given Hx of CAD. Platelets are a bit low at 145k but not prohibitory for ASA.     2. HFpEF with chronic LE edema - on Torsemide 20 mg daily    3. Hx of PE - no longer on anticoagulation    4. Heart palpitations - brief runs of PSVT noted on Zio monitor 12/2024, lasting up to 14 seconds. Continue Toprol 25 mg daily.      5. Obesity BMI 31 - Hx of gastric bypass       Plan:   - continue current medications for now   - you would benefit from baby Aspirin daily, discuss aspirin and your cholesterol therapy with Dr. Davis  - schedule nuclear stress test   - if no significant abnormalities on echocardiogram or stress test can follow up in 1 year       [facility]        HPI:   Ramiro Jeffers is a  64 year old male with PMH significant for CAD s/p PCI to prox LAD in 2018, bilateral PE, HFpEF, Hx of short runs of possible atrial  fibrillation diagnosed 2022 on event monitoring, however subsequently evaluated by EP during inpatient stay with no convincing evidence of atrial fibrillation that was sustained.  As such anticoagulation was discontinued    Patient presents today for follow up in cardiology clinic and was last seen by Dr. Key in 11/2024 at which time he was having episodic palpitations. Zio monitor 12/2024 demonstrated no atrial fibrillation, rare PVCs and 104 brief runs of PSVT (longest 14 seconds). He was continue on Toprol 25 mg daily.     Today, patient reports he has had some episodes of chest pain. His most recent episode was fairly severe, it occurred in the center / right side of his chest. He decided not to take a SL nitroglycerin that time and pain resolved on it owns. He has take SL nitroglycerin at other times however. 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 He has been noting increasing ALAS with hills. He also notes some periodic chest fluttering. He follows a heart healthy diet.       ROS:   Please see pertinent positives in HPI      Medications:   Current Outpatient Medications   Medication Sig Dispense Refill     Ascorbic Acid (VITAMIN C) 500 MG CHEW Take 1 chew tab by mouth daily       ezetimibe (ZETIA) 10 MG tablet Take 1 tablet (10 mg) by mouth daily. 90 tablet 3     gabapentin (NEURONTIN) 100 MG capsule Take 1 capsule (100 mg) by mouth 3 times daily. 270 capsule 3     isosorbide mononitrate (IMDUR) 30 MG 24 hr tablet Take 2 tablets (60 mg) by mouth daily. 180 tablet 3     levothyroxine (SYNTHROID/LEVOTHROID) 137 MCG tablet Take 1 tablet (137 mcg) by mouth daily. Take 137 mcg every other day alternating with 150 mcg PO alternating every other day. 45 tablet 3     levothyroxine (SYNTHROID/LEVOTHROID) 150 MCG tablet Take 137 mcg every other day alternating with 150 mcg PO alternating every other day. 45 tablet 3     lisinopril (ZESTRIL) 5 MG  "tablet Take 1 tablet (5 mg) by mouth daily. 90 tablet 3     metoprolol succinate ER (TOPROL XL) 25 MG 24 hr tablet Take 1 tablet (25 mg) by mouth daily. 90 tablet 3     nitroGLYcerin (NITROSTAT) 0.4 MG sublingual tablet For chest pain place 1 tablet under the tongue every 5 minutes for 3 doses. If symptoms persist 5 minutes after 1st dose call 911. 25 tablet 3     rosuvastatin (CRESTOR) 40 MG tablet Take 1 tablet (40 mg) by mouth daily. 90 tablet 3     torsemide (DEMADEX) 20 MG tablet Take 1 tablet (20 mg) by mouth daily. 90 tablet 3         Physical Exam:     Vitals: /70 (BP Location: Left arm, Patient Position: Sitting)   Pulse 87   Ht 1.753 m (5' 9\")   Wt 91.9 kg (202 lb 11.2 oz)   SpO2 97%   BMI 29.93 kg/m       Wt Readings from Last 4 Encounters:   02/13/25 97.1 kg (214 lb)   11/12/24 96.7 kg (213 lb 1.6 oz)   11/29/23 78.7 kg (173 lb 8 oz)   07/20/23 80.8 kg (178 lb 3.2 oz)       GEN: NAD   C/V:  Regular rate and rhythm, no murmur.    RESP: Respirations are unlabored. Clear to auscultation bilaterally without wheezing, rales, or rhonchi.  EXT: No pitting LE edema.      Data:     Last lab work personally reviewed     Echo: 6/2024  Left ventricular size, wall motion and function are normal. The ejection  fraction is 55-60%.  Right ventricular function, chamber size, wall motion, and thickness are  normal.  No significant valvular abnormalities were noted.  Grade II or moderate diastolic dysfunction. Diastolic Doppler findings (E/E'  ratio and/or other parameters) suggest left ventricular filling pressures are  indeterminate.  This study was compared with the study from 7/18/23: No significant changes  noted.    o 12/2024  Patient had a min HR of 45 bpm, max HR of 171 bpm, and avg HR of 68 bpm. Predominant underlying rhythm was Sinus Rhythm. 104 Supraventricular Tachycardia runs occurred, the run with the fastest interval lasting 10.2 secs with a max rate of 171 bpm, the longest lasting 14.1 secs " with an avg rate of 115 bpm. Isolated SVEs were rare (<1.0%), SVE Couplets were rare (<1.0%), and SVE Triplets were rare (<1.0%). Isolated VEs were rare (<1.0%, 4695), VE Couplets were rare (<1.0%, 76), and VE Triplets were rare (<1.0%, 1). Ventricular Trigeminy was present.     Symptoms reported (93 episodes) were mostly related to sinus rhythm with rare PVCs.  Monitoring period: 13 day(s)     Angiogram: 2021  1.  Widely patent proximal LAD stent.  2.  Mild stenosis at the ostium of D2.  Normal coronary arteries elsewhere.  3.  Normal LVEF by ventriculogram.  No regional WMA noted.  4.  Normal LVEDP.          Thank you for allowing me to participate in the care of your patient.      Sincerely,     Lizz Painting PA-C     [facility]  cc:   Tereza Key MD  [address]  [place] [address]

## 2025-07-30 NOTE — PATIENT INSTRUCTIONS
Thank you for your visit with the [facility] today.    Today's plan:   - continue current medications for now   - you would benefit from baby Aspirin daily  - discuss aspirin and your cholesterol with Dr. Davis  - schedule nuclear stress test   - if no significant abnormalities on echocardiogram or stress test can follow up in 1 year     If you have questions or concerns please call the nurse team at [phone] or send a [employer] message.     Scheduling phone number: [phone]    It was a pleasure seeing you today!     Lizz Painting PA-C   [facility]

## 2025-07-31 ENCOUNTER — HOSPITAL ENCOUNTER (OUTPATIENT)
Dept: CARDIOLOGY | Facility: CLINIC | Age: 65
End: 2025-07-31
Attending: INTERNAL MEDICINE
Payer: COMMERCIAL

## 2025-07-31 DIAGNOSIS — I50.9 CONGESTIVE HEART FAILURE, UNSPECIFIED HF CHRONICITY, UNSPECIFIED HEART FAILURE TYPE (H): ICD-10-CM

## 2025-07-31 LAB — LVEF ECHO: NORMAL

## 2025-07-31 PROCEDURE — 255N000002 HC RX 255 OP 636: Performed by: INTERNAL MEDICINE

## 2025-07-31 PROCEDURE — 999N000208 ECHOCARDIOGRAM COMPLETE

## 2025-07-31 RX ADMIN — HUMAN ALBUMIN MICROSPHERES AND PERFLUTREN 9 ML (DILUTED): 10; .22 INJECTION, SOLUTION INTRAVENOUS at 10:28

## 2025-08-07 ENCOUNTER — HOSPITAL ENCOUNTER (OUTPATIENT)
Dept: CARDIOLOGY | Facility: CLINIC | Age: 65
End: 2025-08-07
Attending: PHYSICIAN ASSISTANT
Payer: COMMERCIAL

## 2025-08-07 VITALS
BODY MASS INDEX: 29.92 KG/M2 | HEIGHT: 69 IN | SYSTOLIC BLOOD PRESSURE: 112 MMHG | DIASTOLIC BLOOD PRESSURE: 60 MMHG | OXYGEN SATURATION: 98 % | WEIGHT: 202 LBS | HEART RATE: 57 BPM

## 2025-08-07 DIAGNOSIS — I25.119 CORONARY ARTERY DISEASE INVOLVING NATIVE CORONARY ARTERY OF NATIVE HEART WITH ANGINA PECTORIS: ICD-10-CM

## 2025-08-07 DIAGNOSIS — R07.9 CHEST PAIN, UNSPECIFIED TYPE: ICD-10-CM

## 2025-08-07 LAB
CV BLOOD PRESSURE: 48 MMHG
CV STRESS MAX HR HE: 88
NUC STRESS EJECTION FRACTION: 55 %
RATE PRESSURE PRODUCT: NORMAL
STRESS ECHO BASELINE DIASTOLIC HE: 58
STRESS ECHO BASELINE HR: 58 BPM
STRESS ECHO BASELINE SYSTOLIC BP: 122
STRESS ECHO CALCULATED PERCENT HR: 57 %
STRESS ECHO LAST STRESS DIASTOLIC BP: 54
STRESS ECHO LAST STRESS SYSTOLIC BP: 132
STRESS ECHO TARGET HR: 155
STRESS/REST PERFUSION RATIO: 1.14

## 2025-08-07 PROCEDURE — 93017 CV STRESS TEST TRACING ONLY: CPT

## 2025-08-07 PROCEDURE — A9502 TC99M TETROFOSMIN: HCPCS | Performed by: PHYSICIAN ASSISTANT

## 2025-08-07 PROCEDURE — 343N000001 HC RX 343 MED OP 636: Performed by: PHYSICIAN ASSISTANT

## 2025-08-07 RX ADMIN — TETROFOSMIN 11.7 MILLICURIE: 1.38 INJECTION, POWDER, LYOPHILIZED, FOR SOLUTION INTRAVENOUS at 10:08

## 2025-08-07 RX ADMIN — TETROFOSMIN 3.91 MILLICURIE: 1.38 INJECTION, POWDER, LYOPHILIZED, FOR SOLUTION INTRAVENOUS at 08:22

## 2025-08-14 ENCOUNTER — TELEPHONE (OUTPATIENT)
Dept: OTHER | Facility: CLINIC | Age: 65
End: 2025-08-14

## 2025-08-14 ENCOUNTER — OFFICE VISIT (OUTPATIENT)
Dept: OTHER | Facility: CLINIC | Age: 65
End: 2025-08-14
Attending: INTERNAL MEDICINE
Payer: COMMERCIAL

## 2025-08-14 VITALS — SYSTOLIC BLOOD PRESSURE: 112 MMHG | DIASTOLIC BLOOD PRESSURE: 62 MMHG | HEART RATE: 65 BPM

## 2025-08-14 DIAGNOSIS — E06.3 HYPOTHYROIDISM DUE TO HASHIMOTO'S THYROIDITIS: ICD-10-CM

## 2025-08-14 DIAGNOSIS — E78.5 HYPERLIPIDEMIA LDL GOAL <55: Primary | ICD-10-CM

## 2025-08-14 DIAGNOSIS — R20.2 PARESTHESIA: ICD-10-CM

## 2025-08-14 DIAGNOSIS — R20.2 PARESTHESIAS: ICD-10-CM

## 2025-08-14 DIAGNOSIS — I25.118 CORONARY ARTERY DISEASE OF NATIVE ARTERY OF NATIVE HEART WITH STABLE ANGINA PECTORIS: ICD-10-CM

## 2025-08-14 DIAGNOSIS — E78.5 HYPERLIPIDEMIA LDL GOAL <70: ICD-10-CM

## 2025-08-14 DIAGNOSIS — D64.9 ANEMIA, UNSPECIFIED TYPE: ICD-10-CM

## 2025-08-14 DIAGNOSIS — I10 ESSENTIAL HYPERTENSION: ICD-10-CM

## 2025-08-14 DIAGNOSIS — I50.9 CONGESTIVE HEART FAILURE, UNSPECIFIED HF CHRONICITY, UNSPECIFIED HEART FAILURE TYPE (H): ICD-10-CM

## 2025-08-14 DIAGNOSIS — R73.01 IFG (IMPAIRED FASTING GLUCOSE): ICD-10-CM

## 2025-08-14 PROCEDURE — G0463 HOSPITAL OUTPT CLINIC VISIT: HCPCS | Performed by: INTERNAL MEDICINE

## 2025-08-14 RX ORDER — LISINOPRIL 5 MG/1
5 TABLET ORAL DAILY
Qty: 90 TABLET | Refills: 3 | Status: SHIPPED | OUTPATIENT
Start: 2025-08-14

## 2025-08-14 RX ORDER — TORSEMIDE 20 MG/1
20 TABLET ORAL DAILY
Qty: 90 TABLET | Refills: 3 | Status: SHIPPED | OUTPATIENT
Start: 2025-08-14

## 2025-08-14 RX ORDER — EZETIMIBE 10 MG/1
10 TABLET ORAL DAILY
Qty: 90 TABLET | Refills: 3 | Status: SHIPPED | OUTPATIENT
Start: 2025-08-14

## 2025-08-14 RX ORDER — METOPROLOL SUCCINATE 25 MG/1
25 TABLET, EXTENDED RELEASE ORAL DAILY
Qty: 90 TABLET | Refills: 3 | Status: SHIPPED | OUTPATIENT
Start: 2025-08-14

## 2025-08-14 RX ORDER — ISOSORBIDE MONONITRATE 30 MG/1
60 TABLET, EXTENDED RELEASE ORAL DAILY
Qty: 180 TABLET | Refills: 3 | Status: SHIPPED | OUTPATIENT
Start: 2025-08-14

## 2025-08-14 RX ORDER — LEVOTHYROXINE SODIUM 150 UG/1
150 TABLET ORAL DAILY
Qty: 90 TABLET | Refills: 3 | Status: SHIPPED | OUTPATIENT
Start: 2025-08-14

## 2025-08-14 RX ORDER — ROSUVASTATIN CALCIUM 40 MG/1
40 TABLET, COATED ORAL DAILY
Qty: 90 TABLET | Refills: 3 | Status: SHIPPED | OUTPATIENT
Start: 2025-08-14

## (undated) DEVICE — DEFIB PRO-PADZ LVP LQD GEL ADULT 8900-2105-01

## (undated) DEVICE — CATH ANGIO INFINITI 3DRC 4FRX100CM 538476

## (undated) DEVICE — CATH ANGIO INFINITI PIGTAIL 145 6 SH 6FRX110CM  534-652S

## (undated) DEVICE — INTRO SHEATH 4FRX10CM PINNACLE RSS402

## (undated) DEVICE — SLEEVE TR BAND RADIAL COMPRESSION DEVICE 24CM TRB24-REG

## (undated) DEVICE — KIT HAND CONTROL ANGIOTOUCH ACIST 65CM AT-P65

## (undated) DEVICE — CATH ANGIO JUDKINS R4 6FRX100CM INFINITI 534621T

## (undated) DEVICE — CATH DIAGNOSTIC RADIAL 5FR TIG 4.0

## (undated) DEVICE — TOTE ANGIO CORP PC15AT SAN32CC83O

## (undated) DEVICE — INTRO GLIDESHEATH SLENDER 6FR 10X45CM 60-1060

## (undated) DEVICE — MANIFOLD KIT ANGIO AUTOMATED 014613

## (undated) DEVICE — NDL PERC ENTRY THINWALL 18GA 7.0" G00166

## (undated) DEVICE — CATH DIAG 4FR JL 4.5 538417

## (undated) DEVICE — WIRE GUIDE 0.035"X260CM SAFE-T-J EXCHANGE G00517

## (undated) RX ORDER — POTASSIUM CHLORIDE 1500 MG/1
TABLET, EXTENDED RELEASE ORAL
Status: DISPENSED
Start: 2018-11-15

## (undated) RX ORDER — FENTANYL CITRATE 50 UG/ML
INJECTION, SOLUTION INTRAMUSCULAR; INTRAVENOUS
Status: DISPENSED
Start: 2019-02-15

## (undated) RX ORDER — NITROGLYCERIN 5 MG/ML
VIAL (ML) INTRAVENOUS
Status: DISPENSED
Start: 2018-11-15

## (undated) RX ORDER — HEPARIN SODIUM 200 [USP'U]/100ML
INJECTION, SOLUTION INTRAVENOUS
Status: DISPENSED
Start: 2021-10-05

## (undated) RX ORDER — CLOPIDOGREL 300 MG/1
TABLET, FILM COATED ORAL
Status: DISPENSED
Start: 2018-11-15

## (undated) RX ORDER — FENTANYL CITRATE 50 UG/ML
INJECTION, SOLUTION INTRAMUSCULAR; INTRAVENOUS
Status: DISPENSED
Start: 2021-10-05

## (undated) RX ORDER — ASPIRIN 81 MG/1
TABLET, CHEWABLE ORAL
Status: DISPENSED
Start: 2021-10-05

## (undated) RX ORDER — VERAPAMIL HYDROCHLORIDE 2.5 MG/ML
INJECTION, SOLUTION INTRAVENOUS
Status: DISPENSED
Start: 2018-11-15

## (undated) RX ORDER — REGADENOSON 0.08 MG/ML
INJECTION, SOLUTION INTRAVENOUS
Status: DISPENSED
Start: 2018-10-30

## (undated) RX ORDER — POTASSIUM CHLORIDE 1500 MG/1
TABLET, EXTENDED RELEASE ORAL
Status: DISPENSED
Start: 2021-10-05

## (undated) RX ORDER — LIDOCAINE HYDROCHLORIDE 10 MG/ML
INJECTION, SOLUTION EPIDURAL; INFILTRATION; INTRACAUDAL; PERINEURAL
Status: DISPENSED
Start: 2021-10-05

## (undated) RX ORDER — LIDOCAINE HYDROCHLORIDE 10 MG/ML
INJECTION, SOLUTION EPIDURAL; INFILTRATION; INTRACAUDAL; PERINEURAL
Status: DISPENSED
Start: 2019-02-15

## (undated) RX ORDER — HEPARIN SODIUM 1000 [USP'U]/ML
INJECTION, SOLUTION INTRAVENOUS; SUBCUTANEOUS
Status: DISPENSED
Start: 2019-02-15

## (undated) RX ORDER — HEPARIN SODIUM 1000 [USP'U]/ML
INJECTION, SOLUTION INTRAVENOUS; SUBCUTANEOUS
Status: DISPENSED
Start: 2021-10-05

## (undated) RX ORDER — REGADENOSON 0.08 MG/ML
INJECTION, SOLUTION INTRAVENOUS
Status: DISPENSED
Start: 2021-09-20

## (undated) RX ORDER — HEPARIN SODIUM 1000 [USP'U]/ML
INJECTION, SOLUTION INTRAVENOUS; SUBCUTANEOUS
Status: DISPENSED
Start: 2018-11-15

## (undated) RX ORDER — NITROGLYCERIN 5 MG/ML
VIAL (ML) INTRAVENOUS
Status: DISPENSED
Start: 2021-10-05

## (undated) RX ORDER — LIDOCAINE HYDROCHLORIDE 10 MG/ML
INJECTION, SOLUTION EPIDURAL; INFILTRATION; INTRACAUDAL; PERINEURAL
Status: DISPENSED
Start: 2018-11-15

## (undated) RX ORDER — FENTANYL CITRATE 50 UG/ML
INJECTION, SOLUTION INTRAMUSCULAR; INTRAVENOUS
Status: DISPENSED
Start: 2018-11-15

## (undated) RX ORDER — ACETAMINOPHEN 325 MG/1
TABLET ORAL
Status: DISPENSED
Start: 2018-11-15

## (undated) RX ORDER — VERAPAMIL HYDROCHLORIDE 2.5 MG/ML
INJECTION, SOLUTION INTRAVENOUS
Status: DISPENSED
Start: 2021-10-05